# Patient Record
Sex: MALE | Race: WHITE | Employment: OTHER | URBAN - METROPOLITAN AREA
[De-identification: names, ages, dates, MRNs, and addresses within clinical notes are randomized per-mention and may not be internally consistent; named-entity substitution may affect disease eponyms.]

---

## 2021-11-19 ENCOUNTER — HOSPITAL ENCOUNTER (INPATIENT)
Age: 76
LOS: 13 days | Discharge: HOME HEALTH | DRG: 871 | End: 2021-12-02
Attending: EMERGENCY MEDICINE | Admitting: INTERNAL MEDICINE
Payer: MEDICARE

## 2021-11-19 ENCOUNTER — APPOINTMENT (OUTPATIENT)
Dept: CT IMAGING | Age: 76
DRG: 871 | End: 2021-11-19
Attending: EMERGENCY MEDICINE
Payer: MEDICARE

## 2021-11-19 ENCOUNTER — APPOINTMENT (OUTPATIENT)
Dept: CT IMAGING | Age: 76
DRG: 871 | End: 2021-11-19
Attending: INTERNAL MEDICINE
Payer: MEDICARE

## 2021-11-19 DIAGNOSIS — K81.0 ACUTE CHOLECYSTITIS: Primary | ICD-10-CM

## 2021-11-19 DIAGNOSIS — D72.829 LEUKOCYTOSIS, UNSPECIFIED TYPE: ICD-10-CM

## 2021-11-19 PROBLEM — I26.99 ACUTE PULMONARY EMBOLISM WITHOUT ACUTE COR PULMONALE (HCC): Status: ACTIVE | Noted: 2021-11-19

## 2021-11-19 PROBLEM — E87.20 LACTIC ACIDOSIS: Status: ACTIVE | Noted: 2021-11-19

## 2021-11-19 PROBLEM — I10 PRIMARY HYPERTENSION: Status: ACTIVE | Noted: 2021-11-19

## 2021-11-19 PROBLEM — N17.9 AKI (ACUTE KIDNEY INJURY) (HCC): Status: ACTIVE | Noted: 2021-11-19

## 2021-11-19 LAB
ALBUMIN SERPL-MCNC: 3.5 G/DL (ref 3.2–4.6)
ALBUMIN/GLOB SERPL: 1 {RATIO} (ref 1.2–3.5)
ALP SERPL-CCNC: 79 U/L (ref 50–136)
ALT SERPL-CCNC: 32 U/L (ref 12–65)
ANION GAP SERPL CALC-SCNC: 8 MMOL/L (ref 7–16)
APTT PPP: >200 SEC (ref 24.1–35.1)
AST SERPL-CCNC: 15 U/L (ref 15–37)
BASOPHILS # BLD: 0 K/UL (ref 0–0.2)
BASOPHILS NFR BLD: 0 % (ref 0–2)
BILIRUB SERPL-MCNC: 0.6 MG/DL (ref 0.2–1.1)
BUN SERPL-MCNC: 29 MG/DL (ref 8–23)
CALCIUM SERPL-MCNC: 9.5 MG/DL (ref 8.3–10.4)
CHLORIDE SERPL-SCNC: 110 MMOL/L (ref 98–107)
CO2 SERPL-SCNC: 25 MMOL/L (ref 21–32)
CREAT SERPL-MCNC: 1.7 MG/DL (ref 0.8–1.5)
DIFFERENTIAL METHOD BLD: ABNORMAL
EOSINOPHIL # BLD: 0 K/UL (ref 0–0.8)
EOSINOPHIL # BLD: 0.1 K/UL (ref 0–0.8)
EOSINOPHIL # BLD: 0.1 K/UL (ref 0–0.8)
EOSINOPHIL NFR BLD: 0 % (ref 0.5–7.8)
ERYTHROCYTE [DISTWIDTH] IN BLOOD BY AUTOMATED COUNT: 15.1 % (ref 11.9–14.6)
ERYTHROCYTE [DISTWIDTH] IN BLOOD BY AUTOMATED COUNT: 15.1 % (ref 11.9–14.6)
ERYTHROCYTE [DISTWIDTH] IN BLOOD BY AUTOMATED COUNT: 15.2 % (ref 11.9–14.6)
GLOBULIN SER CALC-MCNC: 3.4 G/DL (ref 2.3–3.5)
GLUCOSE SERPL-MCNC: 104 MG/DL (ref 65–100)
HCT VFR BLD AUTO: 42.6 % (ref 41.1–50.3)
HCT VFR BLD AUTO: 42.6 % (ref 41.1–50.3)
HCT VFR BLD AUTO: 44.4 % (ref 41.1–50.3)
HGB BLD-MCNC: 13.6 G/DL (ref 13.6–17.2)
HGB BLD-MCNC: 14 G/DL (ref 13.6–17.2)
HGB BLD-MCNC: 14.7 G/DL (ref 13.6–17.2)
IMM GRANULOCYTES # BLD AUTO: 0.1 K/UL (ref 0–0.5)
IMM GRANULOCYTES NFR BLD AUTO: 1 % (ref 0–5)
LACTATE SERPL-SCNC: 2.1 MMOL/L (ref 0.4–2)
LIPASE SERPL-CCNC: 84 U/L (ref 73–393)
LYMPHOCYTES # BLD: 0.8 K/UL (ref 0.5–4.6)
LYMPHOCYTES # BLD: 1 K/UL (ref 0.5–4.6)
LYMPHOCYTES # BLD: 1.2 K/UL (ref 0.5–4.6)
LYMPHOCYTES NFR BLD: 5 % (ref 13–44)
LYMPHOCYTES NFR BLD: 6 % (ref 13–44)
LYMPHOCYTES NFR BLD: 6 % (ref 13–44)
MCH RBC QN AUTO: 29.1 PG (ref 26.1–32.9)
MCH RBC QN AUTO: 29.9 PG (ref 26.1–32.9)
MCH RBC QN AUTO: 30.3 PG (ref 26.1–32.9)
MCHC RBC AUTO-ENTMCNC: 31.9 G/DL (ref 31.4–35)
MCHC RBC AUTO-ENTMCNC: 32.9 G/DL (ref 31.4–35)
MCHC RBC AUTO-ENTMCNC: 33.1 G/DL (ref 31.4–35)
MCV RBC AUTO: 90.8 FL (ref 79.6–97.8)
MCV RBC AUTO: 91 FL (ref 79.6–97.8)
MCV RBC AUTO: 91.5 FL (ref 79.6–97.8)
MONOCYTES # BLD: 1.1 K/UL (ref 0.1–1.3)
MONOCYTES NFR BLD: 6 % (ref 4–12)
MONOCYTES NFR BLD: 7 % (ref 4–12)
MONOCYTES NFR BLD: 7 % (ref 4–12)
NEUTS SEG # BLD: 13.8 K/UL (ref 1.7–8.2)
NEUTS SEG # BLD: 14.7 K/UL (ref 1.7–8.2)
NEUTS SEG # BLD: 16.5 K/UL (ref 1.7–8.2)
NEUTS SEG NFR BLD: 85 % (ref 43–78)
NEUTS SEG NFR BLD: 87 % (ref 43–78)
NEUTS SEG NFR BLD: 87 % (ref 43–78)
NRBC # BLD: 0 K/UL (ref 0–0.2)
PLATELET # BLD AUTO: 185 K/UL (ref 150–450)
PLATELET # BLD AUTO: 186 K/UL (ref 150–450)
PLATELET # BLD AUTO: 224 K/UL (ref 150–450)
PMV BLD AUTO: 10 FL (ref 9.4–12.3)
PMV BLD AUTO: 10.2 FL (ref 9.4–12.3)
PMV BLD AUTO: 10.4 FL (ref 9.4–12.3)
POTASSIUM SERPL-SCNC: 3.7 MMOL/L (ref 3.5–5.1)
PROT SERPL-MCNC: 6.9 G/DL (ref 6.3–8.2)
RBC # BLD AUTO: 4.68 M/UL (ref 4.23–5.6)
RBC # BLD AUTO: 4.69 M/UL (ref 4.23–5.6)
RBC # BLD AUTO: 4.85 M/UL (ref 4.23–5.6)
SODIUM SERPL-SCNC: 143 MMOL/L (ref 138–145)
UFH PPP CHRO-ACNC: 0.96 IU/ML (ref 0.3–0.7)
WBC # BLD AUTO: 16.2 K/UL (ref 4.3–11.1)
WBC # BLD AUTO: 16.8 K/UL (ref 4.3–11.1)
WBC # BLD AUTO: 19 K/UL (ref 4.3–11.1)

## 2021-11-19 PROCEDURE — 96374 THER/PROPH/DIAG INJ IV PUSH: CPT

## 2021-11-19 PROCEDURE — 74011000258 HC RX REV CODE- 258: Performed by: EMERGENCY MEDICINE

## 2021-11-19 PROCEDURE — 74011250636 HC RX REV CODE- 250/636: Performed by: EMERGENCY MEDICINE

## 2021-11-19 PROCEDURE — 83690 ASSAY OF LIPASE: CPT

## 2021-11-19 PROCEDURE — 86580 TB INTRADERMAL TEST: CPT | Performed by: INTERNAL MEDICINE

## 2021-11-19 PROCEDURE — 74011000258 HC RX REV CODE- 258: Performed by: INTERNAL MEDICINE

## 2021-11-19 PROCEDURE — 87040 BLOOD CULTURE FOR BACTERIA: CPT

## 2021-11-19 PROCEDURE — 36415 COLL VENOUS BLD VENIPUNCTURE: CPT

## 2021-11-19 PROCEDURE — 93005 ELECTROCARDIOGRAM TRACING: CPT | Performed by: EMERGENCY MEDICINE

## 2021-11-19 PROCEDURE — 2709999900 HC NON-CHARGEABLE SUPPLY

## 2021-11-19 PROCEDURE — 65270000029 HC RM PRIVATE

## 2021-11-19 PROCEDURE — 85520 HEPARIN ASSAY: CPT

## 2021-11-19 PROCEDURE — 83605 ASSAY OF LACTIC ACID: CPT

## 2021-11-19 PROCEDURE — 74011250637 HC RX REV CODE- 250/637: Performed by: INTERNAL MEDICINE

## 2021-11-19 PROCEDURE — 85730 THROMBOPLASTIN TIME PARTIAL: CPT

## 2021-11-19 PROCEDURE — 74011000636 HC RX REV CODE- 636: Performed by: EMERGENCY MEDICINE

## 2021-11-19 PROCEDURE — 80053 COMPREHEN METABOLIC PANEL: CPT

## 2021-11-19 PROCEDURE — 99284 EMERGENCY DEPT VISIT MOD MDM: CPT

## 2021-11-19 PROCEDURE — 85025 COMPLETE CBC W/AUTO DIFF WBC: CPT

## 2021-11-19 PROCEDURE — 74177 CT ABD & PELVIS W/CONTRAST: CPT

## 2021-11-19 PROCEDURE — 96375 TX/PRO/DX INJ NEW DRUG ADDON: CPT

## 2021-11-19 PROCEDURE — 74011000250 HC RX REV CODE- 250: Performed by: INTERNAL MEDICINE

## 2021-11-19 PROCEDURE — 81003 URINALYSIS AUTO W/O SCOPE: CPT

## 2021-11-19 PROCEDURE — 74011250636 HC RX REV CODE- 250/636: Performed by: INTERNAL MEDICINE

## 2021-11-19 RX ORDER — SODIUM CHLORIDE 9 MG/ML
100 INJECTION, SOLUTION INTRAVENOUS CONTINUOUS
Status: DISCONTINUED | OUTPATIENT
Start: 2021-11-19 | End: 2021-11-22

## 2021-11-19 RX ORDER — OXYCODONE AND ACETAMINOPHEN 5; 325 MG/1; MG/1
1 TABLET ORAL
Status: DISCONTINUED | OUTPATIENT
Start: 2021-11-19 | End: 2021-11-27

## 2021-11-19 RX ORDER — SODIUM CHLORIDE 0.9 % (FLUSH) 0.9 %
5-10 SYRINGE (ML) INJECTION AS NEEDED
Status: DISCONTINUED | OUTPATIENT
Start: 2021-11-19 | End: 2021-11-27

## 2021-11-19 RX ORDER — FAMOTIDINE 20 MG/1
20 TABLET, FILM COATED ORAL
Status: DISCONTINUED | OUTPATIENT
Start: 2021-11-19 | End: 2021-11-27

## 2021-11-19 RX ORDER — SODIUM CHLORIDE 0.9 % (FLUSH) 0.9 %
5-40 SYRINGE (ML) INJECTION AS NEEDED
Status: DISCONTINUED | OUTPATIENT
Start: 2021-11-19 | End: 2021-12-02 | Stop reason: HOSPADM

## 2021-11-19 RX ORDER — MAG HYDROX/ALUMINUM HYD/SIMETH 200-200-20
15 SUSPENSION, ORAL (FINAL DOSE FORM) ORAL
Status: DISCONTINUED | OUTPATIENT
Start: 2021-11-19 | End: 2021-12-02

## 2021-11-19 RX ORDER — SODIUM CHLORIDE 0.9 % (FLUSH) 0.9 %
10 SYRINGE (ML) INJECTION
Status: COMPLETED | OUTPATIENT
Start: 2021-11-19 | End: 2021-11-19

## 2021-11-19 RX ORDER — SODIUM CHLORIDE 0.9 % (FLUSH) 0.9 %
5-40 SYRINGE (ML) INJECTION EVERY 8 HOURS
Status: DISCONTINUED | OUTPATIENT
Start: 2021-11-19 | End: 2021-12-02 | Stop reason: HOSPADM

## 2021-11-19 RX ORDER — ONDANSETRON 4 MG/1
4 TABLET, ORALLY DISINTEGRATING ORAL
Status: DISCONTINUED | OUTPATIENT
Start: 2021-11-19 | End: 2021-12-02 | Stop reason: HOSPADM

## 2021-11-19 RX ORDER — HEPARIN SODIUM 5000 [USP'U]/100ML
18-36 INJECTION, SOLUTION INTRAVENOUS
Status: DISCONTINUED | OUTPATIENT
Start: 2021-11-19 | End: 2021-11-22

## 2021-11-19 RX ORDER — SODIUM CHLORIDE 0.9 % (FLUSH) 0.9 %
5-10 SYRINGE (ML) INJECTION EVERY 8 HOURS
Status: DISCONTINUED | OUTPATIENT
Start: 2021-11-19 | End: 2021-11-27

## 2021-11-19 RX ORDER — ONDANSETRON 2 MG/ML
4 INJECTION INTRAMUSCULAR; INTRAVENOUS
Status: DISCONTINUED | OUTPATIENT
Start: 2021-11-19 | End: 2021-12-02 | Stop reason: HOSPADM

## 2021-11-19 RX ORDER — POLYETHYLENE GLYCOL 3350 17 G/17G
17 POWDER, FOR SOLUTION ORAL DAILY PRN
Status: DISCONTINUED | OUTPATIENT
Start: 2021-11-19 | End: 2021-11-27

## 2021-11-19 RX ORDER — QUETIAPINE FUMARATE 25 MG/1
25 TABLET, FILM COATED ORAL
Status: DISCONTINUED | OUTPATIENT
Start: 2021-11-19 | End: 2021-12-02 | Stop reason: HOSPADM

## 2021-11-19 RX ORDER — ACETAMINOPHEN 650 MG/1
650 SUPPOSITORY RECTAL
Status: DISCONTINUED | OUTPATIENT
Start: 2021-11-19 | End: 2021-12-02 | Stop reason: HOSPADM

## 2021-11-19 RX ORDER — ONDANSETRON 2 MG/ML
4 INJECTION INTRAMUSCULAR; INTRAVENOUS
Status: COMPLETED | OUTPATIENT
Start: 2021-11-19 | End: 2021-11-19

## 2021-11-19 RX ORDER — FACIAL-BODY WIPES
10 EACH TOPICAL DAILY PRN
Status: DISCONTINUED | OUTPATIENT
Start: 2021-11-19 | End: 2021-12-01

## 2021-11-19 RX ORDER — MORPHINE SULFATE 2 MG/ML
2 INJECTION, SOLUTION INTRAMUSCULAR; INTRAVENOUS
Status: DISCONTINUED | OUTPATIENT
Start: 2021-11-19 | End: 2021-11-21

## 2021-11-19 RX ORDER — HEPARIN SODIUM 1000 [USP'U]/ML
80 INJECTION, SOLUTION INTRAVENOUS; SUBCUTANEOUS ONCE
Status: COMPLETED | OUTPATIENT
Start: 2021-11-19 | End: 2021-11-19

## 2021-11-19 RX ORDER — HYDRALAZINE HYDROCHLORIDE 20 MG/ML
10 INJECTION INTRAMUSCULAR; INTRAVENOUS
Status: DISCONTINUED | OUTPATIENT
Start: 2021-11-19 | End: 2021-11-21

## 2021-11-19 RX ORDER — SODIUM CHLORIDE 0.9 % (FLUSH) 0.9 %
10 SYRINGE (ML) INJECTION
Status: DISCONTINUED | OUTPATIENT
Start: 2021-11-19 | End: 2021-11-19

## 2021-11-19 RX ORDER — ACETAMINOPHEN 325 MG/1
650 TABLET ORAL
Status: DISCONTINUED | OUTPATIENT
Start: 2021-11-19 | End: 2021-12-02 | Stop reason: HOSPADM

## 2021-11-19 RX ORDER — MORPHINE SULFATE 4 MG/ML
4 INJECTION INTRAVENOUS
Status: COMPLETED | OUTPATIENT
Start: 2021-11-19 | End: 2021-11-19

## 2021-11-19 RX ADMIN — MORPHINE SULFATE 2 MG: 2 INJECTION, SOLUTION INTRAMUSCULAR; INTRAVENOUS at 18:13

## 2021-11-19 RX ADMIN — CEFTRIAXONE 2 G: 2 INJECTION, POWDER, FOR SOLUTION INTRAMUSCULAR; INTRAVENOUS at 17:39

## 2021-11-19 RX ADMIN — HEPARIN SODIUM 18 UNITS/KG/HR: 5000 INJECTION, SOLUTION INTRAVENOUS at 14:54

## 2021-11-19 RX ADMIN — PIPERACILLIN SODIUM AND TAZOBACTAM SODIUM 4.5 G: 4; .5 INJECTION, POWDER, LYOPHILIZED, FOR SOLUTION INTRAVENOUS at 14:06

## 2021-11-19 RX ADMIN — Medication 10 ML: at 14:00

## 2021-11-19 RX ADMIN — SODIUM CHLORIDE, SODIUM LACTATE, POTASSIUM CHLORIDE, AND CALCIUM CHLORIDE 1000 ML: 600; 310; 30; 20 INJECTION, SOLUTION INTRAVENOUS at 11:00

## 2021-11-19 RX ADMIN — MORPHINE SULFATE 4 MG: 4 INJECTION INTRAVENOUS at 11:00

## 2021-11-19 RX ADMIN — IOPAMIDOL 100 ML: 755 INJECTION, SOLUTION INTRAVENOUS at 12:23

## 2021-11-19 RX ADMIN — SODIUM CHLORIDE 100 ML: 900 INJECTION, SOLUTION INTRAVENOUS at 12:23

## 2021-11-19 RX ADMIN — HEPARIN SODIUM 18 UNITS/KG/HR: 5000 INJECTION, SOLUTION INTRAVENOUS at 15:15

## 2021-11-19 RX ADMIN — SODIUM CHLORIDE 100 ML/HR: 900 INJECTION, SOLUTION INTRAVENOUS at 17:37

## 2021-11-19 RX ADMIN — Medication 10 ML: at 22:14

## 2021-11-19 RX ADMIN — ONDANSETRON 4 MG: 2 INJECTION INTRAMUSCULAR; INTRAVENOUS at 11:00

## 2021-11-19 RX ADMIN — HEPARIN SODIUM 7640 UNITS: 1000 INJECTION, SOLUTION INTRAVENOUS; SUBCUTANEOUS at 14:50

## 2021-11-19 RX ADMIN — TUBERCULIN PURIFIED PROTEIN DERIVATIVE 5 UNITS: 5 INJECTION, SOLUTION INTRADERMAL at 18:00

## 2021-11-19 RX ADMIN — Medication 10 ML: at 12:23

## 2021-11-19 RX ADMIN — QUETIAPINE FUMARATE 25 MG: 25 TABLET ORAL at 17:39

## 2021-11-19 NOTE — PROGRESS NOTES
STAT MRI abdomen was order on this patient in the ER. Patient is from a assisted living, so he cannot complete his own screening form. Patient is also in restrains. We cannot do an abdomen STAT based on NPO status. The RN from 201 also stated he is talking out of his head. We are going to cancel this MRI for now and see what the doc wants to do in the AM of 11/20.

## 2021-11-19 NOTE — ED NOTES
Per phone conversation with Regi Rosales and Ivelisse Matos the daughters of the patient have stated that they are not the ones whom are making decisions. Contact Jeannine Harper about insurance and medical decisions. The daugthers are not in the patients life, and do not make any decisions. Regi Rosales stated she would contact Kavon Gomes and have her call the  at the ER.

## 2021-11-19 NOTE — PROGRESS NOTES
Pt received contrast at 12:45pm. Will have to wait 24 hours and have another stat creatinine drawn before giving more contrast. Message left for RN to return call to give message.
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
## 2021-11-19 NOTE — CONSULTS
H&P/Consult Note/Progress Note/Office Note:   Clarissa Rolon  MRN: [id number]  :1945  Age:76 y.o. General Surgery Consult ordered by: Dr. Nayan Mosqueda  Reason for General Surgery Consult: Acute Cholecystitis    HPI: Clarissa Rolon is a 78 y.o. male with a past medical history of Asthma, HTN, and PTSD. Pt is a poor historian and information was obtained from chart. Pt presented to the ED 21 from a nursing facility with c/o abdominal pain. WBC 16.8    21 CT of Abd and Pelvis  IMPRESSION     1. Possible filling defect in a left lower lobe pulmonary artery, only partially  imaged but worrisome for acute pulmonary embolism. A CTPA is recommended for  further evaluation.     2. Distended gallbladder with wall thickening and pericholecystic inflammatory  changes worrisome for acute cholecystitis. Secondary inflammatory changes from  biliary obstruction are also a consideration as the intrahepatic and  extrahepatic bile ducts are dilated. Consider MRI/MRCP if there is concern for  biliary obstruction.     3. Advanced atherosclerosis with high-grade stenosis in the proximal SMA and  high-grade stenosis versus occlusion at the origin of the left SFA. Past Medical History:   Diagnosis Date    Asthma     Hypertension      History reviewed. No pertinent surgical history.   Current Facility-Administered Medications   Medication Dose Route Frequency    sodium chloride (NS) flush 5-10 mL  5-10 mL IntraVENous Q8H    sodium chloride (NS) flush 5-10 mL  5-10 mL IntraVENous PRN    sodium chloride (NS) flush 5-40 mL  5-40 mL IntraVENous Q8H    sodium chloride (NS) flush 5-40 mL  5-40 mL IntraVENous PRN    acetaminophen (TYLENOL) tablet 650 mg  650 mg Oral Q6H PRN    Or    acetaminophen (TYLENOL) suppository 650 mg  650 mg Rectal Q6H PRN    polyethylene glycol (MIRALAX) packet 17 g  17 g Oral DAILY PRN    bisacodyL (DULCOLAX) suppository 10 mg  10 mg Rectal DAILY PRN    ondansetron ([employer] ODT) tablet 4 mg  4 mg Oral Q8H PRN    Or    ondansetron (ZOFRAN) injection 4 mg  4 mg IntraVENous Q6H PRN    famotidine (PEPCID) tablet 20 mg  20 mg Oral BID PRN    alum-mag hydroxide-simeth (MYLANTA) oral suspension 15 mL  15 mL Oral Q6H PRN    heparin 25,000 units in dextrose 500 mL infusion  18-36 Units/kg/hr IntraVENous TITRATE    cefTRIAXone (ROCEPHIN) 2 g in 0.9% sodium chloride (MBP/ADV) 50 mL MBP  2 g IntraVENous Q24H    oxyCODONE-acetaminophen (PERCOCET) 5-325 mg per tablet 1 Tablet  1 Tablet Oral Q4H PRN    morphine injection 2 mg  2 mg IntraVENous Q3H PRN    0.9% sodium chloride infusion  100 mL/hr IntraVENous CONTINUOUS    hydrALAZINE (APRESOLINE) 20 mg/mL injection 10 mg  10 mg IntraVENous Q6H PRN    tuberculin injection 5 Units  5 Units IntraDERMal ONCE    QUEtiapine (SEROquel) tablet 25 mg  25 mg Oral QHS     Patient has no known allergies. Social History     Socioeconomic History    Marital status: SINGLE     Social History     Tobacco Use   Smoking Status Not on file   Smokeless Tobacco Not on file     History reviewed. No pertinent family history. ROS: The patient has no difficulty with chest pain or shortness of breath. No fever or chills. Comprehensive review of systems was otherwise unremarkable except as noted above. Physical Exam:   Visit Vitals  BP (!) 173/73   Pulse 60   Temp 97.8 °F (36.6 °C)   Resp 16   Wt 210 lb 9.6 oz (95.5 kg)   SpO2 94%     Vitals:    11/19/21 1141 11/19/21 1344 11/19/21 1412 11/19/21 1545   BP: (!) 166/73  (!) 185/78 (!) 173/73   Pulse: (!) 52  (!) 58 60   Resp:    16   Temp:    97.8 °F (36.6 °C)   SpO2: 94%  94% 94%   Weight:  210 lb 9.6 oz (95.5 kg)       No intake/output data recorded. No intake/output data recorded. Constitutional: Alert, uncooperative, no acute distress; appears stated age    Eyes:Sclera are clear.  EOMs intact  ENMT: no external lesions gross hearing normal; no obvious neck masses, no ear or lip lesions, nares normal  CV: Bradycardic,   Abdomen: Protuberant, mildly ttp Right upper abd     Musculoskeletal: unremarkable with normal function. No embolic signs or cyanosis. Neuro:  Oriented; moves all 4; no focal deficits  Psychiatric: normal affect and mood, no memory impairment    Recent vitals (if inpt):  Patient Vitals for the past 24 hrs:   BP Temp Pulse Resp SpO2 Weight   11/19/21 1545 (!) 173/73 97.8 °F (36.6 °C) 60 16 94 % --   11/19/21 1412 (!) 185/78 -- (!) 58 -- 94 % --   11/19/21 1344 -- -- -- -- -- 210 lb 9.6 oz (95.5 kg)   11/19/21 1141 (!) 166/73 -- (!) 52 -- 94 % --   11/19/21 1018 -- 98 °F (36.7 °C) -- -- -- --   11/19/21 1017 (!) 198/90 -- (!) 50 16 97 % --       Amount and/or Complexity of Data Reviewed and Analyzed:  I reviewed and analyzed all of the unique labs and radiologic studies that are shown below as well as any that are in the HPI, and any that are in the expanded problem list below  *Each unique test, order, or document contributes to the combination of 2 or combination of 3 in Category 1 below. For this visit I also reviewed old records and prior notes. Recent Labs     11/19/21  1856 11/19/21  1630 11/19/21  1629 11/19/21  1019   WBC 16.2*   < >  --  19.0*   HGB 13.6   < >  --  14.7      < >  --  224   NA  --   --   --  143   K  --   --   --  3.7   CL  --   --   --  110*   CO2  --   --   --  25   BUN  --   --   --  29*   CREA  --   --   --  1.70*   GLU  --   --   --  104*   APTT  --   --  >200.0*  --    TBILI  --   --   --  0.6   ALT  --   --   --  32   AP  --   --   --  79   LPSE  --   --   --  84    < > = values in this interval not displayed.      Review of most recent CBC  Lab Results   Component Value Date/Time    WBC 16.2 (H) 11/19/2021 06:56 PM    HGB 13.6 11/19/2021 06:56 PM    HCT 42.6 11/19/2021 06:56 PM    PLATELET 780 65/18/2205 06:56 PM    MCV 91.0 11/19/2021 06:56 PM       Review of most recent BMP  Lab Results   Component Value Date/Time    Sodium 143 11/19/2021 10:19 AM Potassium 3.7 11/19/2021 10:19 AM    Chloride 110 (H) 11/19/2021 10:19 AM    CO2 25 11/19/2021 10:19 AM    Anion gap 8 11/19/2021 10:19 AM    Glucose 104 (H) 11/19/2021 10:19 AM    BUN 29 (H) 11/19/2021 10:19 AM    Creatinine 1.70 (H) 11/19/2021 10:19 AM    GFR est AA 51 (L) 11/19/2021 10:19 AM    GFR est non-AA 42 (L) 11/19/2021 10:19 AM    Calcium 9.5 11/19/2021 10:19 AM       Review of most recent LFTs (and lipase if done)  Lab Results   Component Value Date/Time    ALT (SGPT) 32 11/19/2021 10:19 AM    AST (SGOT) 15 11/19/2021 10:19 AM    Alk. phosphatase 79 11/19/2021 10:19 AM    Bilirubin, total 0.6 11/19/2021 10:19 AM     Lab Results   Component Value Date/Time    Lipase 84 11/19/2021 10:19 AM       Lab Results   Component Value Date/Time    aPTT >200.0 (HH) 11/19/2021 04:29 PM       Review of most recent HgbA1c  No results found for: HBA1C, IDM9ZESV, ARH6MABR    Nutritional assessment screen for wound healing issues:  Lab Results   Component Value Date/Time    Protein, total 6.9 11/19/2021 10:19 AM    Albumin 3.5 11/19/2021 10:19 AM       @lastcovr@  XR Results (most recent):  No results found for this or any previous visit. CT Results (most recent):  Results from Hospital Encounter encounter on 11/19/21    CT ABD PELV W CONT    Narrative  EXAMINATION: CT  ABDOMEN / PELVIS   11/19/2021 12:25 PM    ACCESSION NUMBER:  [id number]    INDICATION:  Right-sided abdominal pain and leukocytosis    COMPARISON: None available    TECHNIQUE: Contiguous axial computed tomographic images were obtained from the  domes of the diaphragm to the symphysis pubis after the intravenous  administration of 100 mL of Isovue 370. Coronal reconstructions were also  performed. Radiation dose reduction techniques were used for this study:  Our CT scanners  use one or all of the following: Automated exposure control, adjustment of the  mA and/or kVp according to patient's size, iterative reconstruction.     FINDINGS:    LOWER THORAX: Lung bases are clear without pleural effusion. Multiple calcified  granulomas. The heart is enlarged without pericardial effusion. Coronary artery  atherosclerotic calcifications. Possible filling defect within a left lower lobe  pulmonary artery. LIVER: Normal    BILIARY: The gallbladder is mildly distended with wall thickening and  pericholecystic fat stranding. There is dilation of the intrahepatic and  extrahepatic bile ducts with the common bile duct measuring up to 1.2 cm. SPLEEN: Numerous calcified granulomas. Normal in size. PANCREAS: No pancreatic duct dilation or mass. ADRENALS: No adrenal nodules or hypertrophy. URINARY: Kidneys are symmetric in size and enhancement. No renal mass or  hydronephrosis. There are punctate nonobstructing right renal calculi. BOWEL: The stomach, small bowel, and large bowel are normal in caliber and wall  thickness. No inflammatory changes. There are postsurgical changes of partial  sigmoid colectomy with patent primary anastomosis. The appendix is not  visualized, however, no secondary signs of acute appendicitis. VESSELS: The abdominal aorta and iliac arterial system are nonaneurysmal with  severe atherosclerotic calcifications. There is high-grade stenosis or occlusion  in the origin of the left superficial femoral artery. There is also short  segment high-grade stenosis within the proximal superior mesenteric artery due  to calcific and fibrofatty plaque. NODES: No lymphadenopathy. FLUID: No free intraperitoneal fluid. REPRODUCTIVE: Mild prostatomegaly. BONES/SOFT TISSUES: Regional soft tissues are within normal limits. No acute or  aggressive osseous abnormality. Impression  1. Possible filling defect in a left lower lobe pulmonary artery, only partially  imaged but worrisome for acute pulmonary embolism. A CTPA is recommended for  further evaluation.     2. Distended gallbladder with wall thickening and pericholecystic inflammatory  changes worrisome for acute cholecystitis. Secondary inflammatory changes from  biliary obstruction are also a consideration as the intrahepatic and  extrahepatic bile ducts are dilated. Consider MRI/MRCP if there is concern for  biliary obstruction. 3. Advanced atherosclerosis with high-grade stenosis in the proximal SMA and  high-grade stenosis versus occlusion at the origin of the left SFA. Notification: The significant results of the study were discussed with Dr. Papo Carmen at 12:53 PM on 11/19/2021. US Results (most recent):  No results found for this or any previous visit. Admission date (for inpatients): 11/19/2021   * No surgery found *  * No surgery found *        ASSESSMENT/PLAN:  Problem List  Date Reviewed: 11/19/2021          Codes Class Noted    Acute cholecystitis ICD-10-CM: K81.0  ICD-9-CM: 575.0  11/19/2021        Acute pulmonary embolism without acute cor pulmonale (HCC) ICD-10-CM: I26.99  ICD-9-CM: 415.19  11/19/2021        TONIA (acute kidney injury) ([facility] ICD-10-CM: N17.9  ICD-9-CM: 584.9  11/19/2021        Lactic acidosis ICD-10-CM: E87.2  ICD-9-CM: 276.2  11/19/2021        Primary hypertension ICD-10-CM: I10  ICD-9-CM: 401.9  11/19/2021            Active Problems:    Acute cholecystitis (11/19/2021)      Acute pulmonary embolism without acute cor pulmonale ([facility] (11/19/2021)      TONIA (acute kidney injury) ([facility] (11/19/2021)      Lactic acidosis (11/19/2021)      Primary hypertension (11/19/2021)           Number and Complexity of Problems addressed and   Risks of complications and/or morbidity of management    Plan:    Care Management per Hospitalist  IV Abx - Ceftriaxone  MRCP  NPO  General Surgery will follow.    Further orders per Dr. Blanca Harris, NP

## 2021-11-19 NOTE — ED PROVIDER NOTES
Patient is a very poor historian but comes in with some abdominal complaints. Pain on exam more than by his awareness is seeing this more on the right side more right upper abdomen. Never had any abdominal surgeries that he is aware of in their note surgical markings. Feels as though he has some worsening constipation beyond his chronic constipation. No melena. Patient lives in a nursing facility most likely due to ability to care for himself and some probable dementia-like changes of minimal paperwork was sent with him that is available to me unaware of fever. .    The history is provided by the patient. Abdominal Pain   This is a new problem. The pain is located in the generalized abdominal region and RUQ. Pertinent negatives include no fever, no diarrhea, no vomiting and no constipation. Nothing worsens the pain. The pain is relieved by nothing. His past medical history does not include PUD, GERD, pancreatitis or diverticulitis. No history of abdominal surgery       Past Medical History:   Diagnosis Date    Asthma     Hypertension        History reviewed. No pertinent surgical history. History reviewed. No pertinent family history.     Social History     Socioeconomic History    Marital status: Not on file     Spouse name: Not on file    Number of children: Not on file    Years of education: Not on file    Highest education level: Not on file   Occupational History    Not on file   Tobacco Use    Smoking status: Not on file    Smokeless tobacco: Not on file   Substance and Sexual Activity    Alcohol use: Not on file    Drug use: Not on file    Sexual activity: Not on file   Other Topics Concern    Not on file   Social History Narrative    Not on file     Social Determinants of Health     Financial Resource Strain:     Difficulty of Paying Living Expenses: Not on file   Food Insecurity:     Worried About [address] in the Last Year: Not on file    [address] in the Last Year: Not on file   Transportation Needs:     Lack of Transportation (Medical): Not on file    Lack of Transportation (Non-Medical): Not on file   Physical Activity:     Days of Exercise per Week: Not on file    Minutes of Exercise per Session: Not on file   Stress:     Feeling of Stress : Not on file   Social Connections:     Frequency of Communication with Friends and Family: Not on file    Frequency of Social Gatherings with Friends and Family: Not on file    Attends Hinduism Services: Not on file    Active Member of [address] [place] [address] [employer] or Organizations: Not on file    Attends Club or Organization Meetings: Not on file    Marital Status: Not on file   Intimate Partner Violence:     Fear of Current or Ex-Partner: Not on file    Emotionally Abused: Not on file    Physically Abused: Not on file    Sexually Abused: Not on file   Housing Stability:     Unable to Pay for Housing in the Last Year: Not on file    Number of [place] in the Last Year: Not on file    Unstable Housing in the Last Year: Not on file         ALLERGIES: Patient has no known allergies. Review of Systems   Unable to perform ROS: Other (History quality from patient is a very poor)   Constitutional: Negative for fever. Respiratory: Negative. Cardiovascular: Negative. Gastrointestinal: Positive for abdominal pain. Negative for constipation, diarrhea and vomiting. All other systems reviewed and are negative. Vitals:    11/19/21 1017 11/19/21 1018   BP: (!) 198/90    Pulse: (!) 50    Resp: 16    Temp:  98 °F (36.7 °C)   SpO2: 97%             Physical Exam  Vitals and nursing note reviewed. Constitutional:       General: He is not in acute distress. Appearance: He is obese. He is ill-appearing. He is not toxic-appearing. HENT:      Head: Atraumatic. Eyes:      General: No scleral icterus. Cardiovascular:      Rate and Rhythm: Regular rhythm. Bradycardia present.    Pulmonary:      Effort: Pulmonary effort is normal. Breath sounds: Normal breath sounds. No wheezing or rhonchi. Abdominal:      General: Abdomen is protuberant. Bowel sounds are decreased. Palpations: Abdomen is soft. Tenderness: There is abdominal tenderness in the right upper quadrant. There is no right CVA tenderness, left CVA tenderness, guarding or rebound. Negative signs include Casper's sign. Hernia: No hernia is present. Comments: Some right-sided soreness it is more to the upper than lower right abdomen. No overt peritoneal findings. No abdominal wall defect hernia or bruising   Skin:     Coloration: Skin is not cyanotic or jaundiced. Neurological:      General: No focal deficit present. Psychiatric:         Behavior: Behavior normal.      Comments: Patient at that time somewhat uncooperative though probably not intentionally and feels mainly related to baseline mental status          MDM  Number of Diagnoses or Management Options  Acute cholecystitis  Leukocytosis, unspecified type  Diagnosis management comments: Patient somewhat uncooperative at times mainly probably due to baseline mild dementia appearing changes. Resents with abdominal pain that he poorly localized but on exam is most consistent with right middle and upper abdomen. No lorenzo peritoneal findings. Patient felt issues were mainly constipation due to meds so findings on exam and diagnostics consistent with probable acute cholecystitis.        Amount and/or Complexity of Data Reviewed  Clinical lab tests: ordered and reviewed  Tests in the radiology section of CPT®: ordered and reviewed  Tests in the medicine section of CPT®: (============================================  ED EKG Interpretation  EKG was interpreted in the absence of a cardiologist.    EKG rhythm: normal sinus rhythm, sinus bradycardia  Rate: 55  ST Segments: Normal ST segments - NO STEMI      Chepe Gilbert MD; 11/19/2021 @7:06 PM================  )  Review and summarize past medical records: yes  Discuss the patient with other providers: yes  Independent visualization of images, tracings, or specimens: yes    Risk of Complications, Morbidity, and/or Mortality  Presenting problems: moderate  Diagnostic procedures: moderate  Management options: moderate           Procedures

## 2021-11-19 NOTE — PROGRESS NOTES
Chani Milling from MRI is canceling MRI due to patient being poor historian. CT Chest Pulmonary Embolism was accidentally also d/c. Nurse spoke with Dorcarlos Milling and MRI will reverse the cancellation of the chest CT.

## 2021-11-19 NOTE — PROGRESS NOTES
TRANSFER - IN REPORT:    Verbal report received from NALLELY Merritt(name) on Tom Saucedo  being received from ED(unit) for routine progression of care      Report consisted of patients Situation, Background, Assessment and   Recommendations(SBAR). Information from the following report(s) SBAR, Kardex, Procedure Summary, Intake/Output and MAR was reviewed with the receiving nurse. Opportunity for questions and clarification was provided. Assessment completed upon patients arrival to unit and care assumed.

## 2021-11-19 NOTE — PROGRESS NOTES
Chart review complete, attempted to meet with pt at bedside, pt would not provide information about insurance or family, only states [facility] CM called spoke with [facility] staff at [email or web address] who states pt just recently moved into their community and they do not have any family contact information on the pt, once again CM attempted to get information from pt and he is still not answering questions for this CM, only states he has a daughter who pays the bills but will not provide contact information, also states he is [facility]% [facility] but will not provide any information on any of that as well. CM will remain available to assist as needed.     Care Management Interventions  PCP Verified by CM:  (unknown)  MyChart Signup: No  Discharge Durable Medical Equipment: No  Physical Therapy Consult: No  Occupational Therapy Consult: No  Speech Therapy Consult: No  Support Systems: Other (Comment) (Shannan Nevarez [phone])  Discharge Location  Discharge Placement: Unable to determine at this time

## 2021-11-19 NOTE — ED TRIAGE NOTES
Patient presents via [facility] from [address] [place] [address] ([address]. [place] [address])  With complaints of bilateral lower quadrant abdominal pain. Patient states that he thinks the staff gave him to much medication last night. Patient complaints of nausea, no vomiting.  BP enroute 162/97    95% RA  HR 50

## 2021-11-20 ENCOUNTER — APPOINTMENT (OUTPATIENT)
Dept: CT IMAGING | Age: 76
DRG: 871 | End: 2021-11-20
Attending: INTERNAL MEDICINE
Payer: MEDICARE

## 2021-11-20 PROBLEM — G93.41 ACUTE METABOLIC ENCEPHALOPATHY: Status: ACTIVE | Noted: 2021-11-20

## 2021-11-20 LAB
ALBUMIN SERPL-MCNC: 2.8 G/DL (ref 3.2–4.6)
ALBUMIN/GLOB SERPL: 0.8 {RATIO} (ref 1.2–3.5)
ALP SERPL-CCNC: 133 U/L (ref 50–136)
ALT SERPL-CCNC: 44 U/L (ref 12–65)
AMMONIA PLAS-SCNC: 13 UMOL/L (ref 11–32)
AMPHET UR QL SCN: NEGATIVE
ANION GAP SERPL CALC-SCNC: 6 MMOL/L (ref 7–16)
AST SERPL-CCNC: 30 U/L (ref 15–37)
ATRIAL RATE: 55 BPM
BARBITURATES UR QL SCN: NEGATIVE
BASOPHILS # BLD: 0 K/UL (ref 0–0.2)
BASOPHILS NFR BLD: 0 % (ref 0–2)
BENZODIAZ UR QL: NEGATIVE
BILIRUB DIRECT SERPL-MCNC: 0.4 MG/DL
BILIRUB SERPL-MCNC: 0.7 MG/DL (ref 0.2–1.1)
BUN SERPL-MCNC: 24 MG/DL (ref 8–23)
CALCIUM SERPL-MCNC: 8.9 MG/DL (ref 8.3–10.4)
CALCULATED P AXIS, ECG09: 21 DEGREES
CALCULATED R AXIS, ECG10: 20 DEGREES
CALCULATED T AXIS, ECG11: -168 DEGREES
CANNABINOIDS UR QL SCN: NEGATIVE
CHLORIDE SERPL-SCNC: 114 MMOL/L (ref 98–107)
CO2 SERPL-SCNC: 24 MMOL/L (ref 21–32)
COCAINE UR QL SCN: NEGATIVE
CREAT SERPL-MCNC: 1.04 MG/DL (ref 0.8–1.5)
DIAGNOSIS, 93000: NORMAL
DIFFERENTIAL METHOD BLD: ABNORMAL
EOSINOPHIL # BLD: 0 K/UL (ref 0–0.8)
EOSINOPHIL NFR BLD: 0 % (ref 0.5–7.8)
ERYTHROCYTE [DISTWIDTH] IN BLOOD BY AUTOMATED COUNT: 15.1 % (ref 11.9–14.6)
GLOBULIN SER CALC-MCNC: 3.6 G/DL (ref 2.3–3.5)
GLUCOSE SERPL-MCNC: 90 MG/DL (ref 65–100)
HCT VFR BLD AUTO: 40.1 % (ref 41.1–50.3)
HGB BLD-MCNC: 13 G/DL (ref 13.6–17.2)
IMM GRANULOCYTES # BLD AUTO: 0.1 K/UL (ref 0–0.5)
IMM GRANULOCYTES NFR BLD AUTO: 1 % (ref 0–5)
LACTATE SERPL-SCNC: 0.7 MMOL/L (ref 0.4–2)
LYMPHOCYTES # BLD: 1.5 K/UL (ref 0.5–4.6)
LYMPHOCYTES NFR BLD: 10 % (ref 13–44)
MCH RBC QN AUTO: 29.5 PG (ref 26.1–32.9)
MCHC RBC AUTO-ENTMCNC: 32.4 G/DL (ref 31.4–35)
MCV RBC AUTO: 90.9 FL (ref 79.6–97.8)
METHADONE UR QL: NEGATIVE
MM INDURATION POC: 0 MM (ref 0–5)
MONOCYTES # BLD: 1.4 K/UL (ref 0.1–1.3)
MONOCYTES NFR BLD: 9 % (ref 4–12)
NEUTS SEG # BLD: 11.9 K/UL (ref 1.7–8.2)
NEUTS SEG NFR BLD: 80 % (ref 43–78)
NRBC # BLD: 0 K/UL (ref 0–0.2)
OPIATES UR QL: POSITIVE
P-R INTERVAL, ECG05: 170 MS
PCP UR QL: NEGATIVE
PLATELET # BLD AUTO: 156 K/UL (ref 150–450)
PMV BLD AUTO: 10.4 FL (ref 9.4–12.3)
POTASSIUM SERPL-SCNC: 3.5 MMOL/L (ref 3.5–5.1)
PPD POC: NORMAL
PROT SERPL-MCNC: 6.4 G/DL (ref 6.3–8.2)
Q-T INTERVAL, ECG07: 450 MS
QRS DURATION, ECG06: 98 MS
QTC CALCULATION (BEZET), ECG08: 430 MS
RBC # BLD AUTO: 4.41 M/UL (ref 4.23–5.6)
SODIUM SERPL-SCNC: 144 MMOL/L (ref 138–145)
TSH SERPL DL<=0.005 MIU/L-ACNC: 1.59 UIU/ML (ref 0.36–3.74)
UFH PPP CHRO-ACNC: 0.37 IU/ML (ref 0.3–0.7)
UFH PPP CHRO-ACNC: 0.56 IU/ML (ref 0.3–0.7)
UFH PPP CHRO-ACNC: 0.82 IU/ML (ref 0.3–0.7)
VENTRICULAR RATE, ECG03: 55 BPM
VIT B12 SERPL-MCNC: 240 PG/ML (ref 193–986)
WBC # BLD AUTO: 14.9 K/UL (ref 4.3–11.1)

## 2021-11-20 PROCEDURE — 74011000250 HC RX REV CODE- 250: Performed by: INTERNAL MEDICINE

## 2021-11-20 PROCEDURE — 70450 CT HEAD/BRAIN W/O DYE: CPT

## 2021-11-20 PROCEDURE — 82607 VITAMIN B-12: CPT

## 2021-11-20 PROCEDURE — 85520 HEPARIN ASSAY: CPT

## 2021-11-20 PROCEDURE — 85025 COMPLETE CBC W/AUTO DIFF WBC: CPT

## 2021-11-20 PROCEDURE — 74011250636 HC RX REV CODE- 250/636: Performed by: INTERNAL MEDICINE

## 2021-11-20 PROCEDURE — 77010033678 HC OXYGEN DAILY

## 2021-11-20 PROCEDURE — 84443 ASSAY THYROID STIM HORMONE: CPT

## 2021-11-20 PROCEDURE — 86592 SYPHILIS TEST NON-TREP QUAL: CPT

## 2021-11-20 PROCEDURE — 80048 BASIC METABOLIC PNL TOTAL CA: CPT

## 2021-11-20 PROCEDURE — 82140 ASSAY OF AMMONIA: CPT

## 2021-11-20 PROCEDURE — 83605 ASSAY OF LACTIC ACID: CPT

## 2021-11-20 PROCEDURE — 94760 N-INVAS EAR/PLS OXIMETRY 1: CPT

## 2021-11-20 PROCEDURE — 80076 HEPATIC FUNCTION PANEL: CPT

## 2021-11-20 PROCEDURE — 74011000258 HC RX REV CODE- 258: Performed by: INTERNAL MEDICINE

## 2021-11-20 PROCEDURE — 80307 DRUG TEST PRSMV CHEM ANLYZR: CPT

## 2021-11-20 PROCEDURE — 74011250637 HC RX REV CODE- 250/637: Performed by: INTERNAL MEDICINE

## 2021-11-20 PROCEDURE — 74011250636 HC RX REV CODE- 250/636: Performed by: FAMILY MEDICINE

## 2021-11-20 PROCEDURE — 2709999900 HC NON-CHARGEABLE SUPPLY

## 2021-11-20 PROCEDURE — 65270000029 HC RM PRIVATE

## 2021-11-20 PROCEDURE — 99254 IP/OBS CNSLTJ NEW/EST MOD 60: CPT | Performed by: SURGERY

## 2021-11-20 PROCEDURE — 84425 ASSAY OF VITAMIN B-1: CPT

## 2021-11-20 PROCEDURE — 36415 COLL VENOUS BLD VENIPUNCTURE: CPT

## 2021-11-20 PROCEDURE — 74011000636 HC RX REV CODE- 636: Performed by: INTERNAL MEDICINE

## 2021-11-20 PROCEDURE — 74011000250 HC RX REV CODE- 250: Performed by: FAMILY MEDICINE

## 2021-11-20 PROCEDURE — 71260 CT THORAX DX C+: CPT

## 2021-11-20 RX ORDER — SODIUM CHLORIDE 0.9 % (FLUSH) 0.9 %
10 SYRINGE (ML) INJECTION
Status: COMPLETED | OUTPATIENT
Start: 2021-11-20 | End: 2021-11-20

## 2021-11-20 RX ORDER — HYDRALAZINE HYDROCHLORIDE 20 MG/ML
20 INJECTION INTRAMUSCULAR; INTRAVENOUS ONCE
Status: COMPLETED | OUTPATIENT
Start: 2021-11-20 | End: 2021-11-20

## 2021-11-20 RX ADMIN — THIAMINE HYDROCHLORIDE 500 MG: 100 INJECTION, SOLUTION INTRAMUSCULAR; INTRAVENOUS at 17:05

## 2021-11-20 RX ADMIN — Medication 10 ML: at 06:00

## 2021-11-20 RX ADMIN — MORPHINE SULFATE 2 MG: 2 INJECTION, SOLUTION INTRAMUSCULAR; INTRAVENOUS at 07:36

## 2021-11-20 RX ADMIN — Medication 10 ML: at 21:52

## 2021-11-20 RX ADMIN — MORPHINE SULFATE 2 MG: 2 INJECTION, SOLUTION INTRAMUSCULAR; INTRAVENOUS at 15:20

## 2021-11-20 RX ADMIN — SODIUM CHLORIDE 100 ML: 900 INJECTION, SOLUTION INTRAVENOUS at 14:18

## 2021-11-20 RX ADMIN — HYDRALAZINE HYDROCHLORIDE 20 MG: 20 INJECTION INTRAMUSCULAR; INTRAVENOUS at 08:58

## 2021-11-20 RX ADMIN — Medication 10 ML: at 14:18

## 2021-11-20 RX ADMIN — THIAMINE HYDROCHLORIDE 500 MG: 100 INJECTION, SOLUTION INTRAMUSCULAR; INTRAVENOUS at 21:56

## 2021-11-20 RX ADMIN — IOPAMIDOL 80 ML: 755 INJECTION, SOLUTION INTRAVENOUS at 14:18

## 2021-11-20 RX ADMIN — Medication 10 ML: at 21:51

## 2021-11-20 RX ADMIN — MORPHINE SULFATE 2 MG: 2 INJECTION, SOLUTION INTRAMUSCULAR; INTRAVENOUS at 10:13

## 2021-11-20 RX ADMIN — HYDRALAZINE HYDROCHLORIDE 10 MG: 20 INJECTION INTRAMUSCULAR; INTRAVENOUS at 05:48

## 2021-11-20 RX ADMIN — PIPERACILLIN SODIUM AND TAZOBACTAM SODIUM 3.38 G: 3; .375 INJECTION, POWDER, LYOPHILIZED, FOR SOLUTION INTRAVENOUS at 15:20

## 2021-11-20 RX ADMIN — PIPERACILLIN SODIUM AND TAZOBACTAM SODIUM 3.38 G: 3; .375 INJECTION, POWDER, LYOPHILIZED, FOR SOLUTION INTRAVENOUS at 10:07

## 2021-11-20 RX ADMIN — ZIPRASIDONE MESYLATE 10 MG: 20 INJECTION, POWDER, LYOPHILIZED, FOR SOLUTION INTRAMUSCULAR at 02:15

## 2021-11-20 RX ADMIN — HEPARIN SODIUM 14 UNITS/KG/HR: 5000 INJECTION, SOLUTION INTRAVENOUS at 06:58

## 2021-11-20 RX ADMIN — HEPARIN SODIUM 14 UNITS/KG/HR: 5000 INJECTION, SOLUTION INTRAVENOUS at 07:47

## 2021-11-20 RX ADMIN — QUETIAPINE FUMARATE 25 MG: 25 TABLET ORAL at 21:57

## 2021-11-20 RX ADMIN — MORPHINE SULFATE 2 MG: 2 INJECTION, SOLUTION INTRAMUSCULAR; INTRAVENOUS at 01:25

## 2021-11-20 RX ADMIN — HYDRALAZINE HYDROCHLORIDE 10 MG: 20 INJECTION INTRAMUSCULAR; INTRAVENOUS at 00:05

## 2021-11-20 RX ADMIN — OLANZAPINE 5 MG: 10 INJECTION, POWDER, FOR SOLUTION INTRAMUSCULAR at 15:35

## 2021-11-20 NOTE — PROGRESS NOTES
END OF SHIFT NOTE:    INTAKE/OUTPUT  11/19 0701 - 11/20 0700  In: 1201 [I.V.:1201]  Out: 200 [Urine:200]  Voiding: YES  Catheter: NO  Drain:   External Urinary Catheter 11/19/21 (Active)   Site Assessment Clean, dry, & intact 11/20/21 0200   Repositioned Yes 11/20/21 0200   Perineal Care Yes 11/20/21 0200   Urine Output (mL) 200 ml 11/19/21 2355               Flatus: Patient does not have flatus present. Stool:  0 occurrences. Characteristics:       Emesis: 0 occurrences. Characteristics:        VITAL SIGNS  Patient Vitals for the past 12 hrs:   Temp Pulse Resp BP SpO2   11/20/21 0440 98.9 °F (37.2 °C) 78 16 (!) 195/89 91 %   11/19/21 2315 98.7 °F (37.1 °C) 71 18 (!) 188/85 95 %   11/19/21 1940 97.9 °F (36.6 °C) 66 17 (!) 184/71 97 %       Pain Assessment  Pain Intensity 1: 0 (11/19/21 1959)  Pain Location 1: Abdomen     Patient Stated Pain Goal: 0    Ambulating  No    Shift report given to oncoming nurse at the bedside.     Yuli Proctor RN

## 2021-11-20 NOTE — PROGRESS NOTES
Nurse to bedside to reassess. Patient appears restless and has removed external carcamo cath. Patient admitted to pain \"allover\". Nurse administered PRN pain med. Nurse requested staff assistance in providing eugenia care. Patient became physically and verbally aggressive with staff with restraints in place. Concern communicated with physician and medication order obtained.

## 2021-11-20 NOTE — PROGRESS NOTES
END OF SHIFT NOTE:    INTAKE/OUTPUT  No intake/output data recorded. Voiding: YES  Catheter: YES; External Cath  Drain:      Flatus: Patient does not have flatus present. Stool:  0 occurrences. Characteristics:       Emesis: 0 occurrences. Characteristics:        VITAL SIGNS  Patient Vitals for the past 12 hrs:   Temp Pulse Resp BP SpO2   11/19/21 1545 97.8 °F (36.6 °C) 60 16 (!) 173/73 94 %   11/19/21 1412 -- (!) 58 -- (!) 185/78 94 %   11/19/21 1141 -- (!) 52 -- (!) 166/73 94 %   11/19/21 1018 98 °F (36.7 °C) -- -- -- --   11/19/21 1017 -- (!) 50 16 (!) 198/90 97 %       Pain Assessment  Pain Intensity 1: 8 (11/19/21 1813)  Pain Location 1: Abdomen          Ambulating  No    Shift report given to oncoming nurse at the bedside.     Anastacio Nicolas RN
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
## 2021-11-20 NOTE — PROGRESS NOTES
Reviewed notes for new spiritual concerns    Staff asked  to visit when he was on the floor    Patient was calm    Sitter at bedside    Provided supportive presence since this is our first encounter    Pt was receptive to visit    Patient asked about a  ( no phillip listed on chart)  This was not a purposeful request     asked patient if we could take advantage of our time together    We talked and then we prayed.     There was a calmness that came over the patient for a moment    Patient was very peaceful when  left the room    Will follow closely during this stay

## 2021-11-20 NOTE — PROGRESS NOTES
Problem: Non-Violent Restraints  Goal: Removal from restraints as soon as assessed to be safe  Outcome: Not Progressing Towards Goal  Goal: No harm/injury to patient while restraints in use  Outcome: Progressing Towards Goal  Goal: Patient's dignity will be maintained  Outcome: Progressing Towards Goal  Goal: Patient Interventions  Outcome: Progressing Towards Goal     Problem: Falls - Risk of  Goal: *Absence of Falls  Description: Document Xiang Casashenry Fall Risk and appropriate interventions in the flowsheet.   Outcome: Progressing Towards Goal  Note: Fall Risk Interventions:  Mobility Interventions: Bed/chair exit alarm, Communicate number of staff needed for ambulation/transfer, Patient to call before getting OOB    Mentation Interventions: Bed/chair exit alarm, Adequate sleep, hydration, pain control, Door open when patient unattended, Evaluate medications/consider consulting pharmacy, Family/sitter at bedside, Increase mobility, More frequent rounding, Reorient patient, Toileting rounds    Medication Interventions: Bed/chair exit alarm, Evaluate medications/consider consulting pharmacy, Patient to call before getting OOB    Elimination Interventions: Bed/chair exit alarm, Call light in reach, Patient to call for help with toileting needs

## 2021-11-20 NOTE — PROGRESS NOTES
11/19/21 1530   Dual Skin Pressure Injury Assessment   Dual Skin Pressure Injury Assessment X   Second Care Provider (Based on [address] [place] [address]) NALLELY Luna   Skin Integumentary   Skin Integumentary (WDL) X    Pressure  Injury Documentation No Pressure Injury Noted-Pressure Ulcer Prevention Initiated   Skin Color Appropriate for ethnicity; Ecchymosis (comment); Hyperpigmentation   Skin Condition/Temp Dry; Flaky; Fragile;  Warm   Skin Integrity Scars (comment)  (abd scar, very dry skin)   Turgor Leathery   Hair Growth Present   Nails X   Varicosities Present   Exceptions to WDL Discolored

## 2021-11-20 NOTE — PROGRESS NOTES
H&P/Consult Note/Progress Note/Office Note:   Shu Maza  MRN: [id number]  :1945  Age:76 y.o. General Surgery Consult ordered by: Dr. Mata Gill  Reason for General Surgery Consult: Acute Cholecystitis    HPI: Shu Maza is a 78 y.o. male with a past medical history of Asthma, HTN, and PTSD. Pt is a poor historian and information was obtained from chart. Pt presented to the ED 21 from a nursing facility with c/o abdominal pain. WBC 16.8    21 CT of Abd and Pelvis  IMPRESSION     1. Possible filling defect in a left lower lobe pulmonary artery, only partially  imaged but worrisome for acute pulmonary embolism. A CTPA is recommended for  further evaluation.     2. Distended gallbladder with wall thickening and pericholecystic inflammatory  changes worrisome for acute cholecystitis. Secondary inflammatory changes from  biliary obstruction are also a consideration as the intrahepatic and  extrahepatic bile ducts are dilated. Consider MRI/MRCP if there is concern for  biliary obstruction.     3. Advanced atherosclerosis with high-grade stenosis in the proximal SMA and  high-grade stenosis versus occlusion at the origin of the left SFA. 21: Pt awake in bed. Abdomen ttp; worse in RUQ. MRCP yesterday was canceled. Past Medical History:   Diagnosis Date    Asthma     Hypertension      History reviewed. No pertinent surgical history.   Current Facility-Administered Medications   Medication Dose Route Frequency    piperacillin-tazobactam (ZOSYN) 3.375 g in 0.9% sodium chloride (MBP/ADV) 100 mL MBP  3.375 g IntraVENous Q8H    sodium chloride 0.9 % bolus infusion 100 mL  100 mL IntraVENous RAD ONCE    saline peripheral flush soln 10 mL  10 mL InterCATHeter RAD ONCE    iopamidoL (ISOVUE-370) 76 % injection 80 mL  80 mL IntraVENous ONCE    sodium chloride (NS) flush 5-10 mL  5-10 mL IntraVENous Q8H    sodium chloride (NS) flush 5-10 mL  5-10 mL IntraVENous PRN    sodium chloride (NS) flush 5-40 mL  5-40 mL IntraVENous Q8H    sodium chloride (NS) flush 5-40 mL  5-40 mL IntraVENous PRN    acetaminophen (TYLENOL) tablet 650 mg  650 mg Oral Q6H PRN    Or    acetaminophen (TYLENOL) suppository 650 mg  650 mg Rectal Q6H PRN    polyethylene glycol (MIRALAX) packet 17 g  17 g Oral DAILY PRN    bisacodyL (DULCOLAX) suppository 10 mg  10 mg Rectal DAILY PRN    ondansetron (ZOFRAN ODT) tablet 4 mg  4 mg Oral Q8H PRN    Or    ondansetron (ZOFRAN) injection 4 mg  4 mg IntraVENous Q6H PRN    famotidine (PEPCID) tablet 20 mg  20 mg Oral BID PRN    alum-mag hydroxide-simeth (MYLANTA) oral suspension 15 mL  15 mL Oral Q6H PRN    heparin 25,000 units in dextrose 500 mL infusion  18-36 Units/kg/hr IntraVENous TITRATE    oxyCODONE-acetaminophen (PERCOCET) 5-325 mg per tablet 1 Tablet  1 Tablet Oral Q4H PRN    morphine injection 2 mg  2 mg IntraVENous Q3H PRN    0.9% sodium chloride infusion  100 mL/hr IntraVENous CONTINUOUS    hydrALAZINE (APRESOLINE) 20 mg/mL injection 10 mg  10 mg IntraVENous Q6H PRN    tuberculin injection 5 Units  5 Units IntraDERMal ONCE    QUEtiapine (SEROquel) tablet 25 mg  25 mg Oral QHS     Patient has no known allergies. Social History     Socioeconomic History    Marital status: SINGLE     Social History     Tobacco Use   Smoking Status Not on file   Smokeless Tobacco Not on file     History reviewed. No pertinent family history. ROS: The patient has no difficulty with chest pain or shortness of breath. No fever or chills. Comprehensive review of systems was otherwise unremarkable except as noted above.     Physical Exam:   Visit Vitals  BP (!) 134/99   Pulse 86   Temp 97.4 °F (36.3 °C)   Resp 22   Wt 210 lb 9.6 oz (95.5 kg)   SpO2 93%     Vitals:    11/20/21 0440 11/20/21 0726 11/20/21 0807 11/20/21 1015   BP: (!) 195/89 (!) 215/89 (!) 194/82 (!) 134/99   Pulse: 78 86     Resp: 16 22     Temp: 98.9 °F (37.2 °C) 97.4 °F (36.3 °C)     SpO2: 91% (!) 89% 93%    Weight:         11/20 0701 - 11/20 1900  In: 285 [I.V.:285]  Out: -   11/18 1901 - 11/20 0700  In: 1201 [I.V.:1201]  Out: 200 [Urine:200]    Constitutional: Alert, cooperative, no acute distress; appears stated age    Eyes:Sclera are clear. EOMs intact  ENMT: no external lesions gross hearing normal; no obvious neck masses, no ear or lip lesions, nares normal  CV: Bradycardic,   Abdomen: Protuberant, +distended, generalized ttp worse RUQ     Musculoskeletal: unremarkable with normal function. No embolic signs or cyanosis. Neuro: moves all 4; no focal deficits  Psychiatric: normal affect and mood, no memory impairment    Recent vitals (if inpt):  Patient Vitals for the past 24 hrs:   BP Temp Pulse Resp SpO2 Weight   11/20/21 1015 (!) 134/99 -- -- -- -- --   11/20/21 0807 (!) 194/82 -- -- -- 93 % --   11/20/21 0726 (!) 215/89 97.4 °F (36.3 °C) 86 22 (!) 89 % --   11/20/21 0440 (!) 195/89 98.9 °F (37.2 °C) 78 16 91 % --   11/19/21 2315 (!) 188/85 98.7 °F (37.1 °C) 71 18 95 % --   11/19/21 1940 (!) 184/71 97.9 °F (36.6 °C) 66 17 97 % --   11/19/21 1545 (!) 173/73 97.8 °F (36.6 °C) 60 16 94 % --   11/19/21 1412 (!) 185/78 -- (!) 58 -- 94 % --   11/19/21 1344 -- -- -- -- -- 210 lb 9.6 oz (95.5 kg)       Amount and/or Complexity of Data Reviewed and Analyzed:  I reviewed and analyzed all of the unique labs and radiologic studies that are shown below as well as any that are in the HPI, and any that are in the expanded problem list below  *Each unique test, order, or document contributes to the combination of 2 or combination of 3 in Category 1 below. For this visit I also reviewed old records and prior notes.       Recent Labs     11/20/21  0541 11/19/21  1630 11/19/21  1629 11/19/21  1019   0000   WBC 14.9*   < >  --  19.0*  --    HGB 13.0*   < >  --  14.7  --       < >  --  224  --      --   --  143   < >   K 3.5  --   --  3.7   < >   *  --   --  110*   < >   CO2 24  --   --  25   < >   BUN 24* --   --  29*   < >   CREA 1.04  --   --  1.70*   < >   GLU 90  --   --  104*   < >   APTT  --   --  >200.0*  --   --    TBILI  --   --   --  0.6  --    ALT  --   --   --  32  --    AP  --   --   --  79  --    LPSE  --   --   --  84  --     < > = values in this interval not displayed. Review of most recent CBC  Lab Results   Component Value Date/Time    WBC 14.9 (H) 11/20/2021 05:41 AM    HGB 13.0 (L) 11/20/2021 05:41 AM    HCT 40.1 (L) 11/20/2021 05:41 AM    PLATELET 752 22/19/8306 05:41 AM    MCV 90.9 11/20/2021 05:41 AM       Review of most recent BMP  Lab Results   Component Value Date/Time    Sodium 144 11/20/2021 05:41 AM    Potassium 3.5 11/20/2021 05:41 AM    Chloride 114 (H) 11/20/2021 05:41 AM    CO2 24 11/20/2021 05:41 AM    Anion gap 6 (L) 11/20/2021 05:41 AM    Glucose 90 11/20/2021 05:41 AM    BUN 24 (H) 11/20/2021 05:41 AM    Creatinine 1.04 11/20/2021 05:41 AM    GFR est AA >60 11/20/2021 05:41 AM    GFR est non-AA >60 11/20/2021 05:41 AM    Calcium 8.9 11/20/2021 05:41 AM       Review of most recent LFTs (and lipase if done)  Lab Results   Component Value Date/Time    ALT (SGPT) 32 11/19/2021 10:19 AM    AST (SGOT) 15 11/19/2021 10:19 AM    Alk. phosphatase 79 11/19/2021 10:19 AM    Bilirubin, total 0.6 11/19/2021 10:19 AM     Lab Results   Component Value Date/Time    Lipase 84 11/19/2021 10:19 AM       Lab Results   Component Value Date/Time    aPTT >200.0 (HH) 11/19/2021 04:29 PM       Review of most recent HgbA1c  No results found for: HBA1C, IFU4XOXJ, BSY7MCVA, HHF4ZSDI    Nutritional assessment screen for wound healing issues:  Lab Results   Component Value Date/Time    Protein, total 6.9 11/19/2021 10:19 AM    Albumin 3.5 11/19/2021 10:19 AM       @lastcovr@  XR Results (most recent):  No results found for this or any previous visit.       CT Results (most recent):  Results from Hospital Encounter encounter on 11/19/21    CT ABD PELV W CONT    Narrative  EXAMINATION: CT  ABDOMEN / PELVIS 11/19/2021 12:25 PM    ACCESSION NUMBER:  [id number]    INDICATION:  Right-sided abdominal pain and leukocytosis    COMPARISON: None available    TECHNIQUE: Contiguous axial computed tomographic images were obtained from the  domes of the diaphragm to the symphysis pubis after the intravenous  administration of 100 mL of Isovue 370. Coronal reconstructions were also  performed. Radiation dose reduction techniques were used for this study:  Our CT scanners  use one or all of the following: Automated exposure control, adjustment of the  mA and/or kVp according to patient's size, iterative reconstruction. FINDINGS:    LOWER THORAX: Lung bases are clear without pleural effusion. Multiple calcified  granulomas. The heart is enlarged without pericardial effusion. Coronary artery  atherosclerotic calcifications. Possible filling defect within a left lower lobe  pulmonary artery. LIVER: Normal    BILIARY: The gallbladder is mildly distended with wall thickening and  pericholecystic fat stranding. There is dilation of the intrahepatic and  extrahepatic bile ducts with the common bile duct measuring up to 1.2 cm. SPLEEN: Numerous calcified granulomas. Normal in size. PANCREAS: No pancreatic duct dilation or mass. ADRENALS: No adrenal nodules or hypertrophy. URINARY: Kidneys are symmetric in size and enhancement. No renal mass or  hydronephrosis. There are punctate nonobstructing right renal calculi. BOWEL: The stomach, small bowel, and large bowel are normal in caliber and wall  thickness. No inflammatory changes. There are postsurgical changes of partial  sigmoid colectomy with patent primary anastomosis. The appendix is not  visualized, however, no secondary signs of acute appendicitis. VESSELS: The abdominal aorta and iliac arterial system are nonaneurysmal with  severe atherosclerotic calcifications.  There is high-grade stenosis or occlusion  in the origin of the left superficial femoral artery. There is also short  segment high-grade stenosis within the proximal superior mesenteric artery due  to calcific and fibrofatty plaque. NODES: No lymphadenopathy. FLUID: No free intraperitoneal fluid. REPRODUCTIVE: Mild prostatomegaly. BONES/SOFT TISSUES: Regional soft tissues are within normal limits. No acute or  aggressive osseous abnormality. Impression  1. Possible filling defect in a left lower lobe pulmonary artery, only partially  imaged but worrisome for acute pulmonary embolism. A CTPA is recommended for  further evaluation. 2. Distended gallbladder with wall thickening and pericholecystic inflammatory  changes worrisome for acute cholecystitis. Secondary inflammatory changes from  biliary obstruction are also a consideration as the intrahepatic and  extrahepatic bile ducts are dilated. Consider MRI/MRCP if there is concern for  biliary obstruction. 3. Advanced atherosclerosis with high-grade stenosis in the proximal SMA and  high-grade stenosis versus occlusion at the origin of the left SFA. Notification: The significant results of the study were discussed with Dr. Edy Borja at 12:53 PM on 11/19/2021. US Results (most recent):  No results found for this or any previous visit.         Admission date (for inpatients): 11/19/2021   * No surgery found *  * No surgery found *        ASSESSMENT/PLAN:  Problem List  Date Reviewed: 11/19/2021          Codes Class Noted    * (Principal) Acute metabolic encephalopathy EHY-[id number]: G93.41  ICD-9-CM: 348.31  11/20/2021        Acute cholecystitis ICD-10-CM: K81.0  ICD-9-CM: 575.0  11/19/2021        Acute pulmonary embolism without acute cor pulmonale ([facility] [address] ICD-10-CM: I26.99  ICD-9-CM: 415.19  11/19/2021        TONIA (acute kidney injury) ([facility] [address] ICD-10-CM: N17.9  ICD-9-CM: 584.9  11/19/2021        Lactic acidosis ICD-10-CM: E87.2  ICD-9-CM: 276.2  11/19/2021        Primary hypertension ICD-10-CM: I10  ICD-9-CM: 401.9  11/19/2021 Principal Problem:    Acute metabolic encephalopathy (13/36/7470)    Active Problems:    Acute cholecystitis (11/19/2021)      Acute pulmonary embolism without acute cor pulmonale ([facility] [address] (11/19/2021)      TONIA (acute kidney injury) ([facility] (11/19/2021)      Lactic acidosis (11/19/2021)      Primary hypertension (11/19/2021)           Number and Complexity of Problems addressed and   Risks of complications and/or morbidity of management    Plan:    Care Management per Hospitalist  IV Abx - Ceftriaxone  MRCP canceled   Clear Liquids  Heparin gtt  CT of Chest today to eval for Pulmonary Embolism    Mirela Le, MARGOTH

## 2021-11-21 PROBLEM — E53.8 B12 DEFICIENCY: Status: ACTIVE | Noted: 2021-11-21

## 2021-11-21 LAB
ANION GAP SERPL CALC-SCNC: 5 MMOL/L (ref 7–16)
ANION GAP SERPL CALC-SCNC: 8 MMOL/L (ref 7–16)
APPEARANCE UR: CLEAR
B PERT DNA SPEC QL NAA+PROBE: NOT DETECTED
BACTERIA URNS QL MICRO: 0 /HPF
BASOPHILS # BLD: 0 K/UL (ref 0–0.2)
BASOPHILS NFR BLD: 0 % (ref 0–2)
BILIRUB UR QL: NEGATIVE
BORDETELLA PARAPERTUSSIS PCR, BORPAR: NOT DETECTED
BUN SERPL-MCNC: 23 MG/DL (ref 8–23)
BUN SERPL-MCNC: 26 MG/DL (ref 8–23)
C PNEUM DNA SPEC QL NAA+PROBE: NOT DETECTED
CALCIUM SERPL-MCNC: 9.3 MG/DL (ref 8.3–10.4)
CALCIUM SERPL-MCNC: 9.3 MG/DL (ref 8.3–10.4)
CASTS URNS QL MICRO: 0 /LPF
CHLORIDE SERPL-SCNC: 111 MMOL/L (ref 98–107)
CHLORIDE SERPL-SCNC: 112 MMOL/L (ref 98–107)
CO2 SERPL-SCNC: 25 MMOL/L (ref 21–32)
CO2 SERPL-SCNC: 26 MMOL/L (ref 21–32)
COLOR UR: YELLOW
CREAT SERPL-MCNC: 1.06 MG/DL (ref 0.8–1.5)
CREAT SERPL-MCNC: 1.13 MG/DL (ref 0.8–1.5)
DIFFERENTIAL METHOD BLD: ABNORMAL
EOSINOPHIL # BLD: 0.1 K/UL (ref 0–0.8)
EOSINOPHIL NFR BLD: 1 % (ref 0.5–7.8)
EPI CELLS #/AREA URNS HPF: 0 /HPF
ERYTHROCYTE [DISTWIDTH] IN BLOOD BY AUTOMATED COUNT: 15.2 % (ref 11.9–14.6)
FLUAV SUBTYP SPEC NAA+PROBE: NOT DETECTED
FLUBV RNA SPEC QL NAA+PROBE: NOT DETECTED
GLUCOSE SERPL-MCNC: 111 MG/DL (ref 65–100)
GLUCOSE SERPL-MCNC: 90 MG/DL (ref 65–100)
GLUCOSE UR STRIP.AUTO-MCNC: NEGATIVE MG/DL
HADV DNA SPEC QL NAA+PROBE: NOT DETECTED
HCOV 229E RNA SPEC QL NAA+PROBE: NOT DETECTED
HCOV HKU1 RNA SPEC QL NAA+PROBE: NOT DETECTED
HCOV NL63 RNA SPEC QL NAA+PROBE: NOT DETECTED
HCOV OC43 RNA SPEC QL NAA+PROBE: NOT DETECTED
HCT VFR BLD AUTO: 37.8 % (ref 41.1–50.3)
HGB BLD-MCNC: 12.1 G/DL (ref 13.6–17.2)
HGB UR QL STRIP: NEGATIVE
HMPV RNA SPEC QL NAA+PROBE: NOT DETECTED
HPIV1 RNA SPEC QL NAA+PROBE: NOT DETECTED
HPIV2 RNA SPEC QL NAA+PROBE: NOT DETECTED
HPIV3 RNA SPEC QL NAA+PROBE: NOT DETECTED
HPIV4 RNA SPEC QL NAA+PROBE: NOT DETECTED
IMM GRANULOCYTES # BLD AUTO: 0 K/UL (ref 0–0.5)
IMM GRANULOCYTES NFR BLD AUTO: 0 % (ref 0–5)
KETONES UR QL STRIP.AUTO: NEGATIVE MG/DL
LEUKOCYTE ESTERASE UR QL STRIP.AUTO: NEGATIVE
LYMPHOCYTES # BLD: 1 K/UL (ref 0.5–4.6)
LYMPHOCYTES NFR BLD: 12 % (ref 13–44)
M PNEUMO DNA SPEC QL NAA+PROBE: NOT DETECTED
MAGNESIUM SERPL-MCNC: 2.1 MG/DL (ref 1.8–2.4)
MCH RBC QN AUTO: 29.3 PG (ref 26.1–32.9)
MCHC RBC AUTO-ENTMCNC: 32 G/DL (ref 31.4–35)
MCV RBC AUTO: 91.5 FL (ref 79.6–97.8)
MM INDURATION POC: 0 MM (ref 0–5)
MONOCYTES # BLD: 0.7 K/UL (ref 0.1–1.3)
MONOCYTES NFR BLD: 8 % (ref 4–12)
NEUTS SEG # BLD: 6.5 K/UL (ref 1.7–8.2)
NEUTS SEG NFR BLD: 77 % (ref 43–78)
NITRITE UR QL STRIP.AUTO: NEGATIVE
NRBC # BLD: 0 K/UL (ref 0–0.2)
PH UR STRIP: 6.5 [PH] (ref 5–9)
PLATELET # BLD AUTO: 177 K/UL (ref 150–450)
PMV BLD AUTO: 11 FL (ref 9.4–12.3)
POTASSIUM SERPL-SCNC: 2.9 MMOL/L (ref 3.5–5.1)
POTASSIUM SERPL-SCNC: 3.1 MMOL/L (ref 3.5–5.1)
PPD POC: NEGATIVE NEGATIVE
PROT UR STRIP-MCNC: ABNORMAL MG/DL
RBC # BLD AUTO: 4.13 M/UL (ref 4.23–5.6)
RBC #/AREA URNS HPF: ABNORMAL /HPF
RSV RNA SPEC QL NAA+PROBE: NOT DETECTED
RV+EV RNA SPEC QL NAA+PROBE: NOT DETECTED
SARS-COV-2 PCR, COVPCR: NOT DETECTED
SODIUM SERPL-SCNC: 142 MMOL/L (ref 138–145)
SODIUM SERPL-SCNC: 145 MMOL/L (ref 136–145)
SP GR UR REFRACTOMETRY: 1.02 (ref 1–1.02)
UFH PPP CHRO-ACNC: 0.44 IU/ML (ref 0.3–0.7)
UROBILINOGEN UR QL STRIP.AUTO: 1 EU/DL (ref 0.2–1)
WBC # BLD AUTO: 8.4 K/UL (ref 4.3–11.1)
WBC URNS QL MICRO: 0 /HPF

## 2021-11-21 PROCEDURE — 77030020847 HC STATLOK BARD -A

## 2021-11-21 PROCEDURE — 0202U NFCT DS 22 TRGT SARS-COV-2: CPT

## 2021-11-21 PROCEDURE — 77030041974 HC CATH SYS PERIPH TELE -B

## 2021-11-21 PROCEDURE — 83735 ASSAY OF MAGNESIUM: CPT

## 2021-11-21 PROCEDURE — 36415 COLL VENOUS BLD VENIPUNCTURE: CPT

## 2021-11-21 PROCEDURE — 97530 THERAPEUTIC ACTIVITIES: CPT

## 2021-11-21 PROCEDURE — 81003 URINALYSIS AUTO W/O SCOPE: CPT

## 2021-11-21 PROCEDURE — 85025 COMPLETE CBC W/AUTO DIFF WBC: CPT

## 2021-11-21 PROCEDURE — 80048 BASIC METABOLIC PNL TOTAL CA: CPT

## 2021-11-21 PROCEDURE — C9113 INJ PANTOPRAZOLE SODIUM, VIA: HCPCS | Performed by: FAMILY MEDICINE

## 2021-11-21 PROCEDURE — 99233 SBSQ HOSP IP/OBS HIGH 50: CPT | Performed by: SURGERY

## 2021-11-21 PROCEDURE — 76937 US GUIDE VASCULAR ACCESS: CPT

## 2021-11-21 PROCEDURE — 65270000029 HC RM PRIVATE

## 2021-11-21 PROCEDURE — 74011000258 HC RX REV CODE- 258: Performed by: INTERNAL MEDICINE

## 2021-11-21 PROCEDURE — 94760 N-INVAS EAR/PLS OXIMETRY 1: CPT

## 2021-11-21 PROCEDURE — 97166 OT EVAL MOD COMPLEX 45 MIN: CPT

## 2021-11-21 PROCEDURE — 97162 PT EVAL MOD COMPLEX 30 MIN: CPT

## 2021-11-21 PROCEDURE — 2709999900 HC NON-CHARGEABLE SUPPLY

## 2021-11-21 PROCEDURE — 74011250636 HC RX REV CODE- 250/636: Performed by: INTERNAL MEDICINE

## 2021-11-21 PROCEDURE — 74011250637 HC RX REV CODE- 250/637: Performed by: FAMILY MEDICINE

## 2021-11-21 PROCEDURE — 85018 HEMOGLOBIN: CPT

## 2021-11-21 PROCEDURE — 74011250637 HC RX REV CODE- 250/637: Performed by: INTERNAL MEDICINE

## 2021-11-21 PROCEDURE — 85520 HEPARIN ASSAY: CPT

## 2021-11-21 PROCEDURE — 74011250636 HC RX REV CODE- 250/636: Performed by: FAMILY MEDICINE

## 2021-11-21 PROCEDURE — 74011000250 HC RX REV CODE- 250: Performed by: FAMILY MEDICINE

## 2021-11-21 PROCEDURE — 74011250636 HC RX REV CODE- 250/636: Performed by: NURSE PRACTITIONER

## 2021-11-21 PROCEDURE — 97112 NEUROMUSCULAR REEDUCATION: CPT

## 2021-11-21 PROCEDURE — 77010033678 HC OXYGEN DAILY

## 2021-11-21 RX ORDER — IBUPROFEN 200 MG
1 TABLET ORAL EVERY 24 HOURS
Status: DISCONTINUED | OUTPATIENT
Start: 2021-11-21 | End: 2021-11-27

## 2021-11-21 RX ORDER — HYDRALAZINE HYDROCHLORIDE 20 MG/ML
20 INJECTION INTRAMUSCULAR; INTRAVENOUS
Status: DISCONTINUED | OUTPATIENT
Start: 2021-11-21 | End: 2021-11-21

## 2021-11-21 RX ORDER — POTASSIUM CHLORIDE 14.9 MG/ML
20 INJECTION INTRAVENOUS
Status: COMPLETED | OUTPATIENT
Start: 2021-11-21 | End: 2021-11-21

## 2021-11-21 RX ORDER — CHLORPROMAZINE HYDROCHLORIDE 25 MG/ML
50 INJECTION INTRAMUSCULAR
Status: DISPENSED | OUTPATIENT
Start: 2021-11-21 | End: 2021-11-22

## 2021-11-21 RX ORDER — HYDROMORPHONE HYDROCHLORIDE 1 MG/ML
0.5 INJECTION, SOLUTION INTRAMUSCULAR; INTRAVENOUS; SUBCUTANEOUS
Status: DISPENSED | OUTPATIENT
Start: 2021-11-21 | End: 2021-11-23

## 2021-11-21 RX ORDER — HYDROMORPHONE HYDROCHLORIDE 1 MG/ML
0.5 INJECTION, SOLUTION INTRAMUSCULAR; INTRAVENOUS; SUBCUTANEOUS
Status: COMPLETED | OUTPATIENT
Start: 2021-11-21 | End: 2021-11-21

## 2021-11-21 RX ORDER — POTASSIUM CHLORIDE 14.9 MG/ML
20 INJECTION INTRAVENOUS
Status: COMPLETED | OUTPATIENT
Start: 2021-11-21 | End: 2021-11-22

## 2021-11-21 RX ORDER — CLONIDINE HYDROCHLORIDE 0.2 MG/1
0.2 TABLET ORAL
Status: ACTIVE | OUTPATIENT
Start: 2021-11-21 | End: 2021-11-22

## 2021-11-21 RX ORDER — DILTIAZEM HYDROCHLORIDE 120 MG/1
120 CAPSULE, COATED, EXTENDED RELEASE ORAL DAILY
Status: DISCONTINUED | OUTPATIENT
Start: 2021-11-21 | End: 2021-12-02 | Stop reason: HOSPADM

## 2021-11-21 RX ORDER — HYDRALAZINE HYDROCHLORIDE 20 MG/ML
10 INJECTION INTRAMUSCULAR; INTRAVENOUS
Status: DISCONTINUED | OUTPATIENT
Start: 2021-11-21 | End: 2021-11-23

## 2021-11-21 RX ORDER — NALOXONE HYDROCHLORIDE 0.4 MG/ML
0.2 INJECTION, SOLUTION INTRAMUSCULAR; INTRAVENOUS; SUBCUTANEOUS
Status: DISCONTINUED | OUTPATIENT
Start: 2021-11-21 | End: 2021-12-02

## 2021-11-21 RX ORDER — CYANOCOBALAMIN 1000 UG/ML
1000 INJECTION, SOLUTION INTRAMUSCULAR; SUBCUTANEOUS EVERY OTHER DAY
Status: COMPLETED | OUTPATIENT
Start: 2021-11-21 | End: 2021-11-27

## 2021-11-21 RX ORDER — LORAZEPAM 2 MG/ML
1 INJECTION INTRAMUSCULAR ONCE
Status: COMPLETED | OUTPATIENT
Start: 2021-11-21 | End: 2021-11-21

## 2021-11-21 RX ORDER — DILTIAZEM HYDROCHLORIDE 5 MG/ML
10 INJECTION INTRAVENOUS ONCE
Status: DISCONTINUED | OUTPATIENT
Start: 2021-11-21 | End: 2021-11-21

## 2021-11-21 RX ORDER — IPRATROPIUM BROMIDE AND ALBUTEROL SULFATE 2.5; .5 MG/3ML; MG/3ML
3 SOLUTION RESPIRATORY (INHALATION)
Status: DISCONTINUED | OUTPATIENT
Start: 2021-11-21 | End: 2021-12-02 | Stop reason: HOSPADM

## 2021-11-21 RX ORDER — LORAZEPAM 2 MG/ML
2 INJECTION INTRAMUSCULAR
Status: COMPLETED | OUTPATIENT
Start: 2021-11-21 | End: 2021-11-22

## 2021-11-21 RX ADMIN — THIAMINE HYDROCHLORIDE 500 MG: 100 INJECTION, SOLUTION INTRAMUSCULAR; INTRAVENOUS at 16:00

## 2021-11-21 RX ADMIN — THIAMINE HYDROCHLORIDE 500 MG: 100 INJECTION, SOLUTION INTRAMUSCULAR; INTRAVENOUS at 21:34

## 2021-11-21 RX ADMIN — MORPHINE SULFATE 2 MG: 2 INJECTION, SOLUTION INTRAMUSCULAR; INTRAVENOUS at 12:58

## 2021-11-21 RX ADMIN — POTASSIUM CHLORIDE 20 MEQ: 14.9 INJECTION, SOLUTION INTRAVENOUS at 20:57

## 2021-11-21 RX ADMIN — HEPARIN SODIUM 14 UNITS/KG/HR: 5000 INJECTION, SOLUTION INTRAVENOUS at 21:25

## 2021-11-21 RX ADMIN — SODIUM CHLORIDE 40 MG: 9 INJECTION INTRAMUSCULAR; INTRAVENOUS; SUBCUTANEOUS at 23:59

## 2021-11-21 RX ADMIN — QUETIAPINE FUMARATE 25 MG: 25 TABLET ORAL at 21:01

## 2021-11-21 RX ADMIN — HYDRALAZINE HYDROCHLORIDE 10 MG: 20 INJECTION INTRAMUSCULAR; INTRAVENOUS at 11:51

## 2021-11-21 RX ADMIN — POTASSIUM CHLORIDE 20 MEQ: 14.9 INJECTION, SOLUTION INTRAVENOUS at 22:47

## 2021-11-21 RX ADMIN — PIPERACILLIN SODIUM AND TAZOBACTAM SODIUM 3.38 G: 3; .375 INJECTION, POWDER, LYOPHILIZED, FOR SOLUTION INTRAVENOUS at 00:04

## 2021-11-21 RX ADMIN — HEPARIN SODIUM 14 UNITS/KG/HR: 5000 INJECTION, SOLUTION INTRAVENOUS at 02:52

## 2021-11-21 RX ADMIN — PIPERACILLIN SODIUM AND TAZOBACTAM SODIUM 3.38 G: 3; .375 INJECTION, POWDER, LYOPHILIZED, FOR SOLUTION INTRAVENOUS at 15:43

## 2021-11-21 RX ADMIN — OXYCODONE HYDROCHLORIDE AND ACETAMINOPHEN 1 TABLET: 5; 325 TABLET ORAL at 08:08

## 2021-11-21 RX ADMIN — POTASSIUM CHLORIDE 20 MEQ: 14.9 INJECTION, SOLUTION INTRAVENOUS at 11:30

## 2021-11-21 RX ADMIN — DILTIAZEM HYDROCHLORIDE 120 MG: 120 CAPSULE, COATED, EXTENDED RELEASE ORAL at 16:57

## 2021-11-21 RX ADMIN — LORAZEPAM 1 MG: 2 INJECTION INTRAMUSCULAR; INTRAVENOUS at 18:18

## 2021-11-21 RX ADMIN — MORPHINE SULFATE 2 MG: 2 INJECTION, SOLUTION INTRAMUSCULAR; INTRAVENOUS at 17:06

## 2021-11-21 RX ADMIN — CYANOCOBALAMIN 1000 MCG: 1000 INJECTION, SOLUTION INTRAMUSCULAR; SUBCUTANEOUS at 20:53

## 2021-11-21 RX ADMIN — HYDRALAZINE HYDROCHLORIDE 20 MG: 20 INJECTION INTRAMUSCULAR; INTRAVENOUS at 15:53

## 2021-11-21 RX ADMIN — POTASSIUM CHLORIDE 20 MEQ: 14.9 INJECTION, SOLUTION INTRAVENOUS at 15:25

## 2021-11-21 RX ADMIN — SODIUM CHLORIDE 100 ML/HR: 900 INJECTION, SOLUTION INTRAVENOUS at 09:52

## 2021-11-21 RX ADMIN — Medication 5 ML: at 14:28

## 2021-11-21 RX ADMIN — PIPERACILLIN SODIUM AND TAZOBACTAM SODIUM 3.38 G: 3; .375 INJECTION, POWDER, LYOPHILIZED, FOR SOLUTION INTRAVENOUS at 23:18

## 2021-11-21 RX ADMIN — Medication 10 ML: at 06:07

## 2021-11-21 RX ADMIN — HYDROMORPHONE HYDROCHLORIDE 0.5 MG: 1 INJECTION, SOLUTION INTRAMUSCULAR; INTRAVENOUS; SUBCUTANEOUS at 23:16

## 2021-11-21 RX ADMIN — PIPERACILLIN SODIUM AND TAZOBACTAM SODIUM 3.38 G: 3; .375 INJECTION, POWDER, LYOPHILIZED, FOR SOLUTION INTRAVENOUS at 08:08

## 2021-11-21 RX ADMIN — OLANZAPINE 10 MG: 10 INJECTION, POWDER, FOR SOLUTION INTRAMUSCULAR at 20:50

## 2021-11-21 NOTE — PROGRESS NOTES
ACUTE PHYSICAL THERAPY GOALS:  (Developed with and agreed upon by patient and/or caregiver.)    (1.) Jose Vega  will move from supine to sit and sit to supine  with SUPERVISION within 7 treatment day(s). (2.) Jose Vega will transfer from bed to chair and chair to bed with SUPERVISION using the least restrictive device within 7 treatment day(s). (3.) Jose Vega will ambulate with SUPERVISION for 30 feet with the least restrictive device within 7 treatment day(s). (4.) Jose Vega will perform standing static and dynamic balance activities x 20 minutes with SUPERVISION to improve safety within 7 treatment day(s). (5.) Jose Vega will perform bilateral lower extremity exercises x 20 min for HEP with INDEPENDENCE to improve strength, endurance, and functional mobility within 7 treatment day(s).      PHYSICAL THERAPY ASSESSMENT: Initial Assessment, Daily Note and AM PT Treatment Day # 1      Jose Vega is a 78 y.o. male   PRIMARY DIAGNOSIS: Acute metabolic encephalopathy  Acute cholecystitis [K81.0]       Reason for Referral:    ICD-10: Treatment Diagnosis: Difficulty in walking, Not elsewhere classified (R26.2)  [place] / [place] ONLY / Product Type: Medicare /     ASSESSMENT:     REHAB RECOMMENDATIONS:   Recommendation to date pending progress:  Setting:   Short-term Rehab  Equipment:    To Be Determined     PRIOR LEVEL OF FUNCTION:  (Prior to Hospitalization) INITIAL/CURRENT LEVEL OF FUNCTION:  (Most Recently Demonstrated)   Bed Mobility:   Unknown  Sit to Stand:   Unknown  Transfers:   Unknown  Gait/Mobility:   Unknown Bed Mobility:   Minimal Assistance x 2  Sit to Stand:   Moderate Assistance x 2  Transfers:   Moderate Assistance x 2  Gait/Mobility:   Moderate Assistance x 2     ASSESSMENT:  Mr. Daniel Medley is a 78year old M who presents with acute metabolic encephalopathy, came from [facility] with abdominal pain, found to have acute cholecystitis. PLOF unclear as pt is confused. This date pt performs mobility including bed mobility with Sharath of 2. SPT to [facility] with modA of 2. Throughout session pt was agitated and impulsive, requiring mittens to be placed mid session for safety as he was attempting to pull out lines and hit therapists. Mobility primarily limited by cognitive status, pt moved well when calm. At end of session pt returned to supine, 4 point restraints in place and RN present, pt calm. Pt presents as functioning below his baseline, with deficits in mobility including transfers, gait, balance, and activity tolerance. Pt will benefit from skilled therapy services to address stated deficits to promote return to highest level of function, independence, and safety. Will continue to follow.      SUBJECTIVE:   Mr. Clau Martinez states, \"take these off of me\"    SOCIAL HISTORY/LIVING ENVIRONMENT: PLOF: came from [facility], unclear mobility status  Support Systems: Other (Comment) (Shannan Isaiah De Nini 634)  OBJECTIVE:     PAIN: VITAL SIGNS: LINES/DRAINS:   Pre Treatment: Pain Screen  Pain Scale 1: Numeric (0 - 10)  Pain Intensity 1: 0  Post Treatment: 0 Vital Signs  O2 Sat (%): 91 % IV and Purewick  O2 Device: Nasal cannula     GROSS EVALUATION:  B LE Within Functional Limits Abnormal/ Functional Abnormal/ Non-Functional (see comments) Not Tested Comments:   AROM [x] [] [] []    PROM [] [] [] [x]    Strength [] [x] [] [] Generalized weakness   Balance [] [x] [] [] Fair sitting, poor standing    Posture [] [x] [] [] Forward flexed   Sensation [] [] [] [x]    Coordination [] [] [] [x]    Tone [] [] [] [x]    Edema [] [] [] [x]    Activity Tolerance [] [x] [] [] desats to high 80s on exertion    [] [] [] []      COGNITION/  PERCEPTION: Intact Impaired   (see comments) Comments:   Orientation [] [x] Self only    Vision [x] []    Hearing [x] []    Command Following [] [x] Requires frequent redirection and repetition of cues Safety Awareness [] [x] Attempting to pull lines, lack of insight into deficits, very impulsive    [] []      MOBILITY: I Mod I S SBA CGA Min Mod Max Total  NT x2 Comments:   Bed Mobility    Rolling [] [] [] [] [] [x] [] [] [] [] [x]    Supine to Sit [] [] [] [] [] [x] [] [] [] [] [x]    Scooting [] [] [] [] [] [x] [] [] [] [] [x]    Sit to Supine [] [] [] [] [] [x] [] [] [] [] [x]    Transfers    Sit to Stand [] [] [] [] [] [] [x] [] [] [] [x]    Bed to Chair [] [] [] [] [] [] [x] [] [] [] [x]    Stand to Sit [] [] [] [] [] [] [x] [] [] [] [x]    I=Independent, Mod I=Modified Independent, S=Supervision, SBA=Standby Assistance, CGA=Contact Guard Assistance,   Min=Minimal Assistance, Mod=Moderate Assistance, Max=Maximal Assistance, Total=Total Assistance, NT=Not Tested  GAIT: I Mod I S SBA CGA Min Mod Max Total  NT x2 Comments:   Level of Assistance [] [] [] [] [] [] [x] [] [] [] [x]    Distance 2 ft    DME Rolling Walker    Gait Quality Unsteady, impulsive    Weightbearing Status N/A     I=Independent, Mod I=Modified Independent, S=Supervision, SBA=Standby Assistance, CGA=Contact Guard Assistance,   Min=Minimal Assistance, Mod=Moderate Assistance, Max=Maximal Assistance, Total=Total Assistance, NT=Not Tested    [address] [facility] [address] [address] AM-[facility] Form       How much difficulty does the patient currently have. .. Unable A Lot A Little None   1. Turning over in bed (including adjusting bedclothes, sheets and blankets)? [] 1   [] 2   [x] 3   [] 4   2. Sitting down on and standing up from a chair with arms ( e.g., wheelchair, bedside commode, etc.)   [] 1   [x] 2   [] 3   [] 4   3. Moving from lying on back to sitting on the side of the bed? [] 1   [] 2   [x] 3   [] 4   How much help from another person does the patient currently need. .. Total A Lot A Little None   4. Moving to and from a bed to a chair (including a wheelchair)? [] 1   [x] 2   [] 3   [] 4   5.   Need to walk in hospital room? [] 1   [x] 2   [] 3   [] 4   6. Climbing 3-5 steps with a railing? [] 1   [x] 2   [] 3   [] 4   © 2007, Trustees of [address] [place] [address] [address] [id number], under license to [employer]. All rights reserved     Score:  Initial: 14 Most Recent: X (Date: -- )    Interpretation of Tool:  Represents activities that are increasingly more difficult (i.e. Bed mobility, Transfers, Gait). PLAN:   FREQUENCY/DURATION: PT Plan of Care: 3 times/week for duration of hospital stay or until stated goals are met, whichever comes first.    PROBLEM LIST:   (Skilled intervention is medically necessary to address:)  1. Decreased ADL/Functional Activities  2. Decreased Activity Tolerance  3. Decreased Balance  4. Decreased Cognition  5. Decreased Gait Ability  6. Decreased Strength  7. Decreased Transfer Abilities   INTERVENTIONS PLANNED:   (Benefits and precautions of physical therapy have been discussed with the patient.)  1. Therapeutic Activity  2. Therapeutic Exercise/HEP  3. Neuromuscular Re-education  4. Gait Training  5. Education     TREATMENT:     EVALUATION: Moderate Complexity : (Untimed Charge)    TREATMENT:   ($$ Therapeutic Activity: 8-22 mins    )  Co-Treatment PT/OT necessary due to patient's decreased overall endurance/tolerance levels, as well as need for high level skilled assistance to complete functional transfers/mobility and functional tasks  Therapeutic Activity (19 Minutes): Therapeutic activity included Rolling, Supine to Sit, Sit to Supine, Scooting, Lateral Scooting, Transfer Training, Ambulation on level ground, Sitting balance  and Standing balance to improve functional Mobility, Strength and Activity tolerance.     TREATMENT GRID:  N/A    AFTER TREATMENT POSITION/PRECAUTIONS:  Bed, Needs within reach, Restraints  and RN notified    INTERDISCIPLINARY COLLABORATION:  RN/PCT and OT/GARCÍA    TOTAL TREATMENT DURATION:  PT Patient Time In/Time Out  Time In: 1022  Time Out: [facility],

## 2021-11-21 NOTE — PROGRESS NOTES
END OF SHIFT NOTE:    INTAKE/OUTPUT  11/20 0701 - 11/21 0700  In: 0859 [I.V.:1842]  Out: 425 [Urine:425]  Voiding: YES  Catheter: YES  Drain:   External Urinary Catheter 11/19/21 (Active)   Site Assessment Clean, dry, & intact 11/21/21 0710   Repositioned No 11/21/21 0710   Perineal Care No 11/21/21 0710   Wick Changed No 11/21/21 0710   Suction Canister/Tubing Changed No 11/21/21 0710   Urine Output (mL) 75 ml 11/20/21 1920               Flatus: Patient does have flatus present. Stool:  1 occurrences. Characteristics:  Stool Assessment  Stool Color: Janna Noel (very dark brown)  Stool Appearance: Formed  Stool Amount: Medium  Stool Source/Status: Rectum    Emesis: 0 occurrences. Characteristics:        VITAL SIGNS  Patient Vitals for the past 12 hrs:   Temp Pulse Resp BP SpO2   11/21/21 1741 -- 93 -- (!) 206/90 --   11/21/21 1629 -- 93 -- (!) 235/95 --   11/21/21 1550 -- 88 -- (!) 205/77 --   11/21/21 1521 97.9 °F (36.6 °C) 88 21 -- 98 %   11/21/21 1151 -- 69 -- (!) 194/84 --   11/21/21 1144 98.1 °F (36.7 °C) 69 20 (!) 194/84 97 %   11/21/21 1100 -- -- -- -- 91 %   11/21/21 0706 98 °F (36.7 °C) 73 21 (!) 178/90 97 %       Pain Assessment  Pain Intensity 1: 8 (11/21/21 1332)  Pain Location 1: Abdomen  Pain Intervention(s) 1: Medication (see MAR)  Patient Stated Pain Goal: 0    Ambulating  Yes (with PT/OT) patient unsteady    Shift report given to oncoming nurse at the bedside.     Shyla Bush, RN

## 2021-11-21 NOTE — PROGRESS NOTES
Patients /95 30 min after hydralazine. Patient is refusing clonidine, stated \"I will spit it out. \" He reported that he took diltiazem at home but does not know dose. Hospitalist notified. Diltiazem ordered.

## 2021-11-21 NOTE — PROGRESS NOTES
H&P/Consult Note/Progress Note/Office Note:   Korey Hill  MRN: [id number]  :1945  Age:76 y.o. General Surgery Consult ordered by: Dr. Cintia Avalos  Reason for General Surgery Consult: Acute Cholecystitis    HPI: Korey Hill is a 78 y.o. male with a past medical history of Asthma, HTN, and PTSD. Pt is a poor historian and information was obtained from chart. Pt presented to the ED 21 from a nursing facility with c/o abdominal pain. WBC 16.8    21 CT of Abd and Pelvis  IMPRESSION     1. Possible filling defect in a left lower lobe pulmonary artery, only partially  imaged but worrisome for acute pulmonary embolism. A CTPA is recommended for  further evaluation.     2. Distended gallbladder with wall thickening and pericholecystic inflammatory  changes worrisome for acute cholecystitis. Secondary inflammatory changes from  biliary obstruction are also a consideration as the intrahepatic and  extrahepatic bile ducts are dilated. Consider MRI/MRCP if there is concern for  biliary obstruction.     3. Advanced atherosclerosis with high-grade stenosis in the proximal SMA and  high-grade stenosis versus occlusion at the origin of the left SFA. 21: Pt awake in bed. Abdomen ttp; worse in RUQ. MRCP yesterday was canceled. 21: Pt awake in bed. Abdomen remains ttp; worse in RUQ. AF, NAD. K+ 2.9. Past Medical History:   Diagnosis Date    Asthma     Hypertension      History reviewed. No pertinent surgical history.   Current Facility-Administered Medications   Medication Dose Route Frequency    piperacillin-tazobactam (ZOSYN) 3.375 g in 0.9% sodium chloride (MBP/ADV) 100 mL MBP  3.375 g IntraVENous Q8H    thiamine (B-1) 500 mg in 0.9% sodium chloride 50 mL IVPB  500 mg IntraVENous TID    sodium chloride (NS) flush 5-10 mL  5-10 mL IntraVENous Q8H    sodium chloride (NS) flush 5-10 mL  5-10 mL IntraVENous PRN    sodium chloride (NS) flush 5-40 mL  5-40 mL IntraVENous Q8H    sodium chloride (NS) flush 5-40 mL  5-40 mL IntraVENous PRN    acetaminophen (TYLENOL) tablet 650 mg  650 mg Oral Q6H PRN    Or    acetaminophen (TYLENOL) suppository 650 mg  650 mg Rectal Q6H PRN    polyethylene glycol (MIRALAX) packet 17 g  17 g Oral DAILY PRN    bisacodyL (DULCOLAX) suppository 10 mg  10 mg Rectal DAILY PRN    ondansetron (ZOFRAN ODT) tablet 4 mg  4 mg Oral Q8H PRN    Or    ondansetron (ZOFRAN) injection 4 mg  4 mg IntraVENous Q6H PRN    famotidine (PEPCID) tablet 20 mg  20 mg Oral BID PRN    alum-mag hydroxide-simeth (MYLANTA) oral suspension 15 mL  15 mL Oral Q6H PRN    heparin 25,000 units in dextrose 500 mL infusion  18-36 Units/kg/hr IntraVENous TITRATE    oxyCODONE-acetaminophen (PERCOCET) 5-325 mg per tablet 1 Tablet  1 Tablet Oral Q4H PRN    morphine injection 2 mg  2 mg IntraVENous Q3H PRN    0.9% sodium chloride infusion  100 mL/hr IntraVENous CONTINUOUS    hydrALAZINE (APRESOLINE) 20 mg/mL injection 10 mg  10 mg IntraVENous Q6H PRN    QUEtiapine (SEROquel) tablet 25 mg  25 mg Oral QHS     Patient has no known allergies. Social History     Socioeconomic History    Marital status: SINGLE     Social History     Tobacco Use   Smoking Status Not on file   Smokeless Tobacco Not on file     History reviewed. No pertinent family history. ROS: The patient has no difficulty with chest pain or shortness of breath. No fever or chills. Comprehensive review of systems was otherwise unremarkable except as noted above.     Physical Exam:   Visit Vitals  BP (!) 178/90 Comment: NALLELY Avila notified   Pulse 73   Temp 98 °F (36.7 °C)   Resp 21   Wt 210 lb 9.6 oz (95.5 kg)   SpO2 97%     Vitals:    11/20/21 1935 11/20/21 2246 11/21/21 0301 11/21/21 0706   BP:  (!) 177/74 (!) 152/80 (!) 178/90   Pulse:  66 71 73   Resp:  20 21 21   Temp:  98.9 °F (37.2 °C) 99 °F (37.2 °C) 98 °F (36.7 °C)   SpO2: 99% 92% 95% 97%   Weight:         11/21 0701 - 11/21 1900  In: 240 [P.O.:240]  Out: - 11/19 1901 - 11/21 0700  In: 3043 [I.V.:3043]  Out: 625 [Urine:625]    Constitutional: Alert, cooperative, no acute distress; appears stated age    Eyes:Sclera are clear. EOMs intact  ENMT: no external lesions gross hearing normal; no obvious neck masses, no ear or lip lesions, nares normal  CV: Bradycardic,   Abdomen: Protuberant, +distended, generalized ttp worse RUQ     Musculoskeletal: unremarkable with normal function. No embolic signs or cyanosis. Neuro: moves all 4; no focal deficits  Psychiatric: normal affect and mood, no memory impairment    Recent vitals (if inpt):  Patient Vitals for the past 24 hrs:   BP Temp Pulse Resp SpO2   11/21/21 0706 (!) 178/90 98 °F (36.7 °C) 73 21 97 %   11/21/21 0301 (!) 152/80 99 °F (37.2 °C) 71 21 95 %   11/20/21 2246 (!) 177/74 98.9 °F (37.2 °C) 66 20 92 %   11/20/21 1935 -- -- -- -- 99 %   11/20/21 1918 (!) 168/74 98.7 °F (37.1 °C) 68 18 99 %   11/20/21 1152 -- 97.7 °F (36.5 °C) 81 16 96 %       Amount and/or Complexity of Data Reviewed and Analyzed:  I reviewed and analyzed all of the unique labs and radiologic studies that are shown below as well as any that are in the HPI, and any that are in the expanded problem list below  *Each unique test, order, or document contributes to the combination of 2 or combination of 3 in Category 1 below. For this visit I also reviewed old records and prior notes.       Recent Labs     11/21/21  0509 11/20/21  1606 11/20/21  1604 11/20/21  0541 11/19/21  1630 11/19/21  1629 11/19/21  1019 11/19/21  1019   WBC 8.4  --   --    < >   < >  --   --  19.0*   HGB 12.1*  --   --    < >   < >  --   --  14.7     --   --    < >   < >  --   --  224     --   --    < >   < >  --   --  143   K 2.9*  --   --    < >   < >  --   --  3.7   *  --   --    < >   < >  --   --  110*   CO2 25  --   --    < >   < >  --   --  25   BUN 26*  --   --    < >   < >  --   --  29*   CREA 1.13  --   --    < >   < >  --   --  1.70*   GLU 90  --   -- < >   < >  --   --  104*   APTT  --   --   --   --   --  >200.0*  --   --    TBILI  --  0.7  --   --   --   --    < > 0.6   CBIL  --  0.4*  --   --   --   --   --   --    ALT  --  44  --   --   --   --    < > 32   AP  --  133  --   --   --   --    < > 79   LPSE  --   --   --   --   --   --   --  84   NH4  --   --  13  --   --   --   --   --     < > = values in this interval not displayed. Review of most recent CBC  Lab Results   Component Value Date/Time    WBC 8.4 11/21/2021 05:09 AM    HGB 12.1 (L) 11/21/2021 05:09 AM    HCT 37.8 (L) 11/21/2021 05:09 AM    PLATELET 955 50/70/8698 05:09 AM    MCV 91.5 11/21/2021 05:09 AM       Review of most recent BMP  Lab Results   Component Value Date/Time    Sodium 145 11/21/2021 05:09 AM    Potassium 2.9 (L) 11/21/2021 05:09 AM    Chloride 112 (H) 11/21/2021 05:09 AM    CO2 25 11/21/2021 05:09 AM    Anion gap 8 11/21/2021 05:09 AM    Glucose 90 11/21/2021 05:09 AM    BUN 26 (H) 11/21/2021 05:09 AM    Creatinine 1.13 11/21/2021 05:09 AM    GFR est AA >60 11/21/2021 05:09 AM    GFR est non-AA >60 11/21/2021 05:09 AM    Calcium 9.3 11/21/2021 05:09 AM       Review of most recent LFTs (and lipase if done)  Lab Results   Component Value Date/Time    ALT (SGPT) 44 11/20/2021 04:06 PM    AST (SGOT) 30 11/20/2021 04:06 PM    Alk.  phosphatase 133 11/20/2021 04:06 PM    Bilirubin, direct 0.4 (H) 11/20/2021 04:06 PM    Bilirubin, total 0.7 11/20/2021 04:06 PM     Lab Results   Component Value Date/Time    Lipase 84 11/19/2021 10:19 AM       Lab Results   Component Value Date/Time    aPTT >200.0 (HH) 11/19/2021 04:29 PM    Bilirubin, direct 0.4 (H) 11/20/2021 04:06 PM    Ammonia 13 11/20/2021 04:04 PM       Review of most recent HgbA1c  No results found for: HBA1C, RVG6RZPB, FWY3VCVG, TLC0CCMH    Nutritional assessment screen for wound healing issues:  Lab Results   Component Value Date/Time    Protein, total 6.4 11/20/2021 04:06 PM    Albumin 2.8 (L) 11/20/2021 04:06 PM @lastcovr@  XR Results (most recent):  No results found for this or any previous visit. CT Results (most recent):  Results from Hospital Encounter encounter on 11/19/21    CT HEAD WO CONT    Narrative  CT head without contrast    History: screen for metal for MRI, patient with history of shrapnel from [facility]    Technique: 5mm axial images were obtained from the skull base to the vertex  without intravenous contrast.  Radiation dose reduction techniques were used for  this study:  Our CT scanners use one or all of the following: Automated exposure  control, adjustment of the mA and/or kVp according to patient's size, iterative  reconstruction. Comparison: None    Findings: The examination is mildly compromised due to motion. The ventricles  and sulci are prominent. There are no extra-axial fluid collections. There is no  evidence to suggest an acute major territorial infarct. Patchy and confluent  areas of decreased attenuation are present within the supratentorial white  matter. These are nonspecific findings but would be most compatible with  moderate chronic small vessel ischemic change. There is no evidence of acute  intraparenchymal hemorrhage or mass effect. The bony calvarium is intact. No  radiopaque foreign body is identified. The visualized mastoid air cells and  paranasal sinuses are well pneumatized and aerated. Impression  1. Mildly, otherwise examination without radiopaque foreign body. 2. Moderate chronic small vessel ischemic change. 3. Mild volume loss. US Results (most recent):  No results found for this or any previous visit.         Admission date (for inpatients): 11/19/2021   * No surgery found *  * No surgery found *        ASSESSMENT/PLAN:  Problem List  Date Reviewed: 11/19/2021          Codes Class Noted    * (Principal) Acute metabolic encephalopathy XUG-[id number]: G93.41  ICD-9-CM: 348.31  11/20/2021        Acute cholecystitis ICD-10-CM: K81.0  ICD-9-CM: 575.0  11/19/2021 Acute pulmonary embolism without acute cor pulmonale (HCC) ICD-10-CM: I26.99  ICD-9-CM: 415.19  11/19/2021        [facility]) ([facility] ICD-10-CM: N17.9  ICD-9-CM: 584.9  11/19/2021        Lactic acidosis ICD-10-CM: L39.4  ICD-9-CM: 276.2  11/19/2021        Primary hypertension ICD-10-CM: I10  ICD-9-CM: 401.9  11/19/2021            Principal Problem:    Acute metabolic encephalopathy (70/72/5221)    Active Problems:    Acute cholecystitis (11/19/2021)      Acute pulmonary embolism without acute cor pulmonale ([facility] (11/19/2021)      TONIA (acute kidney injury) ([facility] (11/19/2021)      Lactic acidosis (11/19/2021)      Primary hypertension (11/19/2021)           Number and Complexity of Problems addressed and   Risks of complications and/or morbidity of management    Patient with multiple medical issues including PE, aspiration pneumonia, sepsis, mental status changes and possible acute cholecystitis. His VSS are stable SaO2 99% on 4L NC. Would like to eventually complete MRCP to determine etiology of biliary ductal dilatation. Will continue serial exams and continue Zosyn. He is now on Heparin gtt. Normal LFTs argue against biliary obstruction. No emergent need for surgery at this time. Will follow. Plan:  Care Management per Hospitalist  IV Abx - Zosyn  MRCP canceled   Clear Liquids  Heparin gtt  Follow labs  Replace rachelle Rowe, NP  this patient was seen and examined yesterday. Notes are being signed today. The patient remains attended and four-point restraints. He denies any abdominal pain. CT was reviewed confirming dilated common bile duct. MRCP was canceled due to patient confusion. Total bilirubin remains normal at 0.7. Will consider MRCP for tomorrow as patient improves. Other medical problems including pulmonary embolism and aspiration pneumonia. Unsure if acute cholecystitis is his main problem.   I have personally performed a face-to-face diagnostic evaluation and management  service on this patient. I have independently seen the patient. I have independently obtained the above history from the patient/family. I have independently examined the patient with above findings. I have independently reviewed data/labs for this patient and developed the above plan of care.     [place] [id number] [facility]

## 2021-11-21 NOTE — PROGRESS NOTES
PICC team called for patient access. Half of abx in and half of potassium in. Thiamine unable to be started. Awaiting new access.

## 2021-11-21 NOTE — PROGRESS NOTES
END OF SHIFT NOTE:    INTAKE/OUTPUT  11/20 0701 - 11/21 0700  In: 8667 [I.V.:1842]  Out: 425 [Urine:425]  Voiding: YES  Catheter: NO  Drain:   External Urinary Catheter 11/19/21 (Active)   Site Assessment Clean, dry, & intact 11/21/21 0021   Repositioned No 11/21/21 0021   Perineal Care No 11/21/21 0021   Wick Changed No 11/21/21 0021   Suction Canister/Tubing Changed No 11/21/21 0021   Urine Output (mL) 75 ml 11/20/21 1920               Flatus: Patient does not have flatus present. Stool:  0 occurrences. Characteristics:       Emesis: 0 occurrences. Characteristics:        VITAL SIGNS  Patient Vitals for the past 12 hrs:   Temp Pulse Resp BP SpO2   11/21/21 0301 99 °F (37.2 °C) 71 21 (!) 152/80 95 %   11/20/21 2246 98.9 °F (37.2 °C) 66 20 (!) 177/74 92 %   11/20/21 1935 -- -- -- -- 99 %   11/20/21 1918 98.7 °F (37.1 °C) 68 18 (!) 168/74 99 %       Pain Assessment  Pain Intensity 1: 7 (11/20/21 0720)  Pain Location 1: Generalized  Pain Intervention(s) 1: Medication (see MAR)  Patient Stated Pain Goal: 0    Ambulating  No    Shift report given to oncoming nurse at the bedside.     Alejandro Alfaro RN

## 2021-11-21 NOTE — CONSULTS
[address]  [address], [address] [facility]       History and Physical/Surgical Consult   One Damion Paniagua date: 2021    MRN: [id number]     : 1945     Age: 78 y.o.          2021 8:23 PM    Subjective/HPI:   This patient is a 78 y.o. seen and evaluated at the request of the hospitalist for acute cholecystitis. The patient was admitted through the ED with confusion and reports of abdominal pain. In the ED he was noted to have an elevated WBC 19 with normal LFTs. CT showed filling defect in pulmonary artery suggestive of PE and GB with thickened wall and dilated ducts. MRCP was cancelled yesterday for unknown reasons possibly confusion. CT chest today confirms PE and infectious process of left upper lobe. Also findings suspicious for aspiration. LFTs today remain normal.  The patient remains confused in restraints. Surgery is consulted for evaluation of acute cholecystitis and possible surgery. Review of Systems  A comprehensive review of systems was negative except for that written in the HPI. Past Medical History:   Diagnosis Date    Asthma     Hypertension       History reviewed. No pertinent surgical history. No Known Allergies   Social History     Tobacco Use    Smoking status: Not on file    Smokeless tobacco: Not on file   Substance Use Topics    Alcohol use: Not on file      Social History     Social History Narrative    Not on file     History reviewed. No pertinent family history.    None     Current Facility-Administered Medications   Medication Dose Route Frequency    piperacillin-tazobactam (ZOSYN) 3.375 g in 0.9% sodium chloride (MBP/ADV) 100 mL MBP  3.375 g IntraVENous Q8H    thiamine (B-1) 500 mg in 0.9% sodium chloride 50 mL IVPB  500 mg IntraVENous TID    sodium chloride (NS) flush 5-10 mL  5-10 mL IntraVENous Q8H    sodium chloride (NS) flush 5-10 mL  5-10 mL IntraVENous PRN    sodium chloride (NS) flush 5-40 mL  5-40 mL IntraVENous Q8H    sodium chloride (NS) flush 5-40 mL  5-40 mL IntraVENous PRN    acetaminophen (TYLENOL) tablet 650 mg  650 mg Oral Q6H PRN    Or    acetaminophen (TYLENOL) suppository 650 mg  650 mg Rectal Q6H PRN    polyethylene glycol (MIRALAX) packet 17 g  17 g Oral DAILY PRN    bisacodyL (DULCOLAX) suppository 10 mg  10 mg Rectal DAILY PRN    ondansetron (ZOFRAN ODT) tablet 4 mg  4 mg Oral Q8H PRN    Or    ondansetron (ZOFRAN) injection 4 mg  4 mg IntraVENous Q6H PRN    famotidine (PEPCID) tablet 20 mg  20 mg Oral BID PRN    alum-mag hydroxide-simeth (MYLANTA) oral suspension 15 mL  15 mL Oral Q6H PRN    heparin 25,000 units in dextrose 500 mL infusion  18-36 Units/kg/hr IntraVENous TITRATE    oxyCODONE-acetaminophen (PERCOCET) 5-325 mg per tablet 1 Tablet  1 Tablet Oral Q4H PRN    morphine injection 2 mg  2 mg IntraVENous Q3H PRN    0.9% sodium chloride infusion  100 mL/hr IntraVENous CONTINUOUS    hydrALAZINE (APRESOLINE) 20 mg/mL injection 10 mg  10 mg IntraVENous Q6H PRN    QUEtiapine (SEROquel) tablet 25 mg  25 mg Oral QHS     Objective:     Vitals:    11/20/21 1015 11/20/21 1152 11/20/21 1918 11/20/21 1935   BP: (!) 134/99  (!) 168/74    Pulse:  81 68    Resp:  16 18    Temp:  97.7 °F (36.5 °C) 98.7 °F (37.1 °C)    SpO2:  96% 99% 99%   Weight:         11/20 1901 - 11/21 0700  In: -   Out: 75 [Urine:75]  11/19 0701 - 11/20 1900  In: 2632 [I.V.:2632]  Out: 550 [Urine:550]  Physical Exam:   Gen- the patient is well developed and in no acute distress  HEENT- PERRL, EOMI, no scleral icterus       nose without alar flaring or epistaxis                  oral muscosa moist without cyanosis  Neck- no JVD or retractions  Lungs- resp even/unlab   Heart- RRR   Abd- soft with diffuse TTP. No rebound  Ext- warm without cyanosis. There is no lower leg edema. Skin- no jaundice or rashes  Neuro- alert and oriented x 3. No gross sensorimotor deficits are present. Data Review   Recent Labs     11/20/21  0541 11/19/21  1856 11/19/21  1630   WBC 14.9* 16.2* 16.8*   HGB 13.0* 13.6 14.0   HCT 40.1* 42.6 42.6    185 186     Recent Labs     11/20/21  0541 11/19/21  1019    143   K 3.5 3.7   * 110*   CO2 24 25   GLU 90 104*   BUN 24* 29*   CREA 1.04 1.70*       Assessment:     Hospital Problems  Date Reviewed: 11/19/2021          Codes Class Noted POA    * (Principal) Acute metabolic encephalopathy CYY-[id number]: G93.41  ICD-9-CM: 348.31  11/20/2021 Unknown        Acute cholecystitis ICD-10-CM: K81.0  ICD-9-CM: 575.0  11/19/2021 Unknown        Acute pulmonary embolism without acute cor pul[facility]ale [facility] ICD-10-CM: I26.99  ICD-9-CM: 415.19  11/19/2021 Unknown        TONIA (acute kidney injury) ([facility] ICD-10-CM: N17.9  ICD-9-CM: 584.9  11/19/2021 Unknown        Lactic acidosis ICD-10-CM: E87.2  ICD-9-CM: 276.2  11/19/2021 Unknown        Primary hypertension ICD-10-CM: I10  ICD-9-CM: 401.9  11/19/2021 Unknown            Plan:   Patient with multiple medical issues including PE, aspiration pneumonia, sepsis, mental status changes and possible acute cholecystitis. His VSS are stable SaO2 99% on 4L NC. Would like to eventually complete MRCP to determine etiology of biliary ductal dilatation. Will continue serial exams and continue Zosyn. He is now on Heparin gtt. Normal LFTs argue against biliary obstruction. No emergent need for surgery at this time. Will follow.   --    Cj Tucker, DO

## 2021-11-21 NOTE — PROGRESS NOTES
Patient /90 about 45 min after diltiazem given. Patient agitated. Hospitalist notified. No new orders at present.

## 2021-11-21 NOTE — PROGRESS NOTES
ACUTE OT GOALS:  (Developed with and agreed upon by patient and/or caregiver.)  1. Patient will bathe and dress total body with minimal assistance and adaptive device as needed. 2. Patient will toilet with stand by assistance and adaptive device as needed. 3. Patient will tolerate 30 minutes of OT treatment with up to 2 rest breaks to increase activity tolerance for ADLs. 4. Patient will complete functional transfers with stand by assistance. 5. Patient will complete functional mobility for ADLs with contact guard assistance. 6. Patient will demonstrate improved safety awareness for ADLs by completing functional task with no cueing from therapist.   Timeframe: 7 visits     OCCUPATIONAL THERAPY ASSESSMENT: Initial Assessment and Daily Note OT Treatment Day # 1    Gerardo Sanchez is a 78 y.o. male   PRIMARY DIAGNOSIS: Acute metabolic encephalopathy  Acute cholecystitis [K81.0]       Reason for Referral:    ICD-10: Treatment Diagnosis: Generalized Muscle Weakness (M62.81)  Difficulty in walking, Not elsewhere classified (R26.2)  [place]PAT[place] / [place] ONLY / Product Type: Medicare /   ASSESSMENT:     REHAB RECOMMENDATIONS:   Recommendation to date pending progress:  Setting:   Short-term Rehab  Equipment:    To Be Determined     PRIOR LEVEL OF FUNCTION:  (Prior to Hospitalization)  INITIAL/CURRENT LEVEL OF FUNCTION:  (Based on today's evaluation)   Bathing:   Unknown  Dressing:   Unknown  Feeding/Grooming:   Unknown  Toileting:   Unknown  Functional Mobility:   Unknown Bathing:   Moderate Assistance  Dressing:   Moderate Assistance  Feeding/Grooming:   Minimal Assistance  Toileting:   Maximal Assistance  Functional Mobility:   Minimal Assistance x 2 to ModAx2     ASSESSMENT:  Mr. Denice Mcdermott presents with acute metabolic encephalopathy, cholecystitis, PE, aspiration pna. Hx PTSD. Pt with AMS, unable to provide PLOF.  Per chart came from independent living facility. Pt with AMS and intermittent agitation, decreased command following and safety. Practiced bed mobilitiy with MinAx2, functional transfers with MinAx2. Practiced steps and BSC transfer with Min-ModAx2/RW, fluctuating assist due to behavior/pt uncooperative and agitated at times. Cues for posture, balance, weight shifting, RW management, safety. Limited this date by AMS. Pt is functioning below baseline and would benefit from continued OT to increase safety and independence. SUBJECTIVE:   Mr. Antonio Briggs states, \"Get out of my way! \"    SOCIAL HISTORY/LIVING ENVIRONMENT:  Hx PTSD. Pt with AMS, unable to provide PLOF. Per chart came from independent living facility. Pt questionable historian, reports independent baseline, ambulates with cane prn.    Support Systems: Other (Comment) (Shannan Isaiah De [address]ini [address])    OBJECTIVE:     PAIN: VITAL SIGNS: LINES/DRAINS:   Pre Treatment: Pain Screen  Pain Scale 1: Numeric (0 - 10)  Pain Intensity 1: 0  Post Treatment: same Vital Signs  O2 Device: Nasal cannula IV and External male catheter  O2 Device: Nasal cannula     GROSS EVALUATION:  BUEs Within Functional Limits Abnormal/ Functional Abnormal/ Non-Functional (see comments) Not Tested Comments:   AROM [] [x] [] []    PROM [] [] [] []    Strength [] [x] [] []    Balance [] [x] [] []    Posture [] [x] [] []    Sensation [] [] [] []    Coordination [] [x] [] []    Tone [] [] [] []    Edema [x] [] [] []    Activity Tolerance [] [x] [] []     [] [] [] []      COGNITION/  PERCEPTION: Intact Impaired   (see comments) Comments:   Orientation [] [x]    Vision [] []    Hearing [] []    Judgment/ Insight [] [x]    Attention [] [x]    Memory [] [x]    Command Following [] [x]    Emotional Regulation [] [x]     [] []      ACTIVITIES OF DAILY LIVING: I Mod I S SBA CGA Min Mod Max Total NT Comments   BASIC ADLs:              Bathing/ Showering [] [] [] [] [] [] [] [] [] [x]    Toileting [] [] [] [] [] [] [] [] [] [x] Dressing [] [] [] [] [] [] [] [] [] [x]    Feeding [] [] [] [] [] [] [] [] [] [x]    Grooming [] [] [] [] [] [] [] [] [] [x]    Personal Device Care [] [] [] [] [] [] [] [] [] [x]    Functional Mobility [] [] [] [] [] [x] [x] [] [] [] x2   I=Independent, Mod I=Modified Independent, S=Supervision, SBA=Standby Assistance, CGA=Contact Guard Assistance,   Min=Minimal Assistance, Mod=Moderate Assistance, Max=Maximal Assistance, Total=Total Assistance, NT=Not Tested    MOBILITY: I Mod I S SBA CGA Min Mod Max Total  NT x2 Comments:   Supine to sit [] [] [] [] [] [x] [] [] [] [] [x]    Sit to supine [] [] [] [] [] [] [x] [] [] [] [x]    Sit to stand [] [] [] [] [] [x] [x] [] [] [] [x]    Bed to chair [] [] [] [] [] [x] [x] [] [] [] [x]    I=Independent, Mod I=Modified Independent, S=Supervision, SBA=Standby Assistance, CGA=Contact Guard Assistance,   Min=Minimal Assistance, Mod=Moderate Assistance, Max=Maximal Assistance, Total=Total Assistance, NT=Not Tested    [address] [facility] [address] [address] AM-PAC 6 Clicks   Daily Activity Inpatient Short Form        How much help from another person does the patient currently need. .. Total A Lot A Little None   1. Putting on and taking off regular lower body clothing? [] 1   [x] 2   [] 3   [] 4   2. Bathing (including washing, rinsing, drying)? [] 1   [x] 2   [] 3   [] 4   3. Toileting, which includes using toilet, bedpan or urinal?   [] 1   [x] 2   [] 3   [] 4   4. Putting on and taking off regular upper body clothing? [] 1   [] 2   [x] 3   [] 4   5. Taking care of personal grooming such as brushing teeth? [] 1   [] 2   [x] 3   [] 4   6. Eating meals? [] 1   [] 2   [x] 3   [] 4   © 2007, Trustees of [address] [place] [address] [address] [id number], under license to [employer]. All rights reserved     Score:  Initial: 15 11/21/21 Most Recent: X (Date: -- )   Interpretation of Tool:  Represents activities that are increasingly more difficult (i.e. Bed mobility, Transfers, Gait).     PLAN:   FREQUENCY/DURATION: OT Plan of Care: 3 times/week for duration of hospital stay or until stated goals are met, whichever comes first.    PROBLEM LIST:   (Skilled intervention is medically necessary to address:)  1. Decreased ADL/Functional Activities  2. Decreased Activity Tolerance  3. Decreased AROM/PROM  4. Decreased Balance  5. Decreased Cognition  6. Decreased Coordination  7. Decreased Gait Ability  8. Decreased Strength  9. Decreased Transfer Abilities   INTERVENTIONS PLANNED:   (Benefits and precautions of occupational therapy have been discussed with the patient.)  1. Self Care Training  2. Therapeutic Activity  3. Therapeutic Exercise/HEP  4. Neuromuscular Re-education  5. Education     TREATMENT:     EVALUATION: Moderate Complexity : (Untimed Charge)    TREATMENT:   ( $$ Neuromuscular Re-Education: 8-22 mins   )  Co-Treatment PT/OT necessary due to patient's decreased overall endurance/tolerance levels, as well as need for high level skilled assistance to complete functional transfers/mobility and functional tasks  Neuromuscular Re-education (10 Minutes): Neuromuscular Re-education included Balance Training, Coordination training, Functional mobility with facilitation, Postural training, Sitting balance training and Standing balance training to improve Balance, Coordination, Functional Mobility and Postural Control.     TREATMENT GRID:  N/A    AFTER TREATMENT POSITION/PRECAUTIONS:  Bed, Needs within reach, Restraints  and RN notified    INTERDISCIPLINARY COLLABORATION:  RN/PCT, PT/PTA and OT/GARCÍA    TOTAL TREATMENT DURATION:  OT Patient Time In/Time Out  Time In: 1022  Time Out: 1400 [facility]

## 2021-11-21 NOTE — PROGRESS NOTES
Patients /77. Hospitalist notified. Hydralazine given and clonidine ordered. Per hospitalist, to give clonidine if BP still >180 after hydralazine.
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
## 2021-11-22 ENCOUNTER — ANESTHESIA EVENT (OUTPATIENT)
Dept: ENDOSCOPY | Age: 76
DRG: 871 | End: 2021-11-22
Payer: MEDICARE

## 2021-11-22 PROBLEM — K92.2 GI BLEED: Status: ACTIVE | Noted: 2021-11-22

## 2021-11-22 LAB
ALBUMIN SERPL-MCNC: 2.5 G/DL (ref 3.2–4.6)
ALBUMIN/GLOB SERPL: 0.7 {RATIO} (ref 1.2–3.5)
ALP SERPL-CCNC: 347 U/L (ref 50–136)
ALT SERPL-CCNC: 83 U/L (ref 12–65)
ANION GAP SERPL CALC-SCNC: 4 MMOL/L (ref 7–16)
ANION GAP SERPL CALC-SCNC: 6 MMOL/L (ref 7–16)
AST SERPL-CCNC: 45 U/L (ref 15–37)
BASOPHILS # BLD: 0 K/UL (ref 0–0.2)
BASOPHILS NFR BLD: 1 % (ref 0–2)
BILIRUB SERPL-MCNC: 0.5 MG/DL (ref 0.2–1.1)
BUN SERPL-MCNC: 15 MG/DL (ref 8–23)
BUN SERPL-MCNC: 20 MG/DL (ref 8–23)
CALCIUM SERPL-MCNC: 9 MG/DL (ref 8.3–10.4)
CALCIUM SERPL-MCNC: 9.2 MG/DL (ref 8.3–10.4)
CHLORIDE SERPL-SCNC: 114 MMOL/L (ref 98–107)
CHLORIDE SERPL-SCNC: 114 MMOL/L (ref 98–107)
CO2 SERPL-SCNC: 24 MMOL/L (ref 21–32)
CO2 SERPL-SCNC: 25 MMOL/L (ref 21–32)
CREAT SERPL-MCNC: 0.79 MG/DL (ref 0.8–1.5)
CREAT SERPL-MCNC: 1 MG/DL (ref 0.8–1.5)
DIFFERENTIAL METHOD BLD: ABNORMAL
EOSINOPHIL # BLD: 0.2 K/UL (ref 0–0.8)
EOSINOPHIL NFR BLD: 3 % (ref 0.5–7.8)
ERYTHROCYTE [DISTWIDTH] IN BLOOD BY AUTOMATED COUNT: 15 % (ref 11.9–14.6)
GLOBULIN SER CALC-MCNC: 3.6 G/DL (ref 2.3–3.5)
GLUCOSE SERPL-MCNC: 80 MG/DL (ref 65–100)
GLUCOSE SERPL-MCNC: 91 MG/DL (ref 65–100)
HCT VFR BLD AUTO: 33.7 % (ref 41.1–50.3)
HCT VFR BLD AUTO: 34.3 % (ref 41.1–50.3)
HEMOCCULT STL QL: POSITIVE
HGB BLD-MCNC: 10.9 G/DL (ref 13.6–17.2)
HGB BLD-MCNC: 11 G/DL (ref 13.6–17.2)
IMM GRANULOCYTES # BLD AUTO: 0 K/UL (ref 0–0.5)
IMM GRANULOCYTES NFR BLD AUTO: 0 % (ref 0–5)
LYMPHOCYTES # BLD: 1.2 K/UL (ref 0.5–4.6)
LYMPHOCYTES NFR BLD: 17 % (ref 13–44)
MCH RBC QN AUTO: 29.9 PG (ref 26.1–32.9)
MCHC RBC AUTO-ENTMCNC: 31.8 G/DL (ref 31.4–35)
MCV RBC AUTO: 94.2 FL (ref 79.6–97.8)
MM INDURATION POC: 0 MM (ref 0–0)
MONOCYTES # BLD: 0.8 K/UL (ref 0.1–1.3)
MONOCYTES NFR BLD: 11 % (ref 4–12)
NEUTS SEG # BLD: 5 K/UL (ref 1.7–8.2)
NEUTS SEG NFR BLD: 68 % (ref 43–78)
NRBC # BLD: 0 K/UL (ref 0–0.2)
PLATELET # BLD AUTO: 178 K/UL (ref 150–450)
PMV BLD AUTO: 10.8 FL (ref 9.4–12.3)
POTASSIUM SERPL-SCNC: 3.3 MMOL/L (ref 3.5–5.1)
POTASSIUM SERPL-SCNC: 3.8 MMOL/L (ref 3.5–5.1)
PPD POC: NEGATIVE NEGATIVE
PROT SERPL-MCNC: 6.1 G/DL (ref 6.3–8.2)
RBC # BLD AUTO: 3.64 M/UL (ref 4.23–5.6)
RPR SER QL: NONREACTIVE
SODIUM SERPL-SCNC: 143 MMOL/L (ref 138–145)
SODIUM SERPL-SCNC: 144 MMOL/L (ref 138–145)
WBC # BLD AUTO: 7.3 K/UL (ref 4.3–11.1)

## 2021-11-22 PROCEDURE — 74011250637 HC RX REV CODE- 250/637: Performed by: FAMILY MEDICINE

## 2021-11-22 PROCEDURE — 99233 SBSQ HOSP IP/OBS HIGH 50: CPT | Performed by: SURGERY

## 2021-11-22 PROCEDURE — 2709999900 HC NON-CHARGEABLE SUPPLY

## 2021-11-22 PROCEDURE — 74011250637 HC RX REV CODE- 250/637: Performed by: INTERNAL MEDICINE

## 2021-11-22 PROCEDURE — C9113 INJ PANTOPRAZOLE SODIUM, VIA: HCPCS | Performed by: FAMILY MEDICINE

## 2021-11-22 PROCEDURE — 74011000250 HC RX REV CODE- 250: Performed by: FAMILY MEDICINE

## 2021-11-22 PROCEDURE — 74011000258 HC RX REV CODE- 258: Performed by: INTERNAL MEDICINE

## 2021-11-22 PROCEDURE — 85025 COMPLETE CBC W/AUTO DIFF WBC: CPT

## 2021-11-22 PROCEDURE — 65270000029 HC RM PRIVATE

## 2021-11-22 PROCEDURE — 74011250636 HC RX REV CODE- 250/636: Performed by: FAMILY MEDICINE

## 2021-11-22 PROCEDURE — 74011250636 HC RX REV CODE- 250/636: Performed by: INTERNAL MEDICINE

## 2021-11-22 PROCEDURE — 36415 COLL VENOUS BLD VENIPUNCTURE: CPT

## 2021-11-22 PROCEDURE — 74011000250 HC RX REV CODE- 250: Performed by: INTERNAL MEDICINE

## 2021-11-22 PROCEDURE — 94760 N-INVAS EAR/PLS OXIMETRY 1: CPT

## 2021-11-22 PROCEDURE — 77010033678 HC OXYGEN DAILY

## 2021-11-22 PROCEDURE — 82272 OCCULT BLD FECES 1-3 TESTS: CPT

## 2021-11-22 PROCEDURE — 80053 COMPREHEN METABOLIC PANEL: CPT

## 2021-11-22 RX ORDER — LORAZEPAM 2 MG/ML
1 INJECTION INTRAMUSCULAR
Status: ACTIVE | OUTPATIENT
Start: 2021-11-22 | End: 2021-11-23

## 2021-11-22 RX ORDER — LORAZEPAM 2 MG/ML
1 INJECTION INTRAMUSCULAR ONCE
Status: DISCONTINUED | OUTPATIENT
Start: 2021-11-22 | End: 2021-11-22

## 2021-11-22 RX ORDER — DEXTROSE MONOHYDRATE AND SODIUM CHLORIDE 5; .45 G/100ML; G/100ML
50 INJECTION, SOLUTION INTRAVENOUS CONTINUOUS
Status: DISCONTINUED | OUTPATIENT
Start: 2021-11-22 | End: 2021-11-24

## 2021-11-22 RX ORDER — LOSARTAN POTASSIUM 50 MG/1
50 TABLET ORAL DAILY
Status: DISCONTINUED | OUTPATIENT
Start: 2021-11-22 | End: 2021-11-24

## 2021-11-22 RX ADMIN — THIAMINE HYDROCHLORIDE 500 MG: 100 INJECTION, SOLUTION INTRAMUSCULAR; INTRAVENOUS at 16:02

## 2021-11-22 RX ADMIN — Medication 10 ML: at 13:09

## 2021-11-22 RX ADMIN — QUETIAPINE FUMARATE 25 MG: 25 TABLET ORAL at 21:00

## 2021-11-22 RX ADMIN — PIPERACILLIN SODIUM AND TAZOBACTAM SODIUM 3.38 G: 3; .375 INJECTION, POWDER, LYOPHILIZED, FOR SOLUTION INTRAVENOUS at 16:01

## 2021-11-22 RX ADMIN — SODIUM CHLORIDE 100 ML/HR: 900 INJECTION, SOLUTION INTRAVENOUS at 06:44

## 2021-11-22 RX ADMIN — SODIUM CHLORIDE 40 MG: 9 INJECTION INTRAMUSCULAR; INTRAVENOUS; SUBCUTANEOUS at 21:00

## 2021-11-22 RX ADMIN — THIAMINE HYDROCHLORIDE 500 MG: 100 INJECTION, SOLUTION INTRAMUSCULAR; INTRAVENOUS at 09:41

## 2021-11-22 RX ADMIN — THIAMINE HYDROCHLORIDE 500 MG: 100 INJECTION, SOLUTION INTRAMUSCULAR; INTRAVENOUS at 21:25

## 2021-11-22 RX ADMIN — DILTIAZEM HYDROCHLORIDE 120 MG: 120 CAPSULE, COATED, EXTENDED RELEASE ORAL at 08:15

## 2021-11-22 RX ADMIN — HYDROMORPHONE HYDROCHLORIDE 0.5 MG: 1 INJECTION, SOLUTION INTRAMUSCULAR; INTRAVENOUS; SUBCUTANEOUS at 07:39

## 2021-11-22 RX ADMIN — PIPERACILLIN SODIUM AND TAZOBACTAM SODIUM 3.38 G: 3; .375 INJECTION, POWDER, LYOPHILIZED, FOR SOLUTION INTRAVENOUS at 23:33

## 2021-11-22 RX ADMIN — DEXTROSE MONOHYDRATE AND SODIUM CHLORIDE 75 ML/HR: 5; .45 INJECTION, SOLUTION INTRAVENOUS at 13:08

## 2021-11-22 RX ADMIN — Medication 10 ML: at 13:10

## 2021-11-22 RX ADMIN — HYDROMORPHONE HYDROCHLORIDE 0.5 MG: 1 INJECTION, SOLUTION INTRAMUSCULAR; INTRAVENOUS; SUBCUTANEOUS at 16:01

## 2021-11-22 RX ADMIN — SODIUM CHLORIDE 40 MG: 9 INJECTION INTRAMUSCULAR; INTRAVENOUS; SUBCUTANEOUS at 08:15

## 2021-11-22 RX ADMIN — PIPERACILLIN SODIUM AND TAZOBACTAM SODIUM 3.38 G: 3; .375 INJECTION, POWDER, LYOPHILIZED, FOR SOLUTION INTRAVENOUS at 08:15

## 2021-11-22 RX ADMIN — LORAZEPAM 2 MG: 2 INJECTION INTRAMUSCULAR; INTRAVENOUS at 12:03

## 2021-11-22 NOTE — PROGRESS NOTES
CM continues to follow for discharge planning and/or CM needs. Pt from an independent living custodial facility (not a skilled nursing home). Pt currently increased agitation/confusion and in restraints. From chart review, pt's daughters are not in pt's life (per their own report) and deflect all decisions to Christell Patience who is listed on pt's chart. Discharge planning pending clinical progress. PT/OT eval recommends STR but pt will need to be out of restraints. No additional CM needs at this time. Will continue to follow and update as needed.

## 2021-11-22 NOTE — PROGRESS NOTES
Attempted to see pt for PT treatment this afternoon, hold per RN due to increased agitation. Will f/u at a later date as pt is able.     Cheyenne Frost, PT, DPT

## 2021-11-22 NOTE — PROGRESS NOTES
Combative when turned. Had zyprexa/seroquel earlier. Messaged hospitalists. Dilaudid one time dose ordered and given. Incontinent dark, tarry stool. Wheezing w/exertion. Dr. Ross Gurrola here and this reported to her. Hgb/Duonebs to be ordered. Calmer after Dilaudid. 0000-Dr. Ross Gurrola here on floor to see pt. Heparin drip discontinued due to possibility of gi bleed. Stool sent for occult blood.

## 2021-11-22 NOTE — PROGRESS NOTES
END OF SHIFT NOTE:    INTAKE/OUTPUT  11/21 0701 - 11/22 0700  In: 240 [P.O.:240]  Out: 1400 [QSPKF:2280]  Voiding: YES  Catheter: NO  Drain:   External Urinary Catheter 11/19/21 (Active)   Site Assessment Clean, dry, & intact 11/21/21 1543   Repositioned Yes 11/21/21 1543   Perineal Care Yes 11/21/21 1543   Wick Changed Yes 11/21/21 1543   Suction Canister/Tubing Changed No 11/21/21 1543   Urine Output (mL) 400 ml 11/21/21 2318               Flatus: Patient does have flatus present. Stool:  3 occurrences. 3 dark black tarry stools this shift. Occult blood positive  Characteristics:  Stool Assessment  Stool Color: Black  Stool Appearance: Loose  Stool Amount: Large  Stool Source/Status: Rectum    Emesis: 0 occurrences. Characteristics:        VITAL SIGNS  Patient Vitals for the past 12 hrs:   Temp Pulse Resp BP SpO2   11/22/21 0740 -- 75 20 -- 98 %   11/22/21 0245 97.8 °F (36.6 °C) 78 21 (!) 151/79 98 %   11/21/21 2356 98.5 °F (36.9 °C) 78 21 (!) 171/84 98 %   11/21/21 2057 -- -- -- -- 96 %       Pain Assessment  Pain Intensity 1: 8 (11/21/21 1332)  Pain Location 1: Abdomen  Pain Intervention(s) 1: Medication (see MAR)  Patient Stated Pain Goal: 0    Ambulating  No  Removed bilateral ankle restraints at 0600. Has not tried to kick anyone. SCDs on. Shift report given to oncoming nurse at the bedside.     Sami Gill RN

## 2021-11-22 NOTE — PROGRESS NOTES
H&P/Consult Note/Progress Note/Office Note:   Demetri Yoder  MRN: [id number]  :1945  Age:76 y.o. General Surgery Consult ordered by: Dr. Shirley Ortega  Reason for General Surgery Consult: Acute Cholecystitis    HPI: Demetri Yoder is a 78 y.o. male with a past medical history of Asthma, HTN, and PTSD. Pt is a poor historian and information was obtained from chart. Pt presented to the ED 21 from a nursing facility with c/o abdominal pain. WBC 16.8    21 CT of Abd and Pelvis  IMPRESSION     1. Possible filling defect in a left lower lobe pulmonary artery, only partially  imaged but worrisome for acute pulmonary embolism. A CTPA is recommended for  further evaluation.     2. Distended gallbladder with wall thickening and pericholecystic inflammatory  changes worrisome for acute cholecystitis. Secondary inflammatory changes from  biliary obstruction are also a consideration as the intrahepatic and  extrahepatic bile ducts are dilated. Consider MRI/MRCP if there is concern for  biliary obstruction.     3. Advanced atherosclerosis with high-grade stenosis in the proximal SMA and  high-grade stenosis versus occlusion at the origin of the left SFA. 21: Pt awake in bed. Abdomen ttp; worse in RUQ. MRCP yesterday was canceled. 21: Pt awake in bed. Abdomen remains ttp; worse in RUQ. AF, NAD. K+ 2.9.  2021 the patient is more alert today. He is aware of his surroundings. He is alert to person place and time stating that he is at [address]. He denies any abdominal pain. Will order MRCP once again for today. If he is unable to complete the MRCP we will pursue gallbladder ultrasound. Vital signs are stable he remains afebrile. Four bowel movements recorded. Past Medical History:   Diagnosis Date    Asthma     B12 deficiency 2021    Hypertension      History reviewed. No pertinent surgical history.   Current Facility-Administered Medications Medication Dose Route Frequency    dilTIAZem ER (CARDIZEM CD) capsule 120 mg  120 mg Oral DAILY    cyanocobalamin (VITAMIN B12) injection 1,000 mcg  1,000 mcg IntraMUSCular EVERY OTHER DAY    nicotine (NICODERM CQ) 21 mg/24 hr patch 1 Patch  1 Patch TransDERmal Q24H    influenza vaccine 2021-22 (6 mos+)(PF) (FLUARIX/FLULAVAL/FLUZONE QUAD) injection 0.5 mL  1 Each IntraMUSCular PRIOR TO DISCHARGE    hydrALAZINE (APRESOLINE) 20 mg/mL injection 10 mg  10 mg IntraVENous Q6H PRN    albuterol-ipratropium (DUO-NEB) 2.5 MG-0.5 MG/3 ML  3 mL Nebulization Q4H PRN    pantoprazole (PROTONIX) 40 mg in 0.9% sodium chloride 10 mL injection  40 mg IntraVENous Q12H    naloxone (NARCAN) injection 0.2 mg  0.2 mg IntraVENous EVERY 2 MINUTES AS NEEDED    chlorproMAZINE (THORAZINE) injection 50 mg  50 mg IntraMUSCular ONCE PRN    And    LORazepam (ATIVAN) injection 2 mg  2 mg IntraMUSCular ONCE PRN    HYDROmorphone (DILAUDID) injection 0.5 mg  0.5 mg IntraVENous Q3H PRN    piperacillin-tazobactam (ZOSYN) 3.375 g in 0.9% sodium chloride (MBP/ADV) 100 mL MBP  3.375 g IntraVENous Q8H    thiamine (B-1) 500 mg in 0.9% sodium chloride 50 mL IVPB  500 mg IntraVENous TID    sodium chloride (NS) flush 5-10 mL  5-10 mL IntraVENous Q8H    sodium chloride (NS) flush 5-10 mL  5-10 mL IntraVENous PRN    sodium chloride (NS) flush 5-40 mL  5-40 mL IntraVENous Q8H    sodium chloride (NS) flush 5-40 mL  5-40 mL IntraVENous PRN    acetaminophen (TYLENOL) tablet 650 mg  650 mg Oral Q6H PRN    Or    acetaminophen (TYLENOL) suppository 650 mg  650 mg Rectal Q6H PRN    polyethylene glycol (MIRALAX) packet 17 g  17 g Oral DAILY PRN    bisacodyL (DULCOLAX) suppository 10 mg  10 mg Rectal DAILY PRN    ondansetron (ZOFRAN ODT) tablet 4 mg  4 mg Oral Q8H PRN    Or    ondansetron (ZOFRAN) injection 4 mg  4 mg IntraVENous Q6H PRN    famotidine (PEPCID) tablet 20 mg  20 mg Oral BID PRN    alum-mag hydroxide-simeth (MYLANTA) oral suspension 15 mL  15 mL Oral Q6H PRN    oxyCODONE-acetaminophen (PERCOCET) 5-325 mg per tablet 1 Tablet  1 Tablet Oral Q4H PRN    0.9% sodium chloride infusion  100 mL/hr IntraVENous CONTINUOUS    QUEtiapine (SEROquel) tablet 25 mg  25 mg Oral QHS     Patient has no known allergies. Social History     Socioeconomic History    Marital status: SINGLE     Social History     Tobacco Use   Smoking Status Not on file   Smokeless Tobacco Not on file     History reviewed. No pertinent family history. ROS: The patient has no difficulty with chest pain or shortness of breath. No fever or chills. Comprehensive review of systems was otherwise unremarkable except as noted above. Physical Exam:   Visit Vitals  BP (!) 184/81 Comment: Primary RN Notified   Pulse 75   Temp 98.5 °F (36.9 °C)   Resp 20   Ht 5' 10\" (1.778 m)   Wt 210 lb 9.6 oz (95.5 kg)   SpO2 98%   BMI 30.22 kg/m²     Vitals:    11/21/21 2057 11/21/21 2356 11/22/21 0245 11/22/21 0740   BP:  (!) 171/84 (!) 151/79 (!) 184/81   Pulse:  78 78 75   Resp:  21 21 20   Temp:  98.5 °F (36.9 °C) 97.8 °F (36.6 °C) 98.5 °F (36.9 °C)   SpO2: 96% 98% 98% 98%   Weight:       Height:         No intake/output data recorded. 11/20 1901 - 11/22 0700  In: 651 [P.O.:240; I.V.:411]  Out: 1475 [Urine:1475]    Constitutional: Alert, cooperative, no acute distress; appears stated age    Eyes:Sclera are clear. EOMs intact  ENMT: no external lesions gross hearing normal; no obvious neck masses, no ear or lip lesions, nares normal  CV: Bradycardic,   Abdomen: soft. No tenderness to palpation. Normal bowel sounds. Musculoskeletal: unremarkable with normal function. No embolic signs or cyanosis.    Neuro: moves all 4; no focal deficits  Psychiatric: normal affect and mood, no memory impairment    Recent vitals (if inpt):  Patient Vitals for the past 24 hrs:   BP Temp Pulse Resp SpO2 Height   11/22/21 0740 (!) 184/81 98.5 °F (36.9 °C) 75 20 98 % --   11/22/21 0245 (!) 151/79 97.8 °F (36.6 °C) 78 21 98 % --   11/21/21 2356 (!) 171/84 98.5 °F (36.9 °C) 78 21 98 % --   11/21/21 2057 -- -- -- -- 96 % --   11/21/21 1938 -- -- -- -- -- 5' 10\" (1.778 m)   11/21/21 1741 (!) 206/90 -- 93 -- -- --   11/21/21 1629 (!) 235/95 -- 93 -- -- --   11/21/21 1550 (!) 205/77 -- 88 -- -- --   11/21/21 1521 -- 97.9 °F (36.6 °C) 88 21 98 % --   11/21/21 1151 (!) 194/84 -- 69 -- -- --   11/21/21 1144 (!) 194/84 98.1 °F (36.7 °C) 69 20 97 % --   11/21/21 1100 -- -- -- -- 91 % --       Amount and/or Complexity of Data Reviewed and Analyzed:  I reviewed and analyzed all of the unique labs and radiologic studies that are shown below as well as any that are in the HPI, and any that are in the expanded problem list below  *Each unique test, order, or document contributes to the combination of 2 or combination of 3 in Category 1 below. For this visit I also reviewed old records and prior notes. Recent Labs     11/22/21  0446 11/21/21  0509 11/20/21  1606 11/20/21  1604 11/19/21  1630 11/19/21  1629 11/19/21  1019 11/19/21  1019   WBC 7.3   < >  --   --    < >  --   --  19.0*   HGB 10.9*   < >  --   --    < >  --   --  14.7      < >  --   --    < >  --   --  224      < >  --   --    < >  --   --  143   K 3.8   < >  --   --    < >  --   --  3.7   *   < >  --   --    < >  --   --  110*   CO2 25   < >  --   --    < >  --   --  25   BUN 20   < >  --   --    < >  --   --  29*   CREA 1.00   < >  --   --    < >  --   --  1.70*   GLU 80   < >  --   --    < >  --   --  104*   APTT  --   --   --   --   --  >200.0*  --   --    TBILI  --   --  0.7  --   --   --    < > 0.6   CBIL  --   --  0.4*  --   --   --   --   --    ALT  --   --  44  --   --   --    < > 32   AP  --   --  133  --   --   --    < > 79   LPSE  --   --   --   --   --   --   --  84   NH4  --   --   --  13  --   --   --   --     < > = values in this interval not displayed.      Review of most recent CBC  Lab Results   Component Value Date/Time    WBC 7.3 11/22/2021 04:46 AM    HGB 10.9 (L) 11/22/2021 04:46 AM    HCT 34.3 (L) 11/22/2021 04:46 AM    PLATELET 449 63/80/3575 04:46 AM    MCV 94.2 11/22/2021 04:46 AM       Review of most recent BMP  Lab Results   Component Value Date/Time    Sodium 143 11/22/2021 04:46 AM    Potassium 3.8 11/22/2021 04:46 AM    Chloride 114 (H) 11/22/2021 04:46 AM    CO2 25 11/22/2021 04:46 AM    Anion gap 4 (L) 11/22/2021 04:46 AM    Glucose 80 11/22/2021 04:46 AM    BUN 20 11/22/2021 04:46 AM    Creatinine 1.00 11/22/2021 04:46 AM    GFR est AA >60 11/22/2021 04:46 AM    GFR est non-AA >60 11/22/2021 04:46 AM    Calcium 9.0 11/22/2021 04:46 AM       Review of most recent LFTs (and lipase if done)  Lab Results   Component Value Date/Time    ALT (SGPT) 44 11/20/2021 04:06 PM    AST (SGOT) 30 11/20/2021 04:06 PM    Alk. phosphatase 133 11/20/2021 04:06 PM    Bilirubin, direct 0.4 (H) 11/20/2021 04:06 PM    Bilirubin, total 0.7 11/20/2021 04:06 PM     Lab Results   Component Value Date/Time    Lipase 84 11/19/2021 10:19 AM       Lab Results   Component Value Date/Time    aPTT >200.0 (HH) 11/19/2021 04:29 PM    Bilirubin, direct 0.4 (H) 11/20/2021 04:06 PM    Ammonia 13 11/20/2021 04:04 PM       Review of most recent HgbA1c  No results found for: HBA1C, FXT3CSMT, NNA4DNHN, RMW1LYTG    Nutritional assessment screen for wound healing issues:  Lab Results   Component Value Date/Time    Protein, total 6.4 11/20/2021 04:06 PM    Albumin 2.8 (L) 11/20/2021 04:06 PM       @lastcovr@  XR Results (most recent):  No results found for this or any previous visit.       CT Results (most recent):  Results from Hospital Encounter encounter on 11/19/21    CT HEAD WO CONT    Narrative  CT head without contrast    History: screen for metal for MRI, patient with history of shrapnel from [facility]    Technique: 5mm axial images were obtained from the skull base to the vertex  without intravenous contrast.  Radiation dose reduction techniques were used for  this study:  Our CT scanners use one or all of the following: Automated exposure  control, adjustment of the mA and/or kVp according to patient's size, iterative  reconstruction. Comparison: None    Findings: The examination is mildly compromised due to motion. The ventricles  and sulci are prominent. There are no extra-axial fluid collections. There is no  evidence to suggest an acute major territorial infarct. Patchy and confluent  areas of decreased attenuation are present within the supratentorial white  matter. These are nonspecific findings but would be most compatible with  moderate chronic small vessel ischemic change. There is no evidence of acute  intraparenchymal hemorrhage or mass effect. The bony calvarium is intact. No  radiopaque foreign body is identified. The visualized mastoid air cells and  paranasal sinuses are well pneumatized and aerated. Impression  1. Mildly, otherwise examination without radiopaque foreign body. 2. Moderate chronic small vessel ischemic change. 3. Mild volume loss. US Results (most recent):  No results found for this or any previous visit.         Admission date (for inpatients): 11/19/2021   * No surgery found *  * No surgery found *        ASSESSMENT/PLAN:  Problem List  Date Reviewed: 11/19/2021          Codes Class Noted    B12 deficiency ICD-10-CM: E53.8  ICD-9-CM: 266.2  11/21/2021        * (Principal) Acute metabolic encephalopathy VGR-82-DS: G93.41  ICD-9-CM: 348.31  11/20/2021        Acute cholecystitis ICD-10-CM: K81.0  ICD-9-CM: 575.0  11/19/2021        Bilateral pulmonary embolism ([facility] [address] ICD-10-CM: I26.99  ICD-9-CM: 415.19  11/19/2021        TONIA (acute kidney injury) ([facility] [address] ICD-10-CM: N17.9  ICD-9-CM: 584.9  11/19/2021        Lactic acidosis ICD-10-CM: E87.2  ICD-9-CM: 276.2  11/19/2021        Primary hypertension ICD-10-CM: I10  ICD-9-CM: 401.9  11/19/2021            Principal Problem:    Acute metabolic encephalopathy (30/19/8637)    Active Problems: Acute cholecystitis (11/19/2021)      Bilateral pulmonary embolism ([facility] [address] (11/19/2021)      TONIA (acute kidney injury) ([address] (11/19/2021)      Lactic acidosis (11/19/2021)      Primary hypertension (11/19/2021)      B12 deficiency (11/21/2021)           Number and Complexity of Problems addressed and   Risks of complications and/or morbidity of management    Patient with multiple medical issues including PE, aspiration pneumonia, sepsis, mental status changes and possible acute cholecystitis. His VSS are stable SaO2 99% on 4L NC. Would like to eventually complete MRCP to determine etiology of biliary ductal dilatation. Will continue serial exams and continue Zosyn. He is now on Heparin gtt. Normal LFTs argue against biliary obstruction. No emergent need for surgery at this time. Will follow. Plan:  Care Management per Hospitalist  IV Abx - Zosyn  MRCP canceled   Clear Liquids  Heparin gtt  Follow labs  Replace rachelle Strong NP  this patient was seen and examined yesterday. Notes are being signed today. The patient remains attended and four-point restraints. He denies any abdominal pain. CT was reviewed confirming dilated common bile duct. MRCP was canceled due to patient confusion. Total bilirubin remains normal at 0.7. Will consider MRCP for tomorrow as patient improves. Other medical problems including pulmonary embolism and aspiration pneumonia. Unsure if acute cholecystitis is his main problem. I have personally performed a face-to-face diagnostic evaluation and management  service on this patient. I have independently seen the patient. I have independently obtained the above history from the patient/family. I have independently examined the patient with above findings. I have independently reviewed data/labs for this patient and developed the above plan of care.     [place] [id number] [facility]

## 2021-11-22 NOTE — CONSULTS
[facility] Consult Note       Primary GI Physician: Dr Shahzad Cueva [facility]    Referring Provider:  Dr Teo Membreno Date:  11/22/2021    Admit Date:  11/19/2021    Chief Complaint:  GIB    Subjective:     History of Present Illness:  Patient is a 78 y.o. male with PMH including but not limited to dementia, colon cancer, asthma, HTN, who is seen in consultation at the request of Dr. Gato Echevarria for GIB. He presented to the ED 11/19 with abd pain and confusion. He ws diagnosed with sepsis with WBC now trending down  On Zosyn empirically for ? absdominal sources. CT with distended gallbladder with wall thickening and pericholecystic inflammatory changes worrisome for acute cholecystitis. Secondary inflammatory changes from biliary obstruction are also a consideration as the intrahepatic and extrahepatic bile ducts are dilated. Surgery is seeing and will re attempt MRCP tomorrow pending his mental status and ability to cooperate. If MRCP is no possible surgery plans US. He naga reports abd pain is resolved. He is confused and oriented to person and place only. He has been combative with staff and gotten Ativan. He is in restraints. CT Chest 11/20 with small bilateral PE and started on heparin. This was stopped yesterday after the patient had 3 dark and tarry stools. Hgb today down to 10.9 from 13.6. BUN elevated on 11/21 at 26, wnl today at 20. LFT's on 11/20 with TB 0.7, , AST 30, ALT 44. Per [facility] reports he has a hx of cancerous colon polyp resected surgically and a family hx of colon cancer in his father. Last colonoscopy as below in 2019 with colon polyps.   He denies abd pain, hx of GIB or BRRB but he        Colonoscopy  3/28/2019 Torin Fernandes MD     Pre-operative Diagnosis:  History of colon cancer [Z85.038]  History of colon polyps [Z86.010]    Post-op Diagnosis:  Post-Op Diagnosis Codes:  * History of colon cancer [Z85.038]  * History of colon polyps [Z86.010]    Procedure:  Procedure(s):  COLON_COLONOSCOPY, FLEXIBLE;DIAGNOSTIC W/REMOVAL TUMOR(S)/POLYP(S)/OTHER LESION(S)BY SNARE TECHNIQUE,. .. Specimen:  ID Type Source Tests Collected by Time Destination   1 : transverse colon polyp Tissue Colon SURGICAL PATHOLOGY EXAM Louis Cuevas MD 3/28/2019 1018   2 : sigmoid colon polyps Tissue Colon SURGICAL PATHOLOGY EXAM Primo Amador MD 3/28/2019 1024   3 : rectosigmoid colon polyp Tissue Colon SURGICAL PATHOLOGY EXAM Primo Amador MD 3/28/2019 1029          PMH:  Past Medical History:   Diagnosis Date    Asthma     B12 deficiency 11/21/2021    Hypertension        PSH:  History reviewed. No pertinent surgical history.     Allergies:  No Known Allergies    Home Medications:  Prior to Admission medications    Not on [facility] Medications:  Current Facility-Administered Medications   Medication Dose Route Frequency    losartan (COZAAR) tablet 50 mg  50 mg Oral DAILY    ziprasidone (GEODON) 10 mg in sterile water (preservative free) 0.5 mL injection  10 mg IntraMUSCular ONCE    LORazepam (ATIVAN) injection 1 mg  1 mg IntraVENous ONCE    dextrose 5 % - 0.45% NaCl infusion  75 mL/hr IntraVENous CONTINUOUS    dilTIAZem ER (CARDIZEM CD) capsule 120 mg  120 mg Oral DAILY    cyanocobalamin (VITAMIN B12) injection 1,000 mcg  1,000 mcg IntraMUSCular EVERY OTHER DAY    nicotine (NICODERM CQ) 21 mg/24 hr patch 1 Patch  1 Patch TransDERmal Q24H    influenza vaccine 2021-22 (6 mos+)(PF) (FLUARIX/FLULAVAL/FLUZONE QUAD) injection 0.5 mL  1 Each IntraMUSCular PRIOR TO DISCHARGE    hydrALAZINE (APRESOLINE) 20 mg/mL injection 10 mg  10 mg IntraVENous Q6H PRN    albuterol-ipratropium (DUO-NEB) 2.5 MG-0.5 MG/3 ML  3 mL Nebulization Q4H PRN    pantoprazole (PROTONIX) 40 mg in 0.9% sodium chloride 10 mL injection  40 mg IntraVENous Q12H    naloxone (NARCAN) injection 0.2 mg  0.2 mg IntraVENous EVERY 2 MINUTES AS NEEDED    chlorproMAZINE (THORAZINE) injection 50 mg  50 mg IntraMUSCular ONCE PRN    HYDROmorphone (DILAUDID) injection 0.5 mg  0.5 mg IntraVENous Q3H PRN    piperacillin-tazobactam (ZOSYN) 3.375 g in 0.9% sodium chloride (MBP/ADV) 100 mL MBP  3.375 g IntraVENous Q8H    thiamine (B-1) 500 mg in 0.9% sodium chloride 50 mL IVPB  500 mg IntraVENous TID    sodium chloride (NS) flush 5-10 mL  5-10 mL IntraVENous Q8H    sodium chloride (NS) flush 5-10 mL  5-10 mL IntraVENous PRN    sodium chloride (NS) flush 5-40 mL  5-40 mL IntraVENous Q8H    sodium chloride (NS) flush 5-40 mL  5-40 mL IntraVENous PRN    acetaminophen (TYLENOL) tablet 650 mg  650 mg Oral Q6H PRN    Or    acetaminophen (TYLENOL) suppository 650 mg  650 mg Rectal Q6H PRN    polyethylene glycol (MIRALAX) packet 17 g  17 g Oral DAILY PRN    bisacodyL (DULCOLAX) suppository 10 mg  10 mg Rectal DAILY PRN    ondansetron (ZOFRAN ODT) tablet 4 mg  4 mg Oral Q8H PRN    Or    ondansetron (ZOFRAN) injection 4 mg  4 mg IntraVENous Q6H PRN    famotidine (PEPCID) tablet 20 mg  20 mg Oral BID PRN    alum-mag hydroxide-simeth (MYLANTA) oral suspension 15 mL  15 mL Oral Q6H PRN    oxyCODONE-acetaminophen (PERCOCET) 5-325 mg per tablet 1 Tablet  1 Tablet Oral Q4H PRN    0.9% sodium chloride infusion  100 mL/hr IntraVENous CONTINUOUS    QUEtiapine (SEROquel) tablet 25 mg  25 mg Oral QHS       Social History:  Social History     Tobacco Use    Smoking status: Not on file    Smokeless tobacco: Not on file   Substance Use Topics    Alcohol use: Not on file       Pt denies any history of drug use, blood transfusions, or tattoos. Family History:  History reviewed. No pertinent family history. Review of Systems:  A detailed 10 system ROS is obtained, with pertinent positives as listed above. All others are negative.     Diet:      Objective:     Physical Exam:  Vitals:  Visit Vitals  /78 (BP 1 Location: Left leg, BP Patient Position: At rest)   Pulse 72 Temp 98.1 °F (36.7 °C)   Resp 21   Ht 5' 10\" (1.778 m)   Wt 95.5 kg (210 lb 9.6 oz)   SpO2 98%   BMI 30.22 kg/m²     Gen:  Pt is alert, cooperative, no acute distress  Skin:  Extremities and face reveal no rashes. HEENT: Sclerae anicteric. Extra-occular muscles are intact. No oral ulcers. No abnormal pigmentation of the lips. The neck is supple. Cardiovascular: Regular rate and rhythm. No murmurs, gallops, or rubs. Respiratory:  Comfortable breathing with no accessory muscle use. Clear breath sounds anteriorly with no wheezes, rales, or rhonchi. GI:  Abdomen nondistended, soft, and nontender. Normal active bowel sounds. No enlargement of the liver or spleen. No masses palpable. Rectal:  Deferred  Musculoskeletal:  No pitting edema of the lower legs.     Neurological:  Patient is alert and oriented to person and place only  Psychiatric:  Confused and agitated       Laboratory:    Recent Labs     11/22/21  0446 11/21/21  2351 11/21/21  1724 11/21/21  0509 11/20/21  1606 11/20/21  0541 11/19/21  1856 11/19/21  1630 11/19/21  1629   WBC 7.3  --   --  8.4  --  14.9* 16.2* 16.8*  --    HGB 10.9* 11.0*  --  12.1*  --  13.0* 13.6 14.0  --    HCT 34.3* 33.7*  --  37.8*  --  40.1* 42.6 42.6  --      --   --  177  --  156 185 186  --    MCV 94.2  --   --  91.5  --  90.9 91.0 90.8  --      --  142 145  --  144  --   --   --    K 3.8  --  3.1* 2.9*  --  3.5  --   --   --    *  --  111* 112*  --  114*  --   --   --    CO2 25  --  26 25  --  24  --   --   --    BUN 20  --  23 26*  --  24*  --   --   --    CREA 1.00  --  1.06 1.13  --  1.04  --   --   --    CA 9.0  --  9.3 9.3  --  8.9  --   --   --    MG  --   --  2.1  --   --   --   --   --   --    GLU 80  --  111* 90  --  90  --   --   --    AP  --   --   --   --  133  --   --   --   --    AST  --   --   --   --  30  --   --   --   --    ALT  --   --   --   --  44  --   --   --   --    TBILI  --   --   --   --  0.7  --   --   --   --    CBIL  -- --   --   --  0.4*  --   --   --   --    ALB  --   --   --   --  2.8*  --   --   --   --    TP  --   --   --   --  6.4  --   --   --   --    APTT  --   --   --   --   --   --   --   --  >200.0*          Assessment:     Principal Problem:    Acute metabolic encephalopathy (31/71/8529)    Active Problems:    Acute cholecystitis (11/19/2021)      Bilateral pulmonary embolism ([facility] [address] (11/19/2021)      TONIA (acute kidney injury) ([facility] [address] (11/19/2021)      Lactic acidosis (11/19/2021)      Primary hypertension (11/19/2021)      B12 deficiency (11/21/2021)      GI bleed (11/22/2021)      79 yo male with PMH including but not limited to dementia, colon cancer, asthma, HTN, who is seen in consultation at the request of Dr. Gato Echevarria for GIB with tarry stools yesterday after being on heparin for bilateral PE's. NO BRRB per nursing reports. Patient is a poor historian and has been combative with staff. Plan:     - MRCP pending   - Will re check LFT's  - Monitor for signs of GIB  - Monitor H&H and transfuse for hgb < 7.0  - Will plan EGD tomorrow to evaluate for UGIB     Miguel Hawkins NP  Patient is seen and examined in collaboration with Dr. Dashawn Hurley. Assessment and plan as per Dr. Joni Tracey.
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
## 2021-11-22 NOTE — PROGRESS NOTES
MRI cannot be performed until 2 doctors sign screening form and a radiologist reviews images. Patient is from a nursing facility and has dementia, unable to screen himself. No family protocol being followed.

## 2021-11-22 NOTE — PROGRESS NOTES
Problem: Non-Violent Restraints  Goal: Removal from restraints as soon as assessed to be safe  Outcome: Progressing Towards Goal  Note: Removed from ankles last night    Goal: No harm/injury to patient while restraints in use  Outcome: Progressing Towards Goal  Goal: Patient's dignity will be maintained  Outcome: Progressing Towards Goal  Goal: Patient Interventions  Outcome: Progressing Towards Goal     Problem: Falls - Risk of  Goal: *Absence of Falls  Description: Document Green Salvia Fall Risk and appropriate interventions in the flowsheet. Outcome: Progressing Towards Goal  Note: Fall Risk Interventions:  Mobility Interventions: Bed/chair exit alarm, Communicate number of staff needed for ambulation/transfer, Patient to call before getting OOB    Mentation Interventions: Adequate sleep, hydration, pain control, Bed/chair exit alarm, Door open when patient unattended, Evaluate medications/consider consulting pharmacy, Increase mobility, More frequent rounding, Reorient patient, Room close to nurse's station    Medication Interventions: Bed/chair exit alarm, Evaluate medications/consider consulting pharmacy, Patient to call before getting OOB, Teach patient to arise slowly    Elimination Interventions: Bed/chair exit alarm, Call light in reach, Patient to call for help with toileting needs, Toileting schedule/hourly rounds              Problem: Patient Education: Go to Patient Education Activity  Goal: Patient/Family Education  Outcome: Progressing Towards Goal     Problem: Pressure Injury - Risk of  Goal: *Prevention of pressure injury  Description: Document Cornelius Scale and appropriate interventions in the flowsheet.   Outcome: Progressing Towards Goal  Note: Pressure Injury Interventions:  Sensory Interventions: Assess changes in LOC, Assess need for specialty bed, Check visual cues for pain, Discuss PT/OT consult with provider, Keep linens dry and wrinkle-free, Maintain/enhance activity level, Minimize linen layers, Pad between skin to skin    Moisture Interventions: Absorbent underpads, Apply protective barrier, creams and emollients, Check for incontinence Q2 hours and as needed, Limit adult briefs, Maintain skin hydration (lotion/cream), Minimize layers, Moisture barrier    Activity Interventions: Assess need for specialty bed, Increase time out of bed, PT/OT evaluation    Mobility Interventions: Assess need for specialty bed, HOB 30 degrees or less, PT/OT evaluation    Nutrition Interventions: Document food/fluid/supplement intake, Offer support with meals,snacks and hydration    Friction and Shear Interventions: Apply protective barrier, creams and emollients, Foam dressings/transparent film/skin sealants, Lift sheet, Minimize layers                Problem: Patient Education: Go to Patient Education Activity  Goal: Patient/Family Education  Outcome: Progressing Towards Goal     Problem: General Medical Care Plan  Goal: *Labs within defined limits  Outcome: Progressing Towards Goal  Goal: *Absence of infection signs and symptoms  Outcome: Progressing Towards Goal  Goal: *Optimal pain control at patient's stated goal  Outcome: Progressing Towards Goal  Goal: *Skin integrity maintained  Outcome: Progressing Towards Goal  Goal: *Fluid volume balance  Outcome: Progressing Towards Goal  Goal: *Optimize nutritional status  Outcome: Progressing Towards Goal  Goal: *Progressive mobility and function (eg: ADL's)  Outcome: Progressing Towards Goal     Problem: Patient Education: Go to Patient Education Activity  Goal: Patient/Family Education  Outcome: Progressing Towards Goal     Problem: Patient Education: Go to Patient Education Activity  Goal: Patient/Family Education  Outcome: Progressing Towards Goal     Problem: General Medical Care Plan  Goal: *Vital signs within specified parameters  Outcome: Not Met  Goal: *Anxiety reduced or absent  Outcome: Not Met

## 2021-11-22 NOTE — PROGRESS NOTES
Notified by RN and Rishi Mcginnis of patient's severe agitation/acute psychosis with combativeness and risk of harm to self and others. Zyprexa 10 IM response was short lived, ~3 hours. Elevated BP and possible cholecysitits . . history of chronic pain syndrome on opioids in the past per record review. Trial dilaudid 0.5 mg x 1 with good response. Patient is sleeping. RR are stable. Appears comfortable in soft restraints. Also nurse noted dark, tarry stool concerning for GIB, on heparin gtt for bilateral PE   Last hemoglobin was early yesterday morning ~12. Check H&H now. Start protonix q12 IV for now pending GI eval in AM   Hold heparin gtt given risk of life threatening bleed. Re-assess AC in AM per primary team.   Dilaudid 0.5 mg prn ordered with prn narcan   One time dose of as needed thorazine 50 mg IM with 2 mg ativan IM for severe refractory agitation.    k low. Ordered IV replacement. Mag checked and wnl.   UA w/o evidence of UTI. Likely slightly dry. Would not aggressively treat his BP aggressively given likely physiologic d/t pain, agitation unless concerns for HTN emergency with end organ damage or vascular/cardiac emergency   Case discussed with primary RN at bedside. There is a high probability of acute organ impairment or life-threatening deterioration in the patient's condition from: ABLA, psychosis     Total critical care time spent: 37 minutes. Time is indicative of direct patient attendance at bedside and on the patient's floor nearby. Includes time spent at bedside performing history and exam, performing chart review, discussing findings and treatment plan with patient and/or family, discussing patient with consultants and colleagues, ordering and reviewing pertinent laboratory and radiographic evaluations, and discussing patient with nursing staff. Time excludes procedures.     Mehreen Lozano DO  11:44 PM  Felisha

## 2021-11-22 NOTE — PROGRESS NOTES
Comprehensive Nutrition Assessment    Type and Reason for Visit: Initial, Positive nutrition screen  Best Practice Alert for Malnutrition Screening Tool: Recently Lost Weight Without Trying: Unsure,  , Eating Poorly Due to Decreased Appetite: No    Nutrition Recommendations/Plan:   Meals and Snacks:   Continue current diet. NPO. Progression per MD.  If pt is to remain NPO/CLD for greater than an additional 4 days, nutrition support should be considered for primary needs. Based on current mentation pt is high risk for self discontinuation of any nutrition access. Malnutrition Assessment:  Malnutrition Status: At risk for malnutrition (specify) (NPO/CLD day 3 at initial assessment)    Nutrition Assessment:   Nutrition History: Unable to obtain. Nutrition Background: PMH remarkable for dementia, colon ca, asthma, HTN. Presented with abdominal pain and confusion. Admitted with Sepsis ? abdominal source, GIB, bilateral PEs, TONIA, acute metabollic encephalopathy. Daily Update:  Pt is restrained, s/p IM ativan at RD visit. Surgery is planning MRCP tomorrow vs gallbladder ultrasound. GI following. Pt without significant wt change based on minimal available EMR weights and current body weight recorded. Currently GI function and mentation inhibit oral intake. IVF: D51/2NS @ 75 ml/hr    Nutrition Related Findings:   4 point restraints; no visual muscle or fat loss      Current Nutrition Therapies:  DIET NPO  DIET NPO    Current Intake:   Average Meal Intake: NPO        Anthropometric Measures:  Height: 5' 10\" (177.8 cm)  Current Body Wt: 95.5 kg (210 lb 8.6 oz), Weight source: Not specified  BMI: 30.2, Obese class 1 (BMI 30.0-34. 9)  Admission Body Weight: 210 lb 8.6 oz (not specified)  Ideal Body Weight (lbs) (Calculated): 166 lbs (75 kg), 126.8 %  Usual Body Wt: 97.1 kg (214 lb) (only EMR value from daniela/ortho office 1/23/20), Percent weight change: -1.6          Edema: LLE: 1+; Non-pitting (11/22/2021  7:20 AM)  RLE: 1+; Non-pitting (11/22/2021  7:20 AM)     Estimated Daily Nutrient Needs:  Energy (kcal/day): 7170-1755 (Kcal/kg (18-20), Weight Used: Admission (95.5 kg))  Protein (g/day):  (1-1.2 g/kg) Weight Used: (Admission)  Fluid (ml/day):   (1 ml/kcal)    Nutrition Diagnosis:   · Inadequate oral intake related to cognitive or neurological impairment, altered GI function as evidenced by NPO or clear liquid status due to medical condition    Nutrition Interventions:   Food and/or Nutrient Delivery: Continue NPO     Coordination of Nutrition Care: Continue to monitor while inpatient    Goals: Active Goal: Tolerate initiation of nutrition regimen within 5 days    Nutrition Monitoring and Evaluation:      Food/Nutrient Intake Outcomes: Diet advancement/tolerance  Physical Signs/Symptoms Outcomes: Biochemical data, GI status, Meal time behavior, Weight    Discharge Planning:     Too soon to determine    Gina Moreau RD, LDN   Contact: [phone]

## 2021-11-23 ENCOUNTER — APPOINTMENT (OUTPATIENT)
Dept: ULTRASOUND IMAGING | Age: 76
DRG: 871 | End: 2021-11-23
Attending: INTERNAL MEDICINE
Payer: MEDICARE

## 2021-11-23 ENCOUNTER — ANESTHESIA (OUTPATIENT)
Dept: ENDOSCOPY | Age: 76
DRG: 871 | End: 2021-11-23
Payer: MEDICARE

## 2021-11-23 LAB
ANION GAP SERPL CALC-SCNC: 9 MMOL/L (ref 7–16)
BASOPHILS # BLD: 0 K/UL (ref 0–0.2)
BASOPHILS NFR BLD: 0 % (ref 0–2)
BUN SERPL-MCNC: 11 MG/DL (ref 8–23)
CALCIUM SERPL-MCNC: 9.2 MG/DL (ref 8.3–10.4)
CHLORIDE SERPL-SCNC: 112 MMOL/L (ref 98–107)
CO2 SERPL-SCNC: 23 MMOL/L (ref 21–32)
CREAT SERPL-MCNC: 0.76 MG/DL (ref 0.8–1.5)
DIFFERENTIAL METHOD BLD: ABNORMAL
EOSINOPHIL # BLD: 0.2 K/UL (ref 0–0.8)
EOSINOPHIL NFR BLD: 3 % (ref 0.5–7.8)
ERYTHROCYTE [DISTWIDTH] IN BLOOD BY AUTOMATED COUNT: 14.7 % (ref 11.9–14.6)
GLUCOSE SERPL-MCNC: 92 MG/DL (ref 65–100)
HCT VFR BLD AUTO: 34.7 % (ref 41.1–50.3)
HCT VFR BLD AUTO: 35.1 % (ref 41.1–50.3)
HCT VFR BLD AUTO: 39 % (ref 41.1–50.3)
HGB BLD-MCNC: 11.2 G/DL (ref 13.6–17.2)
HGB BLD-MCNC: 11.5 G/DL (ref 13.6–17.2)
HGB BLD-MCNC: 12.8 G/DL (ref 13.6–17.2)
IMM GRANULOCYTES # BLD AUTO: 0.1 K/UL (ref 0–0.5)
IMM GRANULOCYTES NFR BLD AUTO: 1 % (ref 0–5)
LYMPHOCYTES # BLD: 0.8 K/UL (ref 0.5–4.6)
LYMPHOCYTES NFR BLD: 10 % (ref 13–44)
MCH RBC QN AUTO: 30.3 PG (ref 26.1–32.9)
MCHC RBC AUTO-ENTMCNC: 32.8 G/DL (ref 31.4–35)
MCV RBC AUTO: 92.4 FL (ref 79.6–97.8)
MONOCYTES # BLD: 0.8 K/UL (ref 0.1–1.3)
MONOCYTES NFR BLD: 11 % (ref 4–12)
NEUTS SEG # BLD: 5.6 K/UL (ref 1.7–8.2)
NEUTS SEG NFR BLD: 75 % (ref 43–78)
NRBC # BLD: 0 K/UL (ref 0–0.2)
PLATELET # BLD AUTO: 179 K/UL (ref 150–450)
PMV BLD AUTO: 10.1 FL (ref 9.4–12.3)
POTASSIUM SERPL-SCNC: 3 MMOL/L (ref 3.5–5.1)
RBC # BLD AUTO: 3.8 M/UL (ref 4.23–5.6)
SODIUM SERPL-SCNC: 144 MMOL/L (ref 138–145)
UFH PPP CHRO-ACNC: 0.19 IU/ML (ref 0.3–0.7)
WBC # BLD AUTO: 7.4 K/UL (ref 4.3–11.1)

## 2021-11-23 PROCEDURE — 85520 HEPARIN ASSAY: CPT

## 2021-11-23 PROCEDURE — 85014 HEMATOCRIT: CPT

## 2021-11-23 PROCEDURE — 65270000029 HC RM PRIVATE

## 2021-11-23 PROCEDURE — 74011000250 HC RX REV CODE- 250: Performed by: NURSE ANESTHETIST, CERTIFIED REGISTERED

## 2021-11-23 PROCEDURE — 80048 BASIC METABOLIC PNL TOTAL CA: CPT

## 2021-11-23 PROCEDURE — 88305 TISSUE EXAM BY PATHOLOGIST: CPT

## 2021-11-23 PROCEDURE — 74011250636 HC RX REV CODE- 250/636: Performed by: INTERNAL MEDICINE

## 2021-11-23 PROCEDURE — 94664 DEMO&/EVAL PT USE INHALER: CPT

## 2021-11-23 PROCEDURE — 74011000258 HC RX REV CODE- 258: Performed by: INTERNAL MEDICINE

## 2021-11-23 PROCEDURE — 77010033678 HC OXYGEN DAILY

## 2021-11-23 PROCEDURE — 74011250636 HC RX REV CODE- 250/636: Performed by: FAMILY MEDICINE

## 2021-11-23 PROCEDURE — 0DB68ZX EXCISION OF STOMACH, VIA NATURAL OR ARTIFICIAL OPENING ENDOSCOPIC, DIAGNOSTIC: ICD-10-PCS | Performed by: INTERNAL MEDICINE

## 2021-11-23 PROCEDURE — 74011250637 HC RX REV CODE- 250/637: Performed by: INTERNAL MEDICINE

## 2021-11-23 PROCEDURE — 2709999900 HC NON-CHARGEABLE SUPPLY: Performed by: INTERNAL MEDICINE

## 2021-11-23 PROCEDURE — 76040000025: Performed by: INTERNAL MEDICINE

## 2021-11-23 PROCEDURE — 2709999900 HC NON-CHARGEABLE SUPPLY

## 2021-11-23 PROCEDURE — 94760 N-INVAS EAR/PLS OXIMETRY 1: CPT

## 2021-11-23 PROCEDURE — 74011000250 HC RX REV CODE- 250: Performed by: FAMILY MEDICINE

## 2021-11-23 PROCEDURE — 94640 AIRWAY INHALATION TREATMENT: CPT

## 2021-11-23 PROCEDURE — 74011250636 HC RX REV CODE- 250/636: Performed by: NURSE ANESTHETIST, CERTIFIED REGISTERED

## 2021-11-23 PROCEDURE — 74011250637 HC RX REV CODE- 250/637: Performed by: FAMILY MEDICINE

## 2021-11-23 PROCEDURE — 88312 SPECIAL STAINS GROUP 1: CPT

## 2021-11-23 PROCEDURE — 74011250636 HC RX REV CODE- 250/636: Performed by: ANESTHESIOLOGY

## 2021-11-23 PROCEDURE — 77030036554

## 2021-11-23 PROCEDURE — C9113 INJ PANTOPRAZOLE SODIUM, VIA: HCPCS | Performed by: FAMILY MEDICINE

## 2021-11-23 PROCEDURE — 76060000031 HC ANESTHESIA FIRST 0.5 HR: Performed by: INTERNAL MEDICINE

## 2021-11-23 PROCEDURE — 77030021593 HC FCPS BIOP ENDOSC BSC -A: Performed by: INTERNAL MEDICINE

## 2021-11-23 PROCEDURE — 85025 COMPLETE CBC W/AUTO DIFF WBC: CPT

## 2021-11-23 PROCEDURE — 36415 COLL VENOUS BLD VENIPUNCTURE: CPT

## 2021-11-23 PROCEDURE — 74011000250 HC RX REV CODE- 250: Performed by: INTERNAL MEDICINE

## 2021-11-23 RX ORDER — SODIUM CHLORIDE, SODIUM LACTATE, POTASSIUM CHLORIDE, CALCIUM CHLORIDE 600; 310; 30; 20 MG/100ML; MG/100ML; MG/100ML; MG/100ML
75 INJECTION, SOLUTION INTRAVENOUS CONTINUOUS
Status: DISCONTINUED | OUTPATIENT
Start: 2021-11-23 | End: 2021-11-23

## 2021-11-23 RX ORDER — HEPARIN SODIUM 5000 [USP'U]/100ML
18-36 INJECTION, SOLUTION INTRAVENOUS
Status: DISCONTINUED | OUTPATIENT
Start: 2021-11-23 | End: 2021-11-25

## 2021-11-23 RX ORDER — POTASSIUM CHLORIDE 14.9 MG/ML
20 INJECTION INTRAVENOUS
Status: COMPLETED | OUTPATIENT
Start: 2021-11-23 | End: 2021-11-23

## 2021-11-23 RX ORDER — POTASSIUM CHLORIDE 29.8 MG/ML
40 INJECTION INTRAVENOUS ONCE
Status: DISCONTINUED | OUTPATIENT
Start: 2021-11-23 | End: 2021-11-23 | Stop reason: SDUPTHER

## 2021-11-23 RX ORDER — PROPOFOL 10 MG/ML
INJECTION, EMULSION INTRAVENOUS AS NEEDED
Status: DISCONTINUED | OUTPATIENT
Start: 2021-11-23 | End: 2021-11-23 | Stop reason: HOSPADM

## 2021-11-23 RX ORDER — HYDRALAZINE HYDROCHLORIDE 20 MG/ML
10 INJECTION INTRAMUSCULAR; INTRAVENOUS
Status: DISCONTINUED | OUTPATIENT
Start: 2021-11-23 | End: 2021-11-27

## 2021-11-23 RX ORDER — SODIUM CHLORIDE 9 MG/ML
25 INJECTION, SOLUTION INTRAVENOUS CONTINUOUS
Status: DISCONTINUED | OUTPATIENT
Start: 2021-11-23 | End: 2021-11-23

## 2021-11-23 RX ORDER — GLYCOPYRROLATE 0.2 MG/ML
INJECTION INTRAMUSCULAR; INTRAVENOUS AS NEEDED
Status: DISCONTINUED | OUTPATIENT
Start: 2021-11-23 | End: 2021-11-23 | Stop reason: HOSPADM

## 2021-11-23 RX ORDER — PANTOPRAZOLE SODIUM 40 MG/1
40 TABLET, DELAYED RELEASE ORAL
Status: DISCONTINUED | OUTPATIENT
Start: 2021-11-23 | End: 2021-12-02 | Stop reason: HOSPADM

## 2021-11-23 RX ORDER — LIDOCAINE HYDROCHLORIDE 20 MG/ML
INJECTION, SOLUTION EPIDURAL; INFILTRATION; INTRACAUDAL; PERINEURAL AS NEEDED
Status: DISCONTINUED | OUTPATIENT
Start: 2021-11-23 | End: 2021-11-23 | Stop reason: HOSPADM

## 2021-11-23 RX ORDER — HEPARIN SODIUM 1000 [USP'U]/ML
20 INJECTION, SOLUTION INTRAVENOUS; SUBCUTANEOUS ONCE
Status: COMPLETED | OUTPATIENT
Start: 2021-11-23 | End: 2021-11-23

## 2021-11-23 RX ORDER — PROPOFOL 10 MG/ML
INJECTION, EMULSION INTRAVENOUS
Status: DISCONTINUED | OUTPATIENT
Start: 2021-11-23 | End: 2021-11-23 | Stop reason: HOSPADM

## 2021-11-23 RX ADMIN — LOSARTAN POTASSIUM 50 MG: 50 TABLET, FILM COATED ORAL at 08:21

## 2021-11-23 RX ADMIN — HEPARIN SODIUM 14 UNITS/KG/HR: 5000 INJECTION, SOLUTION INTRAVENOUS at 13:42

## 2021-11-23 RX ADMIN — THIAMINE HYDROCHLORIDE 500 MG: 100 INJECTION, SOLUTION INTRAMUSCULAR; INTRAVENOUS at 16:58

## 2021-11-23 RX ADMIN — LIDOCAINE HYDROCHLORIDE 100 MG: 20 INJECTION, SOLUTION EPIDURAL; INFILTRATION; INTRACAUDAL; PERINEURAL at 11:57

## 2021-11-23 RX ADMIN — DEXTROSE MONOHYDRATE AND SODIUM CHLORIDE 75 ML/HR: 5; .45 INJECTION, SOLUTION INTRAVENOUS at 08:25

## 2021-11-23 RX ADMIN — CYANOCOBALAMIN 1000 MCG: 1000 INJECTION, SOLUTION INTRAMUSCULAR; SUBCUTANEOUS at 21:23

## 2021-11-23 RX ADMIN — PIPERACILLIN SODIUM AND TAZOBACTAM SODIUM 3.38 G: 3; .375 INJECTION, POWDER, LYOPHILIZED, FOR SOLUTION INTRAVENOUS at 17:00

## 2021-11-23 RX ADMIN — THIAMINE HYDROCHLORIDE 500 MG: 100 INJECTION, SOLUTION INTRAMUSCULAR; INTRAVENOUS at 21:27

## 2021-11-23 RX ADMIN — POTASSIUM CHLORIDE 20 MEQ: 14.9 INJECTION, SOLUTION INTRAVENOUS at 10:13

## 2021-11-23 RX ADMIN — PROPOFOL 140 MCG/KG/MIN: 10 INJECTION, EMULSION INTRAVENOUS at 11:57

## 2021-11-23 RX ADMIN — HYDRALAZINE HYDROCHLORIDE 10 MG: 20 INJECTION INTRAMUSCULAR; INTRAVENOUS at 08:21

## 2021-11-23 RX ADMIN — GLYCOPYRROLATE 0.1 MG: 0.2 INJECTION INTRAMUSCULAR; INTRAVENOUS at 11:57

## 2021-11-23 RX ADMIN — PROPOFOL 80 MG: 10 INJECTION, EMULSION INTRAVENOUS at 11:57

## 2021-11-23 RX ADMIN — PIPERACILLIN SODIUM AND TAZOBACTAM SODIUM 3.38 G: 3; .375 INJECTION, POWDER, LYOPHILIZED, FOR SOLUTION INTRAVENOUS at 23:16

## 2021-11-23 RX ADMIN — PIPERACILLIN SODIUM AND TAZOBACTAM SODIUM 3.38 G: 3; .375 INJECTION, POWDER, LYOPHILIZED, FOR SOLUTION INTRAVENOUS at 08:22

## 2021-11-23 RX ADMIN — IPRATROPIUM BROMIDE AND ALBUTEROL SULFATE 3 ML: .5; 3 SOLUTION RESPIRATORY (INHALATION) at 17:52

## 2021-11-23 RX ADMIN — DILTIAZEM HYDROCHLORIDE 120 MG: 120 CAPSULE, COATED, EXTENDED RELEASE ORAL at 08:21

## 2021-11-23 RX ADMIN — SODIUM CHLORIDE 40 MG: 9 INJECTION INTRAMUSCULAR; INTRAVENOUS; SUBCUTANEOUS at 08:21

## 2021-11-23 RX ADMIN — QUETIAPINE FUMARATE 25 MG: 25 TABLET ORAL at 21:23

## 2021-11-23 RX ADMIN — Medication 10 ML: at 12:56

## 2021-11-23 RX ADMIN — OXYCODONE HYDROCHLORIDE AND ACETAMINOPHEN 1 TABLET: 5; 325 TABLET ORAL at 14:05

## 2021-11-23 RX ADMIN — THIAMINE HYDROCHLORIDE 500 MG: 100 INJECTION, SOLUTION INTRAMUSCULAR; INTRAVENOUS at 10:18

## 2021-11-23 RX ADMIN — POTASSIUM CHLORIDE 20 MEQ: 14.9 INJECTION, SOLUTION INTRAVENOUS at 08:22

## 2021-11-23 RX ADMIN — HYDROMORPHONE HYDROCHLORIDE 0.5 MG: 1 INJECTION, SOLUTION INTRAMUSCULAR; INTRAVENOUS; SUBCUTANEOUS at 03:43

## 2021-11-23 RX ADMIN — HEPARIN SODIUM 1640 UNITS: 1000 INJECTION, SOLUTION INTRAVENOUS; SUBCUTANEOUS at 21:18

## 2021-11-23 RX ADMIN — SODIUM CHLORIDE: 9 INJECTION, SOLUTION INTRAVENOUS at 11:48

## 2021-11-23 RX ADMIN — PANTOPRAZOLE SODIUM 40 MG: 40 TABLET, DELAYED RELEASE ORAL at 16:58
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
## 2021-11-23 NOTE — PROGRESS NOTES
TRANSFER - OUT REPORT:    Verbal report given to Rd Carranza RN(name) on Bonnie Berry  being transferred to GI lab(unit) for ordered procedure       Report consisted of patients Situation, Background, Assessment and   Recommendations(SBAR). Information from the following report(s) SBAR, Kardex, Intake/Output, MAR and Recent Results was reviewed with the receiving nurse. Lines:   Peripheral IV 11/21/21 Right Forearm (Active)   Site Assessment Clean, dry, & intact 11/23/21 0703   Phlebitis Assessment 0 11/23/21 0703   Infiltration Assessment 0 11/23/21 0703   Dressing Status Clean, dry, & intact 11/23/21 0703   Dressing Type Tape; Transparent 11/23/21 0703   Hub Color/Line Status Infusing; Flushed; Patent 11/23/21 0703   Alcohol Cap Used No 11/22/21 1830        Opportunity for questions and clarification was provided.       Patient transported with:   O2 @ 3 liters

## 2021-11-23 NOTE — ANESTHESIA PREPROCEDURE EVALUATION
Relevant Problems   No relevant active problems       Anesthetic History               Review of Systems / Medical History  Patient summary reviewed and pertinent labs reviewed    Pulmonary    COPD      Smoker  Asthma        Neuro/Psych         Dementia    Comments: Metabolic encephalopathy Cardiovascular    Hypertension                   GI/Hepatic/Renal               Comments: GIB Endo/Other        Cancer (colon)     Other Findings            Physical Exam    Airway  Mallampati: III    Neck ROM: decreased range of motion   Mouth opening: Normal     Cardiovascular  Regular rate and rhythm,  S1 and S2 normal,  no murmur, click, rub, or gallop             Dental  No notable dental hx       Pulmonary  Breath sounds clear to auscultation               Abdominal         Other Findings            Anesthetic Plan    ASA: 4  Anesthesia type: total IV anesthesia            Anesthetic plan and risks discussed with: Patient      Patient alert, answers questions appropriately and demonstrates understanding. Denies current nausea or recent vomiting.

## 2021-11-23 NOTE — PROGRESS NOTES
END OF SHIFT NOTE:    INTAKE/OUTPUT  11/21 0701 - 11/22 0700  In: 240 [P.O.:240]  Out: 1400 [PTTYB:3361]  Voiding: YES  Catheter: NO  Drain:   External Urinary Catheter 11/19/21 (Active)   Site Assessment Clean, dry, & intact 11/21/21 1543   Repositioned Yes 11/21/21 1543   Perineal Care Yes 11/21/21 1543   Wick Changed Yes 11/21/21 1543   Suction Canister/Tubing Changed No 11/21/21 1543   Urine Output (mL) 400 ml 11/21/21 2318               Flatus: Patient does have flatus present. Stool:  2   occurrences. Characteristics:  Stool Assessment  Stool Color: Black, Brown (very dark in color)  Stool Appearance: Loose, Soft  Stool Amount: Large  Stool Source/Status: Rectum, Incontinence    Emesis: 0 occurrences. Characteristics:        VITAL SIGNS  Patient Vitals for the past 12 hrs:   Temp Pulse Resp BP SpO2   11/22/21 1522 98.4 °F (36.9 °C) 78 20 (!) 192/83 98 %   11/22/21 1107 98.1 °F (36.7 °C) 72 21 113/78 98 %   11/22/21 0740 98.5 °F (36.9 °C) 75 20 (!) 184/81 98 %       Pain Assessment  Pain Intensity 1: 0 (11/22/21 0815)  Pain Location 1: Generalized  Pain Intervention(s) 1: Medication (see MAR), Repositioned  Patient Stated Pain Goal: 0    Ambulating  No    Shift report given to oncoming nurse at the bedside.     Kevin Scott RN

## 2021-11-23 NOTE — PROGRESS NOTES
END OF SHIFT NOTE:    INTAKE/OUTPUT  11/22 0701 - 11/23 0700  In: 2413 [I.V.:2413]  Out: 700 [Urine:700]  Voiding: YES  Catheter: NO  Drain:   External Urinary Catheter 11/19/21 (Active)   Site Assessment Clean, dry, & intact 11/23/21 0703   Repositioned Yes 11/21/21 1543   Perineal Care Yes 11/21/21 1543   Wick Changed Yes 11/21/21 1543   Suction Canister/Tubing Changed No 11/21/21 1543   Urine Output (mL) 700 ml 11/23/21 1044               Flatus: Patient does have flatus present. Stool:  2 occurrences. Characteristics:  Stool Assessment  Stool Color: Black, Brown  Stool Appearance: Loose  Stool Amount: Large  Stool Source/Status: Rectum    Emesis: 0 occurrences. Characteristics:        VITAL SIGNS  Patient Vitals for the past 12 hrs:   Temp Pulse Resp BP SpO2   11/23/21 1753 -- -- -- -- 95 %   11/23/21 1511 98.1 °F (36.7 °C) 86 22 (!) 199/77 97 %   11/23/21 1251 98 °F (36.7 °C) 78 20 (!) 154/93 96 %   11/23/21 1242 -- 80 16 (!) 194/80 99 %   11/23/21 1233 -- 84 18 (!) 192/86 98 %   11/23/21 1224 -- 85 16 (!) 165/76 98 %   11/23/21 1214 98 °F (36.7 °C) 99 14 (!) 175/75 100 %   11/23/21 1046 98.2 °F (36.8 °C) 92 20 (!) 228/106 99 %   11/23/21 0702 -- -- -- (!) 200/73 --   11/23/21 0657 98.1 °F (36.7 °C) 82 20 (!) 208/97 99 %       Pain Assessment  Pain Intensity 1: 0 (11/23/21 1242)  Pain Location 1: Generalized  Pain Intervention(s) 1: Medication (see MAR)  Patient Stated Pain Goal: 0    Ambulating  No    Shift report given to oncoming nurse at the bedside.     Princess Bellamy RN

## 2021-11-23 NOTE — PROGRESS NOTES
Routine safety standards initiated.  Routine nursing standards initiated.   TRANSFER - IN REPORT:    Verbal report received from [address] on [address]  being received from [phone] for ordered procedure      Report consisted of patients Situation, Background, Assessment and   Recommendations(SBAR). Information from the following report(s) SBAR, Intake/Output, MAR, Recent Results, Med Rec Status and Pre Procedure Checklist was reviewed with the receiving nurse. Opportunity for questions and clarification was provided.

## 2021-11-23 NOTE — PROGRESS NOTES
OT Note:    OT attempted to see patient this afternoon for therapy. Pt did not want to participate at this time. OT will re-attempt to see patient at a later date/time.     Thanks,  Louann Becerra Counter

## 2021-11-23 NOTE — ANESTHESIA POSTPROCEDURE EVALUATION
Procedure(s):  ESOPHAGOGASTRODUODENOSCOPY (EGD)/BMI 30 ROOM 222  ESOPHAGOGASTRODUODENAL (EGD) BIOPSY. total IV anesthesia    Anesthesia Post Evaluation      Multimodal analgesia: multimodal analgesia used between 6 hours prior to anesthesia start to PACU discharge  Patient location during evaluation: PACU  Patient participation: complete - patient participated  Level of consciousness: awake and alert  Pain management: adequate  Airway patency: patent  Anesthetic complications: no  Cardiovascular status: acceptable  Respiratory status: acceptable  Hydration status: acceptable  Post anesthesia nausea and vomiting:  none  Final Post Anesthesia Temperature Assessment:  Normothermia (36.0-37.5 degrees C)      INITIAL Post-op Vital signs:   Vitals Value Taken Time   /93 11/23/21 1251   Temp 36.7 °C (98 °F) 11/23/21 1251   Pulse 64 11/23/21 1251   Resp 20 11/23/21 1251   SpO2 96 % 11/23/21 1251   Vitals shown include unvalidated device data.

## 2021-11-23 NOTE — PROGRESS NOTES
TRANSFER - IN REPORT:    Verbal report received from Rad Fuller RN(name) on Nai Fish  being received from GI lab(unit) for routine post - op      Report consisted of patients Situation, Background, Assessment and   Recommendations(SBAR). Information from the following report(s) SBAR, Kardex, Procedure Summary, Intake/Output, MAR and Recent Results was reviewed with the receiving nurse. Opportunity for questions and clarification was provided. Assessment completed upon patients arrival to unit and care assumed.

## 2021-11-23 NOTE — PROCEDURES
Esophagogastroduodenoscopy    DATE of PROCEDURE: 11/23/2021    MEDICATIONS ADMINISTERED: MAC    INSTRUMENT: GIF    PROCEDURE:  After obtaining informed consent, the patient was placed in the left lateral position and sedated. The endoscope was advanced under direct vision without difficulty. The esophagus, stomach (including retroflexed views) and duodenum were evaluated. The patient was taken to the recovery area in stable condition. FINDINGS:  ESOPHAGUS:  There is no esophagitis, varices, bleeding  STOMACH: No evidence of bleeding but in the antrum there is gastritis as well as multiple small gastric ulcers noted. Biopsies taken for pathology. DUODENUM: normal without AVMs, bleeding, ulcers    Estimated blood loss: 0-minimal     PLAN:  1. PPI 40mg bid  2. Resume heparin and observe hb   3. Pt cannot follow instructions to prep for colo - had one in 2019.  4. Does not need repeat egd for  healing based on appearance.   5. aDvance diet    Funmilayo Adam MD  [facility], [place]

## 2021-11-23 NOTE — PERIOP NOTES
TRANSFER - OUT REPORT:    Verbal report given to Misty Parada RN (name) on Malini Erazo  being transferred to room 222 (unit) for routine progression of care       Report consisted of patients Situation, Background, Assessment and   Recommendations(SBAR). Information from the following report(s) SBAR, Kardex, Procedure Summary and Intake/Output was reviewed with the receiving nurse. Lines:   Peripheral IV 11/21/21 Right Forearm (Active)   Site Assessment Clean, dry, & intact 11/23/21 0703   Phlebitis Assessment 0 11/23/21 0703   Infiltration Assessment 0 11/23/21 0703   Dressing Status Clean, dry, & intact 11/23/21 0703   Dressing Type Tape; Transparent 11/23/21 0703   Hub Color/Line Status Infusing; Flushed; Patent 11/23/21 0703   Alcohol Cap Used No 11/22/21 1830        Opportunity for questions and clarification was provided.       Patient transported with:   O2 @ 4 liters  Registered Nurse  [facility]

## 2021-11-23 NOTE — PROGRESS NOTES
Pt is refusing vital signs to be taken at this time. Told this RN and PCA to \" get out of his room and let him sleep\" and attempted to punch the PCA. Will continue to monitor.

## 2021-11-23 NOTE — PROGRESS NOTES
Attempted to see pt this AM, he is ARNOLD at procedure. Will f/u as pt is able and schedule permits. Update: Attempted to see pt this PM, he declined treatment. Reports he will work with therapy tomorrow.      Kaylie Rivera, PT, DPT

## 2021-11-24 ENCOUNTER — APPOINTMENT (OUTPATIENT)
Dept: ULTRASOUND IMAGING | Age: 76
DRG: 871 | End: 2021-11-24
Attending: INTERNAL MEDICINE
Payer: MEDICARE

## 2021-11-24 ENCOUNTER — APPOINTMENT (OUTPATIENT)
Dept: GENERAL RADIOLOGY | Age: 76
DRG: 871 | End: 2021-11-24
Attending: STUDENT IN AN ORGANIZED HEALTH CARE EDUCATION/TRAINING PROGRAM
Payer: MEDICARE

## 2021-11-24 LAB
ALBUMIN SERPL-MCNC: 2.4 G/DL (ref 3.2–4.6)
ALBUMIN/GLOB SERPL: 0.7 {RATIO} (ref 1.2–3.5)
ALP SERPL-CCNC: 253 U/L (ref 50–136)
ALT SERPL-CCNC: 70 U/L (ref 12–65)
ANION GAP SERPL CALC-SCNC: 7 MMOL/L (ref 7–16)
ANION GAP SERPL CALC-SCNC: 8 MMOL/L (ref 7–16)
AST SERPL-CCNC: 41 U/L (ref 15–37)
BACTERIA SPEC CULT: NORMAL
BACTERIA SPEC CULT: NORMAL
BASOPHILS # BLD: 0 K/UL (ref 0–0.2)
BASOPHILS NFR BLD: 0 % (ref 0–2)
BILIRUB SERPL-MCNC: 0.4 MG/DL (ref 0.2–1.1)
BUN SERPL-MCNC: 11 MG/DL (ref 8–23)
BUN SERPL-MCNC: 13 MG/DL (ref 8–23)
CALCIUM SERPL-MCNC: 9.2 MG/DL (ref 8.3–10.4)
CALCIUM SERPL-MCNC: 9.5 MG/DL (ref 8.3–10.4)
CHLORIDE SERPL-SCNC: 111 MMOL/L (ref 98–107)
CHLORIDE SERPL-SCNC: 112 MMOL/L (ref 98–107)
CO2 SERPL-SCNC: 23 MMOL/L (ref 21–32)
CO2 SERPL-SCNC: 23 MMOL/L (ref 21–32)
CREAT SERPL-MCNC: 1.02 MG/DL (ref 0.8–1.5)
CREAT SERPL-MCNC: 1.06 MG/DL (ref 0.8–1.5)
DIFFERENTIAL METHOD BLD: ABNORMAL
EOSINOPHIL # BLD: 0.2 K/UL (ref 0–0.8)
EOSINOPHIL NFR BLD: 2 % (ref 0.5–7.8)
ERYTHROCYTE [DISTWIDTH] IN BLOOD BY AUTOMATED COUNT: 14.6 % (ref 11.9–14.6)
GLOBULIN SER CALC-MCNC: 3.3 G/DL (ref 2.3–3.5)
GLUCOSE SERPL-MCNC: 155 MG/DL (ref 65–100)
GLUCOSE SERPL-MCNC: 165 MG/DL (ref 65–100)
HCT VFR BLD AUTO: 34.4 % (ref 41.1–50.3)
HCT VFR BLD AUTO: 36.3 % (ref 41.1–50.3)
HCT VFR BLD AUTO: 37.3 % (ref 41.1–50.3)
HGB BLD-MCNC: 11 G/DL (ref 13.6–17.2)
HGB BLD-MCNC: 11.7 G/DL (ref 13.6–17.2)
HGB BLD-MCNC: 11.9 G/DL (ref 13.6–17.2)
IMM GRANULOCYTES # BLD AUTO: 0.1 K/UL (ref 0–0.5)
IMM GRANULOCYTES NFR BLD AUTO: 1 % (ref 0–5)
LYMPHOCYTES # BLD: 1.1 K/UL (ref 0.5–4.6)
LYMPHOCYTES NFR BLD: 12 % (ref 13–44)
MCH RBC QN AUTO: 28.9 PG (ref 26.1–32.9)
MCHC RBC AUTO-ENTMCNC: 32 G/DL (ref 31.4–35)
MCV RBC AUTO: 90.3 FL (ref 79.6–97.8)
MONOCYTES # BLD: 0.9 K/UL (ref 0.1–1.3)
MONOCYTES NFR BLD: 11 % (ref 4–12)
NEUTS SEG # BLD: 6.6 K/UL (ref 1.7–8.2)
NEUTS SEG NFR BLD: 74 % (ref 43–78)
NRBC # BLD: 0 K/UL (ref 0–0.2)
PLATELET # BLD AUTO: 193 K/UL (ref 150–450)
PMV BLD AUTO: 10.2 FL (ref 9.4–12.3)
POTASSIUM SERPL-SCNC: 3.3 MMOL/L (ref 3.5–5.1)
POTASSIUM SERPL-SCNC: 3.6 MMOL/L (ref 3.5–5.1)
PROT SERPL-MCNC: 5.7 G/DL (ref 6.3–8.2)
RBC # BLD AUTO: 3.81 M/UL (ref 4.23–5.6)
SERVICE CMNT-IMP: NORMAL
SERVICE CMNT-IMP: NORMAL
SODIUM SERPL-SCNC: 141 MMOL/L (ref 138–145)
SODIUM SERPL-SCNC: 143 MMOL/L (ref 136–145)
UFH PPP CHRO-ACNC: 0.5 IU/ML (ref 0.3–0.7)
UFH PPP CHRO-ACNC: 0.57 IU/ML (ref 0.3–0.7)
WBC # BLD AUTO: 8.8 K/UL (ref 4.3–11.1)

## 2021-11-24 PROCEDURE — 74011250636 HC RX REV CODE- 250/636: Performed by: INTERNAL MEDICINE

## 2021-11-24 PROCEDURE — 74011000250 HC RX REV CODE- 250: Performed by: INTERNAL MEDICINE

## 2021-11-24 PROCEDURE — 74011250637 HC RX REV CODE- 250/637: Performed by: INTERNAL MEDICINE

## 2021-11-24 PROCEDURE — 85520 HEPARIN ASSAY: CPT

## 2021-11-24 PROCEDURE — 74011250636 HC RX REV CODE- 250/636: Performed by: STUDENT IN AN ORGANIZED HEALTH CARE EDUCATION/TRAINING PROGRAM

## 2021-11-24 PROCEDURE — 74011000250 HC RX REV CODE- 250: Performed by: FAMILY MEDICINE

## 2021-11-24 PROCEDURE — 74011000258 HC RX REV CODE- 258: Performed by: INTERNAL MEDICINE

## 2021-11-24 PROCEDURE — 74011250637 HC RX REV CODE- 250/637: Performed by: FAMILY MEDICINE

## 2021-11-24 PROCEDURE — 77010033678 HC OXYGEN DAILY

## 2021-11-24 PROCEDURE — 80053 COMPREHEN METABOLIC PANEL: CPT

## 2021-11-24 PROCEDURE — 94760 N-INVAS EAR/PLS OXIMETRY 1: CPT

## 2021-11-24 PROCEDURE — 85025 COMPLETE CBC W/AUTO DIFF WBC: CPT

## 2021-11-24 PROCEDURE — 97530 THERAPEUTIC ACTIVITIES: CPT

## 2021-11-24 PROCEDURE — 85014 HEMATOCRIT: CPT

## 2021-11-24 PROCEDURE — 76705 ECHO EXAM OF ABDOMEN: CPT

## 2021-11-24 PROCEDURE — 99233 SBSQ HOSP IP/OBS HIGH 50: CPT | Performed by: SURGERY

## 2021-11-24 PROCEDURE — 65270000029 HC RM PRIVATE

## 2021-11-24 PROCEDURE — 36415 COLL VENOUS BLD VENIPUNCTURE: CPT

## 2021-11-24 PROCEDURE — 73080 X-RAY EXAM OF ELBOW: CPT

## 2021-11-24 PROCEDURE — 94640 AIRWAY INHALATION TREATMENT: CPT

## 2021-11-24 PROCEDURE — 2709999900 HC NON-CHARGEABLE SUPPLY

## 2021-11-24 PROCEDURE — 97535 SELF CARE MNGMENT TRAINING: CPT

## 2021-11-24 RX ORDER — POTASSIUM CHLORIDE 14.9 MG/ML
20 INJECTION INTRAVENOUS ONCE
Status: COMPLETED | OUTPATIENT
Start: 2021-11-24 | End: 2021-11-24

## 2021-11-24 RX ORDER — LOSARTAN POTASSIUM 50 MG/1
100 TABLET ORAL DAILY
Status: DISCONTINUED | OUTPATIENT
Start: 2021-11-25 | End: 2021-12-02 | Stop reason: HOSPADM

## 2021-11-24 RX ADMIN — THIAMINE HYDROCHLORIDE 500 MG: 100 INJECTION, SOLUTION INTRAMUSCULAR; INTRAVENOUS at 10:59

## 2021-11-24 RX ADMIN — HYDRALAZINE HYDROCHLORIDE 10 MG: 20 INJECTION INTRAMUSCULAR; INTRAVENOUS at 15:37

## 2021-11-24 RX ADMIN — HYDRALAZINE HYDROCHLORIDE 10 MG: 20 INJECTION INTRAMUSCULAR; INTRAVENOUS at 08:04

## 2021-11-24 RX ADMIN — HEPARIN SODIUM 16 UNITS/KG/HR: 5000 INJECTION, SOLUTION INTRAVENOUS at 06:13

## 2021-11-24 RX ADMIN — IPRATROPIUM BROMIDE AND ALBUTEROL SULFATE 3 ML: .5; 3 SOLUTION RESPIRATORY (INHALATION) at 04:46

## 2021-11-24 RX ADMIN — PIPERACILLIN SODIUM AND TAZOBACTAM SODIUM 3.38 G: 3; .375 INJECTION, POWDER, LYOPHILIZED, FOR SOLUTION INTRAVENOUS at 08:03

## 2021-11-24 RX ADMIN — PIPERACILLIN SODIUM AND TAZOBACTAM SODIUM 3.38 G: 3; .375 INJECTION, POWDER, LYOPHILIZED, FOR SOLUTION INTRAVENOUS at 16:05

## 2021-11-24 RX ADMIN — HEPARIN SODIUM 16 UNITS/KG/HR: 5000 INJECTION, SOLUTION INTRAVENOUS at 22:04

## 2021-11-24 RX ADMIN — THIAMINE HYDROCHLORIDE 500 MG: 100 INJECTION, SOLUTION INTRAMUSCULAR; INTRAVENOUS at 21:39

## 2021-11-24 RX ADMIN — IPRATROPIUM BROMIDE AND ALBUTEROL SULFATE 3 ML: .5; 3 SOLUTION RESPIRATORY (INHALATION) at 17:12

## 2021-11-24 RX ADMIN — THIAMINE HYDROCHLORIDE 500 MG: 100 INJECTION, SOLUTION INTRAMUSCULAR; INTRAVENOUS at 15:33

## 2021-11-24 RX ADMIN — QUETIAPINE FUMARATE 25 MG: 25 TABLET ORAL at 23:15

## 2021-11-24 RX ADMIN — PIPERACILLIN SODIUM AND TAZOBACTAM SODIUM 3.38 G: 3; .375 INJECTION, POWDER, LYOPHILIZED, FOR SOLUTION INTRAVENOUS at 23:23

## 2021-11-24 RX ADMIN — Medication 10 ML: at 21:40

## 2021-11-24 RX ADMIN — PANTOPRAZOLE SODIUM 40 MG: 40 TABLET, DELAYED RELEASE ORAL at 16:05

## 2021-11-24 RX ADMIN — OXYCODONE HYDROCHLORIDE AND ACETAMINOPHEN 1 TABLET: 5; 325 TABLET ORAL at 10:55

## 2021-11-24 RX ADMIN — POTASSIUM CHLORIDE 20 MEQ: 14.9 INJECTION, SOLUTION INTRAVENOUS at 21:53

## 2021-11-24 RX ADMIN — LOSARTAN POTASSIUM 50 MG: 50 TABLET, FILM COATED ORAL at 08:03

## 2021-11-24 RX ADMIN — DILTIAZEM HYDROCHLORIDE 120 MG: 120 CAPSULE, COATED, EXTENDED RELEASE ORAL at 08:03

## 2021-11-24 RX ADMIN — HYDRALAZINE HYDROCHLORIDE 10 MG: 20 INJECTION INTRAMUSCULAR; INTRAVENOUS at 02:02

## 2021-11-24 RX ADMIN — ACETAMINOPHEN 650 MG: 325 TABLET ORAL at 16:27

## 2021-11-24 RX ADMIN — PANTOPRAZOLE SODIUM 40 MG: 40 TABLET, DELAYED RELEASE ORAL at 06:32

## 2021-11-24 RX ADMIN — DEXTROSE MONOHYDRATE AND SODIUM CHLORIDE 75 ML/HR: 5; .45 INJECTION, SOLUTION INTRAVENOUS at 02:04
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
## 2021-11-24 NOTE — PROGRESS NOTES
ACUTE PHYSICAL THERAPY GOALS:  (Developed with and agreed upon by patient and/or caregiver.)  1.) Gerardo Sanchez  will move from supine to sit and sit to supine  with SUPERVISION within 7 treatment day(s). (2.) Gerardo Sanchez will transfer from bed to chair and chair to bed with SUPERVISION using the least restrictive device within 7 treatment day(s). (3.) Gerardo Sanchez will ambulate with SUPERVISION for 30 feet with the least restrictive device within 7 treatment day(s). (4.) Gerardo Sanchez will perform standing static and dynamic balance activities x 20 minutes with SUPERVISION to improve safety within 7 treatment day(s). (5.) Gerardo Sanchez will perform bilateral lower extremity exercises x 20 min for HEP with INDEPENDENCE to improve strength, endurance, and functional mobility within 7 treatment       PHYSICAL THERAPY: Daily Note and PM Treatment Day # 2    Gerardo Sanchez is a 78 y.o. male   PRIMARY DIAGNOSIS: Acute metabolic encephalopathy  Acute cholecystitis [K81.0]  Procedure(s) (LRB):  ESOPHAGOGASTRODUODENOSCOPY (EGD)/BMI 30 ROOM 222 (N/A)  ESOPHAGOGASTRODUODENAL (EGD) BIOPSY (N/A)  1 Day Post-Op    ASSESSMENT:     REHAB RECOMMENDATIONS: CURRENT LEVEL OF FUNCTION:  (Most Recently Demonstrated)   Recommendation to date pending progress:  Setting:   Short-term Rehab  Equipment:    To Be Determined Bed Mobility:   Minimal Assistance x 2  Sit to Stand:   Minimal Assistance x 2  Transfers:   Minimal Assistance x 2  Gait/Mobility:   Moderate Assistance x 2     ASSESSMENT:  Mr. Denice Mcdermott is supine in the bed and agreeable to therapy. OT present for co treat. Bed mobility requires min assist x 2 with a lot of verbal cues for sequencing. Scooting with assist to the edge of the bed. Sitting balance good. Sit to stand with min assist, balance fair-.   With the use of the [facility] the patient is assisted to the recliner with mod assist x 2 as the patient has safety issues present, he is using the cane inappropriately while holding onto the IV pole with the lines also in his hand. He is not following commands very well either. Managed to make it to the recliner and seated, he is able to scoot himself back in the chair. Patient is positioned for comfort with needs within reach and alarm intact. RN aware of location. Good session, some progress demonstrated however the patient can be very impulsive and easy agitated. Needs additional skilled therapy at discharge. SUBJECTIVE:   Mr. Burton Kovacs states, \"The bus will come and pick me up if you will just call them. \"    SOCIAL HISTORY/ LIVING ENVIRONMENT: see eval  Home Environment: Other (comment)  One/Two Story Residence: Other (Comment)  Living Alone: No  Support Systems: Other (Comment)  OBJECTIVE:     PAIN: VITAL SIGNS: LINES/DRAINS:   Pre Treatment:  0/10  Post Treatment: 0/10   IV and Purewick  O2 Device: Nasal cannula (4L/min.)     MOBILITY: I Mod I S SBA CGA Min Mod Max Total  NT x2 Comments:   Bed Mobility    Rolling [] [] [] [] [] [x] [] [] [] [] [x]    Supine to Sit [] [] [] [] [] [x] [] [] [] [] [x]    Scooting [] [] [] [] [] [x] [] [] [] [] [x]    Sit to Supine [] [] [] [] [] [] [] [] [] [x] []    Transfers    Sit to Stand [] [] [] [] [] [x] [] [] [] [] [x]    Bed to Chair [] [] [] [] [] [x] [] [] [] [] [x]    Stand to Sit [] [] [] [] [] [x] [] [] [] [] [x]    I=Independent, Mod I=Modified Independent, S=Supervision, SBA=Standby Assistance, CGA=Contact Guard Assistance,   Min=Minimal Assistance, Mod=Moderate Assistance, Max=Maximal Assistance, Total=Total Assistance, NT=Not Tested    BALANCE: Good Fair+ Fair Fair- Poor NT Comments   Sitting Static [] [] [x] [] [] []    Sitting Dynamic [] [] [x] [] [] []              Standing Static [] [] [] [x] [] []    Standing Dynamic [] [] [] [x] [] []      GAIT: I Mod I S SBA CGA Min Mod Max Total  NT x2 Comments:   Level of Assistance [] [] [] [] [] [] [x] [] [] [] [x] Distance 3 feet     DME SPC    Gait Quality Safety issues present    Weightbearing  Status N/A     I=Independent, Mod I=Modified Independent, S=Supervision, SBA=Standby Assistance, CGA=Contact Guard Assistance,   Min=Minimal Assistance, Mod=Moderate Assistance, Max=Maximal Assistance, Total=Total Assistance, NT=Not Tested    PLAN:   FREQUENCY/DURATION: PT Plan of Care: 3 times/week for duration of hospital stay or until stated goals are met, whichever comes first.  TREATMENT:     TREATMENT:   ($$ Therapeutic Activity: 23-37 mins    )  Co-Treatment PT/OT necessary due to patient's decreased overall endurance/tolerance levels, as well as need for high level skilled assistance to complete functional transfers/mobility and functional tasks  Therapeutic Activity (23 Minutes): Therapeutic activity included Rolling, Supine to Sit, Scooting, Transfer Training, Ambulation on level ground, Sitting balance  and Standing balance to improve functional Mobility, Strength and Activity tolerance.     TREATMENT GRID:  N/A    AFTER TREATMENT POSITION/PRECAUTIONS:  Alarm Activated, Chair, Needs within reach and RN notified    INTERDISCIPLINARY COLLABORATION:  RN/PCT, PT/PTA and OT/GARCÍA    TOTAL TREATMENT DURATION:  PT Patient Time In/Time Out  Time In: 1312  Time Out: Giorgi Hand PTA

## 2021-11-24 NOTE — PROGRESS NOTES
At this time pt is no longer interfering with medical therapy. He is no longer a threat to himself, staff or others. Pt remains calm and cooperative. Sitter in place. Pt resting comfortably with no complaints at this time. Will continue to monitor.

## 2021-11-24 NOTE — PROGRESS NOTES
END OF SHIFT NOTE:    INTAKE/OUTPUT  11/23 0701 - 11/24 0700  In: 1953 [P.O.:720; I.V.:1235]  Out: 1700 [Urine:1700]  Voiding: YES- via condom cath    Catheter: NO  Drain:   External Urinary Catheter 11/19/21 (Active)   Site Assessment Clean, dry, & intact 11/24/21 0700   Repositioned Yes 11/24/21 0700   Perineal Care Yes 11/24/21 0700   Wick Changed No 11/24/21 0700   Suction Canister/Tubing Changed No 11/24/21 0700   Urine Output (mL) 500 ml 11/24/21 1845               Flatus: Patient + BS but unable to tell me if he is passing flatus    Stool:  0 occurrences. Characteristics:  Stool Assessment  Stool Color: Other (Comment) (No BM to assess at this time)  Stool Appearance: Loose  Stool Amount: Large  Stool Source/Status: Rectum    Emesis: 0 occurrences. Characteristics:        VITAL SIGNS  Patient Vitals for the past 12 hrs:   Temp Pulse Resp BP SpO2   11/24/21 1713 -- -- -- -- 95 %   11/24/21 1531 97.9 °F (36.6 °C) 91 18 (!) 196/93 97 %   11/24/21 1131 98.2 °F (36.8 °C) 89 20 (!) 165/90 98 %   11/24/21 0734 97.5 °F (36.4 °C) 78 20 (!) 207/90 94 %       Pain Assessment  Pain Intensity 1: 0 (11/24/21 1516)  Pain Location 1: Elbow  Pain Intervention(s) 1: Medication (see MAR)  Patient Stated Pain Goal: 0    Ambulating  No- transferred to chair    Shift report given to oncoming nurse at the bedside.     Julio Tristan, NALLELY

## 2021-11-24 NOTE — PROGRESS NOTES
PT note:  Treatment deferred as this time per the RN as the patient is in extreme pain (elbow). Will return as time permits. Hernan Bishop, PTA

## 2021-11-24 NOTE — PROGRESS NOTES
ACUTE OT GOALS:  (Developed with and agreed upon by patient and/or caregiver.)  1. Patient will bathe and dress total body with minimal assistance and adaptive device as needed. 2. Patient will toilet with stand by assistance and adaptive device as needed. 3. Patient will tolerate 30 minutes of OT treatment with up to 2 rest breaks to increase activity tolerance for ADLs. 4. Patient will complete functional transfers with stand by assistance. 5. Patient will complete functional mobility for ADLs with contact guard assistance. 6. Patient will demonstrate improved safety awareness for ADLs by completing functional task with no cueing from therapist.   Timeframe: 7 visits     OCCUPATIONAL THERAPY: Daily Note OT Treatment Day # 2    Jose A Andrews is a 78 y.o. male   PRIMARY DIAGNOSIS: Acute metabolic encephalopathy  Acute cholecystitis [K81.0]  Procedure(s) (LRB):  ESOPHAGOGASTRODUODENOSCOPY (EGD)/BMI 30 ROOM 222 (N/A)  ESOPHAGOGASTRODUODENAL (EGD) BIOPSY (N/A)  1 Day Post-Op  [place] / Plan: [place] ONLY / Product Type: Medicare /   ASSESSMENT:     REHAB RECOMMENDATIONS: CURRENT LEVEL OF FUNCTION:  (Most Recently Demonstrated)   Recommendation to date pending progress:  Setting:   Short-term Rehab  Equipment:    To Be Determined Bathing:   Moderate Assistance  Dressing:   Maximal Assistance  Feeding/Grooming:   Supervision  Toileting:   Moderate Assistance  Functional Mobility:   Moderate Assistance x 2     ASSESSMENT:  Mr. Tammy Camara presents with acute metabolic encephalopathy, cholecystitis, PE, aspiration pna. Hx PTSD. Pt with AMS, unable to provide PLOF. Per chart came from independent living facility. Today, pt supine in bed eating lunch and agreeable to therapy co-treatment session after several days of agitation and refusing therapy. Pt finished lunch and performed grooming with setup.  Pt required min A x2 for sup>sit and maintained sitting balance on EOB with high guard. Pt performed sit<>stand and bed<>chair with mod A x2. Pt unsteady and impulsive during few steps to chair, grabbing on to IV and holding on to cane. He was left with legs elevated, alarm in place, and tray positioned in front. Pt asking to go home. SUBJECTIVE:   Mr. Burton Kovacs states, \"You just need to call me an ambulance so I can go home. \"    SOCIAL HISTORY/LIVING ENVIRONMENT:  Hx PTSD. Pt with AMS, unable to provide PLOF. Per chart came from independent living facility. Pt questionable historian, reports independent baseline, ambulates with cane prn.   Home Environment: Other (comment)  One/Two Story Residence: Other (Comment)  Living Alone: No  Support Systems: Other (Comment)    OBJECTIVE:     PAIN: VITAL SIGNS: LINES/DRAINS:   Pre Treatment: Pain Screen  Pain Scale 1: Numeric (0 - 10)  Pain Intensity 1: 0  Post Treatment: 0   IV and Purewick  O2 Device: Nasal cannula (4L/min.)     ACTIVITIES OF DAILY LIVING: I Mod I S SBA CGA Min Mod Max Total NT Comments   BASIC ADLs:              Bathing/ Showering [] [] [] [] [] [] [] [] [] [x]    Toileting [] [] [] [] [] [] [] [] [] [x] purewick in place   Dressing [] [] [] [] [] [] [] [x] [] [] Jeannine Loth new socks   Feeding [] [] [x] [] [] [] [] [] [] [] Eating lunch laying in bed   Grooming [] [] [x] [] [] [] [] [] [] [] Wash face and chest after lunch   Personal Device Care [] [] [] [] [] [] [] [] [] [x]    Functional Mobility [] [] [] [] [] [] [x] [] [] [] x2 bed>chair   I=Independent, Mod I=Modified Independent, S=Supervision, SBA=Standby Assistance, CGA=Contact Guard Assistance,   Min=Minimal Assistance, Mod=Moderate Assistance, Max=Maximal Assistance, Total=Total Assistance, NT=Not Tested    MOBILITY: I Mod I S SBA CGA Min Mod Max Total  NT x2 Comments:   Supine to sit [] [] [] [] [] [x] [x] [] [] [] [x]    Sit to supine [] [] [] [] [] [] [] [] [] [] []    Sit to stand [] [] [] [] [] [] [x] [] [] [] [x]    Bed to chair [] [] [] [] [] [] [x] [] [] [] [x] I=Independent, Mod I=Modified Independent, S=Supervision, SBA=Standby Assistance, CGA=Contact Guard Assistance,   Min=Minimal Assistance, Mod=Moderate Assistance, Max=Maximal Assistance, Total=Total Assistance, NT=Not Tested    BALANCE: Good Fair+ Fair Fair- Poor NT Comments   Sitting Static [] [x] [] [] [] []    Sitting Dynamic [] [x] [] [] [] []              Standing Static [] [] [] [x] [] []    Standing Dynamic [] [] [] [x] [] []      PLAN:   FREQUENCY/DURATION: OT Plan of Care: 3 times/week for duration of hospital stay or until stated goals are met, whichever comes first.    TREATMENT:   TREATMENT:   ($$ Self Care/Home Management: 23-37 mins    )  Co-Treatment PT/OT necessary due to patient's decreased overall endurance/tolerance levels, as well as need for high level skilled assistance to complete functional transfers/mobility and functional tasks  Self Care (35 Minutes): Self care including Lower Body Dressing, Self Feeding, Grooming and functional mobility to increase independence and decrease level of assistance required.     TREATMENT GRID:  N/A    AFTER TREATMENT POSITION/PRECAUTIONS:  Alarm Activated, Chair, Needs within reach and RN notified    INTERDISCIPLINARY COLLABORATION:  RN/PCT and PT/PTA    TOTAL TREATMENT DURATION:  OT Patient Time In/Time Out  Time In: 1300  Time Out: Stefan Rankin 28., OT

## 2021-11-24 NOTE — PROGRESS NOTES
Comprehensive Nutrition Assessment    Type and Reason for Visit: Reassess  Best Practice Alert for Malnutrition Screening Tool: Recently Lost Weight Without Trying: Unsure,  , Eating Poorly Due to Decreased Appetite: No    Nutrition Recommendations/Plan:   Meals and Snacks:   Continue current diet. If pt maintains current trend of oral intake he will meet estimated needs. Nutrition Supplement Therapy:   Medical food supplement therapy:   Deferred at this time as pt consuming adequate intake times one tray and does not engage in conversation regarding nutrition history. Malnutrition Assessment:  Malnutrition Status: At risk for malnutrition (specify) (NPO/CLD day 3 at initial assessment)    Nutrition Assessment:   Nutrition History: Unable to obtain. Nutrition Background: PMH remarkable for dementia, colon ca, asthma, HTN. Presented with abdominal pain and confusion. Admitted with Sepsis ? abdominal source, GIB, bilateral PEs, TONIA, acute metabollic encephalopathy. Daily Update:  Pt up to chair at RD visit. He is vocal about his rights being violated stating he wants to return to his home. He is able to accurately recall noon meal service and what he consumed. He denies any difficulties with oral intake. Unable to obtain any additional pertinent information from pt. Discussed with Shira Prather RN.    IVF: D51/2NS @ 50 ml/hr    Nutrition Related Findings:   Visually without fat or muscle wasting, full NFPE deferred due to pt agitation. Current Nutrition Therapies:  ADULT DIET Regular; Low Fat/Low Chol/High Fiber/ARELI    Current Intake:   Average Meal Intake: % (times one meal)        Anthropometric Measures:  Height: 5' 10\" (177.8 cm)  Current Body Wt: 95.3 kg (210 lb 1.6 oz), Weight source: Not specified  BMI: 30.1, Obese class 1 (BMI 30.0-34. 9)  Admission Body Weight: 210 lb 8.6 oz (not specified)  Ideal Body Weight (lbs) (Calculated): 166 lbs (75 kg), 126.8 %  Usual Body Wt: 97.1 kg (214 lb) (only EMR value from daniela/ortho office 1/23/20), Percent weight change: -1.6          Edema: LLE: 1+; Non-pitting (11/24/2021  7:00 AM)  RLE: 1+; Non-pitting (11/24/2021  7:00 AM)     Estimated Daily Nutrient Needs:  Energy (kcal/day): 3958-5291 (Kcal/kg (18-20), Weight Used: Admission (95.5 kg))  Protein (g/day):  (1-1.2 g/kg) Weight Used: (Admission)  Fluid (ml/day):   (1 ml/kcal)    Nutrition Diagnosis:   · Inadequate oral intake related to cognitive or neurological impairment, altered GI function as evidenced by  (diet just advanced)    Nutrition Interventions:   Food and/or Nutrient Delivery: Continue current diet     Coordination of Nutrition Care: Continue to monitor while inpatient    Goals:   Previous Goal Met: Goal(s) achieved  Active Goal: Maintain po trend to meet >75% estimated needs    Nutrition Monitoring and Evaluation:      Food/Nutrient Intake Outcomes: Food and nutrient intake  Physical Signs/Symptoms Outcomes: Biochemical data, GI status, Meal time behavior, Weight    Discharge Planning:     Too soon to determine    Gina Nash RD, LDN   Contact: [phone]

## 2021-11-24 NOTE — PROGRESS NOTES
CM reviewed pt's chart for discharge needs. Therapy unable to work with pt for the past couple days. Tentative recommendation for STR but will need update therapy note for clarification. CM following pt's status for discharge needs.

## 2021-11-25 LAB
ALBUMIN SERPL-MCNC: 2.2 G/DL (ref 3.2–4.6)
ALBUMIN/GLOB SERPL: 0.7 {RATIO} (ref 1.2–3.5)
ALP SERPL-CCNC: 201 U/L (ref 50–136)
ALT SERPL-CCNC: 57 U/L (ref 12–65)
ANION GAP SERPL CALC-SCNC: 6 MMOL/L (ref 7–16)
AST SERPL-CCNC: 29 U/L (ref 15–37)
BASOPHILS # BLD: 0 K/UL (ref 0–0.2)
BASOPHILS NFR BLD: 0 % (ref 0–2)
BILIRUB SERPL-MCNC: 0.3 MG/DL (ref 0.2–1.1)
BUN SERPL-MCNC: 10 MG/DL (ref 8–23)
CALCIUM SERPL-MCNC: 9.1 MG/DL (ref 8.3–10.4)
CHLORIDE SERPL-SCNC: 113 MMOL/L (ref 98–107)
CO2 SERPL-SCNC: 23 MMOL/L (ref 21–32)
CREAT SERPL-MCNC: 0.98 MG/DL (ref 0.8–1.5)
DIFFERENTIAL METHOD BLD: ABNORMAL
EOSINOPHIL # BLD: 0.2 K/UL (ref 0–0.8)
EOSINOPHIL NFR BLD: 3 % (ref 0.5–7.8)
ERYTHROCYTE [DISTWIDTH] IN BLOOD BY AUTOMATED COUNT: 14.8 % (ref 11.9–14.6)
GLOBULIN SER CALC-MCNC: 3.1 G/DL (ref 2.3–3.5)
GLUCOSE SERPL-MCNC: 103 MG/DL (ref 65–100)
HCT VFR BLD AUTO: 32.9 % (ref 41.1–50.3)
HGB BLD-MCNC: 10.7 G/DL (ref 13.6–17.2)
IMM GRANULOCYTES # BLD AUTO: 0.1 K/UL (ref 0–0.5)
IMM GRANULOCYTES NFR BLD AUTO: 1 % (ref 0–5)
LYMPHOCYTES # BLD: 1 K/UL (ref 0.5–4.6)
LYMPHOCYTES NFR BLD: 13 % (ref 13–44)
MAGNESIUM SERPL-MCNC: 2.1 MG/DL (ref 1.8–2.4)
MCH RBC QN AUTO: 30.2 PG (ref 26.1–32.9)
MCHC RBC AUTO-ENTMCNC: 32.5 G/DL (ref 31.4–35)
MCV RBC AUTO: 92.9 FL (ref 79.6–97.8)
MONOCYTES # BLD: 0.9 K/UL (ref 0.1–1.3)
MONOCYTES NFR BLD: 11 % (ref 4–12)
NEUTS SEG # BLD: 6 K/UL (ref 1.7–8.2)
NEUTS SEG NFR BLD: 72 % (ref 43–78)
NRBC # BLD: 0 K/UL (ref 0–0.2)
PLATELET # BLD AUTO: 189 K/UL (ref 150–450)
PMV BLD AUTO: 9.9 FL (ref 9.4–12.3)
POTASSIUM SERPL-SCNC: 3.5 MMOL/L (ref 3.5–5.1)
PROT SERPL-MCNC: 5.3 G/DL (ref 6.3–8.2)
RBC # BLD AUTO: 3.54 M/UL (ref 4.23–5.6)
SODIUM SERPL-SCNC: 142 MMOL/L (ref 138–145)
UFH PPP CHRO-ACNC: 0.47 IU/ML (ref 0.3–0.7)
WBC # BLD AUTO: 8.3 K/UL (ref 4.3–11.1)

## 2021-11-25 PROCEDURE — 74011250637 HC RX REV CODE- 250/637: Performed by: STUDENT IN AN ORGANIZED HEALTH CARE EDUCATION/TRAINING PROGRAM

## 2021-11-25 PROCEDURE — 74011250637 HC RX REV CODE- 250/637: Performed by: FAMILY MEDICINE

## 2021-11-25 PROCEDURE — 74011250637 HC RX REV CODE- 250/637: Performed by: INTERNAL MEDICINE

## 2021-11-25 PROCEDURE — 2709999900 HC NON-CHARGEABLE SUPPLY

## 2021-11-25 PROCEDURE — 94640 AIRWAY INHALATION TREATMENT: CPT

## 2021-11-25 PROCEDURE — 36415 COLL VENOUS BLD VENIPUNCTURE: CPT

## 2021-11-25 PROCEDURE — 85025 COMPLETE CBC W/AUTO DIFF WBC: CPT

## 2021-11-25 PROCEDURE — 65270000029 HC RM PRIVATE

## 2021-11-25 PROCEDURE — 74011000258 HC RX REV CODE- 258: Performed by: INTERNAL MEDICINE

## 2021-11-25 PROCEDURE — 94760 N-INVAS EAR/PLS OXIMETRY 1: CPT

## 2021-11-25 PROCEDURE — 83735 ASSAY OF MAGNESIUM: CPT

## 2021-11-25 PROCEDURE — 85520 HEPARIN ASSAY: CPT

## 2021-11-25 PROCEDURE — 74011000250 HC RX REV CODE- 250: Performed by: FAMILY MEDICINE

## 2021-11-25 PROCEDURE — 80053 COMPREHEN METABOLIC PANEL: CPT

## 2021-11-25 PROCEDURE — 77010033678 HC OXYGEN DAILY

## 2021-11-25 PROCEDURE — 74011250636 HC RX REV CODE- 250/636: Performed by: FAMILY MEDICINE

## 2021-11-25 PROCEDURE — 74011250637 HC RX REV CODE- 250/637: Performed by: NURSE PRACTITIONER

## 2021-11-25 PROCEDURE — 74011250636 HC RX REV CODE- 250/636: Performed by: INTERNAL MEDICINE

## 2021-11-25 RX ORDER — HEPARIN SODIUM 5000 [USP'U]/100ML
18-36 INJECTION, SOLUTION INTRAVENOUS
Status: ACTIVE | OUTPATIENT
Start: 2021-11-25 | End: 2021-11-25

## 2021-11-25 RX ORDER — HEPARIN SODIUM 5000 [USP'U]/100ML
18-36 INJECTION, SOLUTION INTRAVENOUS
Status: DISCONTINUED | OUTPATIENT
Start: 2021-11-25 | End: 2021-11-25

## 2021-11-25 RX ADMIN — Medication 10 ML: at 15:40

## 2021-11-25 RX ADMIN — THIAMINE HYDROCHLORIDE 500 MG: 100 INJECTION, SOLUTION INTRAMUSCULAR; INTRAVENOUS at 21:38

## 2021-11-25 RX ADMIN — Medication 10 ML: at 21:31

## 2021-11-25 RX ADMIN — HYDRALAZINE HYDROCHLORIDE 10 MG: 20 INJECTION INTRAMUSCULAR; INTRAVENOUS at 05:22

## 2021-11-25 RX ADMIN — DILTIAZEM HYDROCHLORIDE 120 MG: 120 CAPSULE, COATED, EXTENDED RELEASE ORAL at 08:08

## 2021-11-25 RX ADMIN — Medication 10 ML: at 05:07

## 2021-11-25 RX ADMIN — PIPERACILLIN SODIUM AND TAZOBACTAM SODIUM 3.38 G: 3; .375 INJECTION, POWDER, LYOPHILIZED, FOR SOLUTION INTRAVENOUS at 23:40

## 2021-11-25 RX ADMIN — THIAMINE HYDROCHLORIDE 500 MG: 100 INJECTION, SOLUTION INTRAMUSCULAR; INTRAVENOUS at 09:15

## 2021-11-25 RX ADMIN — PANTOPRAZOLE SODIUM 40 MG: 40 TABLET, DELAYED RELEASE ORAL at 15:41

## 2021-11-25 RX ADMIN — CYANOCOBALAMIN 1000 MCG: 1000 INJECTION, SOLUTION INTRAMUSCULAR; SUBCUTANEOUS at 20:08

## 2021-11-25 RX ADMIN — QUETIAPINE FUMARATE 25 MG: 25 TABLET ORAL at 21:38

## 2021-11-25 RX ADMIN — ACETAMINOPHEN 650 MG: 325 TABLET ORAL at 08:07

## 2021-11-25 RX ADMIN — PANTOPRAZOLE SODIUM 40 MG: 40 TABLET, DELAYED RELEASE ORAL at 06:37

## 2021-11-25 RX ADMIN — PIPERACILLIN SODIUM AND TAZOBACTAM SODIUM 3.38 G: 3; .375 INJECTION, POWDER, LYOPHILIZED, FOR SOLUTION INTRAVENOUS at 16:10

## 2021-11-25 RX ADMIN — THIAMINE HYDROCHLORIDE 500 MG: 100 INJECTION, SOLUTION INTRAMUSCULAR; INTRAVENOUS at 16:38

## 2021-11-25 RX ADMIN — IPRATROPIUM BROMIDE AND ALBUTEROL SULFATE 3 ML: .5; 3 SOLUTION RESPIRATORY (INHALATION) at 09:13

## 2021-11-25 RX ADMIN — Medication 10 ML: at 21:32

## 2021-11-25 RX ADMIN — HYDRALAZINE HYDROCHLORIDE 10 MG: 20 INJECTION INTRAMUSCULAR; INTRAVENOUS at 10:36

## 2021-11-25 RX ADMIN — APIXABAN 10 MG: 5 TABLET, FILM COATED ORAL at 15:40

## 2021-11-25 RX ADMIN — LOSARTAN POTASSIUM 100 MG: 50 TABLET, FILM COATED ORAL at 08:08

## 2021-11-25 RX ADMIN — ACETAMINOPHEN 650 MG: 325 TABLET ORAL at 15:41

## 2021-11-25 RX ADMIN — PIPERACILLIN SODIUM AND TAZOBACTAM SODIUM 3.38 G: 3; .375 INJECTION, POWDER, LYOPHILIZED, FOR SOLUTION INTRAVENOUS at 08:08

## 2021-11-25 NOTE — PROGRESS NOTES
END OF SHIFT NOTE:    INTAKE/OUTPUT  11/24 0701 - 11/25 0700  In: 719 [I.V.:812]  Out: 1100 [Urine:1100]  Voiding: YES  Catheter: NO- condom cath  Drain:   External Urinary Catheter 11/19/21 (Active)   Site Assessment Clean, dry, & intact 11/25/21 0750   Repositioned Yes 11/25/21 0750   Perineal Care Yes 11/25/21 0750   Wick Changed No 11/25/21 0750   Suction Canister/Tubing Changed No 11/25/21 0750   Urine Output (mL) 350 ml 11/25/21 1758               Flatus: Patient does not have flatus present.- Pt denies    Stool:  0 occurrences. Characteristics:  Stool Assessment  Stool Color:  (have not observed)  Stool Appearance: Loose  Stool Amount: Large  Stool Source/Status: Rectum    Emesis: 0 occurrences. Characteristics:        VITAL SIGNS  Patient Vitals for the past 12 hrs:   Temp Pulse Resp BP SpO2   11/25/21 1446 98.9 °F (37.2 °C) 83 18 (!) 149/73 97 %   11/25/21 1127 98.8 °F (37.1 °C) 92 18 (!) 145/69 96 %   11/25/21 0914 -- -- -- -- 96 %   11/25/21 0653 99.4 °F (37.4 °C) 93 18 (!) 172/75 94 %       Pain Assessment  Pain Intensity 1: 0 (11/25/21 1311)  Pain Location 1: Chest  Pain Intervention(s) 1: Medication (see MAR)  Patient Stated Pain Goal: 0    Ambulating  No    Pt has had a good day without episodes of agitation. Remains confused. Poor safety awareness. Medicated with tylenol x 2. Appetite fair. Pt waiting for his friend to bring his dentures so he can chew his food. Shift report given to oncoming nurse at the bedside.     Arielle Arnold RN

## 2021-11-25 NOTE — PROGRESS NOTES
Hospitalist Progress Note   Admit Date:  2021 10:13 AM   Name:  Juliana Varela   Age:  78 y.o. Sex:  male  :  1945   MRN:  [id number]   Room:      Presenting Complaint: Abdominal Pain    Reason(s) for Admission: Acute cholecystitis [K81.0]     Hospital Course & Interval History:   April Malagon a 78 y. o. male with a unknown medical history presented  to the ER with a complaint of severe abdominal pain x 1 day. Unable to obtain history of complaint due to patient being combative and confused. Daughter was contacted and stated she has no relationship with patient and could not provide any history. In the ER: Labs  showing leukocytosis and elevated creatinine 1.70, Lactic 2.1. CT AP worrisome for PE LLL, distended gallbladder concerning for acute cholecystitis with dilatedintrahepatic and extrahepatic bile ducts with the common bile duct measuring up to 1.2 cm.    .   CTPE showing small volume bilateral PE, mid left upper lobe groundglass opacities, trace pleural effusion and some possible aspiration. CT head unremarkable.  Patient behavior required for the addition of restraints for safety concerns     Subjective (21):  \"I need my teeth\" Denies pain, distress    Assessment & Plan:     Principal Problem:  Sepsis due to ?abdominal source (?intrabiliary/cholecystitis)  - WBC normalized 8.8  - BCx  no growth x 5 days  - Continue Zosyn  - RPP (including COVID) are negative  - MRCP cannot be completed  - Abd U/S  Cholelithiasis with evidence suggestive of acute cholecystitis, Intra and extrahepatic biliary ductal dilatation, Hepatic steatosis  - Surgery followed - signed off , no surgical intervention, can follow up in office  - GI following - no need for ERCP, plan for outpatient followup    -continue Zosyn for completion of 7 days  -Final BCx negative   -Therapy with recs for rehab     Bilateral Pes  Acute hypoxic respiratory failure  - ?provoked  - COVID negative  - Heparin gtt with pharmacy to dose, GI is ok with restarting   - needs DOAC at discharge  - Supplemental O2 at 4L NC, wean as tolerated  - Consider transition to [address] 11/25 since no surgical plans  11/25   -Patient transitioned to [facility]; Monitor for bleeding  -O2 reduced to 3L;  Monitor and continue to wean as able         GI bleeding  - EGD with GI 11/23, ok to restart heparin  - cannot follow instructions for colonscopy  - avoid NSAIDs, continue PPI  - transfuse for Hgb<7 and/or brisk bleeding  - Hgb 11.9, stable  11/25  -No evidence of bleeding; Monitor Hgb      TONIA, likely prerenal azotemia, resolved  - avoid nephrotoxic meds, NSAIDs    HTN  - on home diltiazem and losartan  - Extremely agitated today, BP elevated 196/93, will increase Losartan, prn Hydralazine available  11/25  -BP more controlled today; Monitor with increase in meds yesterday      Acute metabolic encephalopathy, not improving  - Metabolic workup (RPR, ammonia, LFTs, LA, UDS), unrevealing   - Scheduled thiamine   - CT head shows only chronic changes  - Avoid sedative/hypnotics and narcotics  - Seroquel at nighttime  11/25  -Continue with sitter and meds     Hypokalemia (Resolved)           Dispo/Discharge Planning:      Therapy with recs for rehab     Diet:  ADULT DIET Regular; Low Fat/Low Chol/High Fiber/ARELI  DVT PPx: Eliquis   Code status: Full Code    Hospital Problems as of 11/25/2021 Date Reviewed: 11/19/2021          Codes Class Noted - Resolved POA    GI bleed ICD-10-CM: K92.2  ICD-9-CM: 578.9  11/22/2021 - Present Unknown        B12 deficiency ICD-10-CM: E53.8  ICD-9-CM: 266.2  11/21/2021 - Present Yes        * (Principal) Acute metabolic encephalopathy [facility]-CJ: G93.41  ICD-9-CM: 348.31  11/20/2021 - Present Unknown        Acute cholecystitis ICD-10-CM: K81.0  ICD-9-CM: 575.0  11/19/2021 - Present Unknown        Bilateral pulmonary embolism ([facility] [address] ICD-10-CM: I26.99  ICD-9-CM: 415.19  11/19/2021 - Present Unknown        TONIA (acute kidney injury) ([facility] [address] ICD-10-CM: N17.9  ICD-9-CM: 584.9  11/19/2021 - Present Unknown        Lactic acidosis ICD-10-CM: E87.2  ICD-9-CM: 276.2  11/19/2021 - Present Unknown        Primary hypertension ICD-10-CM: I10  ICD-9-CM: 401.9  11/19/2021 - Present Unknown              Objective:     Patient Vitals for the past 24 hrs:   Temp Pulse Resp BP SpO2   11/25/21 1127 98.8 °F (37.1 °C) 92 18 (!) 145/69 96 %   11/25/21 0914 -- -- -- -- 96 %   11/25/21 0653 99.4 °F (37.4 °C) 93 18 (!) 172/75 94 %   11/25/21 0629 -- 92 -- (!) 170/72 --   11/25/21 0518 98 °F (36.7 °C) 83 18 (!) 184/80 99 %   11/24/21 2344 98 °F (36.7 °C) 96 18 (!) 167/91 93 %   11/24/21 2029 98.3 °F (36.8 °C) 99 18 (!) 142/79 100 %   11/24/21 1713 -- -- -- -- 95 %   11/24/21 1531 97.9 °F (36.6 °C) 91 18 (!) 196/93 97 %     Oxygen Therapy  O2 Sat (%): 96 % (11/25/21 1127)  Pulse via Oximetry: 100 beats per minute (11/25/21 0914)  O2 Device: Nasal cannula (11/25/21 0914)  O2 Flow Rate (L/min): 4 l/min (11/25/21 0914)  FIO2 (%): 36 % (11/23/21 1753)  ETCO2 (mmHg): 97 mmHg (11/21/21 1741)    Estimated body mass index is 30.13 kg/m² as calculated from the following:    Height as of this encounter: 5' 10\" (1.778 m). Weight as of this encounter: 95.3 kg (210 lb). Intake/Output Summary (Last 24 hours) at 11/25/2021 1326  Last data filed at 11/25/2021 0518  Gross per 24 hour   Intake 812 ml   Output 1100 ml   Net -288 ml         Physical Exam:     Blood pressure (!) 145/69, pulse 92, temperature 98.8 °F (37.1 °C), resp. rate 18, height 5' 10\" (1.778 m), weight 95.3 kg (210 lb), SpO2 96 %. General:    No overt distress  Head:  Normocephalic, atraumatic  Eyes:  Sclerae appear normal.  Pupils equally round. ENT:  Nares appear normal, no drainage. Moist oral mucosa  Neck:  No restricted ROM. Trachea midline   CV:   RRR. No m/r/g. No jugular venous distension. Lungs:   CTAB. No wheezing, rhonchi, or rales.   Respirations even, unlabored  Abdomen: Bowel sounds present. Soft, nontender, nondistended. Extremities: No cyanosis or clubbing. No edema  Skin:     No rashes and normal coloration. Warm and dry. Neuro:  CN II-XII grossly intact. A&Ox3  Psych:  Agitated      I have reviewed ordered lab tests and independently visualized imaging below:    Recent Labs:  Recent Results (from the past 48 hour(s))   HGB & HCT    Collection Time: 11/23/21  2:22 PM   Result Value Ref Range    HGB 12.8 (L) 13.6 - 17.2 g/dL    HCT 39.0 (L) 41.1 - 50.3 %   HEPARIN XA UFH    Collection Time: 11/23/21  7:54 PM   Result Value Ref Range    Heparin Xa UFH 0.19 (L) 0.3 - 0.7 IU/mL   HGB & HCT    Collection Time: 11/23/21 10:49 PM   Result Value Ref Range    HGB 11.2 (L) 13.6 - 17.2 g/dL    HCT 34.7 (L) 41.1 - 50.3 %   HEPARIN XA UFH    Collection Time: 11/24/21  3:20 AM   Result Value Ref Range    Heparin Xa UFH 0.50 0.3 - 0.7 IU/mL   CBC WITH AUTOMATED DIFF    Collection Time: 11/24/21  3:20 AM   Result Value Ref Range    WBC 8.8 4.3 - 11.1 K/uL    RBC 3.81 (L) 4.23 - 5.6 M/uL    HGB 11.0 (L) 13.6 - 17.2 g/dL    HCT 34.4 (L) 41.1 - 50.3 %    MCV 90.3 79.6 - 97.8 FL    MCH 28.9 26.1 - 32.9 PG    MCHC 32.0 31.4 - 35.0 g/dL    RDW 14.6 11.9 - 14.6 %    PLATELET 827 579 - 759 K/uL    MPV 10.2 9.4 - 12.3 FL    ABSOLUTE NRBC 0.00 0.0 - 0.2 K/uL    DF AUTOMATED      NEUTROPHILS 74 43 - 78 %    LYMPHOCYTES 12 (L) 13 - 44 %    MONOCYTES 11 4.0 - 12.0 %    EOSINOPHILS 2 0.5 - 7.8 %    BASOPHILS 0 0.0 - 2.0 %    IMMATURE GRANULOCYTES 1 0.0 - 5.0 %    ABS. NEUTROPHILS 6.6 1.7 - 8.2 K/UL    ABS. LYMPHOCYTES 1.1 0.5 - 4.6 K/UL    ABS. MONOCYTES 0.9 0.1 - 1.3 K/UL    ABS. EOSINOPHILS 0.2 0.0 - 0.8 K/UL    ABS. BASOPHILS 0.0 0.0 - 0.2 K/UL    ABS. IMM.  GRANS. 0.1 0.0 - 0.5 K/UL   METABOLIC PANEL, BASIC    Collection Time: 11/24/21  3:20 AM   Result Value Ref Range    Sodium 143 136 - 145 mmol/L    Potassium 3.6 3.5 - 5.1 mmol/L    Chloride 112 (H) 98 - 107 mmol/L    CO2 23 21 - 32 mmol/L    Anion gap 8 7 - 16 mmol/L    Glucose 155 (H) 65 - 100 mg/dL    BUN 11 8 - 23 MG/DL    Creatinine 1.06 0.8 - 1.5 MG/DL    GFR est AA >60 >60 ml/min/1.73m2    GFR est non-AA >60 >60 ml/min/1.73m2    Calcium 9.5 8.3 - 10.4 MG/DL   HEPARIN XA UFH    Collection Time: 11/24/21 10:49 AM   Result Value Ref Range    Heparin Xa UFH 0.57 0.3 - 0.7 IU/mL   HGB & HCT    Collection Time: 11/24/21 11:04 AM   Result Value Ref Range    HGB 11.7 (L) 13.6 - 17.2 g/dL    HCT 36.3 (L) 41.1 - 49.1 %   METABOLIC PANEL, COMPREHENSIVE    Collection Time: 11/24/21 11:04 AM   Result Value Ref Range    Sodium 141 138 - 145 mmol/L    Potassium 3.3 (L) 3.5 - 5.1 mmol/L    Chloride 111 (H) 98 - 107 mmol/L    CO2 23 21 - 32 mmol/L    Anion gap 7 7 - 16 mmol/L    Glucose 165 (H) 65 - 100 mg/dL    BUN 13 8 - 23 MG/DL    Creatinine 1.02 0.8 - 1.5 MG/DL    GFR est AA >60 >60 ml/min/1.73m2    GFR est non-AA >60 >60 ml/min/1.73m2    Calcium 9.2 8.3 - 10.4 MG/DL    Bilirubin, total 0.4 0.2 - 1.1 MG/DL    ALT (SGPT) 70 (H) 12 - 65 U/L    AST (SGOT) 41 (H) 15 - 37 U/L    Alk.  phosphatase 253 (H) 50 - 136 U/L    Protein, total 5.7 (L) 6.3 - 8.2 g/dL    Albumin 2.4 (L) 3.2 - 4.6 g/dL    Globulin 3.3 2.3 - 3.5 g/dL    A-G Ratio 0.7 (L) 1.2 - 3.5     HGB & HCT    Collection Time: 11/24/21  2:32 PM   Result Value Ref Range    HGB 11.9 (L) 13.6 - 17.2 g/dL    HCT 37.3 (L) 41.1 - 50.3 %   CBC WITH AUTOMATED DIFF    Collection Time: 11/25/21  5:23 AM   Result Value Ref Range    WBC 8.3 4.3 - 11.1 K/uL    RBC 3.54 (L) 4.23 - 5.6 M/uL    HGB 10.7 (L) 13.6 - 17.2 g/dL    HCT 32.9 (L) 41.1 - 50.3 %    MCV 92.9 79.6 - 97.8 FL    MCH 30.2 26.1 - 32.9 PG    MCHC 32.5 31.4 - 35.0 g/dL    RDW 14.8 (H) 11.9 - 14.6 %    PLATELET 311 113 - 431 K/uL    MPV 9.9 9.4 - 12.3 FL    ABSOLUTE NRBC 0.00 0.0 - 0.2 K/uL    DF AUTOMATED      NEUTROPHILS 72 43 - 78 %    LYMPHOCYTES 13 13 - 44 %    MONOCYTES 11 4.0 - 12.0 %    EOSINOPHILS 3 0.5 - 7.8 %    BASOPHILS 0 0.0 - 2.0 %    IMMATURE GRANULOCYTES 1 0.0 - 5.0 %    ABS. NEUTROPHILS 6.0 1.7 - 8.2 K/UL    ABS. LYMPHOCYTES 1.0 0.5 - 4.6 K/UL    ABS. MONOCYTES 0.9 0.1 - 1.3 K/UL    ABS. EOSINOPHILS 0.2 0.0 - 0.8 K/UL    ABS. BASOPHILS 0.0 0.0 - 0.2 K/UL    ABS. IMM. GRANS. 0.1 0.0 - 0.5 K/UL   METABOLIC PANEL, COMPREHENSIVE    Collection Time: 11/25/21  5:23 AM   Result Value Ref Range    Sodium 142 138 - 145 mmol/L    Potassium 3.5 3.5 - 5.1 mmol/L    Chloride 113 (H) 98 - 107 mmol/L    CO2 23 21 - 32 mmol/L    Anion gap 6 (L) 7 - 16 mmol/L    Glucose 103 (H) 65 - 100 mg/dL    BUN 10 8 - 23 MG/DL    Creatinine 0.98 0.8 - 1.5 MG/DL    GFR est AA >60 >60 ml/min/1.73m2    GFR est non-AA >60 >60 ml/min/1.73m2    Calcium 9.1 8.3 - 10.4 MG/DL    Bilirubin, total 0.3 0.2 - 1.1 MG/DL    ALT (SGPT) 57 12 - 65 U/L    AST (SGOT) 29 15 - 37 U/L    Alk.  phosphatase 201 (H) 50 - 136 U/L    Protein, total 5.3 (L) 6.3 - 8.2 g/dL    Albumin 2.2 (L) 3.2 - 4.6 g/dL    Globulin 3.1 2.3 - 3.5 g/dL    A-G Ratio 0.7 (L) 1.2 - 3.5     MAGNESIUM    Collection Time: 11/25/21  5:23 AM   Result Value Ref Range    Magnesium 2.1 1.8 - 2.4 mg/dL   HEPARIN XA UFH    Collection Time: 11/25/21  5:23 AM   Result Value Ref Range    Heparin Xa UFH 0.47 0.3 - 0.7 IU/mL       All Micro Results     Procedure Component Value Units Date/Time    CULTURE, BLOOD [[id number]] Collected: 11/19/21 3190    Order Status: Completed Specimen: Blood Updated: 11/24/21 1238     Special Requests: --        RIGHT  HAND       Culture result: NO GROWTH 5 DAYS       CULTURE, BLOOD [[id number]] Collected: 11/19/21 1630    Order Status: Completed Specimen: Blood Updated: 11/24/21 0741     Special Requests: --        LEFT  Antecubital       Culture result: NO GROWTH 5 DAYS       RESPIRATORY VIRUS PANEL W/COVID-19, PCR [[id number]] Collected: 11/21/21 0940    Order Status: Completed Specimen: Nasopharyngeal Updated: 11/21/21 1105     Adenovirus NOT DETECTED        Coronavirus 229E NOT DETECTED        Coronavirus HKU1 NOT DETECTED        Coronavirus CVNL63 NOT DETECTED        Coronavirus OC43 NOT DETECTED        SARS-CoV-2, PCR NOT DETECTED        Metapneumovirus NOT DETECTED        Rhinovirus and Enterovirus NOT DETECTED        Influenza A NOT DETECTED        Influenza B NOT DETECTED        Parainfluenza 1 NOT DETECTED        Parainfluenza 2 NOT DETECTED        Parainfluenza 3 NOT DETECTED        Parainfluenza virus 4 NOT DETECTED        RSV by PCR NOT DETECTED        B. parapertussis, PCR NOT DETECTED        Bordetella pertussis - PCR NOT DETECTED        Chlamydophila pneumoniae DNA, QL, PCR NOT DETECTED        Mycoplasma pneumoniae DNA, QL, PCR NOT DETECTED             Other Studies:  No results found.     Current Meds:  Current Facility-Administered Medications   Medication Dose Route Frequency    ziprasidone (GEODON) 10 mg in sterile water (preservative free) 0.5 mL injection  10 mg IntraMUSCular ONCE PRN    losartan (COZAAR) tablet 100 mg  100 mg Oral DAILY    hydrALAZINE (APRESOLINE) 20 mg/mL injection 10 mg  10 mg IntraVENous Q6H PRN    pantoprazole (PROTONIX) tablet 40 mg  40 mg Oral ACB&D    heparin 25,000 units in dextrose 500 mL infusion  18-36 Units/kg/hr IntraVENous TITRATE    dilTIAZem ER (CARDIZEM CD) capsule 120 mg  120 mg Oral DAILY    cyanocobalamin (VITAMIN B12) injection 1,000 mcg  1,000 mcg IntraMUSCular EVERY OTHER DAY    nicotine (NICODERM CQ) 21 mg/24 hr patch 1 Patch  1 Patch TransDERmal Q24H    influenza vaccine 2021-22 (6 mos+)(PF) (FLUARIX/FLULAVAL/FLUZONE QUAD) injection 0.5 mL  1 Each IntraMUSCular PRIOR TO DISCHARGE    albuterol-ipratropium (DUO-NEB) 2.5 MG-0.5 MG/3 ML  3 mL Nebulization Q4H PRN    naloxone (NARCAN) injection 0.2 mg  0.2 mg IntraVENous EVERY 2 MINUTES AS NEEDED    piperacillin-tazobactam (ZOSYN) 3.375 g in 0.9% sodium chloride (MBP/ADV) 100 mL MBP  3.375 g IntraVENous Q8H    thiamine (B-1) 500 mg in 0.9% sodium chloride 50 mL IVPB  500 mg IntraVENous TID    sodium chloride (NS) flush 5-10 mL  5-10 mL IntraVENous Q8H    sodium chloride (NS) flush 5-10 mL  5-10 mL IntraVENous PRN    sodium chloride (NS) flush 5-40 mL  5-40 mL IntraVENous Q8H    sodium chloride (NS) flush 5-40 mL  5-40 mL IntraVENous PRN    acetaminophen (TYLENOL) tablet 650 mg  650 mg Oral Q6H PRN    Or    acetaminophen (TYLENOL) suppository 650 mg  650 mg Rectal Q6H PRN    polyethylene glycol (MIRALAX) packet 17 g  17 g Oral DAILY PRN    bisacodyL (DULCOLAX) suppository 10 mg  10 mg Rectal DAILY PRN    ondansetron (ZOFRAN ODT) tablet 4 mg  4 mg Oral Q8H PRN    Or    ondansetron (ZOFRAN) injection 4 mg  4 mg IntraVENous Q6H PRN    famotidine (PEPCID) tablet 20 mg  20 mg Oral BID PRN    alum-mag hydroxide-simeth (MYLANTA) oral suspension 15 mL  15 mL Oral Q6H PRN    oxyCODONE-acetaminophen (PERCOCET) 5-325 mg per tablet 1 Tablet  1 Tablet Oral Q4H PRN    QUEtiapine (SEROquel) tablet 25 mg  25 mg Oral QHS       Signed:  Svetlana Ruiz NP    Part of this note may have been written by using a voice dictation software. The note has been proof read but may still contain some grammatical/other typographical errors.

## 2021-11-26 LAB
ALBUMIN SERPL-MCNC: 2.2 G/DL (ref 3.2–4.6)
ALBUMIN/GLOB SERPL: 0.6 {RATIO} (ref 1.2–3.5)
ALP SERPL-CCNC: 168 U/L (ref 50–136)
ALT SERPL-CCNC: 50 U/L (ref 12–65)
ANION GAP SERPL CALC-SCNC: 6 MMOL/L (ref 7–16)
AST SERPL-CCNC: 25 U/L (ref 15–37)
BILIRUB SERPL-MCNC: 0.4 MG/DL (ref 0.2–1.1)
BUN SERPL-MCNC: 11 MG/DL (ref 8–23)
CALCIUM SERPL-MCNC: 9.3 MG/DL (ref 8.3–10.4)
CHLORIDE SERPL-SCNC: 112 MMOL/L (ref 98–107)
CO2 SERPL-SCNC: 24 MMOL/L (ref 21–32)
CREAT SERPL-MCNC: 1.03 MG/DL (ref 0.8–1.5)
GLOBULIN SER CALC-MCNC: 4 G/DL (ref 2.3–3.5)
GLUCOSE SERPL-MCNC: 87 MG/DL (ref 65–100)
HCT VFR BLD AUTO: 33.2 % (ref 41.1–50.3)
HGB BLD-MCNC: 10.7 G/DL (ref 13.6–17.2)
POTASSIUM SERPL-SCNC: 3.6 MMOL/L (ref 3.5–5.1)
PROT SERPL-MCNC: 6.2 G/DL (ref 6.3–8.2)
SODIUM SERPL-SCNC: 142 MMOL/L (ref 138–145)

## 2021-11-26 PROCEDURE — 65270000029 HC RM PRIVATE

## 2021-11-26 PROCEDURE — 74011250637 HC RX REV CODE- 250/637: Performed by: FAMILY MEDICINE

## 2021-11-26 PROCEDURE — 74011250637 HC RX REV CODE- 250/637: Performed by: INTERNAL MEDICINE

## 2021-11-26 PROCEDURE — 85018 HEMOGLOBIN: CPT

## 2021-11-26 PROCEDURE — 94760 N-INVAS EAR/PLS OXIMETRY 1: CPT

## 2021-11-26 PROCEDURE — 74011250637 HC RX REV CODE- 250/637: Performed by: NURSE PRACTITIONER

## 2021-11-26 PROCEDURE — 74011250636 HC RX REV CODE- 250/636: Performed by: NURSE PRACTITIONER

## 2021-11-26 PROCEDURE — 74011000250 HC RX REV CODE- 250: Performed by: FAMILY MEDICINE

## 2021-11-26 PROCEDURE — 36415 COLL VENOUS BLD VENIPUNCTURE: CPT

## 2021-11-26 PROCEDURE — 74011250637 HC RX REV CODE- 250/637: Performed by: STUDENT IN AN ORGANIZED HEALTH CARE EDUCATION/TRAINING PROGRAM

## 2021-11-26 PROCEDURE — 74011000258 HC RX REV CODE- 258: Performed by: NURSE PRACTITIONER

## 2021-11-26 PROCEDURE — 80053 COMPREHEN METABOLIC PANEL: CPT

## 2021-11-26 PROCEDURE — 74011250636 HC RX REV CODE- 250/636: Performed by: INTERNAL MEDICINE

## 2021-11-26 PROCEDURE — 2709999900 HC NON-CHARGEABLE SUPPLY

## 2021-11-26 PROCEDURE — 74011000258 HC RX REV CODE- 258: Performed by: INTERNAL MEDICINE

## 2021-11-26 PROCEDURE — 94640 AIRWAY INHALATION TREATMENT: CPT

## 2021-11-26 RX ADMIN — PANTOPRAZOLE SODIUM 40 MG: 40 TABLET, DELAYED RELEASE ORAL at 06:33

## 2021-11-26 RX ADMIN — HYDRALAZINE HYDROCHLORIDE 10 MG: 20 INJECTION INTRAMUSCULAR; INTRAVENOUS at 20:19

## 2021-11-26 RX ADMIN — QUETIAPINE FUMARATE 25 MG: 25 TABLET ORAL at 21:00

## 2021-11-26 RX ADMIN — PIPERACILLIN SODIUM AND TAZOBACTAM SODIUM 3.38 G: 3; .375 INJECTION, POWDER, LYOPHILIZED, FOR SOLUTION INTRAVENOUS at 23:22

## 2021-11-26 RX ADMIN — PANTOPRAZOLE SODIUM 40 MG: 40 TABLET, DELAYED RELEASE ORAL at 15:49

## 2021-11-26 RX ADMIN — THIAMINE HYDROCHLORIDE 500 MG: 100 INJECTION, SOLUTION INTRAMUSCULAR; INTRAVENOUS at 15:49

## 2021-11-26 RX ADMIN — THIAMINE HYDROCHLORIDE 500 MG: 100 INJECTION, SOLUTION INTRAMUSCULAR; INTRAVENOUS at 10:32

## 2021-11-26 RX ADMIN — Medication 10 ML: at 05:54

## 2021-11-26 RX ADMIN — IPRATROPIUM BROMIDE AND ALBUTEROL SULFATE 3 ML: .5; 3 SOLUTION RESPIRATORY (INHALATION) at 22:03

## 2021-11-26 RX ADMIN — Medication 10 ML: at 15:54

## 2021-11-26 RX ADMIN — PIPERACILLIN SODIUM AND TAZOBACTAM SODIUM 3.38 G: 3; .375 INJECTION, POWDER, LYOPHILIZED, FOR SOLUTION INTRAVENOUS at 15:49

## 2021-11-26 RX ADMIN — Medication 10 ML: at 21:00

## 2021-11-26 RX ADMIN — HYDRALAZINE HYDROCHLORIDE 10 MG: 20 INJECTION INTRAMUSCULAR; INTRAVENOUS at 12:29

## 2021-11-26 RX ADMIN — HYDRALAZINE HYDROCHLORIDE 10 MG: 20 INJECTION INTRAMUSCULAR; INTRAVENOUS at 01:04

## 2021-11-26 RX ADMIN — LOSARTAN POTASSIUM 100 MG: 50 TABLET, FILM COATED ORAL at 08:51

## 2021-11-26 RX ADMIN — APIXABAN 10 MG: 5 TABLET, FILM COATED ORAL at 17:06

## 2021-11-26 RX ADMIN — PIPERACILLIN SODIUM AND TAZOBACTAM SODIUM 3.38 G: 3; .375 INJECTION, POWDER, LYOPHILIZED, FOR SOLUTION INTRAVENOUS at 08:50

## 2021-11-26 RX ADMIN — DILTIAZEM HYDROCHLORIDE 120 MG: 120 CAPSULE, COATED, EXTENDED RELEASE ORAL at 08:51

## 2021-11-26 RX ADMIN — Medication 10 ML: at 15:55

## 2021-11-26 RX ADMIN — APIXABAN 10 MG: 5 TABLET, FILM COATED ORAL at 08:51

## 2021-11-26 RX ADMIN — IPRATROPIUM BROMIDE AND ALBUTEROL SULFATE 3 ML: .5; 3 SOLUTION RESPIRATORY (INHALATION) at 12:15

## 2021-11-26 RX ADMIN — THIAMINE HYDROCHLORIDE 500 MG: 100 INJECTION, SOLUTION INTRAMUSCULAR; INTRAVENOUS at 21:21

## 2021-11-26 NOTE — PROGRESS NOTES
LOS 7d  RNCM reviewed chart for discharge planning. Initial PT evaluation recommendation is for [facility] but since initial evaluation patient has declined to participate with PT. Patient will need to be actively participating with therapy for insurance to approve [facility] needs. PPD initiated on 11/20 per notes.   Will continue to follow for discharge planning needs  Please consult  if any new issues arise      Discharge plan is [facility] if patient will resume participation with PT.

## 2021-11-26 NOTE — PROGRESS NOTES
Hospitalist Progress Note   Admit Date:  2021 10:13 AM   Name:  Jose A Andrews   Age:  78 y.o. Sex:  male  :  1945   MRN:  [id number]   Room:  /    Presenting Complaint: Abdominal Pain    Reason(s) for Admission: Acute cholecystitis [K81.0]     Hospital Course & Interval History:   Aga Douglas a 78 y. o. male with a unknown medical history presented  to the ER with a complaint of severe abdominal pain x 1 day. Unable to obtain history of complaint due to patient being combative and confused. Daughter was contacted and stated she has no relationship with patient and could not provide any history. In the ER: Labs  showing leukocytosis and elevated creatinine 1.70, Lactic 2.1. CT AP worrisome for PE LLL, distended gallbladder concerning for acute cholecystitis with dilatedintrahepatic and extrahepatic bile ducts with the common bile duct measuring up to 1.2 cm.    .   CTPE showing small volume bilateral PE, mid left upper lobe groundglass opacities, trace pleural effusion and some possible aspiration. CT head unremarkable. Patient behavior required for the addition of restraints for safety concerns     Subjective (21):   Patient denies SOB, CP, CHEN  Assessment & Plan:     Principal Problem:  Sepsis due to ?abdominal source (?intrabiliary/cholecystitis)  - WBC normalized 8.8  - BCx  no growth x 5 days  - Continue Zosyn  - RPP (including COVID) are negative  - MRCP cannot be completed  - Abd U/S  Cholelithiasis with evidence suggestive of acute cholecystitis, Intra and extrahepatic biliary ductal dilatation, Hepatic steatosis  - Surgery followed - signed off , no surgical intervention, can follow up in office  - GI following - no need for ERCP, plan for outpatient followup    -continue Zosyn for completion of 7 days  -Final BCx negative   -Therapy with recs for rehab     Bilateral Pes  Acute hypoxic respiratory failure  - ?provoked  - COVID negative  - Heparin gtt with pharmacy to dose, GI is ok with restarting   - needs DOAC at discharge  - Supplemental O2 at 4L NC, wean as tolerated  - Consider transition to [address] 11/25 since no surgical plans  11/25   -Patient transitioned to Eliis; Monitor for bleeding  -O2 reduced to 3L;  Monitor and continue to wean as able  -Patient now on 2L; wean as able          GI bleeding/Anemia  - EGD with GI 11/23, ok to restart heparin  - cannot follow instructions for colonscopy  - avoid NSAIDs, continue PPI  - transfuse for Hgb<7 and/or brisk bleeding  - Hgb 11.9, stable  11/25  -No evidence of bleeding; Monitor Hgb      TONIA, likely prerenal azotemia, resolved  - avoid nephrotoxic meds, NSAIDs    HTN  - on home diltiazem and losartan  - Extremely agitated today, BP elevated 196/93, will increase Losartan, prn Hydralazine available  11/25  -BP more controlled today; Monitor with increase in meds yesterday      Acute metabolic encephalopathy (improved)  - Metabolic workup (RPR, ammonia, LFTs, LA, UDS), unrevealing   - Scheduled thiamine   - CT head shows only chronic changes  - Avoid sedative/hypnotics and narcotics  - Seroquel at nighttime  11/25  -Continue with sitter and meds  11/26 Patient is alert and oriented x 3 and more compliant with treatment     Hypokalemia (Resolved)           Dispo/Discharge Planning:      Therapy with recs for rehab     Diet:  ADULT DIET Regular; Low Fat/Low Chol/High Fiber/ARELI  DVT PPx: Eliquis   Code status: Full Code    Hospital Problems as of 11/26/2021 Date Reviewed: 11/19/2021          Codes Class Noted - Resolved POA    GI bleed ICD-10-CM: K92.2  ICD-9-CM: 578.9  11/22/2021 - Present Unknown        B12 deficiency ICD-10-CM: E53.8  ICD-9-CM: 266.2  11/21/2021 - Present Yes        * (Principal) Acute metabolic encephalopathy SRL-[id number]: G93.41  ICD-9-CM: 348.31  11/20/2021 - Present Unknown        Acute cholecystitis ICD-10-CM: K81.0  ICD-9-CM: 575.0  11/19/2021 - Present Unknown Bilateral pulmonary embolism (HCC) ICD-10-CM: I26.99  ICD-9-CM: 415.19  11/19/2021 - Present Unknown        TONIA (acute kidney injury) ([facility] ICD-10-CM: N17.9  ICD-9-CM: 584.9  11/19/2021 - Present Unknown        Lactic acidosis ICD-10-CM: E87.2  ICD-9-CM: 276.2  11/19/2021 - Present Unknown        Primary hypertension ICD-10-CM: I10  ICD-9-CM: 401.9  11/19/2021 - Present Unknown              Objective:     Patient Vitals for the past 24 hrs:   Temp Pulse Resp BP SpO2   11/26/21 0758 -- -- -- (!) 160/90 --   11/26/21 0742 99.1 °F (37.3 °C) 90 20 (!) 193/101 95 %   11/26/21 0414 97.9 °F (36.6 °C) 91 20 (!) 160/95 95 %   11/26/21 0345 -- 95 20 (!) 169/92 --   11/26/21 0012 99 °F (37.2 °C) 88 18 (!) 167/76 97 %   11/25/21 1901 99.3 °F (37.4 °C) 88 18 (!) 154/75 96 %   11/25/21 1446 98.9 °F (37.2 °C) 83 18 (!) 149/73 97 %     Oxygen Therapy  O2 Sat (%): 95 % (11/26/21 0742)  Pulse via Oximetry: 100 beats per minute (11/25/21 0914)  O2 Device: Nasal cannula (11/26/21 0830)  Skin Assessment: Clean, dry, & intact (11/26/21 9300)  Skin Protection for O2 Device: N/A (11/26/21 0742)  O2 Flow Rate (L/min): 2 l/min (11/26/21 0830)  FIO2 (%): 36 % (11/23/21 1753)  ETCO2 (mmHg): 97 mmHg (11/21/21 1741)    Estimated body mass index is 30.13 kg/m² as calculated from the following:    Height as of this encounter: 5' 10\" (1.778 m). Weight as of this encounter: 95.3 kg (210 lb). Intake/Output Summary (Last 24 hours) at 11/26/2021 1141  Last data filed at 11/26/2021 0929  Gross per 24 hour   Intake 1577 ml   Output 1650 ml   Net -73 ml         Physical Exam:     Blood pressure (!) 160/90, pulse 90, temperature 99.1 °F (37.3 °C), resp. rate 20, height 5' 10\" (1.778 m), weight 95.3 kg (210 lb), SpO2 95 %. General:    No overt distress  Head:  Normocephalic, atraumatic  Eyes:  Sclerae appear normal.  Pupils equally round. ENT:  Nares appear normal, no drainage. Moist oral mucosa  Neck:  No restricted ROM.   Trachea midline   CV: RRR.  No m/r/g. No jugular venous distension. Lungs:   CTAB. No wheezing, rhonchi, or rales. Respirations even, unlabored  Abdomen: Bowel sounds present. Soft, nontender, nondistended. Extremities: No cyanosis or clubbing. No edema  Skin:     No rashes and normal coloration. Warm and dry. Neuro:  CN II-XII grossly intact. A&Ox3  Psych:  Agitated      I have reviewed ordered lab tests and independently visualized imaging below:    Recent Labs:  Recent Results (from the past 48 hour(s))   HGB & HCT    Collection Time: 11/24/21  2:32 PM   Result Value Ref Range    HGB 11.9 (L) 13.6 - 17.2 g/dL    HCT 37.3 (L) 41.1 - 50.3 %   CBC WITH AUTOMATED DIFF    Collection Time: 11/25/21  5:23 AM   Result Value Ref Range    WBC 8.3 4.3 - 11.1 K/uL    RBC 3.54 (L) 4.23 - 5.6 M/uL    HGB 10.7 (L) 13.6 - 17.2 g/dL    HCT 32.9 (L) 41.1 - 50.3 %    MCV 92.9 79.6 - 97.8 FL    MCH 30.2 26.1 - 32.9 PG    MCHC 32.5 31.4 - 35.0 g/dL    RDW 14.8 (H) 11.9 - 14.6 %    PLATELET 126 268 - 401 K/uL    MPV 9.9 9.4 - 12.3 FL    ABSOLUTE NRBC 0.00 0.0 - 0.2 K/uL    DF AUTOMATED      NEUTROPHILS 72 43 - 78 %    LYMPHOCYTES 13 13 - 44 %    MONOCYTES 11 4.0 - 12.0 %    EOSINOPHILS 3 0.5 - 7.8 %    BASOPHILS 0 0.0 - 2.0 %    IMMATURE GRANULOCYTES 1 0.0 - 5.0 %    ABS. NEUTROPHILS 6.0 1.7 - 8.2 K/UL    ABS. LYMPHOCYTES 1.0 0.5 - 4.6 K/UL    ABS. MONOCYTES 0.9 0.1 - 1.3 K/UL    ABS. EOSINOPHILS 0.2 0.0 - 0.8 K/UL    ABS. BASOPHILS 0.0 0.0 - 0.2 K/UL    ABS. IMM.  GRANS. 0.1 0.0 - 0.5 K/UL   METABOLIC PANEL, COMPREHENSIVE    Collection Time: 11/25/21  5:23 AM   Result Value Ref Range    Sodium 142 138 - 145 mmol/L    Potassium 3.5 3.5 - 5.1 mmol/L    Chloride 113 (H) 98 - 107 mmol/L    CO2 23 21 - 32 mmol/L    Anion gap 6 (L) 7 - 16 mmol/L    Glucose 103 (H) 65 - 100 mg/dL    BUN 10 8 - 23 MG/DL    Creatinine 0.98 0.8 - 1.5 MG/DL    GFR est AA >60 >60 ml/min/1.73m2    GFR est non-AA >60 >60 ml/min/1.73m2    Calcium 9.1 8.3 - 10.4 MG/DL Bilirubin, total 0.3 0.2 - 1.1 MG/DL    ALT (SGPT) 57 12 - 65 U/L    AST (SGOT) 29 15 - 37 U/L    Alk. phosphatase 201 (H) 50 - 136 U/L    Protein, total 5.3 (L) 6.3 - 8.2 g/dL    Albumin 2.2 (L) 3.2 - 4.6 g/dL    Globulin 3.1 2.3 - 3.5 g/dL    A-G Ratio 0.7 (L) 1.2 - 3.5     MAGNESIUM    Collection Time: 11/25/21  5:23 AM   Result Value Ref Range    Magnesium 2.1 1.8 - 2.4 mg/dL   HEPARIN XA UFH    Collection Time: 11/25/21  5:23 AM   Result Value Ref Range    Heparin Xa UFH 0.47 0.3 - 0.7 IU/mL   METABOLIC PANEL, COMPREHENSIVE    Collection Time: 11/26/21  5:03 AM   Result Value Ref Range    Sodium 142 138 - 145 mmol/L    Potassium 3.6 3.5 - 5.1 mmol/L    Chloride 112 (H) 98 - 107 mmol/L    CO2 24 21 - 32 mmol/L    Anion gap 6 (L) 7 - 16 mmol/L    Glucose 87 65 - 100 mg/dL    BUN 11 8 - 23 MG/DL    Creatinine 1.03 0.8 - 1.5 MG/DL    GFR est AA >60 >60 ml/min/1.73m2    GFR est non-AA >60 >60 ml/min/1.73m2    Calcium 9.3 8.3 - 10.4 MG/DL    Bilirubin, total 0.4 0.2 - 1.1 MG/DL    ALT (SGPT) 50 12 - 65 U/L    AST (SGOT) 25 15 - 37 U/L    Alk.  phosphatase 168 (H) 50 - 136 U/L    Protein, total 6.2 (L) 6.3 - 8.2 g/dL    Albumin 2.2 (L) 3.2 - 4.6 g/dL    Globulin 4.0 (H) 2.3 - 3.5 g/dL    A-G Ratio 0.6 (L) 1.2 - 3.5     HGB & HCT    Collection Time: 11/26/21  5:03 AM   Result Value Ref Range    HGB 10.7 (L) 13.6 - 17.2 g/dL    HCT 33.2 (L) 41.1 - 50.3 %       All Micro Results     Procedure Component Value Units Date/Time    CULTURE, BLOOD [[id number]] Collected: 11/19/21 1350    Order Status: Completed Specimen: Blood Updated: 11/24/21 0741     Special Requests: --        RIGHT  HAND       Culture result: NO GROWTH 5 DAYS       CULTURE, BLOOD [[id number]] Collected: 11/19/21 1630    Order Status: Completed Specimen: Blood Updated: 11/24/21 0741     Special Requests: --        LEFT  Antecubital       Culture result: NO GROWTH 5 DAYS       RESPIRATORY VIRUS PANEL W/COVID-19, PCR [[id number]] Collected: 11/21/21 0909 Order Status: Completed Specimen: Nasopharyngeal Updated: 11/21/21 1105     Adenovirus NOT DETECTED        Coronavirus 229E NOT DETECTED        Coronavirus HKU1 NOT DETECTED        Coronavirus CVNL63 NOT DETECTED        Coronavirus OC43 NOT DETECTED        SARS-CoV-2, PCR NOT DETECTED        Metapneumovirus NOT DETECTED        Rhinovirus and Enterovirus NOT DETECTED        Influenza A NOT DETECTED        Influenza B NOT DETECTED        Parainfluenza 1 NOT DETECTED        Parainfluenza 2 NOT DETECTED        Parainfluenza 3 NOT DETECTED        Parainfluenza virus 4 NOT DETECTED        RSV by PCR NOT DETECTED        B. parapertussis, PCR NOT DETECTED        Bordetella pertussis - PCR NOT DETECTED        Chlamydophila pneumoniae DNA, QL, PCR NOT DETECTED        Mycoplasma pneumoniae DNA, QL, PCR NOT DETECTED             Other Studies:  No results found.     Current Meds:  Current Facility-Administered Medications   Medication Dose Route Frequency    piperacillin-tazobactam (ZOSYN) 3.375 g in 0.9% sodium chloride (MBP/ADV) 100 mL MBP  3.375 g IntraVENous Q8H    apixaban (ELIQUIS) tablet 10 mg  10 mg Oral BID    Followed by   Rosanne Roca ON 12/2/2021] apixaban (ELIQUIS) tablet 5 mg  5 mg Oral BID    losartan (COZAAR) tablet 100 mg  100 mg Oral DAILY    hydrALAZINE (APRESOLINE) 20 mg/mL injection 10 mg  10 mg IntraVENous Q6H PRN    pantoprazole (PROTONIX) tablet 40 mg  40 mg Oral ACB&D    dilTIAZem ER (CARDIZEM CD) capsule 120 mg  120 mg Oral DAILY    cyanocobalamin (VITAMIN B12) injection 1,000 mcg  1,000 mcg IntraMUSCular EVERY OTHER DAY    nicotine (NICODERM CQ) 21 mg/24 hr patch 1 Patch  1 Patch TransDERmal Q24H    influenza vaccine 2021-22 (6 mos+)(PF) (FLUARIX/FLULAVAL/FLUZONE QUAD) injection 0.5 mL  1 Each IntraMUSCular PRIOR TO DISCHARGE    albuterol-ipratropium (DUO-NEB) 2.5 MG-0.5 MG/3 ML  3 mL Nebulization Q4H PRN    naloxone (NARCAN) injection 0.2 mg  0.2 mg IntraVENous EVERY 2 MINUTES AS NEEDED    thiamine (B-1) 500 mg in 0.9% sodium chloride 50 mL IVPB  500 mg IntraVENous TID    sodium chloride (NS) flush 5-10 mL  5-10 mL IntraVENous Q8H    sodium chloride (NS) flush 5-10 mL  5-10 mL IntraVENous PRN    sodium chloride (NS) flush 5-40 mL  5-40 mL IntraVENous Q8H    sodium chloride (NS) flush 5-40 mL  5-40 mL IntraVENous PRN    acetaminophen (TYLENOL) tablet 650 mg  650 mg Oral Q6H PRN    Or    acetaminophen (TYLENOL) suppository 650 mg  650 mg Rectal Q6H PRN    polyethylene glycol (MIRALAX) packet 17 g  17 g Oral DAILY PRN    bisacodyL (DULCOLAX) suppository 10 mg  10 mg Rectal DAILY PRN    ondansetron (ZOFRAN ODT) tablet 4 mg  4 mg Oral Q8H PRN    Or    ondansetron (ZOFRAN) injection 4 mg  4 mg IntraVENous Q6H PRN    famotidine (PEPCID) tablet 20 mg  20 mg Oral BID PRN    alum-mag hydroxide-simeth (MYLANTA) oral suspension 15 mL  15 mL Oral Q6H PRN    oxyCODONE-acetaminophen (PERCOCET) 5-325 mg per tablet 1 Tablet  1 Tablet Oral Q4H PRN    QUEtiapine (SEROquel) tablet 25 mg  25 mg Oral QHS       Signed:  Pete Peck NP    Part of this note may have been written by using a voice dictation software. The note has been proof read but may still contain some grammatical/other typographical errors.

## 2021-11-26 NOTE — PROGRESS NOTES
Problem: Falls - Risk of  Goal: *Absence of Falls  Description: Document Bard Domínguez Fall Risk and appropriate interventions in the flowsheet. Outcome: Progressing Towards Goal  Note: Fall Risk Interventions:  Mobility Interventions: Communicate number of staff needed for ambulation/transfer, Patient to call before getting OOB    Mentation Interventions: Door open when patient unattended, Reorient patient, Update white board    Medication Interventions: Teach patient to arise slowly, Patient to call before getting OOB    Elimination Interventions: Call light in reach, Patient to call for help with toileting needs              Problem: Patient Education: Go to Patient Education Activity  Goal: Patient/Family Education  Outcome: Progressing Towards Goal     Problem: Pressure Injury - Risk of  Goal: *Prevention of pressure injury  Description: Document Cornelius Scale and appropriate interventions in the flowsheet.   Outcome: Progressing Towards Goal  Note: Pressure Injury Interventions:  Sensory Interventions: Assess changes in LOC, Maintain/enhance activity level    Moisture Interventions: Absorbent underpads, Internal/External urinary devices    Activity Interventions: Increase time out of bed, Pressure redistribution bed/mattress(bed type), PT/OT evaluation    Mobility Interventions: Pressure redistribution bed/mattress (bed type), PT/OT evaluation    Nutrition Interventions: Document food/fluid/supplement intake, Offer support with meals,snacks and hydration    Friction and Shear Interventions: Apply protective barrier, creams and emollients                Problem: Patient Education: Go to Patient Education Activity  Goal: Patient/Family Education  Outcome: Progressing Towards Goal     Problem: General Medical Care Plan  Goal: *Vital signs within specified parameters  Outcome: Progressing Towards Goal  Goal: *Labs within defined limits  Outcome: Progressing Towards Goal  Goal: *Absence of infection signs and symptoms  Outcome: Progressing Towards Goal  Goal: *Optimal pain control at patient's stated goal  Outcome: Progressing Towards Goal  Goal: *Skin integrity maintained  Outcome: Progressing Towards Goal  Goal: *Fluid volume balance  Outcome: Progressing Towards Goal  Goal: *Optimize nutritional status  Outcome: Progressing Towards Goal  Goal: *Anxiety reduced or absent  Outcome: Progressing Towards Goal  Goal: *Progressive mobility and function (eg: ADL's)  Outcome: Progressing Towards Goal     Problem: Patient Education: Go to Patient Education Activity  Goal: Patient/Family Education  Outcome: Progressing Towards Goal     Problem: Patient Education: Go to Patient Education Activity  Goal: Patient/Family Education  Outcome: Progressing Towards Goal

## 2021-11-26 NOTE — PROGRESS NOTES
PT note:  Patient refused therapy at this time stating that he had been up already. Review importance of mobility in the recovery process and his plan of care. However unsuccessful in convincing the patient to participate at this time. Will return as time/schedule permits.   Linda Lindsay, PTA

## 2021-11-26 NOTE — PROGRESS NOTES
END OF SHIFT NOTE:    INTAKE/OUTPUT  11/25 0701 - 11/26 0700  In: 3684 [I.V.:1337]  Out: 850 [Urine:850]  Voiding: YES  Catheter: NO  Drain:   External Urinary Catheter 11/19/21 (Active)   Site Assessment Clean, dry, & intact 11/26/21 1550   Repositioned No 11/26/21 1550   Perineal Care No 11/26/21 1550   Wick Changed No 11/26/21 1550   Suction Canister/Tubing Changed No 11/26/21 1550   Urine Output (mL) 100 ml 11/26/21 1426               Flatus: Patient does have flatus present. Stool:  1 occurrences. Characteristics:  Stool Assessment  Stool Color: Black, Green  Stool Appearance: Loose  Stool Amount: Large  Stool Source/Status: Rectum    Emesis: 0 occurrences. Characteristics:        VITAL SIGNS  Patient Vitals for the past 12 hrs:   Temp Pulse Resp BP SpO2   11/26/21 1553 -- -- -- (!) 146/71 --   11/26/21 1455 98.4 °F (36.9 °C) 86 18 (!) 166/70 95 %   11/26/21 1207 -- -- -- (!) 162/82 --   11/26/21 1111 98.6 °F (37 °C) 91 20 (!) 174/95 95 %   11/26/21 0758 -- -- -- (!) 160/90 --   11/26/21 0742 99.1 °F (37.3 °C) 90 20 (!) 193/101 95 %       Pain Assessment  Pain Intensity 1: 0 (11/26/21 0750)  Pain Location 1: Chest  Pain Intervention(s) 1: Medication (see MAR)  Patient Stated Pain Goal: 0    Ambulating  Yes    Shift report given to oncoming nurse at the bedside.     Wendi Sun RN

## 2021-11-26 NOTE — PROGRESS NOTES
Problem: Non-Violent Restraints  Goal: Removal from restraints as soon as assessed to be safe  Outcome: Resolved/Met  Goal: No harm/injury to patient while restraints in use  Outcome: Resolved/Met  Goal: Patient's dignity will be maintained  Outcome: Resolved/Met  Goal: Patient Interventions  Outcome: Resolved/Met     Problem: Falls - Risk of  Goal: *Absence of Falls  Description: Document Taylor Shook Fall Risk and appropriate interventions in the flowsheet. 11/25/2021 1929 by Rachel Walsh RN  Outcome: Progressing Towards Goal  Note: Fall Risk Interventions:  Mobility Interventions: Communicate number of staff needed for ambulation/transfer    Mentation Interventions: Adequate sleep, hydration, pain control    Medication Interventions: Bed/chair exit alarm    Elimination Interventions: Bed/chair exit alarm           11/25/2021 1844 by Rachel Walsh RN  Outcome: Progressing Towards Goal  Note: Fall Risk Interventions:  Mobility Interventions: Communicate number of staff needed for ambulation/transfer    Mentation Interventions: Adequate sleep, hydration, pain control    Medication Interventions: Bed/chair exit alarm    Elimination Interventions: Bed/chair exit alarm              Problem: Patient Education: Go to Patient Education Activity  Goal: Patient/Family Education  Outcome: Progressing Towards Goal     Problem: Pressure Injury - Risk of  Goal: *Prevention of pressure injury  Description: Document Cornelius Scale and appropriate interventions in the flowsheet.   Outcome: Progressing Towards Goal  Note: Pressure Injury Interventions:  Sensory Interventions: Assess changes in LOC, Discuss PT/OT consult with provider, Pressure redistribution bed/mattress (bed type)    Moisture Interventions: Absorbent underpads, Internal/External urinary devices    Activity Interventions: Increase time out of bed, Pressure redistribution bed/mattress(bed type), PT/OT evaluation    Mobility Interventions: Pressure redistribution bed/mattress (bed type), PT/OT evaluation    Nutrition Interventions: Document food/fluid/supplement intake, Offer support with meals,snacks and hydration    Friction and Shear Interventions: Apply protective barrier, creams and emollients, Foam dressings/transparent film/skin sealants, Lift sheet, Minimize layers                Problem: Patient Education: Go to Patient Education Activity  Goal: Patient/Family Education  Outcome: Progressing Towards Goal     Problem: General Medical Care Plan  Goal: *Vital signs within specified parameters  Outcome: Progressing Towards Goal  Goal: *Labs within defined limits  Outcome: Progressing Towards Goal  Goal: *Absence of infection signs and symptoms  Outcome: Progressing Towards Goal  Goal: *Optimal pain control at patient's stated goal  Outcome: Progressing Towards Goal  Goal: *Skin integrity maintained  Outcome: Progressing Towards Goal  Goal: *Fluid volume balance  Outcome: Progressing Towards Goal  Goal: *Optimize nutritional status  Outcome: Progressing Towards Goal  Goal: *Anxiety reduced or absent  Outcome: Progressing Towards Goal  Goal: *Progressive mobility and function (eg: ADL's)  Outcome: Progressing Towards Goal     Problem: Patient Education: Go to Patient Education Activity  Goal: Patient/Family Education  Outcome: Progressing Towards Goal

## 2021-11-26 NOTE — PROGRESS NOTES
END OF SHIFT NOTE:    INTAKE/OUTPUT  11/25 0701 - 11/26 0700  In: 9806 [I.V.:1337]  Out: 850 [Urine:850]  Voiding: YES (Incontinent)  Catheter: NO  Drain:   External Urinary Catheter 11/19/21 (Active)   Site Assessment Clean, dry, & intact 11/26/21 0319   Repositioned Yes 11/26/21 0319   Perineal Care Yes 11/26/21 0319   Wick Changed Yes 11/26/21 0319   Suction Canister/Tubing Changed Yes 11/26/21 0319   Urine Output (mL) 350 ml 11/25/21 1758               Flatus: Patient does have flatus present. Stool:  0 occurrences. Characteristics:  Stool Assessment  Stool Color:  (have not observed)  Stool Appearance: Other (comment) (not observed)  Stool Amount: Large  Stool Source/Status: Rectum    Emesis: 0 occurrences. Characteristics:        VITAL SIGNS  Patient Vitals for the past 12 hrs:   Temp Pulse Resp BP SpO2   11/26/21 0414 97.9 °F (36.6 °C) 91 20 (!) 160/95 95 %   11/26/21 0345 -- 95 20 (!) 169/92 --   11/26/21 0012 99 °F (37.2 °C) 88 18 (!) 167/76 97 %   11/25/21 1901 99.3 °F (37.4 °C) 88 18 (!) 154/75 96 %       Pain Assessment  Pain Intensity 1: 3 (11/26/21 0345)  Pain Location 1: Chest  Pain Intervention(s) 1: Medication (see MAR)  Patient Stated Pain Goal: 0    Ambulating  No (BLE weakness)/ Chairfast    Shift report given to oncoming nurse at the bedside.     Naima Mack RN

## 2021-11-27 PROCEDURE — 74011000258 HC RX REV CODE- 258: Performed by: INTERNAL MEDICINE

## 2021-11-27 PROCEDURE — 74011250637 HC RX REV CODE- 250/637: Performed by: INTERNAL MEDICINE

## 2021-11-27 PROCEDURE — 74011250637 HC RX REV CODE- 250/637: Performed by: HOSPITALIST

## 2021-11-27 PROCEDURE — 77010033678 HC OXYGEN DAILY

## 2021-11-27 PROCEDURE — 74011250637 HC RX REV CODE- 250/637: Performed by: NURSE PRACTITIONER

## 2021-11-27 PROCEDURE — 74011250636 HC RX REV CODE- 250/636: Performed by: INTERNAL MEDICINE

## 2021-11-27 PROCEDURE — 74011250637 HC RX REV CODE- 250/637: Performed by: STUDENT IN AN ORGANIZED HEALTH CARE EDUCATION/TRAINING PROGRAM

## 2021-11-27 PROCEDURE — 65270000029 HC RM PRIVATE

## 2021-11-27 PROCEDURE — 74011000250 HC RX REV CODE- 250: Performed by: NURSE PRACTITIONER

## 2021-11-27 PROCEDURE — 74011250636 HC RX REV CODE- 250/636: Performed by: NURSE PRACTITIONER

## 2021-11-27 PROCEDURE — 74011250637 HC RX REV CODE- 250/637: Performed by: FAMILY MEDICINE

## 2021-11-27 PROCEDURE — 94760 N-INVAS EAR/PLS OXIMETRY 1: CPT

## 2021-11-27 RX ORDER — GUAIFENESIN 600 MG/1
600 TABLET, EXTENDED RELEASE ORAL
Status: DISCONTINUED | OUTPATIENT
Start: 2021-11-27 | End: 2021-11-28

## 2021-11-27 RX ORDER — IBUPROFEN 200 MG
1 TABLET ORAL EVERY 24 HOURS
Status: DISCONTINUED | OUTPATIENT
Start: 2021-11-28 | End: 2021-12-02 | Stop reason: HOSPADM

## 2021-11-27 RX ORDER — LANOLIN ALCOHOL/MO/W.PET/CERES
500 CREAM (GRAM) TOPICAL 3 TIMES DAILY
Status: DISCONTINUED | OUTPATIENT
Start: 2021-11-27 | End: 2021-12-01

## 2021-11-27 RX ORDER — HYDRALAZINE HYDROCHLORIDE 20 MG/ML
10 INJECTION INTRAMUSCULAR; INTRAVENOUS
Status: DISCONTINUED | OUTPATIENT
Start: 2021-11-27 | End: 2021-12-02 | Stop reason: HOSPADM

## 2021-11-27 RX ORDER — OXYCODONE AND ACETAMINOPHEN 5; 325 MG/1; MG/1
1 TABLET ORAL
Status: DISCONTINUED | OUTPATIENT
Start: 2021-11-27 | End: 2021-12-01

## 2021-11-27 RX ORDER — LABETALOL HYDROCHLORIDE 5 MG/ML
20 INJECTION, SOLUTION INTRAVENOUS
Status: DISCONTINUED | OUTPATIENT
Start: 2021-11-27 | End: 2021-12-02

## 2021-11-27 RX ORDER — CARVEDILOL 6.25 MG/1
6.25 TABLET ORAL 2 TIMES DAILY WITH MEALS
Status: DISCONTINUED | OUTPATIENT
Start: 2021-11-27 | End: 2021-11-28

## 2021-11-27 RX ADMIN — LABETALOL HYDROCHLORIDE 20 MG: 5 INJECTION INTRAVENOUS at 21:01

## 2021-11-27 RX ADMIN — OXYCODONE HYDROCHLORIDE AND ACETAMINOPHEN 1 TABLET: 5; 325 TABLET ORAL at 05:34

## 2021-11-27 RX ADMIN — GUAIFENESIN 600 MG: 600 TABLET ORAL at 22:39

## 2021-11-27 RX ADMIN — APIXABAN 10 MG: 5 TABLET, FILM COATED ORAL at 08:08

## 2021-11-27 RX ADMIN — CARVEDILOL 6.25 MG: 6.25 TABLET, FILM COATED ORAL at 17:26

## 2021-11-27 RX ADMIN — Medication 10 ML: at 05:12

## 2021-11-27 RX ADMIN — PANTOPRAZOLE SODIUM 40 MG: 40 TABLET, DELAYED RELEASE ORAL at 17:27

## 2021-11-27 RX ADMIN — Medication 500 MG: at 17:27

## 2021-11-27 RX ADMIN — PANTOPRAZOLE SODIUM 40 MG: 40 TABLET, DELAYED RELEASE ORAL at 06:30

## 2021-11-27 RX ADMIN — HYDRALAZINE HYDROCHLORIDE 10 MG: 20 INJECTION INTRAMUSCULAR; INTRAVENOUS at 04:19

## 2021-11-27 RX ADMIN — APIXABAN 10 MG: 5 TABLET, FILM COATED ORAL at 17:25

## 2021-11-27 RX ADMIN — QUETIAPINE FUMARATE 25 MG: 25 TABLET ORAL at 21:02

## 2021-11-27 RX ADMIN — Medication 500 MG: at 21:01

## 2021-11-27 RX ADMIN — Medication 10 ML: at 14:07

## 2021-11-27 RX ADMIN — DILTIAZEM HYDROCHLORIDE 120 MG: 120 CAPSULE, COATED, EXTENDED RELEASE ORAL at 08:07

## 2021-11-27 RX ADMIN — LOSARTAN POTASSIUM 100 MG: 50 TABLET, FILM COATED ORAL at 08:07

## 2021-11-27 RX ADMIN — THIAMINE HYDROCHLORIDE 500 MG: 100 INJECTION, SOLUTION INTRAMUSCULAR; INTRAVENOUS at 09:12

## 2021-11-27 RX ADMIN — Medication 10 ML: at 21:02

## 2021-11-27 RX ADMIN — CYANOCOBALAMIN 1000 MCG: 1000 INJECTION, SOLUTION INTRAMUSCULAR; SUBCUTANEOUS at 20:33

## 2021-11-27 NOTE — PROGRESS NOTES
Hospitalist Progress Note   Admit Date:  2021 10:13 AM   Name:  Jeremi Carrillo   Age:  78 y.o. Sex:  male  :  1945   MRN:  [id number]   Room:  /    Presenting Complaint: Abdominal Pain    Reason(s) for Admission: Acute cholecystitis [K81.0]     Hospital Course & Interval History:   Mr. Arline Mckeon a 78 y. o. male with an unknown medical history who presented to the ER with a complaint of severe abdominal pain x 1 day. Unable to obtain history of complaint due to patient being combative and confused. Daughter was contacted and she stated she has no relationship with the patient and could not provide any history. In the ER, labs showing leukocytosis, elevated creatinine of 1.70, and lactic acid 2.1. CT AP worrisome for PE LLL, distended gallbladder concerning for acute cholecystitis with dilated intrahepatic and extrahepatic bile ducts with the common bile duct measuring up to 1.2 cm. CT PE showing small volume bilateral PE, mid L upper lobe groundglass opacities, trace pleural effusion, and some possible aspiration. CT head unremarkable. Patient behavior required the addition of restraints for safety concerns. He was admitted to the Hospitalist service for further treatment. Subjective (21):  No AEO. Patient not encephalopathic during encounter this morning. Patient only complains of SOB with activity. Still requiring O2 NC; will order walk test.  STR being rec by professional therapists.       Assessment & Plan:     Principal Problem:  Sepsis due to ?abdominal source (?intrabiliary/cholecystitis)  - WBC now wnl  - BCx  no growth x 5 days  - Completed Zosyn  - RPP (including COVID) negative  - MRCP cannot be completed  - Abd U/S  Cholelithiasis with evidence suggestive of acute cholecystitis, Intra and extrahepatic biliary ductal dilatation, Hepatic steatosis  - Surgery consulted - signed off , no surgical intervention, plan for outpatient follow-up  - GI consulted - no need for ERCP, plan for outpatient follow-up    Bilateral pulmonary emboli  Acute hypoxic respiratory failure  - ?provoked  - COVID negative  - Heparin gtt with pharmacy to dose, GI is ok with restarting   - Transitioned to [address] on   - Supplemental O2 at 3L NC, wean as tolerated  - Order walk test    GI bleeding/Anemia  - EGD with GI , ok to restart heparin  - cannot follow instructions for colonscopy  - avoid NSAIDs, continue PPI  - transfuse for Hgb<7 and/or brisk bleeding  - No evidence of bleeding; monitor Hgb      TONIA, likely prerenal azotemia, resolved  - avoid nephrotoxic meds, NSAIDs     HTN  - on home diltiazem and losartan  - needs better control; start carvedilol 6.25 mg BID  - PRN hydralazine and labetalol     Acute metabolic encephalopathy, improved  - Metabolic workup (RPR, ammonia, LFTs, LA, UDS), unrevealing   - Scheduled thiamine   - CT head shows only chronic changes  - Avoid sedatives/hypnotics  - Seroquel 25 mg qHS   Patient is alert and oriented x 3 and more compliant with treatment  - PT/OT rec STR    Obesity, Class 1  - Lifestyle modification     Hypokalemia, resolved      Dispo/Discharge Plannin-3 midnights    Diet:  ADULT DIET Regular; Low Fat/Low Chol/High Fiber/ARELI  DVT PPx: apixaban  Code status: Full Code    Hospital Problems as of 2021 Date Reviewed: 2021          Codes Class Noted - Resolved POA    GI bleed ICD-10-CM: K92.2  ICD-9-CM: 578.9  2021 - Present Unknown        B12 deficiency ICD-10-CM: E53.8  ICD-9-CM: 266.2  2021 - Present Yes        * (Principal) Acute metabolic encephalopathy IIU-35-NC: G93.41  ICD-9-CM: 348.31  2021 - Present Unknown        Acute cholecystitis ICD-10-CM: K81.0  ICD-9-CM: 575.0  2021 - Present Unknown        Bilateral pulmonary embolism ([address] ICD-10-CM: I26.99  ICD-9-CM: 415.19  2021 - Present Unknown        TONIA (acute kidney injury) ([address] ICD-10-CM: N17.9  ICD-9-CM: 584.9  11/19/2021 - Present Unknown        Lactic acidosis ICD-10-CM: E87.2  ICD-9-CM: 276.2  11/19/2021 - Present Unknown        Primary hypertension ICD-10-CM: I10  ICD-9-CM: 401.9  11/19/2021 - Present Unknown              Objective:     Patient Vitals for the past 24 hrs:   Temp Pulse Resp BP SpO2   11/27/21 1156 -- 81 -- (!) 154/95 --   11/27/21 1140 99 °F (37.2 °C) 83 24 (!) 175/88 94 %   11/27/21 0816 98.2 °F (36.8 °C) 87 22 (!) 214/100 93 %   11/27/21 0645 98.2 °F (36.8 °C) -- -- (!) 181/84 --   11/27/21 0255 99.2 °F (37.3 °C) 95 19 (!) 198/73 95 %   11/26/21 2355 97.4 °F (36.3 °C) 92 18 (!) 183/84 94 %   11/26/21 2203 -- -- -- -- 99 %   11/26/21 1905 98.6 °F (37 °C) 82 19 (!) 180/80 95 %   11/26/21 1553 -- -- -- (!) 146/71 --   11/26/21 1455 98.4 °F (36.9 °C) 86 18 (!) 166/70 95 %     Oxygen Therapy  O2 Sat (%): 94 % (11/27/21 1140)  Pulse via Oximetry: 97 beats per minute (11/26/21 2203)  O2 Device: Nasal cannula (11/27/21 0734)  Skin Assessment: Clean, dry, & intact (11/27/21 0734)  Skin Protection for O2 Device: No (11/27/21 0734)  O2 Flow Rate (L/min): 3 l/min (11/27/21 0734)  FIO2 (%): 28 % (11/26/21 2203)  ETCO2 (mmHg): 97 mmHg (11/21/21 1741)    Estimated body mass index is 30.13 kg/m² as calculated from the following:    Height as of this encounter: 5' 10\" (1.778 m). Weight as of this encounter: 95.3 kg (210 lb). Intake/Output Summary (Last 24 hours) at 11/27/2021 1412  Last data filed at 11/27/2021 0255  Gross per 24 hour   Intake 905 ml   Output 2300 ml   Net -1395 ml         Physical Exam:   Blood pressure (!) 154/95, pulse 81, temperature 99 °F (37.2 °C), resp. rate 24, height 5' 10\" (1.778 m), weight 95.3 kg (210 lb), SpO2 94 %. General:    No overt distress  Head:  Normocephalic, atraumatic  Eyes:  Sclerae appear normal.  Pupils equally round. ENT:  Nares appear normal, no drainage. Moist oral mucosa  Neck:  No restricted ROM. Trachea midline   CV:   RRR. No m/r/g. Lungs:   Respirations even, unlabored at rest on 3 L NC. Abdomen:   Soft, nontender, nondistended. Extremities: No cyanosis or clubbing. Skin:     No rashes and normal coloration. Neuro:  CN II-XII grossly intact. A&Ox3  Psych:  Normal mood and affect. I have reviewed ordered lab tests and independently visualized imaging below:    Recent Labs:  Recent Results (from the past 48 hour(s))   METABOLIC PANEL, COMPREHENSIVE    Collection Time: 11/26/21  5:03 AM   Result Value Ref Range    Sodium 142 138 - 145 mmol/L    Potassium 3.6 3.5 - 5.1 mmol/L    Chloride 112 (H) 98 - 107 mmol/L    CO2 24 21 - 32 mmol/L    Anion gap 6 (L) 7 - 16 mmol/L    Glucose 87 65 - 100 mg/dL    BUN 11 8 - 23 MG/DL    Creatinine 1.03 0.8 - 1.5 MG/DL    GFR est AA >60 >60 ml/min/1.73m2    GFR est non-AA >60 >60 ml/min/1.73m2    Calcium 9.3 8.3 - 10.4 MG/DL    Bilirubin, total 0.4 0.2 - 1.1 MG/DL    ALT (SGPT) 50 12 - 65 U/L    AST (SGOT) 25 15 - 37 U/L    Alk.  phosphatase 168 (H) 50 - 136 U/L    Protein, total 6.2 (L) 6.3 - 8.2 g/dL    Albumin 2.2 (L) 3.2 - 4.6 g/dL    Globulin 4.0 (H) 2.3 - 3.5 g/dL    A-G Ratio 0.6 (L) 1.2 - 3.5     HGB & HCT    Collection Time: 11/26/21  5:03 AM   Result Value Ref Range    HGB 10.7 (L) 13.6 - 17.2 g/dL    HCT 33.2 (L) 41.1 - 50.3 %       All Micro Results     Procedure Component Value Units Date/Time    CULTURE, BLOOD [[id number]] Collected: 11/19/21 1350    Order Status: Completed Specimen: Blood Updated: 11/24/21 0709     Special Requests: --        RIGHT  HAND       Culture result: NO GROWTH 5 DAYS       CULTURE, BLOOD [[id number]] Collected: 11/19/21 1630    Order Status: Completed Specimen: Blood Updated: 11/24/21 0741     Special Requests: --        LEFT  Antecubital       Culture result: NO GROWTH 5 DAYS       RESPIRATORY VIRUS PANEL W/COVID-19, PCR [[id number]] Collected: 11/21/21 0940    Order Status: Completed Specimen: Nasopharyngeal Updated: 11/21/21 1105     Adenovirus NOT DETECTED Coronavirus 229E NOT DETECTED        Coronavirus HKU1 NOT DETECTED        Coronavirus CVNL63 NOT DETECTED        Coronavirus OC43 NOT DETECTED        SARS-CoV-2, PCR NOT DETECTED        Metapneumovirus NOT DETECTED        Rhinovirus and Enterovirus NOT DETECTED        Influenza A NOT DETECTED        Influenza B NOT DETECTED        Parainfluenza 1 NOT DETECTED        Parainfluenza 2 NOT DETECTED        Parainfluenza 3 NOT DETECTED        Parainfluenza virus 4 NOT DETECTED        RSV by PCR NOT DETECTED        B. parapertussis, PCR NOT DETECTED        Bordetella pertussis - PCR NOT DETECTED        Chlamydophila pneumoniae DNA, QL, PCR NOT DETECTED        Mycoplasma pneumoniae DNA, QL, PCR NOT DETECTED             Other Studies:  No results found.     Current Meds:  Current Facility-Administered Medications   Medication Dose Route Frequency    hydrALAZINE (APRESOLINE) 20 mg/mL injection 10 mg  10 mg IntraVENous Q6H PRN    labetaloL (NORMODYNE;TRANDATE) injection 20 mg  20 mg IntraVENous Q6H PRN    thiamine HCL (B-1) tablet 500 mg  500 mg Oral TID    apixaban (ELIQUIS) tablet 10 mg  10 mg Oral BID    Followed by   Rhonda Branham ON 12/2/2021] apixaban (ELIQUIS) tablet 5 mg  5 mg Oral BID    losartan (COZAAR) tablet 100 mg  100 mg Oral DAILY    pantoprazole (PROTONIX) tablet 40 mg  40 mg Oral ACB&D    dilTIAZem ER (CARDIZEM CD) capsule 120 mg  120 mg Oral DAILY    cyanocobalamin (VITAMIN B12) injection 1,000 mcg  1,000 mcg IntraMUSCular EVERY OTHER DAY    nicotine (NICODERM CQ) 21 mg/24 hr patch 1 Patch  1 Patch TransDERmal Q24H    influenza vaccine 2021-22 (6 mos+)(PF) (FLUARIX/FLULAVAL/FLUZONE QUAD) injection 0.5 mL  1 Each IntraMUSCular PRIOR TO DISCHARGE    albuterol-ipratropium (DUO-NEB) 2.5 MG-0.5 MG/3 ML  3 mL Nebulization Q4H PRN    naloxone (NARCAN) injection 0.2 mg  0.2 mg IntraVENous EVERY 2 MINUTES AS NEEDED    sodium chloride (NS) flush 5-40 mL  5-40 mL IntraVENous Q8H    sodium chloride (NS) flush 5-40 mL  5-40 mL IntraVENous PRN    acetaminophen (TYLENOL) tablet 650 mg  650 mg Oral Q6H PRN    Or    acetaminophen (TYLENOL) suppository 650 mg  650 mg Rectal Q6H PRN    polyethylene glycol (MIRALAX) packet 17 g  17 g Oral DAILY PRN    bisacodyL (DULCOLAX) suppository 10 mg  10 mg Rectal DAILY PRN    ondansetron (ZOFRAN ODT) tablet 4 mg  4 mg Oral Q8H PRN    Or    ondansetron (ZOFRAN) injection 4 mg  4 mg IntraVENous Q6H PRN    famotidine (PEPCID) tablet 20 mg  20 mg Oral BID PRN    alum-mag hydroxide-simeth (MYLANTA) oral suspension 15 mL  15 mL Oral Q6H PRN    oxyCODONE-acetaminophen (PERCOCET) 5-325 mg per tablet 1 Tablet  1 Tablet Oral Q4H PRN    QUEtiapine (SEROquel) tablet 25 mg  25 mg Oral QHS       Signed:  Ashley Tavera NP    Part of this note may have been written by using a voice dictation software. The note has been proof read but may still contain some grammatical/other typographical errors.

## 2021-11-27 NOTE — PROGRESS NOTES
Pt refused to complete walk test due to unsteady gate. Will try again with support staff.  Pt stable at this time

## 2021-11-27 NOTE — PROGRESS NOTES
END OF SHIFT NOTE:    INTAKE/OUTPUT  11/26 0701 - 11/27 0700  In: 8678 [P.O.:720; I.V.:665]  Out: 3100 [Urine:3100]  Voiding: YES  Catheter: NO  Drain:   External Urinary Catheter 11/19/21 (Active)   Site Assessment Clean, dry, & intact 11/27/21 1442   Repositioned No 11/27/21 1442   Perineal Care No 11/27/21 1442   Wick Changed No 11/27/21 1442   Suction Canister/Tubing Changed No 11/27/21 1442   Urine Output (mL) 950 ml 11/27/21 1505               Flatus: Patient does have flatus present. Stool:  0 occurrences. Characteristics:      Emesis: 0 occurrences. Characteristics:        VITAL SIGNS  Patient Vitals for the past 12 hrs:   Temp Pulse Resp BP SpO2   11/27/21 1700 99 °F (37.2 °C) 82 20 (!) 168/96 95 %   11/27/21 1156 -- 81 -- (!) 154/95 --   11/27/21 1140 99 °F (37.2 °C) 83 24 (!) 175/88 94 %   11/27/21 0816 98.2 °F (36.8 °C) 87 22 (!) 214/100 93 %   11/27/21 0645 98.2 °F (36.8 °C) -- -- (!) 181/84 --       Pain Assessment  Pain Intensity 1: 0 (11/27/21 1442)  Pain Location 1: Chest  Pain Intervention(s) 1: Medication (see MAR)  Patient Stated Pain Goal: 0    Ambulating  Refused    Shift report given to oncoming nurse at the bedside.     Alice Wilson RN

## 2021-11-28 ENCOUNTER — APPOINTMENT (OUTPATIENT)
Dept: GENERAL RADIOLOGY | Age: 76
DRG: 871 | End: 2021-11-28
Attending: NURSE PRACTITIONER
Payer: MEDICARE

## 2021-11-28 PROCEDURE — 74011250637 HC RX REV CODE- 250/637: Performed by: NURSE PRACTITIONER

## 2021-11-28 PROCEDURE — 77010033678 HC OXYGEN DAILY

## 2021-11-28 PROCEDURE — 74011250636 HC RX REV CODE- 250/636: Performed by: NURSE PRACTITIONER

## 2021-11-28 PROCEDURE — 94760 N-INVAS EAR/PLS OXIMETRY 1: CPT

## 2021-11-28 PROCEDURE — 97530 THERAPEUTIC ACTIVITIES: CPT

## 2021-11-28 PROCEDURE — 74011250637 HC RX REV CODE- 250/637: Performed by: INTERNAL MEDICINE

## 2021-11-28 PROCEDURE — 74011250637 HC RX REV CODE- 250/637: Performed by: FAMILY MEDICINE

## 2021-11-28 PROCEDURE — 74011250637 HC RX REV CODE- 250/637: Performed by: STUDENT IN AN ORGANIZED HEALTH CARE EDUCATION/TRAINING PROGRAM

## 2021-11-28 PROCEDURE — 65270000029 HC RM PRIVATE

## 2021-11-28 PROCEDURE — 71045 X-RAY EXAM CHEST 1 VIEW: CPT

## 2021-11-28 PROCEDURE — 2709999900 HC NON-CHARGEABLE SUPPLY

## 2021-11-28 RX ORDER — CARVEDILOL 12.5 MG/1
12.5 TABLET ORAL 2 TIMES DAILY WITH MEALS
Status: DISCONTINUED | OUTPATIENT
Start: 2021-11-28 | End: 2021-11-30

## 2021-11-28 RX ORDER — GUAIFENESIN/DEXTROMETHORPHAN 100-10MG/5
10 SYRUP ORAL
Status: DISCONTINUED | OUTPATIENT
Start: 2021-11-28 | End: 2021-12-02 | Stop reason: HOSPADM

## 2021-11-28 RX ADMIN — Medication 500 MG: at 16:10

## 2021-11-28 RX ADMIN — QUETIAPINE FUMARATE 25 MG: 25 TABLET ORAL at 21:20

## 2021-11-28 RX ADMIN — HYDRALAZINE HYDROCHLORIDE 10 MG: 20 INJECTION INTRAMUSCULAR; INTRAVENOUS at 00:45

## 2021-11-28 RX ADMIN — HYDRALAZINE HYDROCHLORIDE 10 MG: 20 INJECTION INTRAMUSCULAR; INTRAVENOUS at 12:09

## 2021-11-28 RX ADMIN — APIXABAN 10 MG: 5 TABLET, FILM COATED ORAL at 08:36

## 2021-11-28 RX ADMIN — DILTIAZEM HYDROCHLORIDE 120 MG: 120 CAPSULE, COATED, EXTENDED RELEASE ORAL at 08:37

## 2021-11-28 RX ADMIN — Medication 10 ML: at 21:21

## 2021-11-28 RX ADMIN — APIXABAN 10 MG: 5 TABLET, FILM COATED ORAL at 18:37

## 2021-11-28 RX ADMIN — PANTOPRAZOLE SODIUM 40 MG: 40 TABLET, DELAYED RELEASE ORAL at 16:10

## 2021-11-28 RX ADMIN — CARVEDILOL 6.25 MG: 6.25 TABLET, FILM COATED ORAL at 08:37

## 2021-11-28 RX ADMIN — LABETALOL HYDROCHLORIDE 20 MG: 5 INJECTION INTRAVENOUS at 22:08

## 2021-11-28 RX ADMIN — OXYCODONE HYDROCHLORIDE AND ACETAMINOPHEN 1 TABLET: 5; 325 TABLET ORAL at 03:48

## 2021-11-28 RX ADMIN — CARVEDILOL 12.5 MG: 12.5 TABLET, FILM COATED ORAL at 16:09

## 2021-11-28 RX ADMIN — LOSARTAN POTASSIUM 100 MG: 50 TABLET, FILM COATED ORAL at 08:36

## 2021-11-28 RX ADMIN — Medication 10 ML: at 05:04

## 2021-11-28 RX ADMIN — Medication 500 MG: at 21:30

## 2021-11-28 RX ADMIN — Medication 10 ML: at 15:16

## 2021-11-28 RX ADMIN — OXYCODONE HYDROCHLORIDE AND ACETAMINOPHEN 1 TABLET: 5; 325 TABLET ORAL at 08:39

## 2021-11-28 RX ADMIN — HYDRALAZINE HYDROCHLORIDE 10 MG: 20 INJECTION INTRAMUSCULAR; INTRAVENOUS at 21:20

## 2021-11-28 RX ADMIN — LABETALOL HYDROCHLORIDE 20 MG: 5 INJECTION INTRAVENOUS at 05:03

## 2021-11-28 RX ADMIN — LABETALOL HYDROCHLORIDE 20 MG: 5 INJECTION INTRAVENOUS at 16:12

## 2021-11-28 RX ADMIN — PANTOPRAZOLE SODIUM 40 MG: 40 TABLET, DELAYED RELEASE ORAL at 07:30

## 2021-11-28 RX ADMIN — Medication 500 MG: at 08:38
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
## 2021-11-28 NOTE — PROGRESS NOTES
Hospitalist Progress Note   Admit Date:  2021 10:13 AM   Name:  Malini Erazo   Age:  78 y.o. Sex:  male  :  1945   MRN:  [id number]   Room:      Presenting Complaint: Abdominal Pain    Reason(s) for Admission: Acute cholecystitis [K81.0]     Hospital Course & Interval History:   Mr. Tenzin Drew a 78 y. o. male with an unknown medical history who presented to the ER with a complaint of severe abdominal pain x 1 day. Unable to obtain history of complaint due to patient being combative and confused. Daughter was contacted and she stated she has no relationship with the patient and could not provide any history. In the ER, labs showing leukocytosis, elevated creatinine of 1.70, and lactic acid 2.1. CT AP worrisome for PE LLL, distended gallbladder concerning for acute cholecystitis with dilated intrahepatic and extrahepatic bile ducts with the common bile duct measuring up to 1.2 cm. CT PE showing small volume bilateral PE, mid L upper lobe groundglass opacities, trace pleural effusion, and some possible aspiration. CT head unremarkable. Patient behavior required the addition of restraints for safety concerns. He was admitted to the Hospitalist service for further treatment. Subjective (21): Patient refused walk test.  Chest imaging appears improved. His only complaint is that he wants his cane. Follow up labs in the morning. Due to coughing while eating and findings on CT chest, will ask SLP to evaluate him.     Assessment & Plan:     Principal Problem:  Sepsis due to ?abdominal source (?intrabiliary/cholecystitis)  - WBC now wnl  - BCx  no growth x 5 days  - Completed Zosyn  - RPP (including COVID) negative  - MRCP could not be completed  - Abd U/S  Cholelithiasis with evidence suggestive of acute cholecystitis, Intra and extrahepatic biliary ductal dilatation, hepatic steatosis  - Surgery consulted - signed off , no surgical intervention, plan for outpatient follow-up  - GI consulted - no need for ERCP, plan for outpatient follow-up     Bilateral pulmonary emboli  Acute hypoxic respiratory failure  - ?provoked  - COVID negative  - Transitioned to DOAC from heparin gtt on Nov/25  - Supplemental O2 at 3L NC, wean as tolerated  - CXR on Nov/28 indicates resolution of L infiltrates  - Ask SLP to evaluate  - Check walk test     GI bleeding/Anemia  Gastritis/gastric ulcers  - EGD with GI on 11/23, no active bleed during scope  - avoid NSAIDs, continue PPI BID per GI  - transfuse for Hgb<7 and/or brisk bleeding  - cannot follow instructions for colonscopy     Acute metabolic encephalopathy, improved  - Metabolic workup (RPR, ammonia, LFTs, LA, UDS), unrevealing   - Scheduled thiamine   - CT head shows only chronic changes  - Avoid sedatives/hypnotics  - Seroquel 25 mg qHS  - PT/OT rec [facility]    TONIA, likely prerenal azotemia, resolved  - avoid nephrotoxic meds, NSAIDs     HTN  - on home diltiazem and losartan  - needs better control; increase carvedilol to 12.5 mg BID  - PRN hydralazine and labetalol     Obesity, Class 1  - Lifestyle modification     Hypokalemia, resolved      Dispo/Discharge Planning:  To [facility]    Diet:  ADULT DIET Regular; Low Fat/Low Chol/High Fiber/ARELI  DVT PPx: apixaban  Code status: Full Code    Hospital Problems as of 11/28/2021 Date Reviewed: 11/19/2021          Codes Class Noted - Resolved POA    GI bleed ICD-10-CM: K92.2  ICD-9-CM: 578.9  11/22/2021 - Present Unknown        B12 deficiency ICD-10-CM: E53.8  ICD-9-CM: 266.2  11/21/2021 - Present Yes        * (Principal) Acute metabolic encephalopathy [id number]: G93.41  ICD-9-CM: 348.31  11/20/2021 - Present Unknown        Acute cholecystitis ICD-10-CM: K81.0  ICD-9-CM: 575.0  11/19/2021 - Present Unknown        Bilateral pulmonary embolism ([facility] ICD-10-CM: I26.99  ICD-9-CM: 415.19  11/19/2021 - Present Unknown        TONIA (acute kidney injury) ([facility] ICD-10-CM: N17.9  ICD-9-CM: 584.9  11/19/2021 - Present Unknown        Lactic acidosis ICD-10-CM: E87.2  ICD-9-CM: 276.2  11/19/2021 - Present Unknown        Primary hypertension ICD-10-CM: I10  ICD-9-CM: 401.9  11/19/2021 - Present Unknown              Objective:     Patient Vitals for the past 24 hrs:   Temp Pulse Resp BP SpO2   11/28/21 1145 97.5 °F (36.4 °C) 73 22 (!) 183/83 94 %   11/28/21 0709 98 °F (36.7 °C) 78 18 (!) 181/75 93 %   11/28/21 0616 -- -- -- (!) 165/72 --   11/28/21 0330 98.1 °F (36.7 °C) 94 24 (!) 186/88 97 %   11/28/21 0045 97.7 °F (36.5 °C) 78 22 (!) 193/77 98 %   11/27/21 2126 -- -- -- (!) 177/97 95 %   11/27/21 1928 98 °F (36.7 °C) 80 24 (!) 200/82 96 %   11/27/21 1700 99 °F (37.2 °C) 82 20 (!) 168/96 95 %     Oxygen Therapy  O2 Sat (%): 94 % (11/28/21 1145)  Pulse via Oximetry: 97 beats per minute (11/26/21 2203)  O2 Device: None (Room air) (11/28/21 0720)  Skin Assessment: Clean, dry, & intact (11/27/21 1859)  Skin Protection for O2 Device: No (11/27/21 1859)  O2 Flow Rate (L/min): 2 l/min (11/27/21 1859)  FIO2 (%): 28 % (11/26/21 2203)  ETCO2 (mmHg): 97 mmHg (11/21/21 1741)    Estimated body mass index is 30.13 kg/m² as calculated from the following:    Height as of this encounter: 5' 10\" (1.778 m). Weight as of this encounter: 95.3 kg (210 lb). Intake/Output Summary (Last 24 hours) at 11/28/2021 1240  Last data filed at 11/28/2021 1145  Gross per 24 hour   Intake --   Output 2550 ml   Net -2550 ml         Physical Exam:  Blood pressure (!) 183/83, pulse 73, temperature 97.5 °F (36.4 °C), resp. rate 22, height 5' 10\" (1.778 m), weight 95.3 kg (210 lb), SpO2 94 %. General:    No acute distress  Head:  Normocephalic, atraumatic  Eyes:  Sclerae appear normal.  Pupils equally round. ENT:  Nares appear normal, no drainage. Moist oral mucosa  Neck:  No restricted ROM. Trachea midline   CV:   RRR. No m/r/g. Lungs:   + wheezing. Respirations even, unlabored on 2 L NC. Abdomen:   Soft, nontender, nondistended.   Extremities: No cyanosis or clubbing. Skin:     No rashes and normal coloration. Neuro:  CN II-XII grossly intact. A&O   Psych:  Normal mood and affect. I have reviewed ordered lab tests and independently visualized imaging below:    Recent Labs:  No results found for this or any previous visit (from the past 48 hour(s)).     All Micro Results     Procedure Component Value Units Date/Time    CULTURE, BLOOD [[id number]] Collected: 11/19/21 1350    Order Status: Completed Specimen: Blood Updated: 11/24/21 0741     Special Requests: --        RIGHT  HAND       Culture result: NO GROWTH 5 DAYS       CULTURE, BLOOD [[id number]] Collected: 11/19/21 1630    Order Status: Completed Specimen: Blood Updated: 11/24/21 0741     Special Requests: --        LEFT  Antecubital       Culture result: NO GROWTH 5 DAYS       RESPIRATORY VIRUS PANEL W/COVID-19, PCR [[id number]] Collected: 11/21/21 0940    Order Status: Completed Specimen: Nasopharyngeal Updated: 11/21/21 1105     Adenovirus NOT DETECTED        Coronavirus 229E NOT DETECTED        Coronavirus HKU1 NOT DETECTED        Coronavirus CVNL63 NOT DETECTED        Coronavirus OC43 NOT DETECTED        SARS-CoV-2, PCR NOT DETECTED        Metapneumovirus NOT DETECTED        Rhinovirus and Enterovirus NOT DETECTED        Influenza A NOT DETECTED        Influenza B NOT DETECTED        Parainfluenza 1 NOT DETECTED        Parainfluenza 2 NOT DETECTED        Parainfluenza 3 NOT DETECTED        Parainfluenza virus 4 NOT DETECTED        RSV by PCR NOT DETECTED        B. parapertussis, PCR NOT DETECTED        Bordetella pertussis - PCR NOT DETECTED        Chlamydophila pneumoniae DNA, QL, PCR NOT DETECTED        Mycoplasma pneumoniae DNA, QL, PCR NOT DETECTED             Other Studies:  XR CHEST SNGL V    Result Date: 11/28/2021  Chest portable CLINICAL INDICATION: Follow-up of abnormal left upper and lower lobe opacities on previous chest CT, pulmonary emboli, persisting subacute mild shortness of breath, wheezing COMPARISON: CT chest 11/20/2021 TECHNIQUE: single AP portable view chest at 11:32 AM upright FINDINGS:  There is no evidence of dense consolidation, pneumothorax, pleural effusion or overt CHF. There are mild bilateral perihilar areas of vascular indistinctness and congestion. The previously seen left lung infiltrates appear nearly completely resolved. . The mediastinal and hilar contours are stable given technique, heart is enlarged. 1. No consolidative pneumonia. 2. Near complete resolution of prior left infiltrates. 3. Mild pulmonary vascular congestion, cardiac enlargement.        Current Meds:  Current Facility-Administered Medications   Medication Dose Route Frequency    guaiFENesin-dextromethorphan (ROBITUSSIN DM) 100-10 mg/5 mL syrup 10 mL  10 mL Oral Q6H PRN    carvediloL (COREG) tablet 12.5 mg  12.5 mg Oral BID WITH MEALS    hydrALAZINE (APRESOLINE) 20 mg/mL injection 10 mg  10 mg IntraVENous Q6H PRN    labetaloL (NORMODYNE;TRANDATE) injection 20 mg  20 mg IntraVENous Q6H PRN    thiamine HCL (B-1) tablet 500 mg  500 mg Oral TID    nicotine (NICODERM CQ) 14 mg/24 hr patch 1 Patch  1 Patch TransDERmal Q24H    oxyCODONE-acetaminophen (PERCOCET) 5-325 mg per tablet 1 Tablet  1 Tablet Oral Q6H PRN    apixaban (ELIQUIS) tablet 10 mg  10 mg Oral BID    Followed by   Cydne Rosin ON 12/2/2021] apixaban (ELIQUIS) tablet 5 mg  5 mg Oral BID    losartan (COZAAR) tablet 100 mg  100 mg Oral DAILY    pantoprazole (PROTONIX) tablet 40 mg  40 mg Oral ACB&D    dilTIAZem ER (CARDIZEM CD) capsule 120 mg  120 mg Oral DAILY    influenza vaccine 2021-22 (6 mos+)(PF) (FLUARIX/FLULAVAL/FLUZONE QUAD) injection 0.5 mL  1 Each IntraMUSCular PRIOR TO DISCHARGE    albuterol-ipratropium (DUO-NEB) 2.5 MG-0.5 MG/3 ML  3 mL Nebulization Q4H PRN    naloxone (NARCAN) injection 0.2 mg  0.2 mg IntraVENous EVERY 2 MINUTES AS NEEDED    sodium chloride (NS) flush 5-40 mL  5-40 mL IntraVENous Q8H    sodium chloride (NS) flush 5-40 mL  5-40 mL IntraVENous PRN    acetaminophen (TYLENOL) tablet 650 mg  650 mg Oral Q6H PRN    Or    acetaminophen (TYLENOL) suppository 650 mg  650 mg Rectal Q6H PRN    bisacodyL (DULCOLAX) suppository 10 mg  10 mg Rectal DAILY PRN    ondansetron (ZOFRAN ODT) tablet 4 mg  4 mg Oral Q8H PRN    Or    ondansetron (ZOFRAN) injection 4 mg  4 mg IntraVENous Q6H PRN    alum-mag hydroxide-simeth (MYLANTA) oral suspension 15 mL  15 mL Oral Q6H PRN    QUEtiapine (SEROquel) tablet 25 mg  25 mg Oral QHS       Signed:  Alirio Bautista NP    Part of this note may have been written by using a voice dictation software. The note has been proof read but may still contain some grammatical/other typographical errors.

## 2021-11-29 LAB
ANION GAP SERPL CALC-SCNC: 8 MMOL/L (ref 7–16)
BUN SERPL-MCNC: 12 MG/DL (ref 8–23)
CALCIUM SERPL-MCNC: 9.8 MG/DL (ref 8.3–10.4)
CHLORIDE SERPL-SCNC: 109 MMOL/L (ref 98–107)
CO2 SERPL-SCNC: 25 MMOL/L (ref 21–32)
CREAT SERPL-MCNC: 0.83 MG/DL (ref 0.8–1.5)
ERYTHROCYTE [DISTWIDTH] IN BLOOD BY AUTOMATED COUNT: 14.1 % (ref 11.9–14.6)
GLUCOSE SERPL-MCNC: 89 MG/DL (ref 65–100)
HCT VFR BLD AUTO: 32.1 % (ref 41.1–50.3)
HGB BLD-MCNC: 10.5 G/DL (ref 13.6–17.2)
MCH RBC QN AUTO: 30.3 PG (ref 26.1–32.9)
MCHC RBC AUTO-ENTMCNC: 32.7 G/DL (ref 31.4–35)
MCV RBC AUTO: 92.8 FL (ref 79.6–97.8)
NRBC # BLD: 0 K/UL (ref 0–0.2)
PLATELET # BLD AUTO: 292 K/UL (ref 150–450)
PMV BLD AUTO: 9.3 FL (ref 9.4–12.3)
POTASSIUM SERPL-SCNC: 3.4 MMOL/L (ref 3.5–5.1)
RBC # BLD AUTO: 3.46 M/UL (ref 4.23–5.6)
SODIUM SERPL-SCNC: 142 MMOL/L (ref 138–145)
WBC # BLD AUTO: 8.5 K/UL (ref 4.3–11.1)

## 2021-11-29 PROCEDURE — 97530 THERAPEUTIC ACTIVITIES: CPT

## 2021-11-29 PROCEDURE — 85027 COMPLETE CBC AUTOMATED: CPT

## 2021-11-29 PROCEDURE — 74011250637 HC RX REV CODE- 250/637: Performed by: HOSPITALIST

## 2021-11-29 PROCEDURE — 74011250637 HC RX REV CODE- 250/637: Performed by: INTERNAL MEDICINE

## 2021-11-29 PROCEDURE — 74011250637 HC RX REV CODE- 250/637: Performed by: STUDENT IN AN ORGANIZED HEALTH CARE EDUCATION/TRAINING PROGRAM

## 2021-11-29 PROCEDURE — 77030040829 HC CATH EXT URINE MDII -B

## 2021-11-29 PROCEDURE — 36415 COLL VENOUS BLD VENIPUNCTURE: CPT

## 2021-11-29 PROCEDURE — 77010033678 HC OXYGEN DAILY

## 2021-11-29 PROCEDURE — 2709999900 HC NON-CHARGEABLE SUPPLY

## 2021-11-29 PROCEDURE — 74011250637 HC RX REV CODE- 250/637: Performed by: NURSE PRACTITIONER

## 2021-11-29 PROCEDURE — 80048 BASIC METABOLIC PNL TOTAL CA: CPT

## 2021-11-29 PROCEDURE — 94760 N-INVAS EAR/PLS OXIMETRY 1: CPT

## 2021-11-29 PROCEDURE — 92610 EVALUATE SWALLOWING FUNCTION: CPT

## 2021-11-29 PROCEDURE — 74011250637 HC RX REV CODE- 250/637: Performed by: FAMILY MEDICINE

## 2021-11-29 PROCEDURE — 74011250636 HC RX REV CODE- 250/636: Performed by: NURSE PRACTITIONER

## 2021-11-29 PROCEDURE — 74011000250 HC RX REV CODE- 250: Performed by: NURSE PRACTITIONER

## 2021-11-29 PROCEDURE — 65270000029 HC RM PRIVATE

## 2021-11-29 RX ORDER — CLONIDINE HYDROCHLORIDE 0.2 MG/1
0.2 TABLET ORAL ONCE
Status: COMPLETED | OUTPATIENT
Start: 2021-11-29 | End: 2021-11-29

## 2021-11-29 RX ORDER — GUAIFENESIN 600 MG/1
600 TABLET, EXTENDED RELEASE ORAL EVERY 12 HOURS
Status: DISCONTINUED | OUTPATIENT
Start: 2021-11-29 | End: 2021-12-02 | Stop reason: HOSPADM

## 2021-11-29 RX ORDER — POTASSIUM CHLORIDE 20 MEQ/1
40 TABLET, EXTENDED RELEASE ORAL
Status: COMPLETED | OUTPATIENT
Start: 2021-11-29 | End: 2021-11-29

## 2021-11-29 RX ADMIN — APIXABAN 10 MG: 5 TABLET, FILM COATED ORAL at 09:10

## 2021-11-29 RX ADMIN — Medication 10 ML: at 14:30

## 2021-11-29 RX ADMIN — LABETALOL HYDROCHLORIDE 20 MG: 5 INJECTION INTRAVENOUS at 04:57

## 2021-11-29 RX ADMIN — Medication 500 MG: at 09:17

## 2021-11-29 RX ADMIN — Medication 10 ML: at 05:01

## 2021-11-29 RX ADMIN — HYDRALAZINE HYDROCHLORIDE 10 MG: 20 INJECTION INTRAMUSCULAR; INTRAVENOUS at 09:25

## 2021-11-29 RX ADMIN — PANTOPRAZOLE SODIUM 40 MG: 40 TABLET, DELAYED RELEASE ORAL at 17:43

## 2021-11-29 RX ADMIN — HYDRALAZINE HYDROCHLORIDE 10 MG: 20 INJECTION INTRAMUSCULAR; INTRAVENOUS at 21:03

## 2021-11-29 RX ADMIN — APIXABAN 10 MG: 5 TABLET, FILM COATED ORAL at 17:40

## 2021-11-29 RX ADMIN — Medication 10 ML: at 21:04

## 2021-11-29 RX ADMIN — GUAIFENESIN 600 MG: 600 TABLET ORAL at 20:49

## 2021-11-29 RX ADMIN — DILTIAZEM HYDROCHLORIDE 120 MG: 120 CAPSULE, COATED, EXTENDED RELEASE ORAL at 09:12

## 2021-11-29 RX ADMIN — QUETIAPINE FUMARATE 25 MG: 25 TABLET ORAL at 21:03

## 2021-11-29 RX ADMIN — LOSARTAN POTASSIUM 100 MG: 50 TABLET, FILM COATED ORAL at 09:13

## 2021-11-29 RX ADMIN — ACETAMINOPHEN 650 MG: 325 TABLET ORAL at 21:03

## 2021-11-29 RX ADMIN — CARVEDILOL 12.5 MG: 12.5 TABLET, FILM COATED ORAL at 17:42

## 2021-11-29 RX ADMIN — Medication 500 MG: at 17:41

## 2021-11-29 RX ADMIN — CARVEDILOL 12.5 MG: 12.5 TABLET, FILM COATED ORAL at 09:11

## 2021-11-29 RX ADMIN — POTASSIUM CHLORIDE 40 MEQ: 20 TABLET, EXTENDED RELEASE ORAL at 17:43

## 2021-11-29 RX ADMIN — Medication 500 MG: at 21:31

## 2021-11-29 RX ADMIN — CLONIDINE HYDROCHLORIDE 0.2 MG: 0.2 TABLET ORAL at 00:58

## 2021-11-29 RX ADMIN — PANTOPRAZOLE SODIUM 40 MG: 40 TABLET, DELAYED RELEASE ORAL at 06:43

## 2021-11-29 RX ADMIN — HYDRALAZINE HYDROCHLORIDE 10 MG: 20 INJECTION INTRAMUSCULAR; INTRAVENOUS at 03:49

## 2021-11-29 NOTE — PROGRESS NOTES
SPEECH PATHOLOGY NOTE:    Speech therapy consult received and appreciated. Attempted to see patient this AM for bedside swallow evaluation. Patient is currently getting cleaned up by nursing staff. Will re-attempt at later time/date as patient becomes available.        Gudelia Morales MS, CCC-SLP

## 2021-11-29 NOTE — PROGRESS NOTES
END OF SHIFT NOTE:    INTAKE/OUTPUT  11/27 0701 - 11/28 0700  In: -   Out: 0263 [SIGRU:[id number]]  Voiding: YES  Catheter: NO  Drain:   External Urinary Catheter 11/19/21 (Active)   Site Assessment Clean, dry, & intact 11/28/21 1508   Repositioned Yes 11/28/21 1508   Perineal Care Yes 11/28/21 1508   Wick Changed Yes 11/28/21 1508   Suction Canister/Tubing Changed No 11/28/21 1508   Urine Output (mL) 800 ml 11/28/21 1145               Flatus: Patient does have flatus present. Stool:  1 occurrences. Characteristics:  Stool Assessment  Stool Color: Green  Stool Appearance: Loose  Stool Amount: Medium  Stool Source/Status: Rectum, Incontinence    Emesis: 0 occurrences. Characteristics:        VITAL SIGNS  Patient Vitals for the past 12 hrs:   Temp Pulse Resp BP SpO2   11/28/21 1530 98.7 °F (37.1 °C) 89 20 (!) 190/97 91 %   11/28/21 1331 -- -- -- (!) 157/78 --   11/28/21 1145 97.5 °F (36.4 °C) 73 22 (!) 183/83 94 %   11/28/21 0709 98 °F (36.7 °C) 78 18 (!) 181/75 93 %       Pain Assessment  Pain Intensity 1: 0 (11/28/21 1358)  Pain Location 1: Generalized  Pain Intervention(s) 1: Medication (see MAR)  Patient Stated Pain Goal: 0    Ambulating  Refused    Shift report given to oncoming nurse at the bedside.     William Ordoñez RN

## 2021-11-29 NOTE — PROGRESS NOTES
Physician Progress Note      PATIENT:               Vicki Henderson  CSN #:                  [id number]  :                       1945  ADMIT DATE:       2021 10:13 AM  DISCH DATE:  RESPONDING  PROVIDER #:        Rafael Garcia NP          QUERY TEXT:    Patient admitted with sepsis. Noted documentation of acute respiratory failure in progress note on 21. In order to support the diagnosis of acute respiratory failure, please include additional clinical indicators in your documentation. Or please document if the diagnosis of acute respiratory failure has been ruled out after further study. The medical record reflects the following:  Risk Factors: sepsis  Clinical Indicators:  \"Lungs: CTAB. No wheezing, rhonchi, or rales. Respirations even, unlabored\" respirations 20-22, no ABG's  obtained  Treatment: 02 @ 4L N/C    Acute Respiratory Failure Clinical Indicators per [employer] Training Guide and Quick Reference Guide:  pO2 < 60 mmHg or SpO2 (pulse oximetry) < 91% breathing room air  pCO2 > 50 and pH < 7.35  P/F ratio (pO2 / FIO2) < 300  pO2 decrease or pCO2 increase by 10 mmHg from baseline (if known)  Supplemental oxygen of 40% or more  Presence of respiratory distress, tachypnea, dyspnea, shortness of breath, wheezing  Unable to speak in complete sentences  Use of accessory muscles to breathe  Extreme anxiety and feeling of impending doom  Tripod position  Confusion/altered mental status/obtunded  Options provided:  -- Acute Respiratory Failure as evidenced by, Please document evidence.   -- Acute Respiratory Failure ruled out after study  -- Other - I will add my own diagnosis  -- Disagree - Not applicable / Not valid  -- Disagree - Clinically unable to determine / Unknown  -- Refer to Clinical Documentation Reviewer    PROVIDER RESPONSE TEXT:    This patient is in acute respiratory failure as evidenced by Bilateral PE on CT imaging    Query created by: Jarod Ivan on 2021 8:43 AM      Electronically signed by:  Matthew Zuniga NP 11/29/2021 12:10 PM

## 2021-11-29 NOTE — PROGRESS NOTES
Pt had a rapid response called early. Pt was put on per RN. Pt's blood pressure was not stable. Will attempt at another time/date as schedule permits.      Thank you  RICKY Shelby/MARK

## 2021-11-29 NOTE — PROGRESS NOTES
END OF SHIFT NOTE:    INTAKE/OUTPUT  11/28 0701 - 11/29 0700  In: 480 [P.O.:480]  Out: 1400 [Urine:1400]  Voiding: YES  Catheter: NO  Drain:   External Urinary Catheter 11/19/21 (Active)   Site Assessment Clean, dry, & intact 11/29/21 0442   Repositioned Yes 11/29/21 0442   Perineal Care Yes 11/29/21 0442   Wick Changed Yes 11/29/21 0442   Suction Canister/Tubing Changed No 11/29/21 0442   Urine Output (mL) 600 ml 11/28/21 2343               Flatus: Patient does have flatus present. Stool:  0 occurrences. Characteristics:  Stool Assessment  Stool Color: Green  Stool Appearance: Other (comment) (not observed)  Stool Amount: Medium  Stool Source/Status: Rectum, Incontinence    Emesis: 0 occurrences. Characteristics:        VITAL SIGNS  Patient Vitals for the past 12 hrs:   Temp Pulse Resp BP SpO2   11/29/21 0556 -- 65 22 (!) 183/61 92 %   11/29/21 0454 -- 69 22 (!) 186/81 98 %   11/29/21 0345 98.2 °F (36.8 °C) 74 22 (!) 205/89 98 %   11/28/21 2341 98.1 °F (36.7 °C) 82 18 (!) 191/80 92 %   11/28/21 2202 -- 82 -- (!) 181/77 95 %   11/28/21 2116 -- -- -- (!) 203/73 --   11/28/21 1915 97.2 °F (36.2 °C) 74 21 (!) 202/90 98 %       Pain Assessment  Pain Intensity 1: 0 (11/29/21 0556)  Pain Location 1: Generalized  Pain Intervention(s) 1: Medication (see MAR)  Patient Stated Pain Goal: 0    Ambulating  No. Possibly with Assist x 2     Shift report given to oncoming nurse at the bedside.     Jerry Rubi RN

## 2021-11-29 NOTE — PROGRESS NOTES
LTG: Patient will tolerate least restrictive diet without overt signs or symptoms of airway compromise. STG: Patient will tolerate easy to chew diet and thin liquids without overt signs or symptoms of airway compromise. STG: Patient will participate in modified barium swallow study as clinically indicated. SPEECH LANGUAGE PATHOLOGY: DYSPHAGIA- Initial Assessment    NAME/AGE/GENDER: Jarred Munoz is a 78 y.o. male  DATE: 11/29/2021  PRIMARY DIAGNOSIS: Acute cholecystitis [K81.0]  Procedure(s) (LRB):  ESOPHAGOGASTRODUODENOSCOPY (EGD)/BMI 30 ROOM 222 (N/A)  ESOPHAGOGASTRODUODENAL (EGD) BIOPSY (N/A) 6 Days Post-Op  ICD-10: Treatment Diagnosis: R13.12 Dysphagia, Oropharyngeal Phase    RECOMMENDATIONS   DIET:    Easy to Chew   Thin Liquids    MEDICATIONS: as tolerated     PRECAUTIONS, MODIFICATIONS, AND STRATEGIES  · Slow rate of intake  · Small bites/sips  · Upright at 90 degrees during meal  · Supervision with meals due to impulsivity     RECOMMENDATIONS FOR CONTINUED SPEECH THERAPY:   YES: Anticipate need for ongoing speech therapy during this hospitalization. ASSESSMENT   Patient presents with mild oral dysphagia characterized by slowed mastication of chewables due to edentulous state. Baseline dry cough prior to po, and dry cough post swallow x1 with puree and x1 with mixed fruit. Otherwise no overt s/sx airway compromise. Patient endorses chronic cough due to years of smoking. Recommend downgrade to easy to chew diet/thin liquids. Meds as tolerated. Will continue to follow for diet tolerance. EDUCATION:  · Recommendations discussed with Patient and RN    REHABILITATION POTENTIAL FOR STATED GOALS: Good    PLAN    FREQUENCY/DURATION:   Continue to follow patient 2 times a week for duration of hospital stay to address above goals.  Recommendations for next treatment session: Next treatment session will address diet tolerance    CONTINUATION OF SKILLED SERVICES/MEDICAL NECESSITY:   Patient is expected to demonstrate progress in  swallow function, diet tolerance and swallow safety in order to  improve swallow safety.  Patient continues to require skilled intervention due to dysphagia. SUBJECTIVE   Patient alert upright in bed for assessment. Initially argumentative and refusing ST eval; however, agreed to participate with encouragement from nursing staff. Oxygen Device: NC 2L  Pain: Pain Scale 1: Numeric (0 - 10)  Pain Intensity 1: 0    History of Present Injury/Illness: Mr. Bruce Burt  has a past medical history of Asthma, B12 deficiency (11/21/2021), and Hypertension. Morena Shah He also  has no past surgical history on file. PRECAUTIONS/ALLERGIES: Patient has no known allergies. Problem List:  (Impairments causing functional limitations):  1. Oropharyngeal dysphagia    Previous Dysphagia: NONE REPORTED  Diet Prior to Evaluation: Regular/thin    Orientation:  Person  Place    Cognitive-Linguistic Screening:   Alertness  o Alert   Speech Production:   o Some dysarthria, but able to understand   Expressive Language:  o Fluent speech and Able to effectively communicate wants and needs   Receptive Language:  o Answers yes/no questions appropriately and Follows commands   Cognition:   o Reduced attention to task. Tangential. Requires frequent redirection  Prior Level of Function: Lives at independently living alf facility per chart review  Recommendations: Given results of screening, patient appears to be functioning at baseline. No acute assessment of speech, language, or cognition warranted. OBJECTIVE   Oral Motor:   · Labial: No impairment  · Dentition: Edentulous  · Oral Hygiene: Adequate  · Lingual: No impairment    Dysphagia evaluation:   Baseline cough prior to po trials. Patient consumed trials of thin by cup/straw (~4 oz), puree across 3 trials, and peaches across 4 trials. Slowed mastication of peaches, but adequate oral clearing.  Timely single swallows upon palpation with all consistencies. Cough post swallow x1 with puree and x1 with peach. No further overt s/sx airway compromise. Tool Used: Dysphagia Outcome and Severity Scale (CASEY)    Score Comments   Normal Diet  [] 7 With no strategies or extra time needed   Functional Swallow  [] 6 May have mild oral or pharyngeal delay   Mild Dysphagia  [x] 5 Which may require one diet consistency restricted    Mild-Moderate Dysphagia  [] 4 With 1-2 diet consistencies restricted   Moderate Dysphagia  [] 3 With 2 or more diet consistencies restricted   Moderate-Severe Dysphagia  [] 2 With partial PO strategies (trials with ST only)   Severe Dysphagia  [] 1 With inability to tolerate any PO safely      Score:  Initial: 5 Most Recent: x (Date 11/29/21 )   Interpretation of Tool: The Dysphagia Outcome and Severity Scale (CASEY) is a simple, easy-to-use, 7-point scale developed to systematically rate the functional severity of dysphagia based on objective assessment and make recommendations for diet level, independence level, and type of nutrition. Current Medications:   No current facility-administered medications on file prior to encounter. No current outpatient medications on file prior to encounter.         INTERDISCIPLINARY COLLABORATION: RN    After treatment position/precautions:  · Upright in bed  · Call light within reach  · RN notified    Total Treatment Duration:   Time In: 3108  Time Out: [address] [facility] [address], [place]

## 2021-11-29 NOTE — PROGRESS NOTES
Hospitalist Progress Note   Admit Date:  2021 10:13 AM   Name:  Juan Baires   Age:  78 y.o. Sex:  male  :  1945   MRN:  [id number]   Room:  /    Presenting Complaint: Abdominal Pain    Reason(s) for Admission: Acute cholecystitis [K81.0]     Hospital Course & Interval History:   Bart Arce a 78 y. o. male with a unknown medical history presented  to the ER with a complaint of severe abdominal pain x 1 day. Unable to obtain history of complaint due to patient being combative and confused. Daughter was contacted and stated she has no relationship with patient and could not provide any history.     In the ER: Labs  showing leukocytosis and elevated creatinine 1.70, Lactic 2. 1.      CT AP worrisome for PE LLL, distended gallbladder concerning for acute cholecystitis with dilatedintrahepatic and extrahepatic bile ducts with the common bile duct measuring up to 1.2 cm.    .   CTPE showing small volume bilateral PE, mid left upper lobe groundglass opacities, trace pleural effusion and some possible aspiration. CT head unremarkable. Patient behavior required for the addition of restraints for safety concerns     Subjective (21):  Denies SOB, chest pain, poor appetite, hematochezia, hematemesis.  Reports \"I'm irritated with staff\"     Assessment & Plan:     Principal Problem:  Sepsis due to ?abdominal source (?intrabiliary/cholecystitis)  - WBC now wnl  - BCx  no growth x 5 days  - Completed Zosyn  - RPP (including COVID) negative  - MRCP could not be completed  - Abd U/S  Cholelithiasis with evidence suggestive of acute cholecystitis, Intra and extrahepatic biliary ductal dilatation, hepatic steatosis  - Surgery consulted - signed off , no surgical intervention, plan for outpatient follow-up  - GI consulted - no need for ERCP, plan for outpatient follow-up  21  -STR pending      Bilateral pulmonary emboli  Acute hypoxic respiratory failure  - ?provoked  - COVID negative  - Transitioned to [address] from heparin gtt on Nov/25  - Supplemental O2 at 3L NC, wean as tolerated  - CXR on Nov/28 indicates resolution of L infiltrates  - Ask SLP to evaluate  - Check walk test  11/29/21  -Walk test prior to discharge to establish O2 need     GI bleeding/Anemia  Gastritis/gastric ulcers  - EGD with GI on 11/23, no active bleed during scope  - avoid NSAIDs, continue PPI BID per GI  - transfuse for Hgb<7 and/or brisk bleeding  - cannot follow instructions for colonoscopy  11/29/21   -No visible bleeding; Hgb stable at 10.5      Acute metabolic encephalopathy (Resolved)   - Metabolic workup (RPR, ammonia, LFTs, LA, UDS), unrevealing   - Scheduled thiamine   - CT head shows only chronic changes  - Avoid sedatives/hypnotics  - Seroquel 25 mg qHS  - PT/OT rec STR  11/29/21  -Resolved; patient alert and oriented x 3      TONIA, likely prerenal azotemia, resolved  - avoid nephrotoxic meds, NSAIDs     HTN  - on home diltiazem and losartan  - needs better control; increase carvedilol to 12.5 mg BID  - PRN hydralazine and labetalol     Obesity, Class 1  - Lifestyle modification     Hypokalemia, resolved         Dispo/Discharge Planning:      STR pending     Diet:  ADULT DIET Easy to Chew; Low Fat/Low Chol/High Fiber/ARELI  DVT PPx: Eliquis   Code status: Full Code    Hospital Problems as of 11/29/2021 Date Reviewed: 11/19/2021          Codes Class Noted - Resolved POA    GI bleed ICD-10-CM: K92.2  ICD-9-CM: 578.9  11/22/2021 - Present Unknown        B12 deficiency ICD-10-CM: E53.8  ICD-9-CM: 266.2  11/21/2021 - Present Yes        * (Principal) Acute metabolic encephalopathy [id number]: G93.41  ICD-9-CM: 348.31  11/20/2021 - Present Unknown        Acute cholecystitis ICD-10-CM: K81.0  ICD-9-CM: 575.0  11/19/2021 - Present Unknown        Bilateral pulmonary embolism ([facility] ICD-10-CM: I26.99  ICD-9-CM: 415.19  11/19/2021 - Present Unknown        TONIA (acute kidney injury) ([facility] ICD-10-CM: N17.9  ICD-9-CM: 584.9  11/19/2021 - Present Unknown        Lactic acidosis ICD-10-CM: E87.2  ICD-9-CM: 276.2  11/19/2021 - Present Unknown        Primary hypertension ICD-10-CM: I10  ICD-9-CM: 401.9  11/19/2021 - Present Unknown              Objective:     Patient Vitals for the past 24 hrs:   Temp Pulse Resp BP SpO2   11/29/21 1036 98.2 °F (36.8 °C) 67 24 (!) 174/76 98 %   11/29/21 0734 98 °F (36.7 °C) 67 18 (!) 184/70 92 %   11/29/21 0556 -- 65 22 (!) 183/61 92 %   11/29/21 0454 -- 69 22 (!) 186/81 98 %   11/29/21 0345 98.2 °F (36.8 °C) 74 22 (!) 205/89 98 %   11/28/21 2341 98.1 °F (36.7 °C) 82 18 (!) 191/80 92 %   11/28/21 2202 -- 82 -- (!) 181/77 95 %   11/28/21 2116 -- -- -- (!) 203/73 --   11/28/21 1915 97.2 °F (36.2 °C) 74 21 (!) 202/90 98 %   11/28/21 1530 98.7 °F (37.1 °C) 89 20 (!) 190/97 91 %     Oxygen Therapy  O2 Sat (%): 98 % (11/29/21 1036)  Pulse via Oximetry: 97 beats per minute (11/26/21 2203)  O2 Device: Nasal cannula (11/29/21 0734)  Skin Assessment: Clean, dry, & intact (11/28/21 2202)  Skin Protection for O2 Device: No (11/27/21 1859)  O2 Flow Rate (L/min): 2 l/min (11/29/21 0734)  FIO2 (%): 28 % (11/26/21 2203)  ETCO2 (mmHg): 97 mmHg (11/21/21 1741)    Estimated body mass index is 30.13 kg/m² as calculated from the following:    Height as of this encounter: 5' 10\" (1.778 m). Weight as of this encounter: 95.3 kg (210 lb). Intake/Output Summary (Last 24 hours) at 11/29/2021 1454  Last data filed at 11/29/2021 1327  Gross per 24 hour   Intake 0 ml   Output 1300 ml   Net -1300 ml         Physical Exam:     Blood pressure (!) 174/76, pulse 67, temperature 98.2 °F (36.8 °C), resp. rate 24, height 5' 10\" (1.778 m), weight 95.3 kg (210 lb), SpO2 98 %. General:    No overt distress  Head:  Normocephalic, atraumatic  Eyes:  Sclerae appear normal.  Pupils equally round. ENT:  Nares appear normal, no drainage. Moist oral mucosa  Neck:  No restricted ROM. Trachea midline   CV:   RRR. No m/r/g. No jugular venous distension. Lungs:   CTAB. No wheezing, rhonchi, or rales. Respirations even, unlabored  Abdomen: Bowel sounds present. Soft, nontender, nondistended. Extremities: No cyanosis or clubbing. No edema  Skin:     No rashes and normal coloration. Warm and dry. Neuro:  CN II-XII grossly intact. A&Ox3  Psych:  Normal mood and affect.       I have reviewed ordered lab tests and independently visualized imaging below:    Recent Labs:  Recent Results (from the past 48 hour(s))   CBC W/O DIFF    Collection Time: 11/29/21  4:46 AM   Result Value Ref Range    WBC 8.5 4.3 - 11.1 K/uL    RBC 3.46 (L) 4.23 - 5.6 M/uL    HGB 10.5 (L) 13.6 - 17.2 g/dL    HCT 32.1 (L) 41.1 - 50.3 %    MCV 92.8 79.6 - 97.8 FL    MCH 30.3 26.1 - 32.9 PG    MCHC 32.7 31.4 - 35.0 g/dL    RDW 14.1 11.9 - 14.6 %    PLATELET 657 645 - 600 K/uL    MPV 9.3 (L) 9.4 - 12.3 FL    ABSOLUTE NRBC 0.00 0.0 - 0.2 K/uL   METABOLIC PANEL, BASIC    Collection Time: 11/29/21  4:46 AM   Result Value Ref Range    Sodium 142 138 - 145 mmol/L    Potassium 3.4 (L) 3.5 - 5.1 mmol/L    Chloride 109 (H) 98 - 107 mmol/L    CO2 25 21 - 32 mmol/L    Anion gap 8 7 - 16 mmol/L    Glucose 89 65 - 100 mg/dL    BUN 12 8 - 23 MG/DL    Creatinine 0.83 0.8 - 1.5 MG/DL    GFR est AA >60 >60 ml/min/1.73m2    GFR est non-AA >60 >60 ml/min/1.73m2    Calcium 9.8 8.3 - 10.4 MG/DL       All Micro Results     Procedure Component Value Units Date/Time    CULTURE, BLOOD [[id number]] Collected: 11/19/21 1350    Order Status: Completed Specimen: Blood Updated: 11/24/21 1674     Special Requests: --        RIGHT  HAND       Culture result: NO GROWTH 5 DAYS       CULTURE, BLOOD [[id number]] Collected: 11/19/21 1630    Order Status: Completed Specimen: Blood Updated: 11/24/21 0741     Special Requests: --        LEFT  Antecubital       Culture result: NO GROWTH 5 DAYS       RESPIRATORY VIRUS PANEL W/COVID-19, PCR [[id number]] Collected: 11/21/21 0940    Order Status: Completed Specimen: Nasopharyngeal Updated: 11/21/21 1105     Adenovirus NOT DETECTED        Coronavirus 229E NOT DETECTED        Coronavirus HKU1 NOT DETECTED        Coronavirus CVNL63 NOT DETECTED        Coronavirus OC43 NOT DETECTED        SARS-CoV-2, PCR NOT DETECTED        Metapneumovirus NOT DETECTED        Rhinovirus and Enterovirus NOT DETECTED        Influenza A NOT DETECTED        Influenza B NOT DETECTED        Parainfluenza 1 NOT DETECTED        Parainfluenza 2 NOT DETECTED        Parainfluenza 3 NOT DETECTED        Parainfluenza virus 4 NOT DETECTED        RSV by PCR NOT DETECTED        B. parapertussis, PCR NOT DETECTED        Bordetella pertussis - PCR NOT DETECTED        Chlamydophila pneumoniae DNA, QL, PCR NOT DETECTED        Mycoplasma pneumoniae DNA, QL, PCR NOT DETECTED             Other Studies:  No results found.     Current Meds:  Current Facility-Administered Medications   Medication Dose Route Frequency    guaiFENesin-dextromethorphan (ROBITUSSIN DM) 100-10 mg/5 mL syrup 10 mL  10 mL Oral Q6H PRN    carvediloL (COREG) tablet 12.5 mg  12.5 mg Oral BID WITH MEALS    hydrALAZINE (APRESOLINE) 20 mg/mL injection 10 mg  10 mg IntraVENous Q6H PRN    labetaloL (NORMODYNE;TRANDATE) injection 20 mg  20 mg IntraVENous Q6H PRN    thiamine HCL (B-1) tablet 500 mg  500 mg Oral TID    nicotine (NICODERM CQ) 14 mg/24 hr patch 1 Patch  1 Patch TransDERmal Q24H    oxyCODONE-acetaminophen (PERCOCET) 5-325 mg per tablet 1 Tablet  1 Tablet Oral Q6H PRN    apixaban (ELIQUIS) tablet 10 mg  10 mg Oral BID    Followed by   Cydne Rosin ON 12/2/2021] apixaban (ELIQUIS) tablet 5 mg  5 mg Oral BID    losartan (COZAAR) tablet 100 mg  100 mg Oral DAILY    pantoprazole (PROTONIX) tablet 40 mg  40 mg Oral ACB&D    dilTIAZem ER (CARDIZEM CD) capsule 120 mg  120 mg Oral DAILY    influenza vaccine 2021-22 (6 mos+)(PF) (FLUARIX/FLULAVAL/FLUZONE QUAD) injection 0.5 mL  1 Each IntraMUSCular PRIOR TO DISCHARGE    albuterol-ipratropium (DUO-NEB) 2.5 MG-0.5 MG/3 ML  3 mL Nebulization Q4H PRN    naloxone (NARCAN) injection 0.2 mg  0.2 mg IntraVENous EVERY 2 MINUTES AS NEEDED    sodium chloride (NS) flush 5-40 mL  5-40 mL IntraVENous Q8H    sodium chloride (NS) flush 5-40 mL  5-40 mL IntraVENous PRN    acetaminophen (TYLENOL) tablet 650 mg  650 mg Oral Q6H PRN    Or    acetaminophen (TYLENOL) suppository 650 mg  650 mg Rectal Q6H PRN    bisacodyL (DULCOLAX) suppository 10 mg  10 mg Rectal DAILY PRN    ondansetron (ZOFRAN ODT) tablet 4 mg  4 mg Oral Q8H PRN    Or    ondansetron (ZOFRAN) injection 4 mg  4 mg IntraVENous Q6H PRN    alum-mag hydroxide-simeth (MYLANTA) oral suspension 15 mL  15 mL Oral Q6H PRN    QUEtiapine (SEROquel) tablet 25 mg  25 mg Oral QHS       Signed:  Rico Matta NP    Part of this note may have been written by using a voice dictation software. The note has been proof read but may still contain some grammatical/other typographical errors.

## 2021-11-29 NOTE — PROGRESS NOTES
ACUTE OT GOALS:  (Developed with and agreed upon by patient and/or caregiver.)  1. Patient will bathe and dress total body with minimal assistance and adaptive device as needed. 2. Patient will toilet with stand by assistance and adaptive device as needed. 3. Patient will tolerate 30 minutes of OT treatment with up to 2 rest breaks to increase activity tolerance for ADLs. 4. Patient will complete functional transfers with stand by assistance. 5. Patient will complete functional mobility for ADLs with contact guard assistance. 6. Patient will demonstrate improved safety awareness for ADLs by completing functional task with no cueing from therapist.   Timeframe: 7 visits     OCCUPATIONAL THERAPY: Daily Note OT Treatment Day # 3    Sarah Diaz is a 78 y.o. male   PRIMARY DIAGNOSIS: Acute metabolic encephalopathy  Acute cholecystitis [K81.0]  Procedure(s) (LRB):  ESOPHAGOGASTRODUODENOSCOPY (EGD)/BMI 30 ROOM 222 (N/A)  ESOPHAGOGASTRODUODENAL (EGD) BIOPSY (N/A)  6 Days Post-Op  [place] / Plan[place] ONLY / Product Type: Medicare /   ASSESSMENT:     REHAB RECOMMENDATIONS: CURRENT LEVEL OF FUNCTION:  (Most Recently Demonstrated)   Recommendation to date pending progress:  Setting:   Short-term Rehab  Equipment:    To Be Determined Bathing:   Not tested  Dressing:   Not tested  Feeding/Grooming:   Not tested  Toileting:   Not tested  Functional Mobility:   Minimal Assistance x 2     ASSESSMENT:  Mr. Bruce Burt presents with acute metabolic encephalopathy, cholecystitis, PE, aspiration pna. Hx PTSD. Pt with AMS, unable to provide PLOF. Pt was supine in bed upon arrival. Pt was rude and did not want to participate in therapy. Pt finally agreed with max encouragement. Pt completed bed mobility with min A. Pt sat EOB for 15 minutes. Pt returned to supine. Continue POC. SUBJECTIVE:   Mr. Bruce Burt states, \"You do not care about me, this is just a job. \"    SOCIAL HISTORY/LIVING ENVIRONMENT:  Hx PTSD. Pt with AMS, unable to provide PLOF. Per chart came from independent living facility. Pt questionable historian, reports independent baseline, ambulates with cane prn. Home Environment: Other (comment)  One/Two Story Residence: Other (Comment)  Living Alone: No  Support Systems: Other (Comment)    OBJECTIVE:     PAIN: VITAL SIGNS: LINES/DRAINS:   Pre Treatment:    Post Treatment: 0   IV and Purewick  O2 Device: Nasal cannula     ACTIVITIES OF DAILY LIVING: I Mod I S SBA CGA Min Mod Max Total NT Comments   BASIC ADLs:              Bathing/ Showering [] [] [] [] [] [] [] [] [] [x]    Toileting [] [] [] [] [] [] [] [] [] [x]    Dressing [] [] [] [] [] [] [] [] [] [x]    Feeding [] [] [] [] [] [] [] [] [] [x]    Grooming [] [] [] [] [] [] [] [] [] [x]    Personal Device Care [] [] [] [] [] [] [] [] [] [x]    Functional Mobility [] [] [] [] [] [x] [] [] [] [] Bed mob.    I=Independent, Mod I=Modified Independent, S=Supervision, SBA=Standby Assistance, CGA=Contact Guard Assistance,   Min=Minimal Assistance, Mod=Moderate Assistance, Max=Maximal Assistance, Total=Total Assistance, NT=Not Tested    MOBILITY: I Mod I S SBA CGA Min Mod Max Total  NT x2 Comments:   Supine to sit [] [] [] [] [] [x] [] [] [] [] []    Sit to supine [] [] [] [] [] [x] [] [] [] [] []    Sit to stand [] [] [] [] [] [] [] [] [] [x] []    Bed to chair [] [] [] [] [] [] [] [] [] [x] []    I=Independent, Mod I=Modified Independent, S=Supervision, SBA=Standby Assistance, CGA=Contact Guard Assistance,   Min=Minimal Assistance, Mod=Moderate Assistance, Max=Maximal Assistance, Total=Total Assistance, NT=Not Tested    BALANCE: Good Fair+ Fair Fair- Poor NT Comments   Sitting Static [] [x] [] [] [] []    Sitting Dynamic [] [x] [] [] [] []              Standing Static [] [] [] [x] [] []    Standing Dynamic [] [] [] [x] [] []      PLAN:   FREQUENCY/DURATION: OT Plan of Care: 3 times/week for duration of hospital stay or until stated goals are met, whichever comes first.    TREATMENT:   TREATMENT:   ( $$ Therapeutic Activity: 23-37 mins   )  Therapeutic Activity (24 Minutes): Therapeutic activity included Supine to Sit, Sit to Supine, Ambulation on level ground, Sitting balance  and Standing balance to improve functional Mobility and Strength.     TREATMENT GRID:  N/A    AFTER TREATMENT POSITION/PRECAUTIONS:  Bed, Needs within reach and RN notified    INTERDISCIPLINARY COLLABORATION:  RN/PCT and OT/GARCÍA    TOTAL TREATMENT DURATION:   Time in:1121  Time out: [address] [facility]

## 2021-11-30 LAB
ANION GAP SERPL CALC-SCNC: 6 MMOL/L (ref 7–16)
BUN SERPL-MCNC: 13 MG/DL (ref 8–23)
CALCIUM SERPL-MCNC: 9.8 MG/DL (ref 8.3–10.4)
CHLORIDE SERPL-SCNC: 111 MMOL/L (ref 98–107)
CO2 SERPL-SCNC: 27 MMOL/L (ref 21–32)
CREAT SERPL-MCNC: 0.93 MG/DL (ref 0.8–1.5)
GLUCOSE SERPL-MCNC: 90 MG/DL (ref 65–100)
POTASSIUM SERPL-SCNC: 3.8 MMOL/L (ref 3.5–5.1)
SODIUM SERPL-SCNC: 144 MMOL/L (ref 136–145)
VIT B1 BLD-SCNC: 136.5 NMOL/L (ref 66.5–200)

## 2021-11-30 PROCEDURE — 74011250637 HC RX REV CODE- 250/637: Performed by: INTERNAL MEDICINE

## 2021-11-30 PROCEDURE — 94760 N-INVAS EAR/PLS OXIMETRY 1: CPT

## 2021-11-30 PROCEDURE — 36415 COLL VENOUS BLD VENIPUNCTURE: CPT

## 2021-11-30 PROCEDURE — 80048 BASIC METABOLIC PNL TOTAL CA: CPT

## 2021-11-30 PROCEDURE — 74011250637 HC RX REV CODE- 250/637: Performed by: NURSE PRACTITIONER

## 2021-11-30 PROCEDURE — 77010033678 HC OXYGEN DAILY

## 2021-11-30 PROCEDURE — 74011250637 HC RX REV CODE- 250/637: Performed by: STUDENT IN AN ORGANIZED HEALTH CARE EDUCATION/TRAINING PROGRAM

## 2021-11-30 PROCEDURE — 74011000250 HC RX REV CODE- 250: Performed by: NURSE PRACTITIONER

## 2021-11-30 PROCEDURE — 74011250637 HC RX REV CODE- 250/637: Performed by: FAMILY MEDICINE

## 2021-11-30 PROCEDURE — 65270000029 HC RM PRIVATE

## 2021-11-30 RX ORDER — HYDRALAZINE HYDROCHLORIDE 10 MG/1
10 TABLET, FILM COATED ORAL 3 TIMES DAILY
Status: DISCONTINUED | OUTPATIENT
Start: 2021-11-30 | End: 2021-12-02 | Stop reason: HOSPADM

## 2021-11-30 RX ORDER — CARVEDILOL 6.25 MG/1
6.25 TABLET ORAL 2 TIMES DAILY WITH MEALS
Status: DISCONTINUED | OUTPATIENT
Start: 2021-11-30 | End: 2021-12-01

## 2021-11-30 RX ADMIN — Medication 500 MG: at 21:15

## 2021-11-30 RX ADMIN — HYDRALAZINE HYDROCHLORIDE 10 MG: 10 TABLET, FILM COATED ORAL at 09:21

## 2021-11-30 RX ADMIN — Medication 5 ML: at 14:32

## 2021-11-30 RX ADMIN — QUETIAPINE FUMARATE 25 MG: 25 TABLET ORAL at 21:14

## 2021-11-30 RX ADMIN — GUAIFENESIN 600 MG: 600 TABLET ORAL at 09:20

## 2021-11-30 RX ADMIN — PANTOPRAZOLE SODIUM 40 MG: 40 TABLET, DELAYED RELEASE ORAL at 17:22

## 2021-11-30 RX ADMIN — Medication 10 ML: at 05:14

## 2021-11-30 RX ADMIN — HYDRALAZINE HYDROCHLORIDE 10 MG: 10 TABLET, FILM COATED ORAL at 17:21

## 2021-11-30 RX ADMIN — Medication 500 MG: at 09:17

## 2021-11-30 RX ADMIN — Medication 500 MG: at 17:18

## 2021-11-30 RX ADMIN — DILTIAZEM HYDROCHLORIDE 120 MG: 120 CAPSULE, COATED, EXTENDED RELEASE ORAL at 09:20

## 2021-11-30 RX ADMIN — CARVEDILOL 6.25 MG: 6.25 TABLET, FILM COATED ORAL at 17:20

## 2021-11-30 RX ADMIN — GUAIFENESIN 600 MG: 600 TABLET ORAL at 21:14

## 2021-11-30 RX ADMIN — APIXABAN 10 MG: 5 TABLET, FILM COATED ORAL at 09:17

## 2021-11-30 RX ADMIN — Medication 10 ML: at 21:15

## 2021-11-30 RX ADMIN — CARVEDILOL 6.25 MG: 6.25 TABLET, FILM COATED ORAL at 09:19

## 2021-11-30 RX ADMIN — PANTOPRAZOLE SODIUM 40 MG: 40 TABLET, DELAYED RELEASE ORAL at 06:34

## 2021-11-30 RX ADMIN — LABETALOL HYDROCHLORIDE 20 MG: 5 INJECTION INTRAVENOUS at 19:17

## 2021-11-30 RX ADMIN — APIXABAN 10 MG: 5 TABLET, FILM COATED ORAL at 17:21

## 2021-11-30 RX ADMIN — LOSARTAN POTASSIUM 100 MG: 50 TABLET, FILM COATED ORAL at 09:21

## 2021-11-30 RX ADMIN — HYDRALAZINE HYDROCHLORIDE 10 MG: 10 TABLET, FILM COATED ORAL at 21:14

## 2021-11-30 NOTE — PROGRESS NOTES
END OF SHIFT NOTE:    INTAKE/OUTPUT  11/29 0701 - 11/30 0700  In: -   Out: 1000 [Urine:1000]  Voiding: YES  Catheter: NO  Drain:   External Urinary Catheter 11/19/21 (Active)   Site Assessment Clean, dry, & intact 11/29/21 2034   Repositioned Yes 11/29/21 2034   Perineal Care Yes 11/29/21 2034   Wick Changed No 11/29/21 2034   Suction Canister/Tubing Changed No 11/29/21 2034   Urine Output (mL) 300 ml 11/30/21 0553               Flatus: Patient does have flatus present. Stool:  0 occurrences. Characteristics:  Stool Assessment  Stool Color: Green  Stool Appearance: Other (comment) (not observed)  Stool Amount: Medium  Stool Source/Status: Rectum, Incontinence    Emesis: 0 occurrences. Characteristics:        VITAL SIGNS  Patient Vitals for the past 12 hrs:   Temp Pulse Resp BP SpO2   11/30/21 0331 97.5 °F (36.4 °C) 64 20 (!) 173/73 98 %   11/29/21 2301 98.2 °F (36.8 °C) 64 21 (!) 153/79 99 %   11/29/21 2156 -- 70 20 (!) 147/76 --   11/29/21 1954 97.7 °F (36.5 °C) 66 22 (!) 200/65 98 %       Pain Assessment  Pain Intensity 1: 0 (11/30/21 0331)  Pain Location 1: Generalized  Pain Intervention(s) 1: Medication (see MAR)  Patient Stated Pain Goal: 0    Ambulating  No    Shift report given to oncoming nurse at the bedside.     Bertram Mayo RN

## 2021-11-30 NOTE — PROGRESS NOTES
Comprehensive Nutrition Assessment    Type and Reason for Visit: Reassess    Nutrition Recommendations/Plan:    Continue current diet ; pt declined all offers of oral nutrition supplements      Malnutrition Assessment:  Malnutrition Status: At risk for malnutrition (specify) (NPO/CLD day 3 at initial assessment)    Nutrition Assessment:   Nutrition History: Pt states he was eating \"very well\" [facility] at [facility]. Pt states he provided 3 meals per day + multiple snacks. Pt states he has tried oral nutrition supplements in past, however does not care for taste. Pt reports UBW of ~200lb, cannot specify when he last weighed stated amount. Nutrition Background: PMH remarkable for dementia, colon ca, asthma, HTN. Presented with abdominal pain and confusion. Admitted with Sepsis ? abdominal source, GIB, bilateral PEs, TONIA, acute metabollic encephalopathy. Daily Update:  Pt seen at beside, no visitors present. Pt reports wishes to \"get up and leave and be done with this mess, it is all a scam.\" Able to redirect pt to focus on nutrition status during admission. Pt reports \"fair\" intake of breakfast and lunch today. Visual observation of lunch tray shows 50% consumed. Pt reports biggest barrier to greater intake as the taste of foods served, pt declines offer for oral nutrition supplements. Pt began to fixate on leaving hospital, no other needs for RD at this time. Lab Results   Component Value Date/Time    Sodium 144 11/30/2021 05:40 AM    Potassium 3.8 11/30/2021 05:40 AM    Chloride 111 (H) 11/30/2021 05:40 AM    CO2 27 11/30/2021 05:40 AM    Anion gap 6 (L) 11/30/2021 05:40 AM    Glucose 90 11/30/2021 05:40 AM    BUN 13 11/30/2021 05:40 AM    Creatinine 0.93 11/30/2021 05:40 AM    Calcium 9.8 11/30/2021 05:40 AM    Albumin 2.2 (L) 11/26/2021 05:03 AM    Magnesium 2.1 11/25/2021 05:23 AM       Nutrition Related Findings:   NFPE completed without lorenzo signs of fat or muscle wasting noted.       Current Nutrition Therapies:  ADULT DIET Easy to Chew; Low Fat/Low Chol/High Fiber/ARELI    Current Intake:   Average Meal Intake: 51-75% Average Supplement Intake: None ordered      Anthropometric Measures:  Height: 5' 10\" (177.8 cm)  Current Body Wt: 95.3 kg (210 lb 1.6 oz), Weight source: Not specified (unable to get bed scale weight 11/30)  BMI: 30.1, Obese class 1 (BMI 30.0-34. 9)  Admission Body Weight: 210 lb 8.6 oz (not specified)  Ideal Body Weight (lbs) (Calculated): 166 lbs (75 kg), 126.8 %  Usual Body Wt: 97.1 kg (214 lb) (only EMR value from daniela/ortho office 1/23/20), Percent weight change: -1.6          Edema: Generalized: Non-pitting (11/30/2021  7:11 AM)  LLE: Non-pitting (11/30/2021  7:11 AM)  LUE: Non-pitting (11/30/2021  7:11 AM)  RLE: Non-pitting (11/30/2021  7:11 AM)  RUE: Non-pitting (11/30/2021  7:11 AM)     Estimated Daily Nutrient Needs:  Energy (kcal/day): 0830-5207 (Kcal/kg (18-20), Weight Used: Admission (95.5 kg))  Protein (g/day):  (1-1.2 g/kg) Weight Used: (Admission)  Fluid (ml/day):   (1 ml/kcal)    Nutrition Diagnosis:   · Inadequate oral intake related to cognitive or neurological impairment as evidenced by  (pt reported barriers to intake as above)       Nutrition Interventions:   Food and/or Nutrient Delivery: Continue current diet     Coordination of Nutrition Care: Continue to monitor while inpatient  Plan of Care discussed with Teresa Mckeon RN    Goals:   Previous Goal Met: Progress towards goal(s) declining  Active Goal: Meet >75% of estimated needs via PO intake by next RD assessment    Nutrition Monitoring and Evaluation:      Food/Nutrient Intake Outcomes: Food and nutrient intake  Physical Signs/Symptoms Outcomes: Biochemical data, GI status, Meal time behavior    Discharge Planning:    Continue current diet    Philly Ramos [address], LD  Contact: [phone]

## 2021-11-30 NOTE — PROGRESS NOTES
7/12/2017    Dejon Downs MD  [facility]  [address] [place] [address]    RE: Tyrel Rene       Dear Colleague,    I had the pleasure of seeing Tyrel Rene in the [facility].    I had the opportunity to see Mr. Tyrel Rene in Cardiology Clinic today at the [facility] in [place] for reevaluation of chronic ischemic cardiomyopathy and congestive heart failure.      As you may recall, Mr. Rene has a stable ejection fraction of 30%-35% with chronic atrial fibrillation and a biventricular ICD.  He also has chronic kidney disease which has stabilized with a creatinine in the range of 1.4 to 1.5.  He has had some problems with worsening kidney failure when he has developed volume overload issues and required additional diuretic therapy.    Patient was last seen by Dr. Newman in January 2017. He has suffered with diarrhea for quite some time. In January he cut back his age atorvastatin to half a tablet every other day. He continue with diarrhea for days that he took atorvastatin. On the nights that he did not take a atorvastatin he did not have diarrhea. He stopped a atorvastatin last week and he has not had diarrhea since.    Prior to his bypass surgery he was on simvastatin with no difficulties. His last dose of torsemide was in April. Shortness of breath is about the same. It has not increased. He is fairly active. Plays pickle ball quite often without any problems. A vacation to [place] a few times and did well symptom-wise.    His weight has been stable at around 174 pounds at home.     He denies any defibrillator shocks.  His last device check 6/21/2017 showed 83 episodes saved since last remote, 27 with EGM was for review. 23 EGM showed atrial fibrillation with RVR, no change infarct field morphology. 3 EGM's of an SVT. One EGM of a VT  treated successfully with ATP on 5/6/2017 at 11 AM.      On examination today, his blood  Hospitalist Progress Note   Admit Date:  2021 10:13 AM   Name:  Judy Hollingsworth   Age:  78 y.o. Sex:  male  :  1945   MRN:  [id number]   Room:      Presenting Complaint: Abdominal Pain    Reason(s) for Admission: Acute cholecystitis [K81.0]     Hospital Course & Interval History:   Mine Perdomo a 78 y. o. male with a unknown medical history presented  to the ER with a complaint of severe abdominal pain x 1 day. Unable to obtain history of complaint due to patient being combative and confused. Daughter was contacted and stated she has no relationship with patient and could not provide any history.     In the ER: Labs  showing leukocytosis and elevated creatinine 1.70, Lactic 2. 1.      CT AP worrisome for PE LLL, distended gallbladder concerning for acute cholecystitis with dilatedintrahepatic and extrahepatic bile ducts with the common bile duct measuring up to 1.2 cm.    .   CTPE showing small volume bilateral PE, mid left upper lobe groundglass opacities, trace pleural effusion and some possible aspiration. CT head unremarkable. Patient behavior required for the addition of restraints for safety concerns     Subjective (21):  Denies SOB, chest pain, poor appetite, hematochezia, hematemesis.  Reports \"I'm irritated with staff\"     Assessment & Plan:     Principal Problem:  Sepsis due to ?abdominal source (?intrabiliary/cholecystitis)  - WBC now wnl  - BCx  no growth x 5 days  - Completed Zosyn  - RPP (including COVID) negative  - MRCP could not be completed  - Abd U/S  Cholelithiasis with evidence suggestive of acute cholecystitis, Intra and extrahepatic biliary ductal dilatation, hepatic steatosis  - Surgery consulted - signed off , no surgical intervention, plan for outpatient follow-up  - GI consulted - no need for ERCP, plan for outpatient follow-up  21  -STR pending      Bilateral pulmonary emboli  Acute hypoxic respiratory failure  - ?provoked  - COVID negative  - Transitioned to [address] from heparin gtt on Nov/25  - Supplemental O2 at 3L NC, wean as tolerated  - CXR on Nov/28 indicates resolution of L infiltrates  - Ask SLP to evaluate  - Check walk test  11/29/21  -Walk test prior to discharge to establish O2 need     GI bleeding/Anemia  Gastritis/gastric ulcers  - EGD with GI on 11/23, no active bleed during scope  - avoid NSAIDs, continue PPI BID per GI  - transfuse for Hgb<7 and/or brisk bleeding  - cannot follow instructions for colonoscopy  11/29/21   -No visible bleeding; Hgb stable at 10.5      Acute metabolic encephalopathy (Resolved)   - Metabolic workup (RPR, ammonia, LFTs, LA, UDS), unrevealing   - Scheduled thiamine   - CT head shows only chronic changes  - Avoid sedatives/hypnotics  - Seroquel 25 mg qHS  - PT/OT rec STR  11/29/21  -Resolved; patient alert and oriented x 3      TONIA, likely prerenal azotemia, resolved  - avoid nephrotoxic meds, NSAIDs     HTN  - on home diltiazem and losartan  - needs better control; increase carvedilol to 12.5 mg BID  - PRN hydralazine and labetalol  11/30/21  -Scheduled hydralazine added for better BP control      Obesity, Class 1  - Lifestyle modification     Hypokalemia, resolved         Dispo/Discharge Planning:      STR pending     Diet:  ADULT DIET Easy to Chew; Low Fat/Low Chol/High Fiber/ARELI  DVT PPx: Eliquis   Code status: Full Code    Hospital Problems as of 11/30/2021 Date Reviewed: 11/19/2021          Codes Class Noted - Resolved POA    GI bleed ICD-10-CM: K92.2  ICD-9-CM: 578.9  11/22/2021 - Present Unknown        B12 deficiency ICD-10-CM: E53.8  ICD-9-CM: 266.2  11/21/2021 - Present Yes        * (Principal) Acute metabolic encephalopathy RHJ-45-SD: G93.41  ICD-9-CM: 348.31  11/20/2021 - Present Unknown        Acute cholecystitis ICD-10-CM: K81.0  ICD-9-CM: 575.0  11/19/2021 - Present Unknown        Bilateral pulmonary embolism ([facility] [address] ICD-10-CM: I26.99  ICD-9-CM: 415.19  11/19/2021 - pressure is 113/70, heart rate 66 and weight 178 pounds, which is unchanged since 1/2017.  His lungs are clear.  His heart rhythm is regular.  He has no cardiac murmurs or carotid bruits and no edema.     Laboratory values: Sodium 138, potassium 4.7, BUN 20, creatinine 1.43, GFR 48      IMPRESSION:  Mr. Tyrel Rene is a 75-year-old gentleman with chronic ischemic cardiomyopathy with an echocardiogram this year showing a stable ejection fraction of 30%-35%.  He has stage III chronic kidney disease with a stable creatinine of 1.43 today as well.  He has chronic atrial fibrillation and is on warfarin for stroke prevention.  His cholesterol numbers were excellent on atorvastatin 80 mg a day, however the dose needed to be decreased to 40 mg a day due to loose stools. His loose stools progressed to diarrhea and he cut back on his atorvastatin to a half a tablet every other day. He stopped a atorvastatin completely one week ago. He asked for a fasting lipid profile today.     He has not had diarrhea for a week since stopping a atorvastatin. He does state that he was on simvastatin prior to his heart attack and he had no difficulties with that. I added on a fasting lipid profile to his blood work today therefore I had told him I would contact him tomorrow with the lab values and discussed the next plan of care.One thought would be to restart simvastatin.    The patient will follow-up with Dr. Newman in January 2018 with an echocardiogram, base metabolic panel and fasting lipid profile.    B-V -ICD checks as directed. Device check shows episodes of atrial fibrillation with RVR, SVT and one episode of VT successively treated with ATP. He is currently on warfarin therapy and his INRs have been excellent.    He will follow up with us again in 6 months in the [facility] and I will plan to see him again in 1 year.     Thank you for allowing me to participate in the care of your patient.    Sincerely,     Hayde Galvez,  APRN CNP     [facility]     Present Unknown        TONIA (acute kidney injury) ([facility] [address] ICD-10-CM: N17.9  ICD-9-CM: 584.9  11/19/2021 - Present Unknown        Lactic acidosis ICD-10-CM: E87.2  ICD-9-CM: 276.2  11/19/2021 - Present Unknown        Primary hypertension ICD-10-CM: I10  ICD-9-CM: 401.9  11/19/2021 - Present Unknown              Objective:     Patient Vitals for the past 24 hrs:   Temp Pulse Resp BP SpO2   11/30/21 0723 97.8 °F (36.6 °C) 66 20 (!) 185/94 98 %   11/30/21 0331 97.5 °F (36.4 °C) 64 20 (!) 173/73 98 %   11/29/21 2301 98.2 °F (36.8 °C) 64 21 (!) 153/79 99 %   11/29/21 2156 -- 70 20 (!) 147/76 --   11/29/21 1954 97.7 °F (36.5 °C) 66 22 (!) 200/65 98 %   11/29/21 1526 98.6 °F (37 °C) 62 22 (!) 144/68 97 %   11/29/21 1036 98.2 °F (36.8 °C) 67 24 (!) 174/76 98 %     Oxygen Therapy  O2 Sat (%): 98 % (11/30/21 0723)  Pulse via Oximetry: 97 beats per minute (11/26/21 2203)  O2 Device: Nasal cannula (11/30/21 0711)  Skin Assessment: Other (see comment/note) (humidified) (11/29/21 2034)  Skin Protection for O2 Device: Yes (humidified) (11/29/21 2034)  Orientation: Bilateral (11/29/21 2034)  O2 Flow Rate (L/min): 3 l/min (11/30/21 0711)  FIO2 (%): 28 % (11/26/21 2203)  ETCO2 (mmHg): 97 mmHg (11/21/21 1741)    Estimated body mass index is 30.13 kg/m² as calculated from the following:    Height as of this encounter: 5' 10\" (1.778 m). Weight as of this encounter: 95.3 kg (210 lb). Intake/Output Summary (Last 24 hours) at 11/30/2021 1026  Last data filed at 11/30/2021 0553  Gross per 24 hour   Intake --   Output 650 ml   Net -650 ml         Physical Exam:     Blood pressure (!) 185/94, pulse 66, temperature 97.8 °F (36.6 °C), resp. rate 20, height 5' 10\" (1.778 m), weight 95.3 kg (210 lb), SpO2 98 %. General:    No overt distress  Head:  Normocephalic, atraumatic  Eyes:  Sclerae appear normal.  Pupils equally round. ENT:  Nares appear normal, no drainage. Moist oral mucosa  Neck:  No restricted ROM. Trachea midline   CV:   RRR. No m/r/g. No jugular venous distension. Lungs:   CTAB. No wheezing, rhonchi, or rales. Respirations even, unlabored  Abdomen: Bowel sounds present. Soft, nontender, nondistended. Extremities: No cyanosis or clubbing. No edema  Skin:     No rashes and normal coloration. Warm and dry. Neuro:  CN II-XII grossly intact. A&Ox3  Psych:  Normal mood and affect.       I have reviewed ordered lab tests and independently visualized imaging below:    Recent Labs:  Recent Results (from the past 48 hour(s))   CBC W/O DIFF    Collection Time: 11/29/21  4:46 AM   Result Value Ref Range    WBC 8.5 4.3 - 11.1 K/uL    RBC 3.46 (L) 4.23 - 5.6 M/uL    HGB 10.5 (L) 13.6 - 17.2 g/dL    HCT 32.1 (L) 41.1 - 50.3 %    MCV 92.8 79.6 - 97.8 FL    MCH 30.3 26.1 - 32.9 PG    MCHC 32.7 31.4 - 35.0 g/dL    RDW 14.1 11.9 - 14.6 %    PLATELET 155 616 - 896 K/uL    MPV 9.3 (L) 9.4 - 12.3 FL    ABSOLUTE NRBC 0.00 0.0 - 0.2 K/uL   METABOLIC PANEL, BASIC    Collection Time: 11/29/21  4:46 AM   Result Value Ref Range    Sodium 142 138 - 145 mmol/L    Potassium 3.4 (L) 3.5 - 5.1 mmol/L    Chloride 109 (H) 98 - 107 mmol/L    CO2 25 21 - 32 mmol/L    Anion gap 8 7 - 16 mmol/L    Glucose 89 65 - 100 mg/dL    BUN 12 8 - 23 MG/DL    Creatinine 0.83 0.8 - 1.5 MG/DL    GFR est AA >60 >60 ml/min/1.73m2    GFR est non-AA >60 >60 ml/min/1.73m2    Calcium 9.8 8.3 - 44.3 MG/DL   METABOLIC PANEL, BASIC    Collection Time: 11/30/21  5:40 AM   Result Value Ref Range    Sodium 144 136 - 145 mmol/L    Potassium 3.8 3.5 - 5.1 mmol/L    Chloride 111 (H) 98 - 107 mmol/L    CO2 27 21 - 32 mmol/L    Anion gap 6 (L) 7 - 16 mmol/L    Glucose 90 65 - 100 mg/dL    BUN 13 8 - 23 MG/DL    Creatinine 0.93 0.8 - 1.5 MG/DL    GFR est AA >60 >60 ml/min/1.73m2    GFR est non-AA >60 >60 ml/min/1.73m2    Calcium 9.8 8.3 - 10.4 MG/DL       All Micro Results     Procedure Component Value Units Date/Time    CULTURE, BLOOD [[id number]] Collected: 11/19/21 1350    Order Status: Completed Specimen: Blood Updated: 11/24/21 0741     Special Requests: --        RIGHT  HAND       Culture result: NO GROWTH 5 DAYS       CULTURE, BLOOD [[id number]] Collected: 11/19/21 1630    Order Status: Completed Specimen: Blood Updated: 11/24/21 0741     Special Requests: --        LEFT  Antecubital       Culture result: NO GROWTH 5 DAYS       RESPIRATORY VIRUS PANEL W/COVID-19, PCR [[id number]] Collected: 11/21/21 0940    Order Status: Completed Specimen: Nasopharyngeal Updated: 11/21/21 1105     Adenovirus NOT DETECTED        Coronavirus 229E NOT DETECTED        Coronavirus HKU1 NOT DETECTED        Coronavirus CVNL63 NOT DETECTED        Coronavirus OC43 NOT DETECTED        SARS-CoV-2, PCR NOT DETECTED        Metapneumovirus NOT DETECTED        Rhinovirus and Enterovirus NOT DETECTED        Influenza A NOT DETECTED        Influenza B NOT DETECTED        Parainfluenza 1 NOT DETECTED        Parainfluenza 2 NOT DETECTED        Parainfluenza 3 NOT DETECTED        Parainfluenza virus 4 NOT DETECTED        RSV by PCR NOT DETECTED        B. parapertussis, PCR NOT DETECTED        Bordetella pertussis - PCR NOT DETECTED        Chlamydophila pneumoniae DNA, QL, PCR NOT DETECTED        Mycoplasma pneumoniae DNA, QL, PCR NOT DETECTED             Other Studies:  No results found.     Current Meds:  Current Facility-Administered Medications   Medication Dose Route Frequency    hydrALAZINE (APRESOLINE) tablet 10 mg  10 mg Oral TID    carvediloL (COREG) tablet 6.25 mg  6.25 mg Oral BID WITH MEALS    guaiFENesin ER (MUCINEX) tablet 600 mg  600 mg Oral Q12H    guaiFENesin-dextromethorphan (ROBITUSSIN DM) 100-10 mg/5 mL syrup 10 mL  10 mL Oral Q6H PRN    hydrALAZINE (APRESOLINE) 20 mg/mL injection 10 mg  10 mg IntraVENous Q6H PRN    labetaloL (NORMODYNE;TRANDATE) injection 20 mg  20 mg IntraVENous Q6H PRN    thiamine HCL (B-1) tablet 500 mg  500 mg Oral TID    nicotine (NICODERM CQ) 14 mg/24 hr patch 1 Patch  1 Patch TransDERmal Q24H    oxyCODONE-acetaminophen (PERCOCET) 5-325 mg per tablet 1 Tablet  1 Tablet Oral Q6H PRN    apixaban (ELIQUIS) tablet 10 mg  10 mg Oral BID    Followed by   Colette Blazing ON 12/2/2021] apixaban (ELIQUIS) tablet 5 mg  5 mg Oral BID    losartan (COZAAR) tablet 100 mg  100 mg Oral DAILY    pantoprazole (PROTONIX) tablet 40 mg  40 mg Oral ACB&D    dilTIAZem ER (CARDIZEM CD) capsule 120 mg  120 mg Oral DAILY    influenza vaccine 2021-22 (6 mos+)(PF) (FLUARIX/FLULAVAL/FLUZONE QUAD) injection 0.5 mL  1 Each IntraMUSCular PRIOR TO DISCHARGE    albuterol-ipratropium (DUO-NEB) 2.5 MG-0.5 MG/3 ML  3 mL Nebulization Q4H PRN    naloxone (NARCAN) injection 0.2 mg  0.2 mg IntraVENous EVERY 2 MINUTES AS NEEDED    sodium chloride (NS) flush 5-40 mL  5-40 mL IntraVENous Q8H    sodium chloride (NS) flush 5-40 mL  5-40 mL IntraVENous PRN    acetaminophen (TYLENOL) tablet 650 mg  650 mg Oral Q6H PRN    Or    acetaminophen (TYLENOL) suppository 650 mg  650 mg Rectal Q6H PRN    bisacodyL (DULCOLAX) suppository 10 mg  10 mg Rectal DAILY PRN    ondansetron (ZOFRAN ODT) tablet 4 mg  4 mg Oral Q8H PRN    Or    ondansetron (ZOFRAN) injection 4 mg  4 mg IntraVENous Q6H PRN    alum-mag hydroxide-simeth (MYLANTA) oral suspension 15 mL  15 mL Oral Q6H PRN    QUEtiapine (SEROquel) tablet 25 mg  25 mg Oral QHS       Signed:  Ml Hutson NP    Part of this note may have been written by using a voice dictation software. The note has been proof read but may still contain some grammatical/other typographical errors.

## 2021-11-30 NOTE — PROGRESS NOTES
CM follow up regarding rehab for pt. Spoke with caregiver, Kevyn Brown about plan. SHe is supportive of pt having rehab,  Chose referral to [facility] as it is near pt's home so she can follow up with pt at rehab. Liaison Zamzam Pretty from [facility] onsite this afternoon to meet pt and she will discuss with her care team and update [facility]MSW if they can accept pt.

## 2021-12-01 PROCEDURE — 94760 N-INVAS EAR/PLS OXIMETRY 1: CPT

## 2021-12-01 PROCEDURE — 74011250637 HC RX REV CODE- 250/637: Performed by: NURSE PRACTITIONER

## 2021-12-01 PROCEDURE — 77010033678 HC OXYGEN DAILY

## 2021-12-01 PROCEDURE — 65270000029 HC RM PRIVATE

## 2021-12-01 PROCEDURE — 2709999900 HC NON-CHARGEABLE SUPPLY

## 2021-12-01 PROCEDURE — 74011250637 HC RX REV CODE- 250/637: Performed by: INTERNAL MEDICINE

## 2021-12-01 PROCEDURE — 74011250637 HC RX REV CODE- 250/637: Performed by: FAMILY MEDICINE

## 2021-12-01 PROCEDURE — 74011250637 HC RX REV CODE- 250/637: Performed by: STUDENT IN AN ORGANIZED HEALTH CARE EDUCATION/TRAINING PROGRAM

## 2021-12-01 PROCEDURE — 92526 ORAL FUNCTION THERAPY: CPT

## 2021-12-01 PROCEDURE — 74011250636 HC RX REV CODE- 250/636: Performed by: NURSE PRACTITIONER

## 2021-12-01 RX ORDER — LANOLIN ALCOHOL/MO/W.PET/CERES
100 CREAM (GRAM) TOPICAL DAILY
Status: DISCONTINUED | OUTPATIENT
Start: 2021-12-02 | End: 2021-12-02 | Stop reason: HOSPADM

## 2021-12-01 RX ORDER — OXYCODONE AND ACETAMINOPHEN 5; 325 MG/1; MG/1
1 TABLET ORAL
Status: DISCONTINUED | OUTPATIENT
Start: 2021-12-01 | End: 2021-12-02

## 2021-12-01 RX ADMIN — APIXABAN 10 MG: 5 TABLET, FILM COATED ORAL at 09:38

## 2021-12-01 RX ADMIN — HYDRALAZINE HYDROCHLORIDE 10 MG: 10 TABLET, FILM COATED ORAL at 09:40

## 2021-12-01 RX ADMIN — LOSARTAN POTASSIUM 100 MG: 50 TABLET, FILM COATED ORAL at 09:37

## 2021-12-01 RX ADMIN — HYDRALAZINE HYDROCHLORIDE 10 MG: 20 INJECTION INTRAMUSCULAR; INTRAVENOUS at 03:43

## 2021-12-01 RX ADMIN — HYDRALAZINE HYDROCHLORIDE 10 MG: 10 TABLET, FILM COATED ORAL at 22:31

## 2021-12-01 RX ADMIN — GUAIFENESIN 600 MG: 600 TABLET ORAL at 22:30

## 2021-12-01 RX ADMIN — Medication 10 ML: at 14:05

## 2021-12-01 RX ADMIN — PANTOPRAZOLE SODIUM 40 MG: 40 TABLET, DELAYED RELEASE ORAL at 17:50

## 2021-12-01 RX ADMIN — PANTOPRAZOLE SODIUM 40 MG: 40 TABLET, DELAYED RELEASE ORAL at 06:35

## 2021-12-01 RX ADMIN — CARVEDILOL 6.25 MG: 6.25 TABLET, FILM COATED ORAL at 09:39

## 2021-12-01 RX ADMIN — GUAIFENESIN SYRUP AND DEXTROMETHORPHAN 10 ML: 100; 10 SYRUP ORAL at 04:11

## 2021-12-01 RX ADMIN — DILTIAZEM HYDROCHLORIDE 120 MG: 120 CAPSULE, COATED, EXTENDED RELEASE ORAL at 09:39

## 2021-12-01 RX ADMIN — HYDRALAZINE HYDROCHLORIDE 10 MG: 10 TABLET, FILM COATED ORAL at 17:49

## 2021-12-01 RX ADMIN — APIXABAN 10 MG: 5 TABLET, FILM COATED ORAL at 17:49

## 2021-12-01 RX ADMIN — OXYCODONE HYDROCHLORIDE AND ACETAMINOPHEN 1 TABLET: 5; 325 TABLET ORAL at 04:14

## 2021-12-01 RX ADMIN — Medication 10 ML: at 05:02

## 2021-12-01 RX ADMIN — GUAIFENESIN 600 MG: 600 TABLET ORAL at 09:40

## 2021-12-01 RX ADMIN — Medication 10 ML: at 22:31

## 2021-12-01 RX ADMIN — QUETIAPINE FUMARATE 25 MG: 25 TABLET ORAL at 22:31

## 2021-12-01 RX ADMIN — ACETAMINOPHEN 650 MG: 325 TABLET ORAL at 22:31

## 2021-12-01 NOTE — PROGRESS NOTES
LTG: Patient will tolerate least restrictive diet without overt signs or symptoms of airway compromise. MET 12/1  STG: Patient will tolerate easy to chew diet and thin liquids without overt signs or symptoms of airway compromise. MET 12/1  STG: Patient will participate in modified barium swallow study as clinically indicated. Discontinued 12/1  SPEECH LANGUAGE PATHOLOGY: DYSPHAGIA- Daily Note and Discharge    NAME/AGE/GENDER: Lj Wilson is a 78 y.o. male  DATE: 12/1/2021  PRIMARY DIAGNOSIS: Acute cholecystitis [K81.0]  Procedure(s) (LRB):  ESOPHAGOGASTRODUODENOSCOPY (EGD)/BMI 30 ROOM 222 (N/A)  ESOPHAGOGASTRODUODENAL (EGD) BIOPSY (N/A) 8 Days Post-Op  ICD-10: Treatment Diagnosis: R13.12 Dysphagia, Oropharyngeal Phase    RECOMMENDATIONS   DIET:    Easy to [employer] Thin Liquids    MEDICATIONS: as tolerated     PRECAUTIONS, MODIFICATIONS, AND STRATEGIES  · Slow rate of intake  · Small bites/sips  · Upright at 90 degrees during meal and 30 min after po intake   RECOMMENDATIONS FOR CONTINUED SPEECH THERAPY:   No further speech therapy indicated at this time. ASSESSMENT   Ongoing mild oral dysphagia due to edentulous state but functional with soft, easy to chew solids. No overt s/sx aspiration. Baseline cough does not appear related to po intake. Recommend continue easy to chew diet/thin liquids. Meds as tolerated. No further speech therapy intervention at this time. EDUCATION:  · Recommendations discussed with Patient and RN    REHABILITATION POTENTIAL FOR STATED GOALS: Good    PLAN    FREQUENCY/DURATION:   No further speech therapy indicated at this time as oropharyngeal swallow function is within normal limits. CONTINUATION OF SKILLED SERVICES/MEDICAL NECESSITY:   No additional speech services warranted. SUBJECTIVE   Asleep but easily roused. Agreeable to participate. Wanting to go home but pleasant today.      Oxygen Device: NC 2L  Pain: Pain Scale 1: Numeric (0 - 10)  Pain Intensity 1: 0  Pain Location 1: Generalized  Pain Intervention(s) 1: Medication (see MAR)    History of Present Injury/Illness: Mr. Ellen Najera  has a past medical history of Asthma, B12 deficiency (11/21/2021), and Hypertension. Giselle Meyer He also  has no past surgical history on file. PRECAUTIONS/ALLERGIES: Patient has no known allergies. Problem List:  (Impairments causing functional limitations):  1. Oral dysphagia       Orientation:  Person  Place      OBJECTIVE   Oral Motor:   · Labial: No impairment  · Dentition: Edentulous  · Oral Hygiene: Adequate  · Lingual: No impairment    Dysphagia treatment:   Patient seen with easy to chew breakfast tray. Tolerated thin liquid and easy to chew solids without overt s/sx aspiration. Baseline cough that occurred before and significantly delayed at conclusion of breakfast. No coughing during po intake. Vocal quality remained clear. Single swallow per bite/sip. Increased oral prep due to edentulous state (reports dentures at home) but functional with soft, easy to chew solids. Tool Used: Dysphagia Outcome and Severity Scale (CASEY)    Score Comments   Normal Diet  [] 7 With no strategies or extra time needed   Functional Swallow  [] 6 May have mild oral or pharyngeal delay   Mild Dysphagia  [x] 5 Which may require one diet consistency restricted    Mild-Moderate Dysphagia  [] 4 With 1-2 diet consistencies restricted   Moderate Dysphagia  [] 3 With 2 or more diet consistencies restricted   Moderate-Severe Dysphagia  [] 2 With partial PO strategies (trials with ST only)   Severe Dysphagia  [] 1 With inability to tolerate any PO safely      Score:  Initial: 5 Most Recent: 6(Date 12/01/21 )   Interpretation of Tool: The Dysphagia Outcome and Severity Scale (CASEY) is a simple, easy-to-use, 7-point scale developed to systematically rate the functional severity of dysphagia based on objective assessment and make recommendations for diet level, independence level, and type of nutrition. INTERDISCIPLINARY COLLABORATION: n/a    After treatment position/precautions:  · Upright in bed  · Call light within reach    Total Treatment Duration:   Time In: 2870  Time Out: Raymundo Calixto, [facility] [facility], [facility] MED[facility] PAULETTE NICOLAS, [id number] [facility]

## 2021-12-01 NOTE — PROGRESS NOTES
Hospitalist Progress Note   Admit Date:  2021 10:13 AM   Name:  Martha Browne   Age:  78 y.o. Sex:  male  :  1945   MRN:  [id number]   Room:      Presenting Complaint: Abdominal Pain    Reason(s) for Admission: Acute cholecystitis [K81.0]     Hospital Course & Interval History:   Mr. Ann is a 78 y. o. male with an unknown medical history who presented to the ER with a complaint of severe abdominal pain x 1 day.  Unable to obtain history of complaint due to patient being combative and confused.  Daughter was contacted and she stated she has no relationship with the patient and could not provide any history.  In the ER, labs showing leukocytosis, elevated creatinine of 1.70, and lactic acid 2. 1.  CT AP worrisome for PE LLL, distended gallbladder concerning for acute cholecystitis with dilated intrahepatic and extrahepatic bile ducts with the common bile duct measuring up to 1.2 cm.  CT PE showing small volume bilateral PE, mid L upper lobe groundglass opacities, trace pleural effusion, and some possible aspiration.  CT head unremarkable.  Patient behavior required the addition of restraints for safety concerns. Rachael Waggoner was admitted to the Hospitalist service for further treatment. Subjective (21):  No AEO. Patient stable for discharge to a rehab facility. Patient refused PT/OT today.     Assessment & Plan:     Principal Problem:  Sepsis due to ?abdominal source (?intrabiliary/cholecystitis)  - WBC now wnl  - BCx  no growth x 5 days  - Completed Zosyn  - RPP (including COVID) negative  - MRCP could not be completed  - Abd U/S  Cholelithiasis with evidence suggestive of acute cholecystitis, Intra and extrahepatic biliary ductal dilatation, hepatic steatosis  - Surgery consulted - signed off , no surgical intervention, plan for outpatient follow-up  - GI consulted - no need for ERCP, plan for outpatient follow-up  - STR pending      Bilateral pulmonary emboli  Acute hypoxic respiratory failure  - ?provoked  - COVID negative  - Transitioned to DOAC from heparin gtt on Nov/25  - Supplemental O2 at 2-3 L NC, wean as tolerated  - CXR on Nov/28 indicates resolution of L infiltrates  - SLP rec easy-to-chew diet  - Check walk test     GI bleeding/Anemia  Gastritis/gastric ulcers  - EGD with GI on 11/23, no active bleed during scope  - avoid NSAIDs, continue PPI BID per GI  - transfuse for Hgb<7 and/or brisk bleeding  - cannot follow instructions for colonoscopy  - No visible bleeding; Hgb stable at 10.5      Acute metabolic encephalopathy (Resolved)   - Metabolic workup (RPR, ammonia, LFTs, LA, UDS), unrevealing   - Scheduled thiamine   - CT head shows only chronic changes  - Avoid sedatives/hypnotics  - Seroquel 25 mg qHS  - PT/OT rec STR  11/29/21  - Resolved; patient alert and oriented x 3      TONIA, likely prerenal azotemia, resolved  - avoid nephrotoxic meds, NSAIDs     HTN  - on home diltiazem and losartan  - PRN hydralazine and labetalol  - Scheduled hydralazine added for better BP control      Obesity, Class 1  - Lifestyle modification     Hypokalemia, resolved      Dispo/Discharge Planning: Pending    Diet:  ADULT DIET Easy to Chew; Low Fat/Low Chol/High Fiber/ARELI  DVT PPx: apixaban  Code status: Full Code    Hospital Problems as of 12/1/2021 Date Reviewed: 11/19/2021          Codes Class Noted - Resolved POA    GI bleed ICD-10-CM: K92.2  ICD-9-CM: 578.9  11/22/2021 - Present Unknown        B12 deficiency ICD-10-CM: E53.8  ICD-9-CM: 266.2  11/21/2021 - Present Yes        * (Principal) Acute metabolic encephalopathy [id number]-91-UE: G93.41  ICD-9-CM: 348.31  11/20/2021 - Present Unknown        Acute cholecystitis ICD-10-CM: K81.0  ICD-9-CM: 575.0  11/19/2021 - Present Unknown        Bilateral pulmonary embolism ([facility] ICD-10-CM: I26.99  ICD-9-CM: 415.19  11/19/2021 - Present Unknown        TONIA (acute kidney injury) ([facility] ICD-10-CM: N17.9  ICD-9-CM: 584.9  11/19/2021 - Present Unknown        Lactic acidosis ICD-10-CM: E87.2  ICD-9-CM: 276.2  11/19/2021 - Present Unknown        Primary hypertension ICD-10-CM: I10  ICD-9-CM: 401.9  11/19/2021 - Present Unknown              Objective:     Patient Vitals for the past 24 hrs:   Temp Pulse Resp BP SpO2   12/01/21 1239 -- -- -- 134/67 --   12/01/21 1131 98 °F (36.7 °C) 81 18 (!) 161/109 93 %   12/01/21 0713 97.4 °F (36.3 °C) 73 16 (!) 154/75 94 %   12/01/21 0516 -- 65 -- 133/69 97 %   12/01/21 0334 97.5 °F (36.4 °C) 80 20 (!) 181/71 98 %   11/30/21 2347 98.5 °F (36.9 °C) (!) 50 21 (!) 185/75 96 %   11/30/21 2109 -- -- -- (!) 186/74 --   11/30/21 1912 97.3 °F (36.3 °C) 79 20 (!) 182/81 98 %   11/30/21 1514 98.3 °F (36.8 °C) 77 20 (!) 161/76 97 %     Oxygen Therapy  O2 Sat (%): 93 % (12/01/21 1131)  Pulse via Oximetry: 97 beats per minute (11/26/21 2203)  O2 Device: Nasal cannula (12/01/21 0712)  Skin Assessment: Clean, dry, & intact (12/01/21 3622)  Skin Protection for O2 Device: No (12/01/21 6311)  Orientation: Bilateral (11/29/21 2034)  O2 Flow Rate (L/min): 3 l/min (12/01/21 0712)  FIO2 (%): 28 % (11/26/21 2203)  ETCO2 (mmHg): 97 mmHg (11/21/21 1741)    Estimated body mass index is 30.13 kg/m² as calculated from the following:    Height as of this encounter: 5' 10\" (1.778 m). Weight as of this encounter: 95.3 kg (210 lb). Intake/Output Summary (Last 24 hours) at 12/1/2021 1408  Last data filed at 12/1/2021 1219  Gross per 24 hour   Intake 960 ml   Output 2175 ml   Net -1215 ml         Physical Exam:   Blood pressure 134/67, pulse 81, temperature 98 °F (36.7 °C), resp. rate 18, height 5' 10\" (1.778 m), weight 95.3 kg (210 lb), SpO2 93 %. General:    No overt distress  Head:  Normocephalic, atraumatic  Eyes:  Sclerae appear normal.  Pupils equally round. ENT:  Nares appear normal, no drainage. Moist oral mucosa  Neck:  No restricted ROM. Trachea midline   CV:   RRR. No m/r/g. Lungs: No wheezing.   Respirations even, unlabored at rest.  Abdomen:   Soft, nontender, nondistended. Extremities: No cyanosis or clubbing. Skin:     No rashes and normal coloration. Neuro:  CN II-XII grossly intact. A&O  Psych:  Erratic.       I have reviewed ordered lab tests and independently visualized imaging below:    Recent Labs:  Recent Results (from the past 48 hour(s))   METABOLIC PANEL, BASIC    Collection Time: 11/30/21  5:40 AM   Result Value Ref Range    Sodium 144 136 - 145 mmol/L    Potassium 3.8 3.5 - 5.1 mmol/L    Chloride 111 (H) 98 - 107 mmol/L    CO2 27 21 - 32 mmol/L    Anion gap 6 (L) 7 - 16 mmol/L    Glucose 90 65 - 100 mg/dL    BUN 13 8 - 23 MG/DL    Creatinine 0.93 0.8 - 1.5 MG/DL    GFR est AA >60 >60 ml/min/1.73m2    GFR est non-AA >60 >60 ml/min/1.73m2    Calcium 9.8 8.3 - 10.4 MG/DL       All Micro Results     Procedure Component Value Units Date/Time    CULTURE, BLOOD [[id number]] Collected: 11/19/21 1350    Order Status: Completed Specimen: Blood Updated: 11/24/21 0741     Special Requests: --        RIGHT  HAND       Culture result: NO GROWTH 5 DAYS       CULTURE, BLOOD [[id number]] Collected: 11/19/21 1630    Order Status: Completed Specimen: Blood Updated: 11/24/21 0741     Special Requests: --        LEFT  Antecubital       Culture result: NO GROWTH 5 DAYS       RESPIRATORY VIRUS PANEL W/COVID-19, PCR [[id number]] Collected: 11/21/21 0940    Order Status: Completed Specimen: Nasopharyngeal Updated: 11/21/21 1105     Adenovirus NOT DETECTED        Coronavirus 229E NOT DETECTED        Coronavirus HKU1 NOT DETECTED        Coronavirus CVNL63 NOT DETECTED        Coronavirus OC43 NOT DETECTED        SARS-CoV-2, PCR NOT DETECTED        Metapneumovirus NOT DETECTED        Rhinovirus and Enterovirus NOT DETECTED        Influenza A NOT DETECTED        Influenza B NOT DETECTED        Parainfluenza 1 NOT DETECTED        Parainfluenza 2 NOT DETECTED        Parainfluenza 3 NOT DETECTED        Parainfluenza virus 4 NOT DETECTED RSV by PCR NOT DETECTED        B. parapertussis, PCR NOT DETECTED        Bordetella pertussis - PCR NOT DETECTED        Chlamydophila pneumoniae DNA, QL, PCR NOT DETECTED        Mycoplasma pneumoniae DNA, QL, PCR NOT DETECTED             Other Studies:  No results found.     Current Meds:  Current Facility-Administered Medications   Medication Dose Route Frequency    oxyCODONE-acetaminophen (PERCOCET) 5-325 mg per tablet 1 Tablet  1 Tablet Oral Q8H PRN    hydrALAZINE (APRESOLINE) tablet 10 mg  10 mg Oral TID    carvediloL (COREG) tablet 6.25 mg  6.25 mg Oral BID WITH MEALS    guaiFENesin ER (MUCINEX) tablet 600 mg  600 mg Oral Q12H    guaiFENesin-dextromethorphan (ROBITUSSIN DM) 100-10 mg/5 mL syrup 10 mL  10 mL Oral Q6H PRN    hydrALAZINE (APRESOLINE) 20 mg/mL injection 10 mg  10 mg IntraVENous Q6H PRN    labetaloL (NORMODYNE;TRANDATE) injection 20 mg  20 mg IntraVENous Q6H PRN    thiamine HCL (B-1) tablet 500 mg  500 mg Oral TID    nicotine (NICODERM CQ) 14 mg/24 hr patch 1 Patch  1 Patch TransDERmal Q24H    apixaban (ELIQUIS) tablet 10 mg  10 mg Oral BID    Followed by   Mylene Driver ON 12/2/2021] apixaban (ELIQUIS) tablet 5 mg  5 mg Oral BID    losartan (COZAAR) tablet 100 mg  100 mg Oral DAILY    pantoprazole (PROTONIX) tablet 40 mg  40 mg Oral ACB&D    dilTIAZem ER (CARDIZEM CD) capsule 120 mg  120 mg Oral DAILY    influenza vaccine 2021-22 (6 mos+)(PF) (FLUARIX/FLULAVAL/FLUZONE QUAD) injection 0.5 mL  1 Each IntraMUSCular PRIOR TO DISCHARGE    albuterol-ipratropium (DUO-NEB) 2.5 MG-0.5 MG/3 ML  3 mL Nebulization Q4H PRN    naloxone (NARCAN) injection 0.2 mg  0.2 mg IntraVENous EVERY 2 MINUTES AS NEEDED    sodium chloride (NS) flush 5-40 mL  5-40 mL IntraVENous Q8H    sodium chloride (NS) flush 5-40 mL  5-40 mL IntraVENous PRN    acetaminophen (TYLENOL) tablet 650 mg  650 mg Oral Q6H PRN    Or    acetaminophen (TYLENOL) suppository 650 mg  650 mg Rectal Q6H PRN    bisacodyL (DULCOLAX) suppository 10 mg  10 mg Rectal DAILY PRN    ondansetron (ZOFRAN ODT) tablet 4 mg  4 mg Oral Q8H PRN    Or    ondansetron (ZOFRAN) injection 4 mg  4 mg IntraVENous Q6H PRN    alum-mag hydroxide-simeth (MYLANTA) oral suspension 15 mL  15 mL Oral Q6H PRN    QUEtiapine (SEROquel) tablet 25 mg  25 mg Oral QHS       Signed:  Tony Silva NP    Part of this note may have been written by using a voice dictation software. The note has been proof read but may still contain some grammatical/other typographical errors.

## 2021-12-01 NOTE — PROGRESS NOTES
Occupational Therapy Note:     Attempted to see patient this AM. He refused any OOB activity despite maximum encouragment. Will check back as time allows and patient is available.      Thank you,  Eriberto Cervantes, OTR/L

## 2021-12-01 NOTE — PROGRESS NOTES
PT Note:  Attempted PT. Patient refused to get OOB or to move despite strong coaxing and encouragement. Will attempt another time/day as schedule permits.   A Mi Bui, PT, DPT

## 2021-12-01 NOTE — PROGRESS NOTES
CM received notification from the liaison at [facility] that the DON denied pt's referral regarding concern for possible \"behaviors. \"  CM sent a referral to [address] for review. Will await update from the liaison.

## 2021-12-02 ENCOUNTER — HOME HEALTH ADMISSION (OUTPATIENT)
Dept: HOME HEALTH SERVICES | Facility: HOME HEALTH | Age: 76
End: 2021-12-02

## 2021-12-02 VITALS
BODY MASS INDEX: 30.06 KG/M2 | RESPIRATION RATE: 16 BRPM | OXYGEN SATURATION: 95 % | HEART RATE: 74 BPM | TEMPERATURE: 97.7 F | HEIGHT: 70 IN | SYSTOLIC BLOOD PRESSURE: 111 MMHG | WEIGHT: 210 LBS | DIASTOLIC BLOOD PRESSURE: 78 MMHG

## 2021-12-02 PROCEDURE — 97535 SELF CARE MNGMENT TRAINING: CPT

## 2021-12-02 PROCEDURE — 74011250637 HC RX REV CODE- 250/637: Performed by: NURSE PRACTITIONER

## 2021-12-02 PROCEDURE — 97530 THERAPEUTIC ACTIVITIES: CPT

## 2021-12-02 PROCEDURE — 74011250637 HC RX REV CODE- 250/637: Performed by: FAMILY MEDICINE

## 2021-12-02 PROCEDURE — 74011250637 HC RX REV CODE- 250/637: Performed by: INTERNAL MEDICINE

## 2021-12-02 PROCEDURE — 97112 NEUROMUSCULAR REEDUCATION: CPT

## 2021-12-02 PROCEDURE — 74011250637 HC RX REV CODE- 250/637: Performed by: STUDENT IN AN ORGANIZED HEALTH CARE EDUCATION/TRAINING PROGRAM

## 2021-12-02 RX ORDER — DILTIAZEM HYDROCHLORIDE 120 MG/1
120 CAPSULE, COATED, EXTENDED RELEASE ORAL DAILY
Qty: 30 CAPSULE | Refills: 0 | Status: SHIPPED | OUTPATIENT
Start: 2021-12-03 | End: 2022-01-02

## 2021-12-02 RX ORDER — TAMSULOSIN HYDROCHLORIDE 0.4 MG/1
0.4 CAPSULE ORAL
Qty: 30 CAPSULE | Refills: 0 | Status: SHIPPED | OUTPATIENT
Start: 2021-12-02 | End: 2022-01-01

## 2021-12-02 RX ORDER — QUETIAPINE FUMARATE 25 MG/1
25 TABLET, FILM COATED ORAL
Qty: 30 TABLET | Refills: 0 | Status: SHIPPED | OUTPATIENT
Start: 2021-12-02 | End: 2022-01-01

## 2021-12-02 RX ORDER — PANTOPRAZOLE SODIUM 40 MG/1
40 TABLET, DELAYED RELEASE ORAL
Qty: 60 TABLET | Refills: 0 | Status: SHIPPED | OUTPATIENT
Start: 2021-12-02 | End: 2022-01-01

## 2021-12-02 RX ORDER — HYDRALAZINE HYDROCHLORIDE 10 MG/1
10 TABLET, FILM COATED ORAL 3 TIMES DAILY
Qty: 90 TABLET | Refills: 0 | Status: SHIPPED | OUTPATIENT
Start: 2021-12-02 | End: 2022-01-01

## 2021-12-02 RX ORDER — LOSARTAN POTASSIUM 100 MG/1
100 TABLET ORAL DAILY
Qty: 30 TABLET | Refills: 0 | Status: SHIPPED | OUTPATIENT
Start: 2021-12-03 | End: 2022-01-02

## 2021-12-02 RX ADMIN — LOSARTAN POTASSIUM 100 MG: 50 TABLET, FILM COATED ORAL at 09:04

## 2021-12-02 RX ADMIN — Medication 20 ML: at 14:00

## 2021-12-02 RX ADMIN — HYDRALAZINE HYDROCHLORIDE 10 MG: 10 TABLET, FILM COATED ORAL at 16:05

## 2021-12-02 RX ADMIN — HYDRALAZINE HYDROCHLORIDE 10 MG: 10 TABLET, FILM COATED ORAL at 09:04

## 2021-12-02 RX ADMIN — APIXABAN 5 MG: 5 TABLET, FILM COATED ORAL at 16:03

## 2021-12-02 RX ADMIN — GUAIFENESIN 600 MG: 600 TABLET ORAL at 09:05

## 2021-12-02 RX ADMIN — ACETAMINOPHEN 650 MG: 325 TABLET ORAL at 16:03

## 2021-12-02 RX ADMIN — DILTIAZEM HYDROCHLORIDE 120 MG: 120 CAPSULE, COATED, EXTENDED RELEASE ORAL at 09:05

## 2021-12-02 RX ADMIN — Medication 10 ML: at 05:27

## 2021-12-02 RX ADMIN — APIXABAN 10 MG: 5 TABLET, FILM COATED ORAL at 09:04

## 2021-12-02 RX ADMIN — PANTOPRAZOLE SODIUM 40 MG: 40 TABLET, DELAYED RELEASE ORAL at 16:05

## 2021-12-02 RX ADMIN — Medication 100 MG: at 09:04

## 2021-12-02 NOTE — PROGRESS NOTES
ACUTE PHYSICAL THERAPY GOALS:  (Developed with and agreed upon by patient and/or caregiver.)  1.) Kenny Salgado  will move from supine to sit and sit to supine  with SUPERVISION within 7 treatment day(s). (2.) Kenny Salgado will transfer from bed to chair and chair to bed with SUPERVISION using the least restrictive device within 7 treatment day(s). (3.) Kenny Salgado will ambulate with SUPERVISION for 30 feet with the least restrictive device within 7 treatment day(s). (4.) Kenny Salgado will perform standing static and dynamic balance activities x 20 minutes with SUPERVISION to improve safety within 7 treatment day(s). (5.) Kenny Salgado will perform bilateral lower extremity exercises x 20 min for HEP with INDEPENDENCE to improve strength, endurance, and functional mobility within 7 treatment       PHYSICAL THERAPY: Daily Note Treatment Day # 4    Kenny Salgado is a 78 y.o. male   PRIMARY DIAGNOSIS: Acute metabolic encephalopathy  Acute cholecystitis [K81.0]  Procedure(s) (LRB):  ESOPHAGOGASTRODUODENOSCOPY (EGD)/BMI 30 ROOM 222 (N/A)  ESOPHAGOGASTRODUODENAL (EGD) BIOPSY (N/A)  9 Days Post-Op    ASSESSMENT:     REHAB RECOMMENDATIONS: CURRENT LEVEL OF FUNCTION:  (Most Recently Demonstrated)   Recommendation to date pending progress:  Setting:   Short-term Rehab  Equipment:    To Be Determined Bed Mobility:   Not tested  Sit to Stand:   Standby Assistance  Transfers:   Standby Assistance  Gait/Mobility:   Contact Guard Assistance     ASSESSMENT:  Mr. Anna Brown is in bathroom with OT and agreeable to therapy. Patient ambulated ~8' to recliner with RW and CGA. Then performed ex's. Demonstrated progress with gait today. SpO2 >90% on room air. Required assist of 2 due to impulsivity/safety. Needs additional skilled therapy at discharge.      SUBJECTIVE:   Mr. Anna Brown states, \"I know I need to get stronger\"    SOCIAL HISTORY/ LIVING ENVIRONMENT: see eval  Home Environment: Other (comment)  One/Two Story Residence: Other (Comment)  Living Alone: No  Support Systems: Other (Comment)  OBJECTIVE:     PAIN: VITAL SIGNS: LINES/DRAINS:   Pre Treatment: Pain Screen  Pain Scale 1: Numeric (0 - 10)  Pain Intensity 1: 00/10  Post Treatment: 0/10   Purewick  O2 Device: None (Room air)     MOBILITY: I Mod I S SBA CGA Min Mod Max Total  NT x2 Comments:   Bed Mobility    Rolling [] [] [] [] [] [] [] [] [] [x] []    Supine to Sit [] [] [] [] [] [] [] [] [] [x] []    Scooting [] [] [] [] [] [] [] [] [] [x] []    Sit to Supine [] [] [] [] [] [] [] [] [] [x] []    Transfers    Sit to Stand [] [] [] [x] [] [] [] [] [] [] []    Bed to Chair [] [] [] [] [] [] [] [] [] [] []    Stand to Sit [] [] [] [x] [] [] [] [] [] [] []    I=Independent, Mod I=Modified Independent, S=Supervision, SBA=Standby Assistance, CGA=Contact Guard Assistance,   Min=Minimal Assistance, Mod=Moderate Assistance, Max=Maximal Assistance, Total=Total Assistance, NT=Not Tested    BALANCE: Good Fair+ Fair Fair- Poor NT Comments   Sitting Static [] [x] [] [] [] []    Sitting Dynamic [] [x] [] [] [] []              Standing Static [] [] [x] [] [] []    Standing Dynamic [] [] [x] [] [] []      GAIT: I Mod I S SBA CGA Min Mod Max Total  NT x2 Comments:   Level of Assistance [] [] [] [] [x] [] [] [] [] [x] []    Distance 8 feet     DME Rolling Walker and Gait Belt    Gait Quality Flexed posture, short steps, slow    Weightbearing  Status N/A     I=Independent, Mod I=Modified Independent, S=Supervision, SBA=Standby Assistance, CGA=Contact Guard Assistance,   Min=Minimal Assistance, Mod=Moderate Assistance, Max=Maximal Assistance, Total=Total Assistance, NT=Not Tested    PLAN:   FREQUENCY/DURATION: PT Plan of Care: 3 times/week for duration of hospital stay or until stated goals are met, whichever comes first.  TREATMENT:     TREATMENT:   ($$ Therapeutic Activity: 23-37 mins    )  Co-Treatment PT/OT necessary due to patient's decreased overall endurance/tolerance levels, as well as need for high level skilled assistance to complete functional transfers/mobility and functional tasks  Therapeutic Activity (23 Minutes): Therapeutic activity included Transfer Training, Ambulation on level ground and LE ex's to improve functional Mobility, Strength and Activity tolerance.     TREATMENT GRID:  N/A   DATE: 12/02/21       Ambulation        Hip Flexion        Long Arc Quads x15 AB       Hip ab/ad 2x10 AB       Heel Raises x20 AB       Toe Raises x20 AB       Straight leg raise 2x10 AB                        Key:  A=active, AA=active assisted, P=passive, B=bilaterally, R=right, L=left   DF=dorsiflexion, PF=plantarflexion      AFTER TREATMENT POSITION/PRECAUTIONS:  Alarm Activated, Chair, Needs within reach and RN notified    INTERDISCIPLINARY COLLABORATION:  RN/PCT, PT/PTA and OT/GARCÍA    TOTAL TREATMENT DURATION:  PT Patient Time In/Time Out  Time In: 0950  Time Out: [address], PT, DPT

## 2021-12-02 NOTE — PROGRESS NOTES
Pt has only voided 250ml for the shift. Bladder scan shows 475ml. New orders for a straight cath received. Pt refused straight cath. Educated by Tianna Garnett and additional RN. Pt stated \"I understand\" and still refused.

## 2021-12-02 NOTE — PROGRESS NOTES
END OF SHIFT NOTE:    INTAKE/OUTPUT  12/01 0701 - 12/02 0700  In: 840 [P.O.:840]  Out: 1225 [Urine:1225]  Voiding: YES  Catheter: NO  Drain:   Condom Catheter 11/29/21 (Active)   Status Draining 12/02/21 0243   Site Condition No abnormalities 12/02/21 0243   Drainage Tube Clipped to Bed Yes 12/02/21 0243   Catheter Secured to Thigh Yes 12/02/21 0243   Tamper Seal Intact No 12/02/21 0243   Bag Below Bladder/Not on Floor Yes 12/02/21 0243   Lack of Dependent Loop in Tubing Yes 12/02/21 0243   Drainage Bag Less Than Half Full Yes 12/02/21 0243   Sterile Solution Used for  Irrigation N/A 12/02/21 0243   Urine Output (mL) 250 ml 12/02/21 0455               Flatus: Patient does have flatus present. Stool:  0 occurrences. Characteristics:  Stool Assessment  Stool Color: Brown  Stool Appearance: Soft  Stool Amount: Small  Stool Source/Status: Rectum    Emesis: 0 occurrences. Characteristics:        VITAL SIGNS  Patient Vitals for the past 12 hrs:   Temp Pulse Resp BP SpO2   12/02/21 0344 98 °F (36.7 °C) 67 19 (!) 150/69 96 %   12/01/21 2219 98.1 °F (36.7 °C) 69 19 (!) 181/74 95 %   12/01/21 1959 98 °F (36.7 °C) 66 15 (!) 161/82 96 %       Pain Assessment  Pain Intensity 1: 0 (12/02/21 0222)  Pain Location 1: Generalized  Pain Intervention(s) 1: Medication (see MAR)  Patient Stated Pain Goal: 4    Ambulating  No    Shift report given to oncoming nurse at the bedside.     Td Woodard, NALLELY
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
## 2021-12-02 NOTE — PROGRESS NOTES
ACUTE OT GOALS:  (Developed with and agreed upon by patient and/or caregiver.)  1. Patient will bathe and dress total body with minimal assistance and adaptive device as needed. 2. Patient will toilet with stand by assistance and adaptive device as needed. 3. Patient will tolerate 30 minutes of OT treatment with up to 2 rest breaks to increase activity tolerance for ADLs. 4. Patient will complete functional transfers with stand by assistance. 5. Patient will complete functional mobility for ADLs with contact guard assistance. 6. Patient will demonstrate improved safety awareness for ADLs by completing functional task with no cueing from therapist.   Timeframe: 7 visits     OCCUPATIONAL THERAPY: Daily Note and am OT Treatment Day # 4    Tom Saucedo is a 78 y.o. male   PRIMARY DIAGNOSIS: Acute metabolic encephalopathy  Acute cholecystitis [K81.0]  Procedure(s) (LRB):  ESOPHAGOGASTRODUODENOSCOPY (EGD)/BMI 30 ROOM 222 (N/A)  ESOPHAGOGASTRODUODENAL (EGD) BIOPSY (N/A)  9 Days Post-Op  [place] / Plan: [place] ONLY / Product Type: Medicare /   ASSESSMENT:     REHAB RECOMMENDATIONS: CURRENT LEVEL OF FUNCTION:  (Most Recently Demonstrated)   Recommendation to date pending progress:  Setting:   Short-term Rehab  Equipment:    To Be Determined Bathing:   Not tested  Dressing:   Not tested  Feeding/Grooming:   CGA to Min A for standing balance at sinkside. Toileting:   Moderate Assistance brief management/wiping. Functional Mobility:   CGA to Min A x 1 x RW     ASSESSMENT:  Mr. Burton Kovacs presents alert and motivated to work with therapy this am with good progress towards acute care OT goals. Received on RA with O2 sats 93-94% at rest and 92-93% following functional mobility. Requires SBA to complete transfer to EOB. Requires CGA x RW to complete sit <> stand and CGA x RW to complete in room and hallway mobility to increase activity tolerance for household distances.  Requires one seated rest break in hallway due to increased fatigue. Of note, pt requires cueing to hold head upright and bring pelvis forward to improve standing posture and balance. In addition, pt requires cueing to maintain close self to walker proximity. Requires CGA x RW to urinate in standing with use in urinal. Requires CGA x RW to complete stand <> sit in preparation for bowel movement. Requires CGA x BHR to complete sit <> stand and Mod A to complete perineal wiping and brief management in standing. Requires CGA x RW to walk from commode to sink and close CGA to Min A  to wash hands due to decreased dynamic standing posture and balance from increased fatigue. Requires CGA x RW to urinate additional time in standing. PT in room to assist with pt balance for brief change and to assist pt with return to chair (CGA to Min A x 1) to return to recliner chair and complete stand <> sit. Pt would benefit from continued skilled OT to increase independence and safety for performance of ADLs and functional mobility. SUBJECTIVE:   Mr. Manuel Caldwell states, \"I''ll show you my moves. \"    SOCIAL HISTORY/LIVING ENVIRONMENT:  Hx PTSD. Pt with AMS, unable to provide PLOF. Per chart came from independent living facility. Pt questionable historian, reports independent baseline, ambulates with cane prn. Home Environment: Other (comment)  One/Two Story Residence: Other (Comment)  Living Alone: No  Support Systems: Other (Comment)    OBJECTIVE:     PAIN: VITAL SIGNS: LINES/DRAINS:   Pre Treatment: Pain Screen  Pain Scale 1: Numeric (0 - 10)  Pain Intensity 1: 0  Post Treatment: 0 Vital Signs  O2 Sat (%): 94 %  O2 Device: None (Room air) IV  O2 Device: None (Room air)     ACTIVITIES OF DAILY LIVING: I Mod I S SBA CGA Min Mod Max Total NT Comments   BASIC ADLs:              Bathing/ Showering [] [] [] [] [] [] [] [] [] [x]    Toileting [] [] [] [] [] [] [x] [] [] [] Brief management and wiping.    Dressing [] [] [] [] [] [] [x] [] [] [] Brief management. Feeding [] [] [] [] [] [] [] [] [] [x]    Grooming [] [] [] [] [x] [x] [] [] [] [] Washing hands at sinkside. Personal Device Care [] [] [] [] [] [] [] [] [] [x]    Functional Mobility [] [] [] [] [x] [x] [] [] [] [] X RW   I=Independent, Mod I=Modified Independent, S=Supervision, SBA=Standby Assistance, CGA=Contact Guard Assistance,   Min=Minimal Assistance, Mod=Moderate Assistance, Max=Maximal Assistance, Total=Total Assistance, NT=Not Tested    MOBILITY: I Mod I S SBA CGA Min Mod Max Total  NT x2 Comments:   Supine to sit [] [] [] [x] [] [] [] [] [] [] [] With use of bed rail. Sit to supine [] [] [] [] [] [] [] [] [] [x] []    Sit to stand [] [] [] [] [x] [] [] [] [] [] [] CGA x RW   Bed to chair [] [] [] [] [x] [x] [] [] [] [] [] CGA to Min A x 1 x RW   I=Independent, Mod I=Modified Independent, S=Supervision, SBA=Standby Assistance, CGA=Contact Guard Assistance,   Min=Minimal Assistance, Mod=Moderate Assistance, Max=Maximal Assistance, Total=Total Assistance, NT=Not Tested    BALANCE: Good Fair+ Fair Fair- Poor NT Comments   Sitting Static [x] [] [] [] [] []    Sitting Dynamic [] [x] [] [] [] []              Standing Static [] [x] [x] [] [] []    Standing Dynamic [] [] [x] [x] [] [] Decreased posture and balance with increased duration in standing. PLAN:   FREQUENCY/DURATION: OT Plan of Care: 3 times/week for duration of hospital stay or until stated goals are met, whichever comes first.    TREATMENT:   TREATMENT:   Co-Treatment PT/OT necessary due to patient's decreased overall endurance/tolerance levels, as well as need for high level skilled assistance to complete functional transfers/mobility and functional tasks  Self Care (23 Minutes): Self care including Toileting, Lower Body Dressing, Grooming and Energy Conservation Training to increase independence, decrease level of assistance required and increase activity tolerance.   Neuromuscular Re-education (15 Minutes): Neuromuscular Re-education included Balance Training, Coordination training, Postural training, Sitting balance training and Standing balance training to improve Balance, Coordination and Postural Control. TREATMENT GRID:  N/A    AFTER TREATMENT POSITION/PRECAUTIONS:  Needs within reach, RN notified and walking to chair with PT.     INTERDISCIPLINARY COLLABORATION:  RN/PCT, PT/PTA and OT/GARCÍA    TOTAL TREATMENT DURATION:  OT Patient Time In/Time Out  Time In: 0922  Time Out: DELORES Meng/MARK

## 2021-12-02 NOTE — PROGRESS NOTES
CM following patient's chart. PT/OT recommending STR. Previous CM sent referral to [facility] at the request of patient's caregiver, Nicholas Christianas however, [facility] declined. CM then sent referral to [facility] accepted [facility]. CM met with patient with NP in the room to discuss STR. Patient very adamant about going home instead and very rudely and strongly declined STR, even after much encouragement patient asked CM and NP to \"get out of my room\". CM called and updated caregiver on information. She asked if CM will speak with patient about having HH. CM met with patient again who eventually agreed to having [facility] and agreeable to referral being sent to [facility] due to patient have secondary [place] insurance. Order placed, referral sent. Nicholas Hammond (patient's caregiver advised). Caregiver confirmed that patient sees a PCP at the [facility]. CM will continue to follow. 4:00 - Patient is up for discharge. Patient will be getting discharged home with [facility]. Patient needs transportation - CM arranged transporation via [facility] for 4:30. CM contacted patient's caregiver and advised her what time patient would be leaving. Patient and RN also notifeid. [facility] pw placed in patient's chart. No other needs have been identified at this time.      Care Management Interventions  PCP Verified by CM:  (unknown)  MyChart Signup: No  Discharge Durable Medical Equipment: No  Physical Therapy Consult: No  Occupational Therapy Consult: No  Speech Therapy Consult: No  Support Systems: Other (Comment)  Discharge Location  Discharge Placement: Home with home health ([facility] )

## 2021-12-02 NOTE — PROGRESS NOTES
END OF SHIFT NOTE:    INTAKE/OUTPUT  11/30 0701 - 12/01 0700  In: 360 [P.O.:360]  Out: 1600 [Urine:1600]  Voiding: YES  Catheter: NO  Drain:   Condom Catheter 11/29/21 (Active)   Status Draining; Patent 12/01/21 0712   Site Condition No abnormalities 12/01/21 0712   Drainage Tube Clipped to Bed Yes 12/01/21 0712   Catheter Secured to Thigh Yes 12/01/21 0712   Tamper Seal Intact No 12/01/21 0712   Bag Below Bladder/Not on Floor Yes 12/01/21 0712   Lack of Dependent Loop in Tubing Yes 12/01/21 0712   Drainage Bag Less Than Half Full Yes 12/01/21 5674   Sterile Solution Used for  Irrigation N/A 12/01/21 3423   Urine Output (mL) 975 ml 12/01/21 0935               Flatus: Patient does have flatus present. Stool:  2 occurrences. Characteristics:  Stool Assessment  Stool Color: Brown  Stool Appearance: Soft  Stool Amount: Small  Stool Source/Status: Rectum    Emesis: 0 occurrences. Characteristics:        VITAL SIGNS  Patient Vitals for the past 12 hrs:   Temp Pulse Resp BP SpO2   12/01/21 1511 98.2 °F (36.8 °C) 62 14 (!) 172/84 96 %   12/01/21 1239 -- -- -- 134/67 --   12/01/21 1131 98 °F (36.7 °C) 81 18 (!) 161/109 93 %       Pain Assessment  Pain Intensity 1: 0 (12/01/21 0900)  Pain Location 1: Generalized  Pain Intervention(s) 1: Medication (see MAR)  Patient Stated Pain Goal: 4    Ambulating  Refused      Shift report given to oncoming nurse at the bedside.     Alice Wilson RN

## 2021-12-02 NOTE — PROGRESS NOTES
Discharge instructions reviewed with patient. Prescriptions in chart with ambulance papers. Patient alert with some periods of confusion. Knows that he is going home and caregiver Katy Montague made awared by SW of his discharge and  time. Stated \"I am ready to go home\". Waiting in room, belongings packed.

## 2022-03-18 PROBLEM — G93.41 ACUTE METABOLIC ENCEPHALOPATHY: Status: ACTIVE | Noted: 2021-11-20

## 2022-03-18 PROBLEM — I10 PRIMARY HYPERTENSION: Status: ACTIVE | Noted: 2021-11-19

## 2022-03-19 PROBLEM — N17.9 AKI (ACUTE KIDNEY INJURY) (HCC): Status: ACTIVE | Noted: 2021-11-19

## 2022-03-19 PROBLEM — E53.8 B12 DEFICIENCY: Status: ACTIVE | Noted: 2021-11-21

## 2022-03-19 PROBLEM — I26.99 BILATERAL PULMONARY EMBOLISM (HCC): Status: ACTIVE | Noted: 2021-11-19

## 2022-03-19 PROBLEM — K92.2 GI BLEED: Status: ACTIVE | Noted: 2021-11-22

## 2022-03-20 PROBLEM — K81.0 ACUTE CHOLECYSTITIS: Status: ACTIVE | Noted: 2021-11-19

## 2022-03-20 PROBLEM — E87.20 LACTIC ACIDOSIS: Status: ACTIVE | Noted: 2021-11-19

## 2022-04-21 ENCOUNTER — APPOINTMENT (OUTPATIENT)
Dept: GENERAL RADIOLOGY | Age: 77
DRG: 077 | End: 2022-04-21
Attending: EMERGENCY MEDICINE
Payer: MEDICARE

## 2022-04-21 ENCOUNTER — HOSPITAL ENCOUNTER (INPATIENT)
Age: 77
LOS: 3 days | Discharge: HOME HEALTH | DRG: 077 | End: 2022-04-24
Attending: EMERGENCY MEDICINE | Admitting: FAMILY MEDICINE
Payer: MEDICARE

## 2022-04-21 ENCOUNTER — APPOINTMENT (OUTPATIENT)
Dept: CT IMAGING | Age: 77
DRG: 077 | End: 2022-04-21
Attending: EMERGENCY MEDICINE
Payer: MEDICARE

## 2022-04-21 DIAGNOSIS — I10 HYPERTENSION, UNSPECIFIED TYPE: ICD-10-CM

## 2022-04-21 DIAGNOSIS — R79.89 ELEVATED LACTIC ACID LEVEL: ICD-10-CM

## 2022-04-21 DIAGNOSIS — R41.82 ALTERED MENTAL STATUS, UNSPECIFIED ALTERED MENTAL STATUS TYPE: Primary | ICD-10-CM

## 2022-04-21 PROBLEM — I67.4 HYPERTENSIVE ENCEPHALOPATHY: Status: ACTIVE | Noted: 2022-04-21

## 2022-04-21 LAB
ALBUMIN SERPL-MCNC: 3.7 G/DL (ref 3.2–4.6)
ALBUMIN/GLOB SERPL: 1.2 {RATIO} (ref 1.2–3.5)
ALP SERPL-CCNC: 84 U/L (ref 50–136)
ALT SERPL-CCNC: 23 U/L (ref 12–65)
AMPHET UR QL SCN: NEGATIVE
ANION GAP SERPL CALC-SCNC: 6 MMOL/L (ref 7–16)
APPEARANCE UR: CLEAR
AST SERPL-CCNC: 17 U/L (ref 15–37)
BARBITURATES UR QL SCN: NEGATIVE
BASOPHILS # BLD: 0.1 K/UL (ref 0–0.2)
BASOPHILS NFR BLD: 1 % (ref 0–2)
BENZODIAZ UR QL: NEGATIVE
BILIRUB SERPL-MCNC: 0.3 MG/DL (ref 0.2–1.1)
BILIRUB UR QL: NEGATIVE
BILIRUB UR QL: NEGATIVE
BUN SERPL-MCNC: 14 MG/DL (ref 8–23)
CALCIUM SERPL-MCNC: 9.5 MG/DL (ref 8.3–10.4)
CANNABINOIDS UR QL SCN: NEGATIVE
CHLORIDE SERPL-SCNC: 111 MMOL/L (ref 98–107)
CO2 SERPL-SCNC: 27 MMOL/L (ref 21–32)
COCAINE UR QL SCN: NEGATIVE
COLOR UR: YELLOW
CREAT SERPL-MCNC: 1.2 MG/DL (ref 0.8–1.5)
DIFFERENTIAL METHOD BLD: NORMAL
EOSINOPHIL # BLD: 0.2 K/UL (ref 0–0.8)
EOSINOPHIL NFR BLD: 2 % (ref 0.5–7.8)
ERYTHROCYTE [DISTWIDTH] IN BLOOD BY AUTOMATED COUNT: 14.6 % (ref 11.9–14.6)
ETHANOL SERPL-MCNC: 5 MG/DL
GLOBULIN SER CALC-MCNC: 3.2 G/DL (ref 2.3–3.5)
GLUCOSE SERPL-MCNC: 87 MG/DL (ref 65–100)
GLUCOSE UR QL STRIP.AUTO: NEGATIVE MG/DL
GLUCOSE UR STRIP.AUTO-MCNC: NEGATIVE MG/DL
HCT VFR BLD AUTO: 44.3 % (ref 41.1–50.3)
HGB BLD-MCNC: 14.7 G/DL (ref 13.6–17.2)
HGB UR QL STRIP: NEGATIVE
IMM GRANULOCYTES # BLD AUTO: 0 K/UL (ref 0–0.5)
IMM GRANULOCYTES NFR BLD AUTO: 1 % (ref 0–5)
KETONES UR QL STRIP.AUTO: NEGATIVE MG/DL
KETONES UR-MCNC: NEGATIVE MG/DL
LACTATE SERPL-SCNC: 1.1 MMOL/L (ref 0.4–2)
LACTATE SERPL-SCNC: 3.3 MMOL/L (ref 0.4–2)
LEUKOCYTE ESTERASE UR QL STRIP.AUTO: NEGATIVE
LEUKOCYTE ESTERASE UR QL STRIP: NEGATIVE
LYMPHOCYTES # BLD: 1.6 K/UL (ref 0.5–4.6)
LYMPHOCYTES NFR BLD: 19 % (ref 13–44)
MCH RBC QN AUTO: 29.3 PG (ref 26.1–32.9)
MCHC RBC AUTO-ENTMCNC: 33.2 G/DL (ref 31.4–35)
MCV RBC AUTO: 88.4 FL (ref 79.6–97.8)
METHADONE UR QL: NEGATIVE
MONOCYTES # BLD: 0.8 K/UL (ref 0.1–1.3)
MONOCYTES NFR BLD: 9 % (ref 4–12)
NEUTS SEG # BLD: 5.6 K/UL (ref 1.7–8.2)
NEUTS SEG NFR BLD: 68 % (ref 43–78)
NITRITE UR QL STRIP.AUTO: NEGATIVE
NITRITE UR QL: NEGATIVE
NRBC # BLD: 0 K/UL (ref 0–0.2)
OPIATES UR QL: NEGATIVE
PCP UR QL: NEGATIVE
PH UR STRIP: 6 [PH] (ref 5–9)
PH UR: 6.5 [PH] (ref 5–9)
PLATELET # BLD AUTO: 224 K/UL (ref 150–450)
PMV BLD AUTO: 9.5 FL (ref 9.4–12.3)
POTASSIUM SERPL-SCNC: 3.2 MMOL/L (ref 3.5–5.1)
PROT SERPL-MCNC: 6.9 G/DL (ref 6.3–8.2)
PROT UR QL: 100 MG/DL
PROT UR STRIP-MCNC: ABNORMAL MG/DL
RBC # BLD AUTO: 5.01 M/UL (ref 4.23–5.6)
RBC # UR STRIP: NEGATIVE /UL
SERVICE CMNT-IMP: ABNORMAL
SODIUM SERPL-SCNC: 144 MMOL/L (ref 136–145)
SP GR UR REFRACTOMETRY: 1.02 (ref 1–1.02)
SP GR UR: 1.02 (ref 1–1.02)
TROPONIN-HIGH SENSITIVITY: 13.6 PG/ML (ref 0–14)
TROPONIN-HIGH SENSITIVITY: 16.4 PG/ML (ref 0–14)
TSH SERPL DL<=0.005 MIU/L-ACNC: 1.28 UIU/ML (ref 0.36–3.74)
UROBILINOGEN UR QL STRIP.AUTO: 0.2 EU/DL (ref 0.2–1)
UROBILINOGEN UR QL: 0.2 EU/DL (ref 0.2–1)
WBC # BLD AUTO: 8.2 K/UL (ref 4.3–11.1)

## 2022-04-21 PROCEDURE — 93005 ELECTROCARDIOGRAM TRACING: CPT | Performed by: FAMILY MEDICINE

## 2022-04-21 PROCEDURE — 93005 ELECTROCARDIOGRAM TRACING: CPT | Performed by: EMERGENCY MEDICINE

## 2022-04-21 PROCEDURE — 74011000258 HC RX REV CODE- 258: Performed by: EMERGENCY MEDICINE

## 2022-04-21 PROCEDURE — 96375 TX/PRO/DX INJ NEW DRUG ADDON: CPT

## 2022-04-21 PROCEDURE — 81003 URINALYSIS AUTO W/O SCOPE: CPT

## 2022-04-21 PROCEDURE — 71045 X-RAY EXAM CHEST 1 VIEW: CPT

## 2022-04-21 PROCEDURE — 85025 COMPLETE CBC W/AUTO DIFF WBC: CPT

## 2022-04-21 PROCEDURE — 74011000250 HC RX REV CODE- 250: Performed by: FAMILY MEDICINE

## 2022-04-21 PROCEDURE — 74011250636 HC RX REV CODE- 250/636: Performed by: FAMILY MEDICINE

## 2022-04-21 PROCEDURE — 80307 DRUG TEST PRSMV CHEM ANLYZR: CPT

## 2022-04-21 PROCEDURE — 74011250637 HC RX REV CODE- 250/637: Performed by: FAMILY MEDICINE

## 2022-04-21 PROCEDURE — 84443 ASSAY THYROID STIM HORMONE: CPT

## 2022-04-21 PROCEDURE — 84484 ASSAY OF TROPONIN QUANT: CPT

## 2022-04-21 PROCEDURE — 65270000029 HC RM PRIVATE

## 2022-04-21 PROCEDURE — 99285 EMERGENCY DEPT VISIT HI MDM: CPT

## 2022-04-21 PROCEDURE — 74011250636 HC RX REV CODE- 250/636: Performed by: EMERGENCY MEDICINE

## 2022-04-21 PROCEDURE — 96365 THER/PROPH/DIAG IV INF INIT: CPT

## 2022-04-21 PROCEDURE — 86580 TB INTRADERMAL TEST: CPT | Performed by: FAMILY MEDICINE

## 2022-04-21 PROCEDURE — 83605 ASSAY OF LACTIC ACID: CPT

## 2022-04-21 PROCEDURE — 70450 CT HEAD/BRAIN W/O DYE: CPT

## 2022-04-21 PROCEDURE — 80053 COMPREHEN METABOLIC PANEL: CPT

## 2022-04-21 PROCEDURE — 82077 ASSAY SPEC XCP UR&BREATH IA: CPT

## 2022-04-21 RX ORDER — POLYETHYLENE GLYCOL 3350 17 G/17G
17 POWDER, FOR SOLUTION ORAL DAILY PRN
Status: DISCONTINUED | OUTPATIENT
Start: 2022-04-21 | End: 2022-04-24 | Stop reason: HOSPADM

## 2022-04-21 RX ORDER — LORAZEPAM 2 MG/ML
1 INJECTION INTRAMUSCULAR
Status: COMPLETED | OUTPATIENT
Start: 2022-04-21 | End: 2022-04-21

## 2022-04-21 RX ORDER — LOSARTAN POTASSIUM 50 MG/1
100 TABLET ORAL DAILY
Status: DISCONTINUED | OUTPATIENT
Start: 2022-04-22 | End: 2022-04-24 | Stop reason: HOSPADM

## 2022-04-21 RX ORDER — SODIUM CHLORIDE AND POTASSIUM CHLORIDE .9; .15 G/100ML; G/100ML
SOLUTION INTRAVENOUS CONTINUOUS
Status: DISPENSED | OUTPATIENT
Start: 2022-04-21 | End: 2022-04-22

## 2022-04-21 RX ORDER — TAMSULOSIN HYDROCHLORIDE 0.4 MG/1
0.4 CAPSULE ORAL DAILY
Status: DISCONTINUED | OUTPATIENT
Start: 2022-04-22 | End: 2022-04-24 | Stop reason: HOSPADM

## 2022-04-21 RX ORDER — ACETAMINOPHEN 325 MG/1
650 TABLET ORAL
Status: DISCONTINUED | OUTPATIENT
Start: 2022-04-21 | End: 2022-04-24 | Stop reason: HOSPADM

## 2022-04-21 RX ORDER — HALOPERIDOL 5 MG/ML
5 INJECTION INTRAMUSCULAR ONCE
Status: COMPLETED | OUTPATIENT
Start: 2022-04-21 | End: 2022-04-21

## 2022-04-21 RX ORDER — PANTOPRAZOLE SODIUM 40 MG/1
40 TABLET, DELAYED RELEASE ORAL DAILY
Status: DISCONTINUED | OUTPATIENT
Start: 2022-04-22 | End: 2022-04-24 | Stop reason: HOSPADM

## 2022-04-21 RX ORDER — HYDRALAZINE HYDROCHLORIDE 10 MG/1
10 TABLET, FILM COATED ORAL 3 TIMES DAILY
Status: DISCONTINUED | OUTPATIENT
Start: 2022-04-21 | End: 2022-04-22

## 2022-04-21 RX ORDER — SODIUM CHLORIDE 0.9 % (FLUSH) 0.9 %
5-40 SYRINGE (ML) INJECTION EVERY 8 HOURS
Status: DISCONTINUED | OUTPATIENT
Start: 2022-04-21 | End: 2022-04-24 | Stop reason: HOSPADM

## 2022-04-21 RX ORDER — DILTIAZEM HYDROCHLORIDE 120 MG/1
120 CAPSULE, COATED, EXTENDED RELEASE ORAL DAILY
Status: DISCONTINUED | OUTPATIENT
Start: 2022-04-22 | End: 2022-04-24 | Stop reason: HOSPADM

## 2022-04-21 RX ORDER — ACETAMINOPHEN 650 MG/1
650 SUPPOSITORY RECTAL
Status: DISCONTINUED | OUTPATIENT
Start: 2022-04-21 | End: 2022-04-24 | Stop reason: HOSPADM

## 2022-04-21 RX ORDER — SODIUM CHLORIDE 0.9 % (FLUSH) 0.9 %
5-40 SYRINGE (ML) INJECTION AS NEEDED
Status: DISCONTINUED | OUTPATIENT
Start: 2022-04-21 | End: 2022-04-24 | Stop reason: HOSPADM

## 2022-04-21 RX ORDER — HYDRALAZINE HYDROCHLORIDE 20 MG/ML
20 INJECTION INTRAMUSCULAR; INTRAVENOUS
Status: COMPLETED | OUTPATIENT
Start: 2022-04-21 | End: 2022-04-21

## 2022-04-21 RX ORDER — QUETIAPINE FUMARATE 25 MG/1
25 TABLET, FILM COATED ORAL
Status: DISCONTINUED | OUTPATIENT
Start: 2022-04-21 | End: 2022-04-24 | Stop reason: HOSPADM

## 2022-04-21 RX ADMIN — POTASSIUM CHLORIDE AND SODIUM CHLORIDE: 900; 150 INJECTION, SOLUTION INTRAVENOUS at 22:52

## 2022-04-21 RX ADMIN — HYDRALAZINE HYDROCHLORIDE 20 MG: 20 INJECTION INTRAMUSCULAR; INTRAVENOUS at 18:16

## 2022-04-21 RX ADMIN — HALOPERIDOL LACTATE 5 MG: 5 INJECTION, SOLUTION INTRAMUSCULAR at 19:11

## 2022-04-21 RX ADMIN — SODIUM CHLORIDE, PRESERVATIVE FREE 10 ML: 5 INJECTION INTRAVENOUS at 22:54

## 2022-04-21 RX ADMIN — LORAZEPAM 1 MG: 2 INJECTION INTRAMUSCULAR; INTRAVENOUS at 19:34

## 2022-04-21 RX ADMIN — PIPERACILLIN SODIUM AND TAZOBACTAM SODIUM 4.5 G: 4; .5 INJECTION, POWDER, LYOPHILIZED, FOR SOLUTION INTRAVENOUS at 19:14

## 2022-04-21 RX ADMIN — APIXABAN 5 MG: 5 TABLET, FILM COATED ORAL at 22:52

## 2022-04-21 RX ADMIN — QUETIAPINE FUMARATE 25 MG: 25 TABLET ORAL at 22:52

## 2022-04-21 RX ADMIN — HYDRALAZINE HYDROCHLORIDE 10 MG: 10 TABLET, FILM COATED ORAL at 22:53

## 2022-04-21 RX ADMIN — Medication 5 UNITS: at 22:53

## 2022-04-21 NOTE — ED NOTES
Patient screaming in room, \"what the fuck is this! ? Im going to punch you in the face! \". This rn attempted to redirect patient. Patient not receptive to redirection at this time. Patient continues to yell at this RN and says \"go fuck off\". Patient states \"im going to patricia that  he shouldn't have done that\". Patient continues with tangential speech and confusion/disorientation.  MD Notified

## 2022-04-21 NOTE — ED TRIAGE NOTES
Patient arrives from assisted living home, for increased confusion, urinary frequency and incontinence. EMS reports patient is uncooperative, agitated, but able to answer all orientation questions appropriately.

## 2022-04-21 NOTE — H&P
Hospitalist Admission History and Physical     NAME:  Ann Marie Hilton   Age:  78 y.o.  :   1945   MRN:   [id number]  PCP: Unknown, Provider, MD  Consulting MD:  Treatment Team: Attending Provider: Emilia Anderson DO; Primary Nurse: Yaniv Guillen, RN; Respiratory Therapist: Dennis Hudson, RT    Chief Complaint   Patient presents with    Altered mental status    Urinary Pain         HPI:   Patient is a 78 y.o. male who presented to the ED for AMS. History is limited due to patient's confusion and no papers brought with him. Hx of HTN, possible dementia, bilateral PE, and discharge from our facility on 21 for TONIA, AMS, and bilateral PE. I am unsure if he has been taking his medications at home. Received Haldol in ER due to outbursts so he is very sleepy when I see him. Vitals - BP elevated as high as 230/91    Labs - benign other than K 3.2 and lactic acid 3.3, TSH normal    CT head - Again-well evident aging/chronic microischemia findings with  generalized cerebral volume loss. Past Medical History:   Diagnosis Date    Asthma     B12 deficiency 2021    Hypertension         No past surgical history on file. No family history on file. Family history reviewed and negative except as noted above. Social History     Social History Narrative    Not on file        Social History     Tobacco Use    Smoking status: Not on file    Smokeless tobacco: Not on file   Substance Use Topics    Alcohol use: Not on file        Social History     Substance and Sexual Activity   Drug Use Not on file         No Known Allergies    Prior to Admission medications    Not on File           Review of Systems    Cannot obtain due to AMS.        Objective:     Patient Vitals for the past 24 hrs:   Temp Pulse Resp BP SpO2   22 1937 -- 72 18 (!) 207/105 98 %   22 1847 -- 63 14 -- 99 %   22 1845 -- 62 15 (!) 144/122 --   22 1826 -- 60 17 (!) 182/77 --   22 1748 -- (!) 56 17 (!) 206/101 97 %   04/21/22 1724 98.7 °F (37.1 °C) 64 18 (!) 230/91 98 %        No intake/output data recorded. No intake/output data recorded. Data Review:   Recent Results (from the past 24 hour(s))   CBC WITH AUTOMATED DIFF    Collection Time: 04/21/22  5:33 PM   Result Value Ref Range    WBC 8.2 4.3 - 11.1 K/uL    RBC 5.01 4.23 - 5.6 M/uL    HGB 14.7 13.6 - 17.2 g/dL    HCT 44.3 41.1 - 50.3 %    MCV 88.4 79.6 - 97.8 FL    MCH 29.3 26.1 - 32.9 PG    MCHC 33.2 31.4 - 35.0 g/dL    RDW 14.6 11.9 - 14.6 %    PLATELET 928 931 - 226 K/uL    MPV 9.5 9.4 - 12.3 FL    ABSOLUTE NRBC 0.00 0.0 - 0.2 K/uL    DF AUTOMATED      NEUTROPHILS 68 43 - 78 %    LYMPHOCYTES 19 13 - 44 %    MONOCYTES 9 4.0 - 12.0 %    EOSINOPHILS 2 0.5 - 7.8 %    BASOPHILS 1 0.0 - 2.0 %    IMMATURE GRANULOCYTES 1 0.0 - 5.0 %    ABS. NEUTROPHILS 5.6 1.7 - 8.2 K/UL    ABS. LYMPHOCYTES 1.6 0.5 - 4.6 K/UL    ABS. MONOCYTES 0.8 0.1 - 1.3 K/UL    ABS. EOSINOPHILS 0.2 0.0 - 0.8 K/UL    ABS. BASOPHILS 0.1 0.0 - 0.2 K/UL    ABS. IMM. GRANS. 0.0 0.0 - 0.5 K/UL   METABOLIC PANEL, COMPREHENSIVE    Collection Time: 04/21/22  5:33 PM   Result Value Ref Range    Sodium 144 136 - 145 mmol/L    Potassium 3.2 (L) 3.5 - 5.1 mmol/L    Chloride 111 (H) 98 - 107 mmol/L    CO2 27 21 - 32 mmol/L    Anion gap 6 (L) 7 - 16 mmol/L    Glucose 87 65 - 100 mg/dL    BUN 14 8 - 23 MG/DL    Creatinine 1.20 0.8 - 1.5 MG/DL    GFR est AA >60 >60 ml/min/1.73m2    GFR est non-AA >60 >60 ml/min/1.73m2    Calcium 9.5 8.3 - 10.4 MG/DL    Bilirubin, total 0.3 0.2 - 1.1 MG/DL    ALT (SGPT) 23 12 - 65 U/L    AST (SGOT) 17 15 - 37 U/L    Alk.  phosphatase 84 50 - 136 U/L    Protein, total 6.9 6.3 - 8.2 g/dL    Albumin 3.7 3.2 - 4.6 g/dL    Globulin 3.2 2.3 - 3.5 g/dL    A-G Ratio 1.2 1.2 - 3.5     TSH 3RD GENERATION    Collection Time: 04/21/22  5:33 PM   Result Value Ref Range    TSH 1.280 0.358 - 3.740 uIU/mL   LACTIC ACID    Collection Time: 04/21/22  5:33 PM   Result Value Ref Range    Lactic acid 3.3 (H) 0.4 - 2.0 MMOL/L   TROPONIN-HIGH SENSITIVITY    Collection Time: 04/21/22  5:33 PM   Result Value Ref Range    Troponin-High Sensitivity 13.6 0 - 14 pg/mL   POC URINE MACROSCOPIC    Collection Time: 04/21/22  5:36 PM   Result Value Ref Range    Spec. gravity (POC) 1.020 1.001 - 1.023      pH, urine  (POC) 6.5 5.0 - 9.0      Protein (POC) 100 (A) NEG mg/dL    Glucose, urine (POC) Negative NEG mg/dL    Ketones (POC) Negative NEG mg/dL    Bilirubin (POC) Negative NEG      Blood (POC) Negative NEG      Urobilinogen (POC) 0.2 0.2 - 1.0 EU/dL    Nitrite (POC) Negative NEG      Leukocyte esterase (POC) Negative NEG      Performed by Acosta Sotelo        Physical Exam:     General:  Sleepy, starting to wake up but mostly confused and goes back to sleep. Says he is cold   Eyes:  Conjunctivae/corneas clear. PERRL   Ears:  Normal    Nose: Nares normal.    Mouth/Throat: Lips, mucosa, and tongue normal. Teeth and gums normal.   Neck: no JVD. Back:   deferred. Lungs:   Clear to auscultation bilaterally. Heart:  Regular rate and rhythm, S1, S2 normal   Abdomen:   Soft, non-tender. Bowel sounds normal. No masses,  No organomegaly. Extremities: Extremities normal, atraumatic, no cyanosis or edema. Pulses: 2+ and symmetric all extremities. Skin: Skin color, texture, turgor normal. No rashes or lesions   Lymph nodes: Cervical, supraclavicular, and axillary nodes normal.   Neurologic: Cannot tell me the year or where he is located. Can tell me who the president is. Assessment and Plan     Principal Problem:    Hypertensive encephalopathy (4/21/2022)    Active Problems:    Bilateral pulmonary embolism ([address] (11/19/2021)      Lactic acidosis (11/19/2021)      Acute metabolic encephalopathy (59/03/2326)    HTN encephalopathy - starting to wake up and seems more calm. I called pharmacy but since he came from an assistant living facility, we cannot see what he has been taking.  Will restart medications given in December. Cardizem, ARB, hydralazine. Check UDS, UA, and ETOH level. Lactic acidosis - Level below 4 and no fever or leukocytosis. Do not think has an infection. Holding off on further abx. Repeat lactic acid pending. Will order blood cultures. Bilateral PEs- Eliquis BID    Dementia - Seroquel at night. May need PRN Haldol but he is pretty sleepy at this point so will hold off on this order for now. EKG to look at QTC.      PPD/PT/OT    Could not discuss code status with him so will make FULL for now    DVT prophylaxis - Eliquis    Signed By: Aurora Meeks DO   April 21, 2022

## 2022-04-21 NOTE — ED PROVIDER NOTES
45-year-old white male brought in by EMS from an assisted living facility. Report is mental status changes. EMS has left prior to my evaluation of the patient. I am unable to obtain any significant history from the patient. His responses to questioning are not reliable. We are unable to determine where he lives. The only history I have is from previous hospitalizations. He had an admission in 2019 for mental status changes and at that time he was thought to have possible dementia. Unable to confirm this at this time    The history is provided by the patient. Altered mental status     Urinary Pain          Past Medical History:   Diagnosis Date    Asthma     B12 deficiency 11/21/2021    Hypertension        No past surgical history on file. No family history on file. Social History     Socioeconomic History    Marital status: SINGLE     Spouse name: Not on file    Number of children: Not on file    Years of education: Not on file    Highest education level: Not on file   Occupational History    Not on file   Tobacco Use    Smoking status: Not on file    Smokeless tobacco: Not on file   Substance and Sexual Activity    Alcohol use: Not on file    Drug use: Not on file    Sexual activity: Not on file   Other Topics Concern    Not on file   Social History Narrative    Not on file     Social Determinants of Health     Financial Resource Strain:     Difficulty of Paying Living Expenses: Not on file   Food Insecurity:     Worried About Running Out of Food in the Last Year: Not on file    Mohinder of Food in the Last Year: Not on file   Transportation Needs:     Lack of Transportation (Medical): Not on file    Lack of Transportation (Non-Medical):  Not on file   Physical Activity:     Days of Exercise per Week: Not on file    Minutes of Exercise per Session: Not on file   Stress:     Feeling of Stress : Not on file   Social Connections:     Frequency of Communication with Friends and Family: Not on file    Frequency of Social Gatherings with Friends and Family: Not on file    Attends Sabianist Services: Not on file    Active Member of Clubs or Organizations: Not on file    Attends Club or Organization Meetings: Not on file    Marital Status: Not on file   Intimate Partner Violence:     Fear of Current or Ex-Partner: Not on file    Emotionally Abused: Not on file    Physically Abused: Not on file    Sexually Abused: Not on file   Housing Stability:     Unable to Pay for Housing in the Last Year: Not on file    Number of [place] in the Last Year: Not on file    Unstable Housing in the Last Year: Not on file         ALLERGIES: Patient has no known allergies. Review of Systems   Unable to perform ROS: Mental status change       Vitals:    04/21/22 1748 04/21/22 1826 04/21/22 1845 04/21/22 1847   BP: (!) 206/101 (!) 182/77 (!) 144/122    Pulse: (!) 56 60 62 63   Resp: 17 17 15 14   Temp:       SpO2: 97%   99%   Weight:       Height:                Physical Exam  Vitals and nursing note reviewed. Constitutional:       General: He is not in acute distress. Appearance: He is not ill-appearing. Comments: Intermittently agitated and yelling and cussing. HENT:      Head: Normocephalic and atraumatic. Nose: Nose normal.      Mouth/Throat:      Mouth: Mucous membranes are moist.   Eyes:      Conjunctiva/sclera: Conjunctivae normal.      Pupils: Pupils are equal, round, and reactive to light. Cardiovascular:      Rate and Rhythm: Normal rate and regular rhythm. Pulmonary:      Effort: Pulmonary effort is normal.      Breath sounds: Normal breath sounds. Abdominal:      General: There is no distension. Palpations: Abdomen is soft. Tenderness: There is no abdominal tenderness. There is no guarding. Musculoskeletal:         General: No swelling. Cervical back: Normal range of motion and neck supple. Skin:     General: Skin is warm and dry. Neurological:      Mental Status: He is alert. Comments: Patient is not able to accurately state his age or the year. He is able to state that he is in the hospital.  He is able to state his name. He does move all extremities and follows some simple commands. Psychiatric:         Behavior: Behavior is agitated. MDM  Number of Diagnoses or Management Options  Altered mental status, unspecified altered mental status type  Elevated lactic acid level  Hypertension, unspecified type  Diagnosis management comments: Blood work is unremarkable except for lactic acid 3.3. Urinalysis shows no infection. Chest x-ray no acute abnormality. Head CT preliminarily shows no acute abnormality. Patient is being treated with IV fluids and Zosyn for possible sepsis. Was also noted to be significantly hypertensive and was treated with hydralazine. Patient remained agitated yelling and cussing and threatening staff. For his own safety he was treated with 5 mg of haloperidol.   Hospitalist consulted for admission       Amount and/or Complexity of Data Reviewed  Clinical lab tests: ordered and reviewed  Tests in the radiology section of CPT®: ordered and reviewed  Tests in the medicine section of CPT®: ordered and reviewed  Discuss the patient with other providers: yes  Independent visualization of images, tracings, or specimens: yes    Risk of Complications, Morbidity, and/or Mortality  Presenting problems: moderate  Diagnostic procedures: moderate  Management options: moderate           EKG    Date/Time: 4/21/2022 7:28 PM  Performed by: Suzanne Harrison MD  Authorized by: Suzanne Harrison MD     ECG reviewed by ED Physician in the absence of a cardiologist: yes    Interpretation:     Interpretation: non-specific    Quality:     Tracing quality:  Limited by artifact  Rate:     ECG rate assessment: normal    Rhythm:     Rhythm: sinus rhythm    Ectopy:     Ectopy: none    QRS:     QRS axis: Normal  Conduction:     Conduction: normal    ST segments:     ST segments:  Non-specific

## 2022-04-21 NOTE — ED NOTES
Patient continues to yell out, yelling \"what the fuck are we doing\" who are you\" get me some meds! \" patient will not be redirected by this rn and continues to yell and attempt to remove cardiac monitor.  Provider aware

## 2022-04-22 LAB
ANION GAP SERPL CALC-SCNC: 7 MMOL/L (ref 7–16)
ATRIAL RATE: 0 BPM
BASOPHILS # BLD: 0.1 K/UL (ref 0–0.2)
BASOPHILS NFR BLD: 1 % (ref 0–2)
BUN SERPL-MCNC: 13 MG/DL (ref 8–23)
CALCIUM SERPL-MCNC: 9.5 MG/DL (ref 8.3–10.4)
CALCULATED P AXIS, ECG09: 139 DEGREES
CALCULATED R AXIS, ECG10: 50 DEGREES
CALCULATED T AXIS, ECG11: -133 DEGREES
CHLORIDE SERPL-SCNC: 114 MMOL/L (ref 98–107)
CO2 SERPL-SCNC: 24 MMOL/L (ref 21–32)
CREAT SERPL-MCNC: 1 MG/DL (ref 0.8–1.5)
DIAGNOSIS, 93000: NORMAL
DIFFERENTIAL METHOD BLD: NORMAL
EOSINOPHIL # BLD: 0.2 K/UL (ref 0–0.8)
EOSINOPHIL NFR BLD: 2 % (ref 0.5–7.8)
ERYTHROCYTE [DISTWIDTH] IN BLOOD BY AUTOMATED COUNT: 14.6 % (ref 11.9–14.6)
GLUCOSE SERPL-MCNC: 79 MG/DL (ref 65–100)
HCT VFR BLD AUTO: 44.3 % (ref 41.1–50.3)
HGB BLD-MCNC: 14.7 G/DL (ref 13.6–17.2)
IMM GRANULOCYTES # BLD AUTO: 0 K/UL (ref 0–0.5)
IMM GRANULOCYTES NFR BLD AUTO: 0 % (ref 0–5)
LYMPHOCYTES # BLD: 1.5 K/UL (ref 0.5–4.6)
LYMPHOCYTES NFR BLD: 19 % (ref 13–44)
MCH RBC QN AUTO: 28.9 PG (ref 26.1–32.9)
MCHC RBC AUTO-ENTMCNC: 33.2 G/DL (ref 31.4–35)
MCV RBC AUTO: 87 FL (ref 79.6–97.8)
MM INDURATION POC: 0 MM (ref 0–5)
MONOCYTES # BLD: 0.8 K/UL (ref 0.1–1.3)
MONOCYTES NFR BLD: 10 % (ref 4–12)
NEUTS SEG # BLD: 5.6 K/UL (ref 1.7–8.2)
NEUTS SEG NFR BLD: 69 % (ref 43–78)
NRBC # BLD: 0 K/UL (ref 0–0.2)
P-R INTERVAL, ECG05: 186 MS
PLATELET # BLD AUTO: 212 K/UL (ref 150–450)
PMV BLD AUTO: 10.3 FL (ref 9.4–12.3)
POTASSIUM SERPL-SCNC: 3.2 MMOL/L (ref 3.5–5.1)
PPD POC: NEGATIVE NEGATIVE
Q-T INTERVAL, ECG07: 419 MS
QRS DURATION, ECG06: 96 MS
QTC CALCULATION (BEZET), ECG08: 415 MS
RBC # BLD AUTO: 5.09 M/UL (ref 4.23–5.6)
SARS-COV-2, COV2: NORMAL
SODIUM SERPL-SCNC: 145 MMOL/L (ref 138–145)
VENTRICULAR RATE, ECG03: 59 BPM
WBC # BLD AUTO: 8.2 K/UL (ref 4.3–11.1)

## 2022-04-22 PROCEDURE — 74011250636 HC RX REV CODE- 250/636: Performed by: FAMILY MEDICINE

## 2022-04-22 PROCEDURE — U0005 INFEC AGEN DETEC AMPLI PROBE: HCPCS

## 2022-04-22 PROCEDURE — 2709999900 HC NON-CHARGEABLE SUPPLY

## 2022-04-22 PROCEDURE — 85025 COMPLETE CBC W/AUTO DIFF WBC: CPT

## 2022-04-22 PROCEDURE — 94760 N-INVAS EAR/PLS OXIMETRY 1: CPT

## 2022-04-22 PROCEDURE — 36415 COLL VENOUS BLD VENIPUNCTURE: CPT

## 2022-04-22 PROCEDURE — 97530 THERAPEUTIC ACTIVITIES: CPT

## 2022-04-22 PROCEDURE — 97165 OT EVAL LOW COMPLEX 30 MIN: CPT

## 2022-04-22 PROCEDURE — 87040 BLOOD CULTURE FOR BACTERIA: CPT

## 2022-04-22 PROCEDURE — 97535 SELF CARE MNGMENT TRAINING: CPT

## 2022-04-22 PROCEDURE — 97162 PT EVAL MOD COMPLEX 30 MIN: CPT

## 2022-04-22 PROCEDURE — 74011000258 HC RX REV CODE- 258: Performed by: FAMILY MEDICINE

## 2022-04-22 PROCEDURE — 74011250637 HC RX REV CODE- 250/637: Performed by: FAMILY MEDICINE

## 2022-04-22 PROCEDURE — 80048 BASIC METABOLIC PNL TOTAL CA: CPT

## 2022-04-22 PROCEDURE — 65270000029 HC RM PRIVATE

## 2022-04-22 PROCEDURE — 74011250637 HC RX REV CODE- 250/637: Performed by: NURSE PRACTITIONER

## 2022-04-22 PROCEDURE — 74011000250 HC RX REV CODE- 250: Performed by: FAMILY MEDICINE

## 2022-04-22 RX ORDER — HYDRALAZINE HYDROCHLORIDE 20 MG/ML
10 INJECTION INTRAMUSCULAR; INTRAVENOUS
Status: DISCONTINUED | OUTPATIENT
Start: 2022-04-22 | End: 2022-04-24 | Stop reason: HOSPADM

## 2022-04-22 RX ORDER — HYDRALAZINE HYDROCHLORIDE 10 MG/1
10 TABLET, FILM COATED ORAL 3 TIMES DAILY
Status: DISCONTINUED | OUTPATIENT
Start: 2022-04-22 | End: 2022-04-22

## 2022-04-22 RX ORDER — HYDRALAZINE HYDROCHLORIDE 25 MG/1
25 TABLET, FILM COATED ORAL 3 TIMES DAILY
Status: DISCONTINUED | OUTPATIENT
Start: 2022-04-22 | End: 2022-04-22

## 2022-04-22 RX ORDER — FOLIC ACID 1 MG/1
1 TABLET ORAL DAILY
Status: DISCONTINUED | OUTPATIENT
Start: 2022-04-23 | End: 2022-04-24 | Stop reason: HOSPADM

## 2022-04-22 RX ORDER — HYDRALAZINE HYDROCHLORIDE 50 MG/1
50 TABLET, FILM COATED ORAL 3 TIMES DAILY
Status: DISCONTINUED | OUTPATIENT
Start: 2022-04-22 | End: 2022-04-22

## 2022-04-22 RX ORDER — HYDRALAZINE HYDROCHLORIDE 25 MG/1
25 TABLET, FILM COATED ORAL 3 TIMES DAILY
Status: DISCONTINUED | OUTPATIENT
Start: 2022-04-23 | End: 2022-04-23

## 2022-04-22 RX ORDER — POTASSIUM CHLORIDE 20 MEQ/1
40 TABLET, EXTENDED RELEASE ORAL
Status: COMPLETED | OUTPATIENT
Start: 2022-04-22 | End: 2022-04-22

## 2022-04-22 RX ADMIN — APIXABAN 5 MG: 5 TABLET, FILM COATED ORAL at 08:33

## 2022-04-22 RX ADMIN — THIAMINE HYDROCHLORIDE 500 MG: 100 INJECTION, SOLUTION INTRAMUSCULAR; INTRAVENOUS at 16:29

## 2022-04-22 RX ADMIN — QUETIAPINE FUMARATE 25 MG: 25 TABLET ORAL at 21:17

## 2022-04-22 RX ADMIN — HYDRALAZINE HYDROCHLORIDE 50 MG: 50 TABLET, FILM COATED ORAL at 15:19

## 2022-04-22 RX ADMIN — SODIUM CHLORIDE, PRESERVATIVE FREE 10 ML: 5 INJECTION INTRAVENOUS at 15:10

## 2022-04-22 RX ADMIN — SODIUM CHLORIDE, PRESERVATIVE FREE 10 ML: 5 INJECTION INTRAVENOUS at 21:17

## 2022-04-22 RX ADMIN — PANTOPRAZOLE SODIUM 40 MG: 40 TABLET, DELAYED RELEASE ORAL at 08:32

## 2022-04-22 RX ADMIN — SODIUM CHLORIDE, PRESERVATIVE FREE 10 ML: 5 INJECTION INTRAVENOUS at 05:47

## 2022-04-22 RX ADMIN — APIXABAN 5 MG: 5 TABLET, FILM COATED ORAL at 17:42

## 2022-04-22 RX ADMIN — HYDRALAZINE HYDROCHLORIDE 50 MG: 50 TABLET, FILM COATED ORAL at 08:33

## 2022-04-22 RX ADMIN — HYDRALAZINE HYDROCHLORIDE 10 MG: 20 INJECTION INTRAMUSCULAR; INTRAVENOUS at 19:57

## 2022-04-22 RX ADMIN — THIAMINE HYDROCHLORIDE 500 MG: 100 INJECTION, SOLUTION INTRAMUSCULAR; INTRAVENOUS at 21:24

## 2022-04-22 RX ADMIN — HYDRALAZINE HYDROCHLORIDE 10 MG: 10 TABLET, FILM COATED ORAL at 21:22

## 2022-04-22 RX ADMIN — LOSARTAN POTASSIUM 100 MG: 50 TABLET, FILM COATED ORAL at 08:33

## 2022-04-22 RX ADMIN — POTASSIUM CHLORIDE 40 MEQ: 20 TABLET, EXTENDED RELEASE ORAL at 08:33

## 2022-04-22 RX ADMIN — TAMSULOSIN HYDROCHLORIDE 0.4 MG: 0.4 CAPSULE ORAL at 08:33

## 2022-04-22 NOTE — PROGRESS NOTES
ACUTE PHYSICAL THERAPY GOALS:  (Developed with and agreed upon by patient and/or caregiver. )  LTG:  (1.)Mr. Ann will move from supine to sit and sit to supine , scoot up and down and roll side to side in bed with INDEPENDENCE within 7 treatment day(s). (2.)Mr. Ann will transfer from bed to chair and chair to bed with MODIFIED INDEPENDENCE using the least restrictive device within 7 treatment day(s). (3.)Mr. Ann will ambulate with MODIFIED INDEPENDENCE for a minimum of 500 feet with the least restrictive device within 7 treatment day(s). ________________________________________________________________________________________________      PHYSICAL THERAPY ASSESSMENT: Initial Assessment and Daily Note PT Treatment Day # 1      Ann Marie Hilton is a 78 y.o. male   PRIMARY DIAGNOSIS: Hypertensive encephalopathy  Hypertensive encephalopathy [I67.4]       Reason for Referral:    ICD-10: Treatment Diagnosis: Generalized Muscle Weakness (M62.81)  Other abnormalities of gait and mobility (R26.89)  [place]PAT[place] / Plan[place] ONLY / Product Type: Medicare /     ASSESSMENT:     REHAB RECOMMENDATIONS:   Recommendation to date pending progress:  Setting:   Short-term Rehab   with potential for return to [facility] with HHPT pending progress  Equipment:    To Be Determined     PRIOR LEVEL OF FUNCTION:  (Prior to Hospitalization) INITIAL/CURRENT LEVEL OF FUNCTION:  (Most Recently Demonstrated)   Bed Mobility:   Independent  Sit to Stand:   Modified Independent  Transfers:   Modified Independent  Gait/Mobility:   Modified Independent Bed Mobility:   Standby Assistance  With repetitive cues and additional time  Sit to Stand:   Minimal Assistance x2  Transfers:   Minimal Assistance x2  Gait/Mobility:   Minimal Assistance x2     ASSESSMENT:  Mr. Virginia Bridges presents with a diagnosis of hypertensive encephalopathy.  At baseline he states he lives in an residential and is independent with all ADLs and uses [address] for mobility. He states he hasn't had any falls. Pt is a questionable historian as he demonstrates some confusion and difficulty following commands. Pt requires repetitive verbal and tactile cueing to perform bed mobility and other functional mobility and min A x2 for all transfers/gait in order to assist with safety. He demonstrates decreased safety awareness, activity tolerance, strength and overall functional mobility and would continue to benefit from skilled acute care PT to address these deficits. Based upon his progression he may be able to safely return home with HHPT but at this visit he is appropriate for STR stay.       SUBJECTIVE:   Mr. Amelia Johnson states, \"I go by [employer]"    SOCIAL HISTORY/LIVING ENVIRONMENT: Pt lives on the 3rd floor of [facility], independent with ADLs and uses SPC     OBJECTIVE:     PAIN: VITAL SIGNS: LINES/DRAINS:   Pre Treatment: Pain Screen  Pain Scale 1: Numeric (0 - 10)  Pain Intensity 1: 0  Post Treatment: 0   IV  O2 Device: None (Room air)     GROSS EVALUATION:   Within Functional Limits Abnormal/ Functional Abnormal/ Non-Functional (see comments) Not Tested Comments:   AROM [] [x] [] []    PROM [] [] [] []    Strength [] [x] [] []    Balance [] [x] [] []    Posture [] [] [] []    Sensation [x] [] [] []    Coordination [] [] [] []    Tone [] [] [] []    Edema [] [] [] []    Activity Tolerance [] [x] [] []     [] [] [] []      COGNITION/  PERCEPTION: Intact Impaired   (see comments) Comments:   Orientation [] [x] Oriented to person and place only    Vision [x] []    Hearing [] [x] [place]   Command Following [] [x]    Safety Awareness [] [x]     [] []      MOBILITY: I Mod I S SBA CGA Min Mod Max Total  NT x2 Comments:   Bed Mobility    Rolling [] [] [] [] [] [] [] [] [] [x] []    Supine to Sit [] [] [] [x] [] [] [] [] [] [] [] Needs additional time and repetitive verbal and tactile cues   Scooting [] [] [] [x] [] [] [] [] [] [] []    Sit to Supine [] [] [] [] [] [] [] [] [] [x] []    Transfers    Sit to Stand [] [] [] [] [] [x] [] [] [] [] [x]    Bed to Chair [] [] [] [] [] [x] [] [] [] [] [x]    Stand to Sit [] [] [] [] [] [x] [] [] [] [] [x]    I=Independent, Mod I=Modified Independent, S=Supervision, SBA=Standby Assistance, CGA=Contact Guard Assistance,   Min=Minimal Assistance, Mod=Moderate Assistance, Max=Maximal Assistance, Total=Total Assistance, NT=Not Tested  GAIT: I Mod I S SBA CGA Min Mod Max Total  NT x2 Comments:   Level of Assistance [] [] [] [] [] [x] [] [] [] [] [x]    Distance 5'    DME HHA x2    Gait Quality Wide PORTIA, decreased step length B, lateral trunk sway    Weightbearing Status N/A     I=Independent, Mod I=Modified Independent, S=Supervision, SBA=Standby Assistance, CGA=Contact Guard Assistance,   Min=Minimal Assistance, Mod=Moderate Assistance, Max=Maximal Assistance, Total=Total Assistance, NT=Not Tested    [facility] Form       How much difficulty does the patient currently have. .. Unable A Lot A Little None   1. Turning over in bed (including adjusting bedclothes, sheets and blankets)? [] 1   [] 2   [x] 3   [] 4   2. Sitting down on and standing up from a chair with arms ( e.g., wheelchair, bedside commode, etc.)   [] 1   [] 2   [x] 3   [] 4   3. Moving from lying on back to sitting on the side of the bed? [] 1   [] 2   [x] 3   [] 4   How much help from another person does the patient currently need. .. Total A Lot A Little None   4. Moving to and from a bed to a chair (including a wheelchair)? [] 1   [] 2   [x] 3   [] 4   5. Need to walk in hospital room? [] 1   [x] 2   [] 3   [] 4   6. Climbing 3-5 steps with a railing? [] 1   [x] 2   [] 3   [] 4   © 2007, Trustees of [facility] [employer], under license to [employer].  All rights reserved     Score:  Initial: 16 Most Recent: X (Date: -- )    Interpretation of Tool:  Represents activities that are increasingly more difficult (i.e. Bed mobility, Transfers, Gait). PLAN:   FREQUENCY/DURATION: PT Plan of Care: 3 times/week for duration of hospital stay or until stated goals are met, whichever comes first.    PROBLEM LIST:   (Skilled intervention is medically necessary to address:)  1. Decreased ADL/Functional Activities  2. Decreased Activity Tolerance  3. Decreased Balance  4. Decreased Cognition  5. Decreased Coordination  6. Decreased Gait Ability  7. Decreased Strength  8. Decreased Transfer Abilities   INTERVENTIONS PLANNED:   (Benefits and precautions of physical therapy have been discussed with the patient.)  1. Therapeutic Activity  2. Therapeutic Exercise/HEP  3. Neuromuscular Re-education  4. Gait Training  5. Manual Therapy  6. Education     TREATMENT:     EVALUATION: Moderate Complexity : (Untimed Charge)    TREATMENT:   ($$ Therapeutic Activity: 8-22 mins    )  Co-Treatment PT/OT necessary due to patient's decreased overall endurance/tolerance levels, as well as need for high level skilled assistance to complete functional transfers/mobility and functional tasks  Therapeutic Activity (15 Minutes): Therapeutic activity included Supine to Sit, Scooting, Transfer Training, Ambulation on level ground, Sitting balance  and Standing balance to improve functional Mobility, Strength and Activity tolerance.     TREATMENT GRID:  N/A    AFTER TREATMENT POSITION/PRECAUTIONS:  Alarm Activated, Chair, Needs within reach and RN notified    INTERDISCIPLINARY COLLABORATION:  RN/PCT, PT/PTA and OT/GARCÍA    TOTAL TREATMENT DURATION:  PT Patient Time In/Time Out  Time In: 6037  Time Out: [address], [address]

## 2022-04-22 NOTE — PROGRESS NOTES
Hospitalist Progress Note   Admit Date:  2022  5:22 PM   Name:  Merle Cornejo   Age:  78 y.o. Sex:  male  :  1945   MRN:  [id number]   Room:  1/    Presenting Complaint: Altered mental status and Urinary Pain    Reason(s) for Admission: Hypertensive encephalopathy [facility] AT [facility] Course & Interval History:   Patient is a 78 y.o. male who presented to the ED from [facility] with AMS. History was limited due to patient's confusion and no papers brought with him. Hx of HTN, possible dementia, bilateral PE, and discharge from our facility on 21 for TONIA, AMS, and bilateral PE. Received Haldol in ER    CT Head showing aging/chronic microischemia findings with  generalized cerebral volume loss.       Subjective/24hr Events (22): Denies pain/distress. N/V subjective fevers    ROS:  10 systems reviewed and negative except as noted above. Assessment & Plan:     Principal Problem:  Hypertensive encephalopathy (2022)  Hypertensive Urgency  Hypertension  22  -Still with some intermittent confusion  -BP meds adjusted for tighter blood pressure control  -Check BP q 2 hours x 3  -Medication compliance could be the issue   -UA negative, no white count  -Therapy with recs for rehab    Active Problems:  Bilateral pulmonary embolism ([address] (2021)  -Continue Eliquis  -Sat appropriate on RA, denies CP     Lactic acidosis (2021)(Resolved)  -Lactic 1.1 from 3.3    B12 Deficiency  22  -B12 level in a.m.     Mild Hypokalemia  22  -K+ 3.2  -Replace  -BMP/Mg in a.m.            Discharge Planning:      Therapy with recs for STR    Diet:  ADULT DIET Easy to Chew; Low Fat/Low Chol/High Fiber/2 gm Na  DVT PPx: Eliquis   Code status: Full Code    Hospital Problems as of 2022 Date Reviewed: 2021          Codes Class Noted - Resolved POA    * (Principal) Hypertensive encephalopathy ICD-10-CM: I67.4  ICD-9-CM: 437.2  2022 - Present Unknown Acute metabolic encephalopathy VDN-82-SE: G93.41  ICD-9-CM: 348.31  11/20/2021 - Present Yes        Bilateral pulmonary embolism ([address] ICD-10-CM: I26.99  ICD-9-CM: 415.19  11/19/2021 - Present Yes        Lactic acidosis ICD-10-CM: E87.2  ICD-9-CM: 276.2  11/19/2021 - Present Yes              Objective:     Patient Vitals for the past 24 hrs:   Temp Pulse Resp BP SpO2   04/22/22 1308 97.7 °F (36.5 °C) 60 16 (!) 174/80 97 %   04/22/22 1023 -- (!) 56 16 (!) 145/59 97 %   04/22/22 0820 97.8 °F (36.6 °C) (!) 50 16 (!) 181/78 96 %   04/22/22 0056 97.8 °F (36.6 °C) -- 18 -- 98 %   04/21/22 2253 -- 78 -- (!) 183/110 --   04/21/22 2129 98.3 °F (36.8 °C) 78 20 (!) 183/110 99 %   04/21/22 2028 -- -- -- -- 97 %   04/21/22 2007 -- 75 21 (!) 181/74 97 %   04/21/22 1937 -- 72 18 (!) 207/105 98 %   04/21/22 1847 -- 63 14 -- 99 %   04/21/22 1845 -- 62 15 (!) 144/122 --   04/21/22 1826 -- 60 17 (!) 182/77 --   04/21/22 1748 -- (!) 56 17 (!) 206/101 97 %   04/21/22 1724 98.7 °F (37.1 °C) 64 18 (!) 230/91 98 %     Oxygen Therapy  O2 Sat (%): 97 % (04/22/22 1308)  Pulse via Oximetry: 62 beats per minute (04/21/22 1847)  O2 Device: None (Room air) (04/22/22 1308)    Estimated body mass index is 30.13 kg/m² as calculated from the following:    Height as of this encounter: 5' 10\" (1.778 m). Weight as of this encounter: 95.3 kg (210 lb). Intake/Output Summary (Last 24 hours) at 4/22/2022 1424  Last data filed at 4/21/2022 1944  Gross per 24 hour   Intake 100 ml   Output --   Net 100 ml         Physical Exam:     Blood pressure (!) 174/80, pulse 60, temperature 97.7 °F (36.5 °C), resp. rate 16, height 5' 10\" (1.778 m), weight 95.3 kg (210 lb), SpO2 97 %. General:    Well nourished. No overt distress, intermittent confusion  Head:  Normocephalic, atraumatic  Eyes:  Sclerae appear normal.  Pupils equally round. ENT:  Nares appear normal, no drainage. Moist oral mucosa  Neck:  No restricted ROM. Trachea midline   CV:   RRR.   No m/r/g.  No jugular venous distension. Lungs:   CTAB. No wheezing, rhonchi, or rales. Respirations even, unlabored  Abdomen: Bowel sounds present. Soft, nontender, nondistended. Extremities: No cyanosis or clubbing. No edema  Skin:     No rashes and normal coloration. Warm and dry. Neuro:  CN II-XII grossly intact. Sensation intact. A&Ox 2-3  Psych:  Normal mood and affect. I have reviewed ordered lab tests and independently visualized imaging below:    Recent Labs:  Recent Results (from the past 48 hour(s))   EKG, 12 LEAD, INITIAL    Collection Time: 04/21/22  5:29 PM   Result Value Ref Range    Ventricular Rate 59 BPM    Atrial Rate 0 BPM    P-R Interval 186 ms    QRS Duration 96 ms    Q-T Interval 419 ms    QTC Calculation (Bezet) 415 ms    Calculated P Axis 139 degrees    Calculated R Axis 50 degrees    Calculated T Axis -133 degrees    Diagnosis       Sinus or ectopic atrial rhythm  Probable LVH with secondary repol abnrm    Confirmed by Wendie Kc MD (), RAGHAVENDRA PEÑA ([id number]) on 4/22/2022 6:36:03 AM     CBC WITH AUTOMATED DIFF    Collection Time: 04/21/22  5:33 PM   Result Value Ref Range    WBC 8.2 4.3 - 11.1 K/uL    RBC 5.01 4.23 - 5.6 M/uL    HGB 14.7 13.6 - 17.2 g/dL    HCT 44.3 41.1 - 50.3 %    MCV 88.4 79.6 - 97.8 FL    MCH 29.3 26.1 - 32.9 PG    MCHC 33.2 31.4 - 35.0 g/dL    RDW 14.6 11.9 - 14.6 %    PLATELET 017 250 - 366 K/uL    MPV 9.5 9.4 - 12.3 FL    ABSOLUTE NRBC 0.00 0.0 - 0.2 K/uL    DF AUTOMATED      NEUTROPHILS 68 43 - 78 %    LYMPHOCYTES 19 13 - 44 %    MONOCYTES 9 4.0 - 12.0 %    EOSINOPHILS 2 0.5 - 7.8 %    BASOPHILS 1 0.0 - 2.0 %    IMMATURE GRANULOCYTES 1 0.0 - 5.0 %    ABS. NEUTROPHILS 5.6 1.7 - 8.2 K/UL    ABS. LYMPHOCYTES 1.6 0.5 - 4.6 K/UL    ABS. MONOCYTES 0.8 0.1 - 1.3 K/UL    ABS. EOSINOPHILS 0.2 0.0 - 0.8 K/UL    ABS. BASOPHILS 0.1 0.0 - 0.2 K/UL    ABS. IMM.  GRANS. 0.0 0.0 - 0.5 K/UL   METABOLIC PANEL, COMPREHENSIVE    Collection Time: 04/21/22  5:33 PM   Result Value Ref Range    Sodium 144 136 - 145 mmol/L    Potassium 3.2 (L) 3.5 - 5.1 mmol/L    Chloride 111 (H) 98 - 107 mmol/L    CO2 27 21 - 32 mmol/L    Anion gap 6 (L) 7 - 16 mmol/L    Glucose 87 65 - 100 mg/dL    BUN 14 8 - 23 MG/DL    Creatinine 1.20 0.8 - 1.5 MG/DL    GFR est AA >60 >60 ml/min/1.73m2    GFR est non-AA >60 >60 ml/min/1.73m2    Calcium 9.5 8.3 - 10.4 MG/DL    Bilirubin, total 0.3 0.2 - 1.1 MG/DL    ALT (SGPT) 23 12 - 65 U/L    AST (SGOT) 17 15 - 37 U/L    Alk.  phosphatase 84 50 - 136 U/L    Protein, total 6.9 6.3 - 8.2 g/dL    Albumin 3.7 3.2 - 4.6 g/dL    Globulin 3.2 2.3 - 3.5 g/dL    A-G Ratio 1.2 1.2 - 3.5     TSH 3RD GENERATION    Collection Time: 04/21/22  5:33 PM   Result Value Ref Range    TSH 1.280 0.358 - 3.740 uIU/mL   LACTIC ACID    Collection Time: 04/21/22  5:33 PM   Result Value Ref Range    Lactic acid 3.3 (H) 0.4 - 2.0 MMOL/L   TROPONIN-HIGH SENSITIVITY    Collection Time: 04/21/22  5:33 PM   Result Value Ref Range    Troponin-High Sensitivity 13.6 0 - 14 pg/mL   POC URINE MACROSCOPIC    Collection Time: 04/21/22  5:36 PM   Result Value Ref Range    Spec. gravity (POC) 1.020 1.001 - 1.023      pH, urine  (POC) 6.5 5.0 - 9.0      Protein (POC) 100 (A) NEG mg/dL    Glucose, urine (POC) Negative NEG mg/dL    Ketones (POC) Negative NEG mg/dL    Bilirubin (POC) Negative NEG      Blood (POC) Negative NEG      Urobilinogen (POC) 0.2 0.2 - 1.0 EU/dL    Nitrite (POC) Negative NEG      Leukocyte esterase (POC) Negative NEG      Performed by Kadie Bose    LACTIC ACID    Collection Time: 04/21/22  8:11 PM   Result Value Ref Range    Lactic acid 1.1 0.4 - 2.0 MMOL/L   TROPONIN-HIGH SENSITIVITY    Collection Time: 04/21/22  8:11 PM   Result Value Ref Range    Troponin-High Sensitivity 16.4 (H) 0 - 14 pg/mL   ETHYL ALCOHOL    Collection Time: 04/21/22  8:11 PM   Result Value Ref Range    ALCOHOL(ETHYL),SERUM 5 MG/DL   URINALYSIS W/ RFLX MICROSCOPIC    Collection Time: 04/21/22  8:40 PM   Result Value Ref Range    Color YELLOW      Appearance CLEAR      Specific gravity 1.020 1.001 - 1.023      pH (UA) 6.0 5.0 - 9.0      Protein TRACE (A) NEG mg/dL    Glucose Negative mg/dL    Ketone Negative NEG mg/dL    Bilirubin Negative NEG      Blood Negative NEG      Urobilinogen 0.2 0.2 - 1.0 EU/dL    Nitrites Negative NEG      Leukocyte Esterase Negative NEG     DRUG SCREEN, URINE    Collection Time: 04/21/22  8:40 PM   Result Value Ref Range    PCP(PHENCYCLIDINE) Negative      BENZODIAZEPINES Negative      COCAINE Negative      AMPHETAMINES Negative      METHADONE Negative      THC (TH-CANNABINOL) Negative      OPIATES Negative      BARBITURATES Negative     METABOLIC PANEL, BASIC    Collection Time: 04/22/22  4:12 AM   Result Value Ref Range    Sodium 145 138 - 145 mmol/L    Potassium 3.2 (L) 3.5 - 5.1 mmol/L    Chloride 114 (H) 98 - 107 mmol/L    CO2 24 21 - 32 mmol/L    Anion gap 7 7 - 16 mmol/L    Glucose 79 65 - 100 mg/dL    BUN 13 8 - 23 MG/DL    Creatinine 1.00 0.8 - 1.5 MG/DL    GFR est AA >60 >60 ml/min/1.73m2    GFR est non-AA >60 >60 ml/min/1.73m2    Calcium 9.5 8.3 - 10.4 MG/DL   CBC WITH AUTOMATED DIFF    Collection Time: 04/22/22  4:12 AM   Result Value Ref Range    WBC 8.2 4.3 - 11.1 K/uL    RBC 5.09 4.23 - 5.6 M/uL    HGB 14.7 13.6 - 17.2 g/dL    HCT 44.3 41.1 - 50.3 %    MCV 87.0 79.6 - 97.8 FL    MCH 28.9 26.1 - 32.9 PG    MCHC 33.2 31.4 - 35.0 g/dL    RDW 14.6 11.9 - 14.6 %    PLATELET 749 709 - 390 K/uL    MPV 10.3 9.4 - 12.3 FL    ABSOLUTE NRBC 0.00 0.0 - 0.2 K/uL    DF AUTOMATED      NEUTROPHILS 69 43 - 78 %    LYMPHOCYTES 19 13 - 44 %    MONOCYTES 10 4.0 - 12.0 %    EOSINOPHILS 2 0.5 - 7.8 %    BASOPHILS 1 0.0 - 2.0 %    IMMATURE GRANULOCYTES 0 0.0 - 5.0 %    ABS. NEUTROPHILS 5.6 1.7 - 8.2 K/UL    ABS. LYMPHOCYTES 1.5 0.5 - 4.6 K/UL    ABS. MONOCYTES 0.8 0.1 - 1.3 K/UL    ABS. EOSINOPHILS 0.2 0.0 - 0.8 K/UL    ABS. BASOPHILS 0.1 0.0 - 0.2 K/UL    ABS. IMM.  GRANS. 0.0 0.0 - 0.5 K/UL       All Micro Results     Procedure Component Value Units Date/Time    CULTURE, BLOOD [[id number]] Collected: 04/22/22 0814    Order Status: Completed Updated: 04/22/22 0921    CULTURE, BLOOD [[id number]] Collected: 04/22/22 0413    Order Status: Completed Updated: 04/22/22 0529    CULTURE, BLOOD [[id number]] Collected: 04/21/22 2030    Order Status: Canceled Specimen: Blood     CULTURE, BLOOD [[id number]] Collected: 04/21/22 2030    Order Status: Canceled Specimen: Blood           Other Studies:  CT HEAD WO CONT    Result Date: 4/21/2022  CT BRAIN WITHOUT CONTRAST 4/21/2022 6:57 PM INDICATION: Altered mental status COMPARISON: CT brain 11/20/2021 Technique:  Multiple contiguous axial images are obtained encompassing the brain from the skull base to the vertex. For this CT scanner at least one of the following techniques is utilized to decrease patient radiation dose: Automatic exposure control, KVP and mA modulation based on patient weight, and iterative reconstruction. FINDINGS: The ventricles are midline and of appropriate size and configuration. There is again well evident generalized cerebral volume loss and increased patchy hypodensities in the ventricular white matter. The midline structures and posterior fossa are intact. There is no finding of an acute cortical stroke, mass lesion, or acute bleed. No extra-axial fluid collection. Old, small bilateral occipital lobe cortical infarcts are similar to the prior. The skull is intact, no discreet abnormality. The paranasal sinuses are not remarkable. The visualized orbits and mastoid air-cells are patent. Again-well evident aging/chronic microischemia findings with generalized cerebral volume loss. No acute intracranial abnormality. Appearance similar to the prior study. XR CHEST PORT    Result Date: 4/21/2022  1 View portable chest x-ray 4/21/2022 5:57 PM Indication: Altered mental status/confusion Comparison: Chest x-ray 11/28/2021 Findings:  This portable semierect AP chest of 1747 shows the patient rotated to the right. Mildly prominent cardiac silhouette again. Leads overlie the chest. Lungs mildly underventilated. No confluent infiltrate identified. No overt edema or significant effusion. Somewhat limited exam due to patient rotation to the right. Taking this into account no acute infiltrates are seen. Current Meds:  Current Facility-Administered Medications   Medication Dose Route Frequency    hydrALAZINE (APRESOLINE) tablet 50 mg  50 mg Oral TID    pantoprazole (PROTONIX) tablet 40 mg  40 mg Oral DAILY    losartan (COZAAR) tablet 100 mg  100 mg Oral DAILY    dilTIAZem ER (CARDIZEM CD) capsule 120 mg  120 mg Oral DAILY    apixaban (ELIQUIS) tablet 5 mg  5 mg Oral BID    tamsulosin (FLOMAX) capsule 0.4 mg  0.4 mg Oral DAILY    QUEtiapine (SEROquel) tablet 25 mg  25 mg Oral QHS    sodium chloride (NS) flush 5-40 mL  5-40 mL IntraVENous Q8H    sodium chloride (NS) flush 5-40 mL  5-40 mL IntraVENous PRN    acetaminophen (TYLENOL) tablet 650 mg  650 mg Oral Q6H PRN    Or    acetaminophen (TYLENOL) suppository 650 mg  650 mg Rectal Q6H PRN    polyethylene glycol (MIRALAX) packet 17 g  17 g Oral DAILY PRN    tuberculin injection 5 Units  5 Units IntraDERMal ONCE       Signed:  Dandy Santiago NP    Part of this note may have been written by using a voice dictation software. The note has been proof read but may still contain some grammatical/other typographical errors.

## 2022-04-22 NOTE — PROGRESS NOTES
2129: Patient's Admission history could not be completed due to alert mentation and aggressive behavior. 2300: patient combative; agitated and refuses to have BP taken.     0400: pt still refusing vital signs

## 2022-04-22 NOTE — PROGRESS NOTES
04/21/22 2129   Dual Skin Pressure Injury Assessment   Dual Skin Pressure Injury Assessment WDL   Second Care Provider (Based on [address] [place] [address])   (MALGORZATA RN)   Skin Integumentary   Skin Integumentary (WDL) WDL    Pressure  Injury Documentation No Pressure Injury Noted-Pressure Ulcer Prevention Initiated   Wound Prevention and Protection Methods   Orientation of Wound Prevention Mid;Posterior   Location of Wound Prevention Sacrum/Coccyx  (moisture area noted- nothing open)   Dressing Present  No   Wound Offloading (Prevention Methods) Bed, pressure reduction mattress;Repositioning;Pillows   Assessed with Malgorzata RN

## 2022-04-22 NOTE — PROGRESS NOTES
CM screened chart for potential discharge needs. No CM consult received. Upon review of chart, this CM notes that patient lives at [facility] which is a \"Senior Living Community\" and not an [employer]. \"  Patient had recent admission in 12/2021 and was discharged home with [facility] services. At that time, STR at SNF was recommended, however, patient was not agreeable to discharge to that level of care. PT/OT evaluations and recommendations are still pending. CM notes documentation of previous concerns for \"behaviors\" that resulted in patient not be able to admit to certain SNFs. CM anticipates that patient will be recommended for [facility] versus STR. PPD ordered for possible STR need, PCR Covid testing ordered for possible STR need and patient will not need insurance authorization prior to discharge for STR at SNF. C will continue to follow and remain available for ongoing discharge needs. Care Management Interventions  PCP Verified by CM: Yes (Sees PCP at the [facility])  Physical Therapy Consult: Yes  Occupational Therapy Consult: Yes  Support Systems: Other (Comment),Friend/Neighbor (Senior Living Community.   Not [facility])  Discharge Location  Patient Expects to be Discharged to[de-identified] Unable to determine at this time

## 2022-04-22 NOTE — PROGRESS NOTES
ACUTE OT GOALS:  (Developed with and agreed upon by patient and/or caregiver.)  1. Patient will complete lower body bathing and dressing with SUPERVISION and adaptive equipment as needed. 2.Patient will complete upper body bathing and dressing with SINGLETON[ERVISION and adaptive equipment as needed. 3. Patient will complete toileting with SUPERVISION. 4. Patient will tolerate 25 minutes of OT treatment with 1-2 rest breaks to increase activity tolerance for ADLs. 5. Patient will complete functional transfers with SUPERVISION and adaptive equipment as needed. 6. Patient will complete functional activity with SUPERVISION and adaptive equipment as needed. Timeframe: 7 visits     OCCUPATIONAL THERAPY ASSESSMENT: Initial Assessment and Daily Note OT Treatment Day # 1    Caesar Essex is a 78 y.o. male   PRIMARY DIAGNOSIS: Hypertensive encephalopathy  Hypertensive encephalopathy [I67.4]       Reason for Referral:    ICD-10: Treatment Diagnosis: Generalized Muscle Weakness (M62.81)  Other lack of cordination (R27.8)  Difficulty in walking, Not elsewhere classified (R26.2)  [place]PAT[place] / [place] ONLY / Product Type: Medicare /   ASSESSMENT:     REHAB RECOMMENDATIONS:   Recommendation to date pending progress:  Setting:   Short-term Rehab--unless [facility] provides 24/7 care and supervision   Equipment:    To Be Determined     PRIOR LEVEL OF FUNCTION:  (Prior to Hospitalization--questionable historian)  INITIAL/CURRENT LEVEL OF FUNCTION:  (Based on today's evaluation)   Bathing:   Independent  Dressing:   Independent  Feeding/Grooming:   Independent  Toileting:   Independent  Functional Mobility:   Independent Bathing:   Minimal Assistance  Dressing:   Minimal Assistance  Feeding/Grooming:   Contact Guard Assistance  Toileting:   Minimal Assistance  Functional Mobility:   Minimal Assistance     ASSESSMENT:  Mr. Bushra Valdivia presented to the hospital with AMS.  Pt remains confused but cooperative. Pt is a questionable historian due to his confusion but reported living at an [facility] and completing his ADLs independently. Today, pt was received supine in bed. Completed bed mobility Sharath. Required Sharath for LB dressing and CGA for grooming tasks/self-feeding sitting EOB. Sit>stand and transfer to chair with HHA Sharath. Required VCs for safety throughout session. Currently recommend STR unless pt has 24/7 supervision/assistance at his facility. Debbie Lundberg currently demonstrates overall deficits in strength, balance, activity tolerance, and ADL performance. Patient would benefit from skilled OT services at this time in order to address functional deficits and OT goals stated above. SUBJECTIVE:   Mr. Mita Jones states, \"These socks are pretty. \"    SOCIAL HISTORY/LIVING ENVIRONMENT: unknown--pt reported he lives \"in a mansion with lots of apartments\", per chart review--reported home [employer] [address] [place] [address] Street: Other (Comment),Friend/Neighbor ([facility]   Not [facility])    OBJECTIVE:     PAIN: VITAL SIGNS: LINES/DRAINS:   Pre Treatment: Pain Screen  Pain Scale 1: Numeric (0 - 10)  Pain Intensity 1: 0  Post Treatment: no change    IV  O2 Device: None (Room air)     GROSS EVALUATION:  BUEs Within Functional Limits Abnormal/ Functional Abnormal/ Non-Functional (see comments) Not Tested Comments:   AROM [x] [] [] []    PROM [] [] [] [x]    Strength [x] [] [] []    Balance [] [x] [] [] Fair+ sitting and standing    Posture [x] [] [] []    Sensation [x] [] [] []    Coordination [] [x] [] []    Tone [] [] [] [x]    Edema [] [] [] [x]    Activity Tolerance [] [x] [] []     [] [] [] []      COGNITION/  PERCEPTION: Intact Impaired   (see comments) Comments:   Orientation [] [x] Oriented to name    Vision [x] []    Hearing [x] []    Judgment/ Insight [] [x] Decreased safety awareness    Attention [] [x] Re-direction to task   Memory [] [x]    Command Following [] [x] Simple, one-step, commands    Emotional Regulation [x] []     [] []      ACTIVITIES OF DAILY LIVING: I Mod I S SBA CGA Min Mod Max Total NT Comments   BASIC ADLs:              Bathing/ Showering [] [] [] [] [] [] [] [] [] [x]    Toileting [] [] [] [] [] [] [] [] [] [x]    Dressing [] [] [] [] [] [x] [] [] [] [] Socks    Feeding [] [] [] [] [] [] [] [] [] [x]    Grooming [] [] [] [] [] [x] [] [] [] [] Sitting EOB   Personal Device Care [] [] [] [] [] [] [] [] [] [x]    Functional Mobility [] [] [] [] [] [x] [] [] [] []    I=Independent, Mod I=Modified Independent, S=Supervision, SBA=Standby Assistance, CGA=Contact Guard Assistance,   Min=Minimal Assistance, Mod=Moderate Assistance, Max=Maximal Assistance, Total=Total Assistance, NT=Not Tested    MOBILITY: I Mod I S SBA CGA Min Mod Max Total  NT x2 Comments:   Supine to sit [] [] [] [] [] [x] [] [] [] [] []    Sit to supine [] [] [] [] [] [] [] [] [] [x] [] Left sitting in the chair    Sit to stand [] [] [] [] [] [x] [] [] [] [] []    Bed to chair [] [] [] [] [] [x] [] [] [] [] [] HHA   I=Independent, Mod I=Modified Independent, S=Supervision, SBA=Standby Assistance, CGA=Contact Guard Assistance,   Min=Minimal Assistance, Mod=Moderate Assistance, Max=Maximal Assistance, Total=Total Assistance, NT=Not Tested    [facility] AM-PAC 6 Clicks   Daily Activity Inpatient Short Form        How much help from another person does the patient currently need. .. Total A Lot A Little None   1. Putting on and taking off regular lower body clothing? [] 1   [] 2   [x] 3   [] 4   2. Bathing (including washing, rinsing, drying)? [] 1   [] 2   [x] 3   [] 4   3. Toileting, which includes using toilet, bedpan or urinal?   [] 1   [] 2   [x] 3   [] 4   4. Putting on and taking off regular upper body clothing? [] 1   [] 2   [x] 3   [] 4   5. Taking care of personal grooming such as brushing teeth? [] 1   [] 2   [x] 3   [] 4   6. Eating meals?    [] 1   [] 2   [] 3   [x] 4   © 2007, Trustees of [facility], under license to [employer]. All rights reserved     Score:  Initial: 19 completed on 4/22/22 Most Recent: X (Date: -- )   Interpretation of Tool:  Represents activities that are increasingly more difficult (i.e. Bed mobility, Transfers, Gait). PLAN:   FREQUENCY/DURATION: OT Plan of Care: 3 times/week for duration of hospital stay or until stated goals are met, whichever comes first.    PROBLEM LIST:   (Skilled intervention is medically necessary to address:)  1. Decreased ADL/Functional Activities  2. Decreased Activity Tolerance  3. Decreased Balance  4. Decreased Cognition  5. Decreased Coordination  6. Decreased Strength  7. Decreased Transfer Abilities   INTERVENTIONS PLANNED:   (Benefits and precautions of occupational therapy have been discussed with the patient.)  1. Self Care Training  2. Therapeutic Activity  3. Therapeutic Exercise/HEP  4. Neuromuscular Re-education  5. Education     TREATMENT:     EVALUATION: Low Complexity : (Untimed Charge)    TREATMENT:   ($$ Self Care/Home Management: 8-22 mins    )  Co-Treatment PT/OT necessary due to patient's decreased overall endurance/tolerance levels, as well as need for high level skilled assistance to complete functional transfers/mobility and functional tasks  Self Care (15 Minutes): Self care including Lower Body Dressing, Self Feeding and Grooming to increase independence and decrease level of assistance required.     TREATMENT GRID:  N/A    AFTER TREATMENT POSITION/PRECAUTIONS:  Alarm Activated, Chair, Needs within reach and RN notified    INTERDISCIPLINARY COLLABORATION:  RN/PCT, PT/PTA and OT/GARCÍA    TOTAL TREATMENT DURATION:  OT Patient Time In/Time Out  Time In: 5550  Time Out: 87 Eva Bejarano OT

## 2022-04-22 NOTE — PROGRESS NOTES
Reviewed notes for any new spiritual concerns        Risk for decline    Friend -  Bee    Full code    No directives

## 2022-04-22 NOTE — PROGRESS NOTES
Verbal and bedside change of shift report given to NALLELY Landers by Santos Bansal RN. Report included the following information: SBAR, ED Summary, Kardex, Recent Results, MAR, Intake/output. Opportunity for questions and clarifications provided.

## 2022-04-23 LAB
ANION GAP SERPL CALC-SCNC: 6 MMOL/L (ref 7–16)
BUN SERPL-MCNC: 13 MG/DL (ref 8–23)
CALCIUM SERPL-MCNC: 9.3 MG/DL (ref 8.3–10.4)
CHLORIDE SERPL-SCNC: 116 MMOL/L (ref 98–107)
CO2 SERPL-SCNC: 24 MMOL/L (ref 21–32)
CREAT SERPL-MCNC: 1.1 MG/DL (ref 0.8–1.5)
GLUCOSE SERPL-MCNC: 88 MG/DL (ref 65–100)
MAGNESIUM SERPL-MCNC: 1.9 MG/DL (ref 1.8–2.4)
MM INDURATION POC: 0 MM (ref 0–5)
POTASSIUM SERPL-SCNC: 3.5 MMOL/L (ref 3.5–5.1)
PPD POC: NEGATIVE NEGATIVE
SARS-COV-2, COV2: NOT DETECTED
SODIUM SERPL-SCNC: 146 MMOL/L (ref 136–145)
SPECIMEN SOURCE, FCOV2M: NORMAL
VIT B12 SERPL-MCNC: 230 PG/ML (ref 193–986)

## 2022-04-23 PROCEDURE — 74011250636 HC RX REV CODE- 250/636: Performed by: FAMILY MEDICINE

## 2022-04-23 PROCEDURE — 82607 VITAMIN B-12: CPT

## 2022-04-23 PROCEDURE — 74011000250 HC RX REV CODE- 250: Performed by: FAMILY MEDICINE

## 2022-04-23 PROCEDURE — 65270000029 HC RM PRIVATE

## 2022-04-23 PROCEDURE — 80048 BASIC METABOLIC PNL TOTAL CA: CPT

## 2022-04-23 PROCEDURE — 74011250637 HC RX REV CODE- 250/637: Performed by: FAMILY MEDICINE

## 2022-04-23 PROCEDURE — 36415 COLL VENOUS BLD VENIPUNCTURE: CPT

## 2022-04-23 PROCEDURE — 74011250637 HC RX REV CODE- 250/637: Performed by: NURSE PRACTITIONER

## 2022-04-23 PROCEDURE — 74011000258 HC RX REV CODE- 258: Performed by: FAMILY MEDICINE

## 2022-04-23 PROCEDURE — 83735 ASSAY OF MAGNESIUM: CPT

## 2022-04-23 RX ORDER — HYDRALAZINE HYDROCHLORIDE 50 MG/1
50 TABLET, FILM COATED ORAL 3 TIMES DAILY
Status: DISCONTINUED | OUTPATIENT
Start: 2022-04-23 | End: 2022-04-24 | Stop reason: HOSPADM

## 2022-04-23 RX ADMIN — ACETAMINOPHEN 650 MG: 325 TABLET ORAL at 11:10

## 2022-04-23 RX ADMIN — PANTOPRAZOLE SODIUM 40 MG: 40 TABLET, DELAYED RELEASE ORAL at 09:13

## 2022-04-23 RX ADMIN — QUETIAPINE FUMARATE 25 MG: 25 TABLET ORAL at 22:23

## 2022-04-23 RX ADMIN — HYDRALAZINE HYDROCHLORIDE 25 MG: 25 TABLET, FILM COATED ORAL at 09:13

## 2022-04-23 RX ADMIN — OLANZAPINE 2.5 MG: 10 INJECTION, POWDER, FOR SOLUTION INTRAMUSCULAR at 12:18

## 2022-04-23 RX ADMIN — SODIUM CHLORIDE, PRESERVATIVE FREE 10 ML: 5 INJECTION INTRAVENOUS at 13:38

## 2022-04-23 RX ADMIN — SODIUM CHLORIDE, PRESERVATIVE FREE 10 ML: 5 INJECTION INTRAVENOUS at 22:23

## 2022-04-23 RX ADMIN — DILTIAZEM HYDROCHLORIDE 120 MG: 120 CAPSULE, COATED, EXTENDED RELEASE ORAL at 09:13

## 2022-04-23 RX ADMIN — THIAMINE HYDROCHLORIDE 500 MG: 100 INJECTION, SOLUTION INTRAMUSCULAR; INTRAVENOUS at 22:30

## 2022-04-23 RX ADMIN — THIAMINE HYDROCHLORIDE 500 MG: 100 INJECTION, SOLUTION INTRAMUSCULAR; INTRAVENOUS at 16:38

## 2022-04-23 RX ADMIN — APIXABAN 5 MG: 5 TABLET, FILM COATED ORAL at 17:18

## 2022-04-23 RX ADMIN — HYDRALAZINE HYDROCHLORIDE 10 MG: 20 INJECTION INTRAMUSCULAR; INTRAVENOUS at 11:10

## 2022-04-23 RX ADMIN — APIXABAN 5 MG: 5 TABLET, FILM COATED ORAL at 09:13

## 2022-04-23 RX ADMIN — THIAMINE HYDROCHLORIDE 500 MG: 100 INJECTION, SOLUTION INTRAMUSCULAR; INTRAVENOUS at 09:13

## 2022-04-23 RX ADMIN — FOLIC ACID 1 MG: 1 TABLET ORAL at 09:13

## 2022-04-23 RX ADMIN — HYDRALAZINE HYDROCHLORIDE 10 MG: 20 INJECTION INTRAMUSCULAR; INTRAVENOUS at 18:38

## 2022-04-23 RX ADMIN — SODIUM CHLORIDE, PRESERVATIVE FREE 10 ML: 5 INJECTION INTRAVENOUS at 05:38

## 2022-04-23 RX ADMIN — TAMSULOSIN HYDROCHLORIDE 0.4 MG: 0.4 CAPSULE ORAL at 09:13

## 2022-04-23 RX ADMIN — LOSARTAN POTASSIUM 100 MG: 50 TABLET, FILM COATED ORAL at 09:13

## 2022-04-23 RX ADMIN — HYDRALAZINE HYDROCHLORIDE 50 MG: 50 TABLET, FILM COATED ORAL at 16:38

## 2022-04-23 NOTE — PROGRESS NOTES
Hospitalist Progress Note   Admit Date:  2022  5:22 PM   Name:  Ilia Driscoll   Age:  78 y.o. Sex:  male  :  1945   MRN:  [id number]   Room:  721/01    Presenting Complaint: Altered mental status and Urinary Pain    Reason(s) for Admission: Hypertensive encephalopathy [facility] Course & Interval History:   Patient is a 78 y.o. male who presented to the ED from [facility] with AMS. History was limited due to patient's confusion and no papers brought with him. Hx of HTN, possible dementia, bilateral PE, and discharge from our facility on 21 for TONIA, AMS, and bilateral PE. Received Haldol in ER    CT Head showing aging/chronic microischemia findings with  generalized cerebral volume loss.       Subjective/24hr Events (22): Denies pain/distress. N/V subjective fevers. He is calm, comical, compliant with care this a.m.     ROS:  10 systems reviewed and negative except as noted above. Assessment & Plan:     Principal Problem:  Hypertensive encephalopathy (2022)  22  -Still with some intermittent confusion  -UA negative, no white count  -Therapy with recs for rehab  22  -Mentation improved;   -Seroquel scheduled with prn Zyprexa   -Attempt to speak to family about patient's baseline mentation unsuccessful.  He may likely be at baseline  -Monitor  -UDS negative  -Hold anticholinergics as able      Hypertensive Urgency  Hypertension  22  -BP meds adjusted for tighter blood pressure control  -Check BP q 2 hours x 3  -Medication compliance could be the issue  22   -BP remains elevated with no significant improvement  -Hydralazine increased for better BP control  -Monitor    Active Problems:  Bilateral pulmonary embolism ([address] (2021)  -Continue Eliquis  -Sat appropriate on RA, denies CP  22  -Stable on RA  -Will repeat CT in 1-2 days, if negative for PE can d/c Eliquis      Lactic acidosis (2021)(Resolved)  -Lactic 1.1 from 3.3    B12 Deficiency  4/22/22  -B12 level in a.m.   4/23/22  -B12 level 230    Mild Hypokalemia(Resolved)  4/22/22  -K+ 3.2  -Replace  -BMP/Mg in a.m.   4/23/22  -Resolved K+ 3.5, Mg 1.9          Discharge Planning:      Therapy with recs for STR    Diet:  ADULT DIET Easy to Chew; Low Fat/Low Chol/High Fiber/2 gm Na  DVT PPx: Eliquis   Code status: Full Code    Hospital Problems as of 4/23/2022 Date Reviewed: 11/19/2021          Codes Class Noted - Resolved POA    * (Principal) Hypertensive encephalopathy ICD-10-CM: I67.4  ICD-9-CM: 437.2  4/21/2022 - Present Unknown        Acute metabolic encephalopathy OGI-96-ROSARIO: G93.41  ICD-9-CM: 348.31  11/20/2021 - Present Yes        Bilateral pulmonary embolism ([address] ICD-10-CM: I26.99  ICD-9-CM: 415.19  11/19/2021 - Present Yes        Lactic acidosis ICD-10-CM: E87.2  ICD-9-CM: 276.2  11/19/2021 - Present Yes              Objective:     Patient Vitals for the past 24 hrs:   Temp Pulse Resp BP SpO2   04/23/22 1146 98.6 °F (37 °C) 72 16 (!) 181/73 96 %   04/23/22 0846 -- -- -- (!) 211/85 --   04/23/22 0737 97.7 °F (36.5 °C) 74 16 (!) 190/78 97 %   04/23/22 0358 97.9 °F (36.6 °C) 78 20 (!) 173/87 97 %   04/22/22 2122 97.9 °F (36.6 °C) 88 20 (!) 183/77 96 %   04/22/22 1950 -- -- -- (!) 220/96 --   04/22/22 1930 -- -- -- (!) 222/96 --   04/22/22 1915 97.9 °F (36.6 °C) 66 20 (!) 227/101 96 %   04/22/22 1510 97.4 °F (36.3 °C) 62 16 (!) 142/69 98 %   04/22/22 1308 97.7 °F (36.5 °C) 60 16 (!) 174/80 97 %     Oxygen Therapy  O2 Sat (%): 96 % (04/23/22 1146)  Pulse via Oximetry: 62 beats per minute (04/21/22 1847)  O2 Device: None (Room air) (04/23/22 0732)    Estimated body mass index is 30.13 kg/m² as calculated from the following:    Height as of this encounter: 5' 10\" (1.778 m). Weight as of this encounter: 95.3 kg (210 lb).   No intake or output data in the 24 hours ending 04/23/22 1304      Physical Exam:     Blood pressure (!) 181/73, pulse 72, temperature 98.6 °F (37 °C), resp. rate 16, height 5' 10\" (1.778 m), weight 95.3 kg (210 lb), SpO2 96 %. General:    Well nourished. No overt distress, intermittent confusion  Head:  Normocephalic, atraumatic  Eyes:  Sclerae appear normal.  Pupils equally round. ENT:  Nares appear normal, no drainage. Moist oral mucosa  Neck:  No restricted ROM. Trachea midline   CV:   RRR. No m/r/g. No jugular venous distension. Lungs:   CTAB. No wheezing, rhonchi, or rales. Respirations even, unlabored  Abdomen: Bowel sounds present. Soft, nontender, nondistended. Extremities: No cyanosis or clubbing. No edema  Skin:     No rashes and normal coloration. Warm and dry. Neuro:  CN II-XII grossly intact. Sensation intact. A&Ox 2-3  Psych:  Normal mood and affect.       I have reviewed ordered lab tests and independently visualized imaging below:    Recent Labs:  Recent Results (from the past 48 hour(s))   EKG, 12 LEAD, INITIAL    Collection Time: 04/21/22  5:29 PM   Result Value Ref Range    Ventricular Rate 59 BPM    Atrial Rate 0 BPM    P-R Interval 186 ms    QRS Duration 96 ms    Q-T Interval 419 ms    QTC Calculation (Bezet) 415 ms    Calculated P Axis 139 degrees    Calculated R Axis 50 degrees    Calculated T Axis -133 degrees    Diagnosis       Sinus or ectopic atrial rhythm  Probable LVH with secondary repol abnrm    Confirmed by Kiara Salcido MD (), RAGHAVENDRA PEÑA ([id number]) on 4/22/2022 6:36:03 AM     CBC WITH AUTOMATED DIFF    Collection Time: 04/21/22  5:33 PM   Result Value Ref Range    WBC 8.2 4.3 - 11.1 K/uL    RBC 5.01 4.23 - 5.6 M/uL    HGB 14.7 13.6 - 17.2 g/dL    HCT 44.3 41.1 - 50.3 %    MCV 88.4 79.6 - 97.8 FL    MCH 29.3 26.1 - 32.9 PG    MCHC 33.2 31.4 - 35.0 g/dL    RDW 14.6 11.9 - 14.6 %    PLATELET 937 238 - 839 K/uL    MPV 9.5 9.4 - 12.3 FL    ABSOLUTE NRBC 0.00 0.0 - 0.2 K/uL    DF AUTOMATED      NEUTROPHILS 68 43 - 78 %    LYMPHOCYTES 19 13 - 44 %    MONOCYTES 9 4.0 - 12.0 %    EOSINOPHILS 2 0.5 - 7.8 %    BASOPHILS 1 0.0 - 2.0 %    IMMATURE GRANULOCYTES 1 0.0 - 5.0 %    ABS. NEUTROPHILS 5.6 1.7 - 8.2 K/UL    ABS. LYMPHOCYTES 1.6 0.5 - 4.6 K/UL    ABS. MONOCYTES 0.8 0.1 - 1.3 K/UL    ABS. EOSINOPHILS 0.2 0.0 - 0.8 K/UL    ABS. BASOPHILS 0.1 0.0 - 0.2 K/UL    ABS. IMM. GRANS. 0.0 0.0 - 0.5 K/UL   METABOLIC PANEL, COMPREHENSIVE    Collection Time: 04/21/22  5:33 PM   Result Value Ref Range    Sodium 144 136 - 145 mmol/L    Potassium 3.2 (L) 3.5 - 5.1 mmol/L    Chloride 111 (H) 98 - 107 mmol/L    CO2 27 21 - 32 mmol/L    Anion gap 6 (L) 7 - 16 mmol/L    Glucose 87 65 - 100 mg/dL    BUN 14 8 - 23 MG/DL    Creatinine 1.20 0.8 - 1.5 MG/DL    GFR est AA >60 >60 ml/min/1.73m2    GFR est non-AA >60 >60 ml/min/1.73m2    Calcium 9.5 8.3 - 10.4 MG/DL    Bilirubin, total 0.3 0.2 - 1.1 MG/DL    ALT (SGPT) 23 12 - 65 U/L    AST (SGOT) 17 15 - 37 U/L    Alk.  phosphatase 84 50 - 136 U/L    Protein, total 6.9 6.3 - 8.2 g/dL    Albumin 3.7 3.2 - 4.6 g/dL    Globulin 3.2 2.3 - 3.5 g/dL    A-G Ratio 1.2 1.2 - 3.5     TSH 3RD GENERATION    Collection Time: 04/21/22  5:33 PM   Result Value Ref Range    TSH 1.280 0.358 - 3.740 uIU/mL   LACTIC ACID    Collection Time: 04/21/22  5:33 PM   Result Value Ref Range    Lactic acid 3.3 (H) 0.4 - 2.0 MMOL/L   TROPONIN-HIGH SENSITIVITY    Collection Time: 04/21/22  5:33 PM   Result Value Ref Range    Troponin-High Sensitivity 13.6 0 - 14 pg/mL   POC URINE MACROSCOPIC    Collection Time: 04/21/22  5:36 PM   Result Value Ref Range    Spec. gravity (POC) 1.020 1.001 - 1.023      pH, urine  (POC) 6.5 5.0 - 9.0      Protein (POC) 100 (A) NEG mg/dL    Glucose, urine (POC) Negative NEG mg/dL    Ketones (POC) Negative NEG mg/dL    Bilirubin (POC) Negative NEG      Blood (POC) Negative NEG      Urobilinogen (POC) 0.2 0.2 - 1.0 EU/dL    Nitrite (POC) Negative NEG      Leukocyte esterase (POC) Negative NEG      Performed by Sammie More    LACTIC ACID    Collection Time: 04/21/22  8:11 PM   Result Value Ref Range    Lactic acid 1.1 0.4 - 2.0 MMOL/L   TROPONIN-HIGH SENSITIVITY    Collection Time: 04/21/22  8:11 PM   Result Value Ref Range    Troponin-High Sensitivity 16.4 (H) 0 - 14 pg/mL   ETHYL ALCOHOL    Collection Time: 04/21/22  8:11 PM   Result Value Ref Range    ALCOHOL(ETHYL),SERUM 5 MG/DL   URINALYSIS W/ RFLX MICROSCOPIC    Collection Time: 04/21/22  8:40 PM   Result Value Ref Range    Color YELLOW      Appearance CLEAR      Specific gravity 1.020 1.001 - 1.023      pH (UA) 6.0 5.0 - 9.0      Protein TRACE (A) NEG mg/dL    Glucose Negative mg/dL    Ketone Negative NEG mg/dL    Bilirubin Negative NEG      Blood Negative NEG      Urobilinogen 0.2 0.2 - 1.0 EU/dL    Nitrites Negative NEG      Leukocyte Esterase Negative NEG     DRUG SCREEN, URINE    Collection Time: 04/21/22  8:40 PM   Result Value Ref Range    PCP(PHENCYCLIDINE) Negative      BENZODIAZEPINES Negative      COCAINE Negative      AMPHETAMINES Negative      METHADONE Negative      THC (TH-CANNABINOL) Negative      OPIATES Negative      BARBITURATES Negative     METABOLIC PANEL, BASIC    Collection Time: 04/22/22  4:12 AM   Result Value Ref Range    Sodium 145 138 - 145 mmol/L    Potassium 3.2 (L) 3.5 - 5.1 mmol/L    Chloride 114 (H) 98 - 107 mmol/L    CO2 24 21 - 32 mmol/L    Anion gap 7 7 - 16 mmol/L    Glucose 79 65 - 100 mg/dL    BUN 13 8 - 23 MG/DL    Creatinine 1.00 0.8 - 1.5 MG/DL    GFR est AA >60 >60 ml/min/1.73m2    GFR est non-AA >60 >60 ml/min/1.73m2    Calcium 9.5 8.3 - 10.4 MG/DL   CBC WITH AUTOMATED DIFF    Collection Time: 04/22/22  4:12 AM   Result Value Ref Range    WBC 8.2 4.3 - 11.1 K/uL    RBC 5.09 4.23 - 5.6 M/uL    HGB 14.7 13.6 - 17.2 g/dL    HCT 44.3 41.1 - 50.3 %    MCV 87.0 79.6 - 97.8 FL    MCH 28.9 26.1 - 32.9 PG    MCHC 33.2 31.4 - 35.0 g/dL    RDW 14.6 11.9 - 14.6 %    PLATELET 561 701 - 324 K/uL    MPV 10.3 9.4 - 12.3 FL    ABSOLUTE NRBC 0.00 0.0 - 0.2 K/uL    DF AUTOMATED      NEUTROPHILS 69 43 - 78 %    LYMPHOCYTES 19 13 - 44 %    MONOCYTES 10 4.0 - 12.0 %    EOSINOPHILS 2 0.5 - 7.8 %    BASOPHILS 1 0.0 - 2.0 %    IMMATURE GRANULOCYTES 0 0.0 - 5.0 %    ABS. NEUTROPHILS 5.6 1.7 - 8.2 K/UL    ABS. LYMPHOCYTES 1.5 0.5 - 4.6 K/UL    ABS. MONOCYTES 0.8 0.1 - 1.3 K/UL    ABS. EOSINOPHILS 0.2 0.0 - 0.8 K/UL    ABS. BASOPHILS 0.1 0.0 - 0.2 K/UL    ABS. IMM.  GRANS. 0.0 0.0 - 0.5 K/UL   CULTURE, BLOOD    Collection Time: 04/22/22  4:13 AM    Specimen: Blood   Result Value Ref Range    Special Requests: LEFT  HAND        Culture result: NO GROWTH 1 DAY     CULTURE, BLOOD    Collection Time: 04/22/22  8:14 AM    Specimen: Blood   Result Value Ref Range    Special Requests: RIGHT  HAND        Culture result: NO GROWTH 1 DAY     SARS-COV-2    Collection Time: 04/22/22  3:25 PM   Result Value Ref Range    SARS-CoV-2 by PCR Please find results under separate order     PLEASE READ & DOCUMENT PPD TEST IN 24 HRS    Collection Time: 04/22/22 10:53 PM   Result Value Ref Range    PPD Negative Negative    mm Induration 0 0 - 5 mm   METABOLIC PANEL, BASIC    Collection Time: 04/23/22  6:39 AM   Result Value Ref Range    Sodium 146 (H) 136 - 145 mmol/L    Potassium 3.5 3.5 - 5.1 mmol/L    Chloride 116 (H) 98 - 107 mmol/L    CO2 24 21 - 32 mmol/L    Anion gap 6 (L) 7 - 16 mmol/L    Glucose 88 65 - 100 mg/dL    BUN 13 8 - 23 MG/DL    Creatinine 1.10 0.8 - 1.5 MG/DL    GFR est AA >60 >60 ml/min/1.73m2    GFR est non-AA >60 >60 ml/min/1.73m2    Calcium 9.3 8.3 - 10.4 MG/DL   MAGNESIUM    Collection Time: 04/23/22  6:39 AM   Result Value Ref Range    Magnesium 1.9 1.8 - 2.4 mg/dL   VITAMIN B12    Collection Time: 04/23/22  6:39 AM   Result Value Ref Range    Vitamin B12 230 193 - 986 pg/mL       All Micro Results     Procedure Component Value Units Date/Time    CULTURE, BLOOD [[id number]] Collected: 04/22/22 0413    Order Status: Completed Specimen: Blood Updated: 04/23/22 0956     Special Requests: --        LEFT  HAND       Culture result: NO GROWTH 1 DAY       CULTURE, BLOOD [[id number]] Collected: 04/22/22 0814    Order Status: Completed Specimen: Blood Updated: 04/23/22 0956     Special Requests: --        RIGHT  HAND       Culture result: NO GROWTH 1 DAY       SARS-COV-2, PCR [[id number]] Collected: 04/22/22 1525    Order Status: Completed Updated: 04/22/22 1641    CULTURE, BLOOD [[id number]] Collected: 04/21/22 2030    Order Status: Canceled Specimen: Blood     CULTURE, BLOOD [[id number]] Collected: 04/21/22 2030    Order Status: Canceled Specimen: Blood           Other Studies:  No results found. Current Meds:  Current Facility-Administered Medications   Medication Dose Route Frequency    hydrALAZINE (APRESOLINE) tablet 50 mg  50 mg Oral TID    OLANZapine (ZyPREXA) 2.5 mg in sterile water (preservative free) 0.5 mL injection  2.5 mg IntraMUSCular BID PRN    thiamine (B-1) 500 mg in 0.9% sodium chloride 50 mL IVPB  500 mg IntraVENous TID    Followed by   Lonza Bent ON 4/25/2022] thiamine (B-1) 250 mg in 0.9% sodium chloride 50 mL IVPB  250 mg IntraVENous DAILY    folic acid (FOLVITE) tablet 1 mg  1 mg Oral DAILY    hydrALAZINE (APRESOLINE) 20 mg/mL injection 10 mg  10 mg IntraVENous Q6H PRN    pantoprazole (PROTONIX) tablet 40 mg  40 mg Oral DAILY    losartan (COZAAR) tablet 100 mg  100 mg Oral DAILY    dilTIAZem ER (CARDIZEM CD) capsule 120 mg  120 mg Oral DAILY    apixaban (ELIQUIS) tablet 5 mg  5 mg Oral BID    tamsulosin (FLOMAX) capsule 0.4 mg  0.4 mg Oral DAILY    QUEtiapine (SEROquel) tablet 25 mg  25 mg Oral QHS    sodium chloride (NS) flush 5-40 mL  5-40 mL IntraVENous Q8H    sodium chloride (NS) flush 5-40 mL  5-40 mL IntraVENous PRN    acetaminophen (TYLENOL) tablet 650 mg  650 mg Oral Q6H PRN    Or    acetaminophen (TYLENOL) suppository 650 mg  650 mg Rectal Q6H PRN    polyethylene glycol (MIRALAX) packet 17 g  17 g Oral DAILY PRN       Signed:  Martin Guillen NP    Part of this note may have been written by using a voice dictation software.   The note has been proof read but may still contain some grammatical/other typographical errors.

## 2022-04-24 VITALS
DIASTOLIC BLOOD PRESSURE: 75 MMHG | SYSTOLIC BLOOD PRESSURE: 158 MMHG | RESPIRATION RATE: 16 BRPM | HEART RATE: 72 BPM | OXYGEN SATURATION: 97 % | TEMPERATURE: 97.4 F | HEIGHT: 70 IN | BODY MASS INDEX: 30.06 KG/M2 | WEIGHT: 210 LBS

## 2022-04-24 PROCEDURE — 74011000250 HC RX REV CODE- 250: Performed by: FAMILY MEDICINE

## 2022-04-24 PROCEDURE — 74011000258 HC RX REV CODE- 258: Performed by: FAMILY MEDICINE

## 2022-04-24 PROCEDURE — 74011250637 HC RX REV CODE- 250/637: Performed by: FAMILY MEDICINE

## 2022-04-24 PROCEDURE — 74011250637 HC RX REV CODE- 250/637: Performed by: NURSE PRACTITIONER

## 2022-04-24 PROCEDURE — 74011250636 HC RX REV CODE- 250/636: Performed by: FAMILY MEDICINE

## 2022-04-24 RX ORDER — LOSARTAN POTASSIUM 100 MG/1
100 TABLET ORAL DAILY
Qty: 30 TABLET | Refills: 0 | Status: SHIPPED | OUTPATIENT
Start: 2022-04-25 | End: 2022-05-25

## 2022-04-24 RX ORDER — HYDRALAZINE HYDROCHLORIDE 50 MG/1
50 TABLET, FILM COATED ORAL 3 TIMES DAILY
Qty: 90 TABLET | Refills: 0 | Status: SHIPPED | OUTPATIENT
Start: 2022-04-24 | End: 2022-05-24

## 2022-04-24 RX ORDER — PANTOPRAZOLE SODIUM 40 MG/1
40 TABLET, DELAYED RELEASE ORAL DAILY
Qty: 30 TABLET | Refills: 0 | Status: SHIPPED | OUTPATIENT
Start: 2022-04-25 | End: 2022-05-25

## 2022-04-24 RX ORDER — QUETIAPINE FUMARATE 25 MG/1
25 TABLET, FILM COATED ORAL
Qty: 30 TABLET | Refills: 0 | Status: SHIPPED | OUTPATIENT
Start: 2022-04-24 | End: 2022-05-24

## 2022-04-24 RX ORDER — DILTIAZEM HYDROCHLORIDE 120 MG/1
120 CAPSULE, COATED, EXTENDED RELEASE ORAL DAILY
Qty: 30 CAPSULE | Refills: 0 | Status: SHIPPED | OUTPATIENT
Start: 2022-04-25 | End: 2022-05-25

## 2022-04-24 RX ORDER — TAMSULOSIN HYDROCHLORIDE 0.4 MG/1
0.4 CAPSULE ORAL DAILY
Qty: 30 CAPSULE | Refills: 0 | Status: SHIPPED | OUTPATIENT
Start: 2022-04-25 | End: 2022-05-25

## 2022-04-24 RX ADMIN — APIXABAN 5 MG: 5 TABLET, FILM COATED ORAL at 17:11

## 2022-04-24 RX ADMIN — FOLIC ACID 1 MG: 1 TABLET ORAL at 08:51

## 2022-04-24 RX ADMIN — DILTIAZEM HYDROCHLORIDE 120 MG: 120 CAPSULE, COATED, EXTENDED RELEASE ORAL at 08:51

## 2022-04-24 RX ADMIN — HYDRALAZINE HYDROCHLORIDE 50 MG: 50 TABLET, FILM COATED ORAL at 08:51

## 2022-04-24 RX ADMIN — PANTOPRAZOLE SODIUM 40 MG: 40 TABLET, DELAYED RELEASE ORAL at 08:51

## 2022-04-24 RX ADMIN — LOSARTAN POTASSIUM 100 MG: 50 TABLET, FILM COATED ORAL at 08:51

## 2022-04-24 RX ADMIN — TAMSULOSIN HYDROCHLORIDE 0.4 MG: 0.4 CAPSULE ORAL at 08:51

## 2022-04-24 RX ADMIN — HYDRALAZINE HYDROCHLORIDE 50 MG: 50 TABLET, FILM COATED ORAL at 16:52

## 2022-04-24 RX ADMIN — THIAMINE HYDROCHLORIDE 500 MG: 100 INJECTION, SOLUTION INTRAMUSCULAR; INTRAVENOUS at 08:53

## 2022-04-24 RX ADMIN — SODIUM CHLORIDE, PRESERVATIVE FREE 10 ML: 5 INJECTION INTRAVENOUS at 05:38

## 2022-04-24 RX ADMIN — APIXABAN 5 MG: 5 TABLET, FILM COATED ORAL at 08:51

## 2022-04-24 NOTE — PROGRESS NOTES
Patient refusing to sign discharge paperwork and is threatening to patricia us. Waiting on Ximena Bustamante to pick him and take him back to his assisted living.      Scripts and d/c paperwork given to [facility] ambulance

## 2022-04-24 NOTE — DISCHARGE INSTRUCTIONS
Patient Education     Altered Mental Status: Care Instructions  Your Care Instructions  Altered mental status is a change in how well your brain is working. As a result, you may be confused, be less alert than usual, or act in odd ways. This may include seeing or hearing things that aren't really there (hallucinations). A mental status change has many possible causes. For example, it may be the result of an infection, an imbalance of chemicals in the body, or a chronic disease such as diabetes or COPD. It can also be caused by things such as a head injury, taking certain medicines, or using alcohol or drugs. The doctor may do tests to look for the cause. These tests may include urine tests, blood tests, and imaging tests such as a CT scan. Sometimes a clear cause isn't found. But tests can help the doctor rule out a serious cause of your symptoms. A change in mental status can be scary. But mental status will often return to normal when the cause is treated. So it is important to get any follow-up testing or treatment the doctor has suggested. The doctor has checked you carefully, but problems can develop later. If you notice any problems or new symptoms, get medical treatment right away. Follow-up care is a key part of your treatment and safety. Be sure to make and go to all appointments, and call your doctor if you are having problems. It's also a good idea to know your test results and keep a list of the medicines you take. How can you care for yourself at home? · Be safe with medicines. Take your medicines exactly as prescribed. Call your doctor if you think you are having a problem with your medicine. · Have another adult stay with you until you are better. This can help keep you safe. Ask that person to watch for signs that your mental status is getting worse. When should you call for help? Call 911 anytime you think you may need emergency care.  For example, call if:  · You passed out (lost consciousness). Call your doctor now or seek immediate medical care if:  · Your mental status is getting worse. · You have new symptoms, such as a fever, chills, or shortness of breath. · You do not feel safe. Watch closely for changes in your health, and be sure to contact your doctor if:  · You do not get better as expected. Where can you learn more? Go to [employer]  Enter J452 in the search box to learn more about \"Altered Mental Status: Care Instructions. \"   © 8903-9363 Healthwise, Incorporated. Care instructions adapted under license by [employer] (which disclaims liability or warranty for this information). This care instruction is for use with your licensed healthcare professional. If you have questions about a medical condition or this instruction, always ask your healthcare professional. [address] any warranty or liability for your use of this information.   Content Version: 71.0.486277; Current as of: November 20, 2015

## 2022-04-24 NOTE — PROGRESS NOTES
Patient is up for discharge. Patient will be getting discharged back to his [facility][1]. CM met with patient and discussed HH. Patient agreeable to referral being sent back to [facility] for RN for medication management/PT/OT. CM also added Aid services to order. CM asked patient how was he going to get home and he stated \"probably my girlfriend\". Referral has been faxed to [facility]. No other needs identified at this time. 2:30 - CM notified that patient will need a [employer] ride home. CM arranged transport via [facility] for 3:00. RN notified. 2:45 - CM notified to cancel [employer] ride - patient will need ambulance transport home. CM arranged transport with Doreen Dill for next available, 5:00. [facility]oreen Dill packet completed and placed in patient's chart. RN aware of transport time. Care Management Interventions  PCP Verified by CM:  Yes [facility] [address] [facility]  Physical Therapy Consult: Yes  Occupational Therapy Consult: Yes  Support Systems: Other (Comment) (semior living)  Discharge Location  Patient Expects to be Discharged to[de-identified] Home with home health ([facility] )

## 2022-04-24 NOTE — PROGRESS NOTES
Problem: Falls - Risk of  Goal: *Absence of Falls  Description: Document Kym Trammell Fall Risk and appropriate interventions in the flowsheet. Outcome: Progressing Towards Goal  Note: Fall Risk Interventions:  Mobility Interventions: Bed/chair exit alarm    Mentation Interventions: Bed/chair exit alarm,Room close to nurse's station,Reorient patient    Medication Interventions: Bed/chair exit alarm    Elimination Interventions: Bed/chair exit alarm,Call light in reach              Problem: Patient Education: Go to Patient Education Activity  Goal: Patient/Family Education  Outcome: Progressing Towards Goal     Problem: Patient Education: Go to Patient Education Activity  Goal: Patient/Family Education  Outcome: Progressing Towards Goal     Problem: Patient Education: Go to Patient Education Activity  Goal: Patient/Family Education  Outcome: Progressing Towards Goal     Problem: Pressure Injury - Risk of  Goal: *Prevention of pressure injury  Description: Document Cornelius Scale and appropriate interventions in the flowsheet.   Outcome: Progressing Towards Goal  Note: Pressure Injury Interventions:  Sensory Interventions: Assess changes in LOC,Minimize linen layers    Moisture Interventions: Absorbent underpads,Minimize layers    Activity Interventions: Increase time out of bed    Mobility Interventions: HOB 30 degrees or less    Nutrition Interventions: Document food/fluid/supplement intake    Friction and Shear Interventions: HOB 30 degrees or less,Minimize layers                Problem: Patient Education: Go to Patient Education Activity  Goal: Patient/Family Education  Outcome: Progressing Towards Goal

## 2022-04-24 NOTE — PROGRESS NOTES
Problem: Falls - Risk of  Goal: *Absence of Falls  Description: Document Jodie Luna Fall Risk and appropriate interventions in the flowsheet. Outcome: Progressing Towards Goal  Note: Fall Risk Interventions:  Mobility Interventions: Bed/chair exit alarm,OT consult for ADLs,Patient to call before getting OOB,PT Consult for mobility concerns    Mentation Interventions: Bed/chair exit alarm,Door open when patient unattended    Medication Interventions: Bed/chair exit alarm,Patient to call before getting OOB,Teach patient to arise slowly    Elimination Interventions: Bed/chair exit alarm,Call light in reach,Patient to call for help with toileting needs,Toileting schedule/hourly rounds              Problem: Patient Education: Go to Patient Education Activity  Goal: Patient/Family Education  Outcome: Progressing Towards Goal     Problem: Patient Education: Go to Patient Education Activity  Goal: Patient/Family Education  Outcome: Progressing Towards Goal     Problem: Patient Education: Go to Patient Education Activity  Goal: Patient/Family Education  Outcome: Progressing Towards Goal     Problem: Pressure Injury - Risk of  Goal: *Prevention of pressure injury  Description: Document Cornelius Scale and appropriate interventions in the flowsheet.   Outcome: Progressing Towards Goal  Note: Pressure Injury Interventions:  Sensory Interventions: Assess changes in LOC,Check visual cues for pain    Moisture Interventions: Absorbent underpads,Check for incontinence Q2 hours and as needed    Activity Interventions: Increase time out of bed,PT/OT evaluation    Mobility Interventions: HOB 30 degrees or less,PT/OT evaluation    Nutrition Interventions: Document food/fluid/supplement intake,Discuss nutritional consult with provider,Offer support with meals,snacks and hydration    Friction and Shear Interventions: Apply protective barrier, creams and emollients,Foam dressings/transparent film/skin sealants                Problem: Patient Education: Go to Patient Education Activity  Goal: Patient/Family Education  Outcome: Progressing Towards Goal

## 2022-04-24 NOTE — PROGRESS NOTES
Hourly rounds completed this shift. All needs met at this time. Bed low and locked, call light within reach. Will continue to monitor and give bedside report to oncoming nurse.

## 2022-04-24 NOTE — DISCHARGE SUMMARY
Hospitalist Discharge Summary   Admit Date:  2022  5:22 PM   DC Note date: 2022  Name:  Jose Salazar   Age:  78 y.o. Sex:  male  :  1945   MRN:  [id number]   Room:  [facility]  PCP:  Unknown, Provider, MD    Presenting Complaint: Altered mental status and Urinary Pain    Initial Admission Diagnosis: Hypertensive encephalopathy [I67.4]     Problem List for this Hospitalization:  Hospital Problems as of 2022 Date Reviewed: 2021          Codes Class Noted - Resolved POA    * (Principal) Hypertensive encephalopathy ICD-10-CM: I67.4  ICD-9-CM: 437.2  2022 - Present Unknown        Acute metabolic encephalopathy API-51-TX: G93.41  ICD-9-CM: 348.31  2021 - Present Yes        Bilateral pulmonary embolism ([facility] [address] ICD-10-CM: I26.99  ICD-9-CM: 415.19  2021 - Present Yes        Lactic acidosis ICD-10-CM: E87.2  ICD-9-CM: 276.2  2021 - Present Yes            Did Patient have Sepsis (YES OR NO): No     Hospital Course:  Patient is a 69 y. o. male who presented to the ED from [facility] with AMS. History was limited due to patient's confusion and no papers brought with him. Hx of HTN, possible dementia, bilateral PE, and discharge from our facility on 21 for TONIA, AMS, and bilateral PE. Received Haldol in ER     CT Head showing aging/chronic microischemia findings with generalized cerebral volume loss. Patient's BP meds adjusted until satisfactory BP obtained, His mentation improved to A&O x 3. UA negative and BCx remained negative. Patient afebrile with normal white count. He was able to state that he has been running out of some of his medications too early and the [place] was refusing to refill, stating this has happened several times. Appears patient is taking too much of his medications at one time which may have led to his encephalopathy. Patient is A&O x 3, denies confusion, CP, N/V, dizziness, poor appetite.  Therapy with recs to return to his facility with HH. Patient stable for discharge and discharge back to his facility. He should follow-up with his PCP for eval of need for Eliquis. He would have completed 3 month of Eliquis in March for his PEs. Patient denied having  chest pain or SOB during this hospital visit,  He did voice understanding.        Disposition: [facility]  Diet: ADULT DIET Easy to Chew; Low Fat/Low Chol/High Fiber/2 gm Na  Code Status: Full Code    Follow Up Orders: Follow-up Appointments   Procedures    FOLLOW UP VISIT Appointment in: One Week With PCP for evaluation of need of Eliquis     With PCP for evaluation of need of Eliquis     Standing Status:   Standing     Number of Occurrences:   1     Order Specific Question:   Appointment in     Answer: One Week       Follow-up Information     Follow up With Specialties Details Why Contact Info    Unknown, Provider, MD Internal Medicine   Patient not available to ask            Follow up labs/diagnostics (ultimately defer to outpatient provider):  PCP for management of Eliquis     Time spent in patient discharge and coordination 38 minutes. Patient was stable at time of discharge. Instructions given to call a physician or return if any concerns. Discharge Info:   Current Discharge Medication List      START taking these medications    Details   apixaban (ELIQUIS) 5 mg tablet Take 1 Tablet by mouth two (2) times a day for 7 days. Qty: 14 Tablet, Refills: 0  Start date: 4/24/2022, End date: 5/1/2022    Comments: Patient needs to be evaluated for continued need of medication by his PCP. 3 months would have ended in March of this year      dilTIAZem ER (CARDIZEM CD) 120 mg capsule Take 1 Capsule by mouth daily for 30 days. Qty: 30 Capsule, Refills: 0  Start date: 4/25/2022, End date: 5/25/2022      hydrALAZINE (APRESOLINE) 50 mg tablet Take 1 Tablet by mouth three (3) times daily for 30 days.   Qty: 90 Tablet, Refills: 0  Start date: 4/24/2022, End date: 5/24/2022      losartan (COZAAR) 100 mg tablet Take 1 Tablet by mouth daily for 30 days. Qty: 30 Tablet, Refills: 0  Start date: 4/25/2022, End date: 5/25/2022      pantoprazole (PROTONIX) 40 mg tablet Take 1 Tablet by mouth daily for 30 days. Qty: 30 Tablet, Refills: 0  Start date: 4/25/2022, End date: 5/25/2022      QUEtiapine (SEROquel) 25 mg tablet Take 1 Tablet by mouth nightly for 30 days. Qty: 30 Tablet, Refills: 0  Start date: 4/24/2022, End date: 5/24/2022      tamsulosin (FLOMAX) 0.4 mg capsule Take 1 Capsule by mouth daily for 30 days. Qty: 30 Capsule, Refills: 0  Start date: 4/25/2022, End date: 5/25/2022             Procedures done this admission:  * No surgery found *    Consults this admission:  None    Echocardiogram/EKG results:  No results found for this or any previous visit. EKG Results     Procedure 720 Value Units Date/Time    EKG, 12 LEAD, INITIAL [[id number]] Collected: 04/21/22 1729    Order Status: Completed Updated: 04/22/22 0636     Ventricular Rate 59 BPM      Atrial Rate 0 BPM      P-R Interval 186 ms      QRS Duration 96 ms      Q-T Interval 419 ms      QTC Calculation (Bezet) 415 ms      Calculated P Axis 139 degrees      Calculated R Axis 50 degrees      Calculated T Axis -133 degrees      Diagnosis --     Sinus or ectopic atrial rhythm  Probable LVH with secondary repol abnrm    Confirmed by Murray Gallegos MD (), Irma Hunt ([id number]) on 4/22/2022 6:36:03 AM      EKG, 12 LEAD, INITIAL [[id number]]     Order Status: Completed           Diagnostic Imaging/Tests:   CT HEAD WO CONT    Result Date: 4/21/2022  CT BRAIN WITHOUT CONTRAST 4/21/2022 6:57 PM INDICATION: Altered mental status COMPARISON: CT brain 11/20/2021 Technique:  Multiple contiguous axial images are obtained encompassing the brain from the skull base to the vertex.   For this CT scanner at least one of the following techniques is utilized to decrease patient radiation dose: Automatic exposure control, KVP and mA modulation based on patient weight, and iterative reconstruction. FINDINGS: The ventricles are midline and of appropriate size and configuration. There is again well evident generalized cerebral volume loss and increased patchy hypodensities in the ventricular white matter. The midline structures and posterior fossa are intact. There is no finding of an acute cortical stroke, mass lesion, or acute bleed. No extra-axial fluid collection. Old, small bilateral occipital lobe cortical infarcts are similar to the prior. The skull is intact, no discreet abnormality. The paranasal sinuses are not remarkable. The visualized orbits and mastoid air-cells are patent. Again-well evident aging/chronic microischemia findings with generalized cerebral volume loss. No acute intracranial abnormality. Appearance similar to the prior study. XR CHEST PORT    Result Date: 4/21/2022  1 View portable chest x-ray 4/21/2022 5:57 PM Indication: Altered mental status/confusion Comparison: Chest x-ray 11/28/2021 Findings: This portable semierect AP chest of 1747 shows the patient rotated to the right. Mildly prominent cardiac silhouette again. Leads overlie the chest. Lungs mildly underventilated. No confluent infiltrate identified. No overt edema or significant effusion. Somewhat limited exam due to patient rotation to the right. Taking this into account no acute infiltrates are seen.        All Micro Results     Procedure Component Value Units Date/Time    CULTURE, BLOOD [[id number]] Collected: 04/22/22 0814    Order Status: Completed Specimen: Blood Updated: 04/24/22 1009     Special Requests: --        RIGHT  HAND       Culture result: NO GROWTH 2 DAYS       CULTURE, BLOOD [[id number]] Collected: 04/22/22 0413    Order Status: Completed Specimen: Blood Updated: 04/24/22 1009     Special Requests: --        LEFT  HAND       Culture result: NO GROWTH 2 DAYS       SARS-COV-2, PCR [[id number]] Collected: 04/22/22 1525    Order Status: Completed Specimen: Nasopharyngeal Updated: 04/23/22 1427     Specimen source Nasopharyngeal        SARS-CoV-2 by PCR Not detected        Comment:      The specimen is NEGATIVE for SARS-CoV-2, the novel coronavirus associated with COVID-19. This test has been authorized by the FDA under an Emergency Use Authorization (EUA) for use by authorized laboratories.         Fact sheet for Healthcare Providers: Comedy.comdate.co.nz       Fact sheet for Patients: Comedy.comdaminicabit.co.nz       Methodology: RT-PCR         CULTURE, BLOOD [[id number]] Collected: 04/21/22 2030    Order Status: Canceled Specimen: Blood     CULTURE, BLOOD [[id number]] Collected: 04/21/22 2030    Order Status: Canceled Specimen: Blood           Labs: Results:       BMP, Mg, Phos Recent Labs     04/23/22  0639 04/22/22 0412 04/21/22  1733   * 145 144   K 3.5 3.2* 3.2*   * 114* 111*   CO2 24 24 27   AGAP 6* 7 6*   BUN 13 13 14   CREA 1.10 1.00 1.20   CA 9.3 9.5 9.5   GLU 88 79 87   MG 1.9  --   --       CBC Recent Labs     04/22/22 0412 04/21/22  1733   WBC 8.2 8.2   RBC 5.09 5.01   HGB 14.7 14.7   HCT 44.3 44.3    224   GRANS 69 68   LYMPH 19 19   EOS 2 2   MONOS 10 9   BASOS 1 1   IG 0 1   ANEU 5.6 5.6   ABL 1.5 1.6   DEEPA 0.2 0.2   ABM 0.8 0.8   ABB 0.1 0.1   AIG 0.0 0.0      LFT Recent Labs     04/21/22  1733   ALT 23   AP 84   TP 6.9   ALB 3.7   GLOB 3.2   AGRAT 1.2      Cardiac Testing No results found for: BNPP, BNP, CPK, RCK1, RCK2, RCK3, RCK4, CKMB, CKNDX, CKND1, TROPT, TROIQ   Coagulation Tests Lab Results   Component Value Date/Time    aPTT >200.0 () 11/19/2021 04:29 PM      A1c No results found for: HBA1C, [id number]   Lipid Panel No results found for: CHOL, CHOLPOCT, CHOLX, CHLST, CHOLV, [id number], HDL, HDLP, LDL, LDLC, DLDLP, 437613, VLDLC, VLDL, TGLX, TRIGL, TRIGP, TGLPOCT, CHHD, [facility]   Thyroid Panel Lab Results   Component Value Date/Time    TSH 1.280 04/21/2022 05:33 PM    TSH 1.590 11/20/2021 04:06 PM Most Recent UA Lab Results   Component Value Date/Time    Color YELLOW 04/21/2022 08:40 PM    Appearance CLEAR 04/21/2022 08:40 PM    pH (UA) 6.0 04/21/2022 08:40 PM    Protein TRACE (A) 04/21/2022 08:40 PM    Glucose Negative 04/21/2022 08:40 PM    Ketone Negative 04/21/2022 08:40 PM    Bilirubin Negative 04/21/2022 08:40 PM    Blood Negative 04/21/2022 08:40 PM    Urobilinogen 0.2 04/21/2022 08:40 PM    Nitrites Negative 04/21/2022 08:40 PM    Leukocyte Esterase Negative 04/21/2022 08:40 PM    WBC 0 11/21/2021 09:03 PM    RBC 0-3 11/21/2021 09:03 PM    Epithelial cells 0 11/21/2021 09:03 PM    Bacteria 0 11/21/2021 09:03 PM    Casts 0 11/21/2021 09:03 PM          All Labs from Last 24 Hrs:  Recent Results (from the past 24 hour(s))   PLEASE READ & DOCUMENT PPD TEST IN 72 HRS    Collection Time: 04/23/22 10:53 PM   Result Value Ref Range    PPD Negative Negative    mm Induration 0 0 - 5 mm       Current Med List in Hospital:   Current Facility-Administered Medications   Medication Dose Route Frequency    hydrALAZINE (APRESOLINE) tablet 50 mg  50 mg Oral TID    OLANZapine (ZyPREXA) 2.5 mg in sterile water (preservative free) 0.5 mL injection  2.5 mg IntraMUSCular BID PRN    [START ON 4/25/2022] thiamine (B-1) 250 mg in 0.9% sodium chloride 50 mL IVPB  250 mg IntraVENous DAILY    folic acid (FOLVITE) tablet 1 mg  1 mg Oral DAILY    hydrALAZINE (APRESOLINE) 20 mg/mL injection 10 mg  10 mg IntraVENous Q6H PRN    pantoprazole (PROTONIX) tablet 40 mg  40 mg Oral DAILY    losartan (COZAAR) tablet 100 mg  100 mg Oral DAILY    dilTIAZem ER (CARDIZEM CD) capsule 120 mg  120 mg Oral DAILY    apixaban (ELIQUIS) tablet 5 mg  5 mg Oral BID    tamsulosin (FLOMAX) capsule 0.4 mg  0.4 mg Oral DAILY    QUEtiapine (SEROquel) tablet 25 mg  25 mg Oral QHS    sodium chloride (NS) flush 5-40 mL  5-40 mL IntraVENous Q8H    sodium chloride (NS) flush 5-40 mL  5-40 mL IntraVENous PRN    acetaminophen (TYLENOL) tablet 650 mg 650 mg Oral Q6H PRN    Or    acetaminophen (TYLENOL) suppository 650 mg  650 mg Rectal Q6H PRN    polyethylene glycol (MIRALAX) packet 17 g  17 g Oral DAILY PRN       No Known Allergies  Immunization History   Administered Date(s) Administered    COVID-19, Pfizer Purple top, DILUTE for use, 12+ yrs, 30mcg/0.3mL dose 02/23/2021    TB Skin Test (PPD) Intradermal 11/19/2021, 04/21/2022       Recent Vital Data:  Patient Vitals for the past 24 hrs:   Temp Pulse Resp BP SpO2   04/24/22 1149 97.4 °F (36.3 °C) 72 16 (!) 158/75 97 %   04/24/22 0743 -- (!) 56 16 (!) 186/79 97 %   04/24/22 0521 97.9 °F (36.6 °C) 60 18 -- 94 %   04/23/22 2223 97.4 °F (36.3 °C) 65 18 (!) 151/57 96 %   04/23/22 1835 -- -- -- (!) 173/119 --   04/23/22 1622 -- 67 17 (!) 187/75 97 %     Oxygen Therapy  O2 Sat (%): 97 % (04/24/22 1149)  Pulse via Oximetry: 62 beats per minute (04/21/22 1847)  O2 Device: None (Room air) (04/24/22 1149)    Estimated body mass index is 30.13 kg/m² as calculated from the following:    Height as of this encounter: 5' 10\" (1.778 m). Weight as of this encounter: 95.3 kg (210 lb). No intake or output data in the 24 hours ending 04/24/22 1246      Physical Exam:    General:    Well nourished. No overt distress  Head:  Normocephalic, atraumatic  Eyes:  Sclerae appear normal.  Pupils equally round. HENT:  Nares appear normal, no drainage. Moist mucous membranes  Neck:  No restricted ROM. Trachea midline  CV:   RRR. No m/r/g. No JVD  Lungs:   CTAB. No wheezing, rhonchi, or rales. Even, unlabored  Abdomen:   Soft, nontender, nondistended. Extremities: Warm and dry. No cyanosis or clubbing. No edema. Skin:     No rashes. Normal coloration  Neuro:  CN II-XII grossly intact. Psych:  Normal mood and affect. Signed:  Bren Perera NP    Part of this note may have been written by using a voice dictation software.   The note has been proof read but may still contain some grammatical/other typographical errors.

## 2022-04-24 NOTE — PROGRESS NOTES
Problem: Falls - Risk of  Goal: *Absence of Falls  Description: Document Shannon Mcburney Fall Risk and appropriate interventions in the flowsheet. 4/24/2022 1339 by Ray Rico RN  Outcome: Resolved/Met  4/24/2022 1014 by Ray Rico RN  Outcome: Progressing Towards Goal  Note: Fall Risk Interventions:  Mobility Interventions: Bed/chair exit alarm,OT consult for ADLs,Patient to call before getting OOB,PT Consult for mobility concerns    Mentation Interventions: Bed/chair exit alarm,Door open when patient unattended    Medication Interventions: Bed/chair exit alarm,Patient to call before getting OOB,Teach patient to arise slowly    Elimination Interventions: Bed/chair exit alarm,Call light in reach,Patient to call for help with toileting needs,Toileting schedule/hourly rounds              Problem: Patient Education: Go to Patient Education Activity  Goal: Patient/Family Education  4/24/2022 1339 by Ray Rico RN  Outcome: Resolved/Met  4/24/2022 1014 by Ray Rico RN  Outcome: Progressing Towards Goal     Problem: Patient Education: Go to Patient Education Activity  Goal: Patient/Family Education  4/24/2022 1339 by Ray Rico RN  Outcome: Resolved/Met  4/24/2022 1014 by Ray Rico RN  Outcome: Progressing Towards Goal     Problem: Patient Education: Go to Patient Education Activity  Goal: Patient/Family Education  4/24/2022 1339 by Ray Rico RN  Outcome: Resolved/Met  4/24/2022 1014 by Ray Rico RN  Outcome: Progressing Towards Goal     Problem: Pressure Injury - Risk of  Goal: *Prevention of pressure injury  Description: Document Cornelius Scale and appropriate interventions in the flowsheet.   4/24/2022 1339 by Ray Rico RN  Outcome: Resolved/Met  4/24/2022 1014 by Ray Rico RN  Outcome: Progressing Towards Goal  Note: Pressure Injury Interventions:  Sensory Interventions: Assess changes in LOC,Check visual cues for pain    Moisture Interventions: Absorbent underpads,Check for incontinence Q2 hours and as needed    Activity Interventions: Increase time out of bed,PT/OT evaluation    Mobility Interventions: HOB 30 degrees or less,PT/OT evaluation    Nutrition Interventions: Document food/fluid/supplement intake,Discuss nutritional consult with provider,Offer support with meals,snacks and hydration    Friction and Shear Interventions: Apply protective barrier, creams and emollients,Foam dressings/transparent film/skin sealants                Problem: Patient Education: Go to Patient Education Activity  Goal: Patient/Family Education  4/24/2022 1339 by Carmelina Avina RN  Outcome: Resolved/Met  4/24/2022 1014 by Carmelina Avina RN  Outcome: Progressing Towards Goal

## 2022-04-27 LAB
BACTERIA SPEC CULT: NORMAL
BACTERIA SPEC CULT: NORMAL
SERVICE CMNT-IMP: NORMAL
SERVICE CMNT-IMP: NORMAL

## 2022-10-04 ENCOUNTER — APPOINTMENT (OUTPATIENT)
Dept: GENERAL RADIOLOGY | Age: 77
DRG: 312 | End: 2022-10-04
Payer: OTHER GOVERNMENT

## 2022-10-04 ENCOUNTER — APPOINTMENT (OUTPATIENT)
Dept: CT IMAGING | Age: 77
DRG: 312 | End: 2022-10-04
Payer: OTHER GOVERNMENT

## 2022-10-04 ENCOUNTER — HOSPITAL ENCOUNTER (INPATIENT)
Age: 77
LOS: 9 days | Discharge: NURSING, REHAB OR LONG-TERM CARE | DRG: 312 | End: 2022-10-13
Attending: GENERAL PRACTICE | Admitting: INTERNAL MEDICINE
Payer: OTHER GOVERNMENT

## 2022-10-04 DIAGNOSIS — I95.9 TRANSIENT HYPOTENSION: ICD-10-CM

## 2022-10-04 DIAGNOSIS — G93.41 ACUTE METABOLIC ENCEPHALOPATHY: Primary | ICD-10-CM

## 2022-10-04 PROBLEM — I67.4 HYPERTENSIVE ENCEPHALOPATHY: Status: RESOLVED | Noted: 2022-04-21 | Resolved: 2022-10-04

## 2022-10-04 PROBLEM — E53.8 B12 DEFICIENCY: Status: ACTIVE | Noted: 2021-11-21

## 2022-10-04 PROBLEM — Z85.038 HISTORY OF COLON CANCER: Status: ACTIVE | Noted: 2019-02-13

## 2022-10-04 PROBLEM — Z86.73 HISTORY OF CVA (CEREBROVASCULAR ACCIDENT): Chronic | Status: ACTIVE | Noted: 2022-10-04

## 2022-10-04 PROBLEM — R56.9 SEIZURE-LIKE ACTIVITY (HCC): Status: ACTIVE | Noted: 2022-10-04

## 2022-10-04 PROBLEM — I73.9 CLAUDICATION OF LEFT LOWER EXTREMITY (HCC): Status: ACTIVE | Noted: 2020-01-23

## 2022-10-04 PROBLEM — Z72.0 TOBACCO ABUSE: Status: ACTIVE | Noted: 2019-11-10

## 2022-10-04 PROBLEM — K81.0 ACUTE CHOLECYSTITIS: Status: RESOLVED | Noted: 2021-11-19 | Resolved: 2022-10-04

## 2022-10-04 PROBLEM — R55 PRE-SYNCOPE: Status: ACTIVE | Noted: 2022-10-04

## 2022-10-04 PROBLEM — I10 PRIMARY HYPERTENSION: Status: ACTIVE | Noted: 2021-11-19

## 2022-10-04 PROBLEM — Z86.010 HISTORY OF COLON POLYPS: Status: ACTIVE | Noted: 2019-02-13

## 2022-10-04 PROBLEM — Z86.0100 HISTORY OF COLON POLYPS: Status: ACTIVE | Noted: 2019-02-13

## 2022-10-04 LAB
ALBUMIN SERPL-MCNC: 3.1 G/DL (ref 3.2–4.6)
ALBUMIN/GLOB SERPL: 0.9 {RATIO} (ref 1.2–3.5)
ALP SERPL-CCNC: 91 U/L (ref 50–136)
ALT SERPL-CCNC: 21 U/L (ref 12–65)
AMMONIA PLAS-SCNC: 21 UMOL/L (ref 11–32)
ANION GAP SERPL CALC-SCNC: 11 MMOL/L (ref 4–13)
AST SERPL-CCNC: 6 U/L (ref 15–37)
BASOPHILS # BLD: 0 K/UL (ref 0–0.2)
BASOPHILS NFR BLD: 0 % (ref 0–2)
BILIRUB SERPL-MCNC: 0.9 MG/DL (ref 0.2–1.1)
BUN SERPL-MCNC: 19 MG/DL (ref 8–23)
CALCIUM SERPL-MCNC: 9.7 MG/DL (ref 8.3–10.4)
CHLORIDE SERPL-SCNC: 108 MMOL/L (ref 101–110)
CO2 SERPL-SCNC: 22 MMOL/L (ref 21–32)
CREAT SERPL-MCNC: 1.3 MG/DL (ref 0.8–1.5)
DIFFERENTIAL METHOD BLD: ABNORMAL
EKG ATRIAL RATE: 83 BPM
EKG DIAGNOSIS: NORMAL
EKG P AXIS: 63 DEGREES
EKG P-R INTERVAL: 154 MS
EKG Q-T INTERVAL: 418 MS
EKG QRS DURATION: 94 MS
EKG QTC CALCULATION (BAZETT): 492 MS
EKG R AXIS: 45 DEGREES
EKG T AXIS: 97 DEGREES
EKG VENTRICULAR RATE: 83 BPM
EOSINOPHIL # BLD: 0.2 K/UL (ref 0–0.8)
EOSINOPHIL NFR BLD: 1 % (ref 0.5–7.8)
ERYTHROCYTE [DISTWIDTH] IN BLOOD BY AUTOMATED COUNT: 14.2 % (ref 11.9–14.6)
GLOBULIN SER CALC-MCNC: 3.6 G/DL (ref 2.3–3.5)
GLUCOSE SERPL-MCNC: 112 MG/DL (ref 65–100)
HCT VFR BLD AUTO: 43.7 % (ref 41.1–50.3)
HGB BLD-MCNC: 13.9 G/DL (ref 13.6–17.2)
IMM GRANULOCYTES # BLD AUTO: 0.2 K/UL (ref 0–0.5)
IMM GRANULOCYTES NFR BLD AUTO: 1 % (ref 0–5)
LACTATE SERPL-SCNC: 1.3 MMOL/L (ref 0.4–2)
LYMPHOCYTES # BLD: 1.2 K/UL (ref 0.5–4.6)
LYMPHOCYTES NFR BLD: 7 % (ref 13–44)
MCH RBC QN AUTO: 30.3 PG (ref 26.1–32.9)
MCHC RBC AUTO-ENTMCNC: 31.8 G/DL (ref 31.4–35)
MCV RBC AUTO: 95.4 FL (ref 79.6–97.8)
MONOCYTES # BLD: 1.2 K/UL (ref 0.1–1.3)
MONOCYTES NFR BLD: 7 % (ref 4–12)
NEUTS SEG # BLD: 14.6 K/UL (ref 1.7–8.2)
NEUTS SEG NFR BLD: 84 % (ref 43–78)
NRBC # BLD: 0 K/UL (ref 0–0.2)
PLATELET # BLD AUTO: 236 K/UL (ref 150–450)
PMV BLD AUTO: 10.6 FL (ref 9.4–12.3)
POTASSIUM SERPL-SCNC: 3.8 MMOL/L (ref 3.5–5.1)
PROCALCITONIN SERPL-MCNC: 0.24 NG/ML (ref 0–0.49)
PROT SERPL-MCNC: 6.7 G/DL (ref 6.3–8.2)
RBC # BLD AUTO: 4.58 M/UL (ref 4.23–5.6)
SODIUM SERPL-SCNC: 141 MMOL/L (ref 138–145)
VIT B12 SERPL-MCNC: 904 PG/ML (ref 193–986)
WBC # BLD AUTO: 17.4 K/UL (ref 4.3–11.1)

## 2022-10-04 PROCEDURE — 84145 PROCALCITONIN (PCT): CPT

## 2022-10-04 PROCEDURE — 6360000002 HC RX W HCPCS

## 2022-10-04 PROCEDURE — 1100000000 HC RM PRIVATE

## 2022-10-04 PROCEDURE — 82140 ASSAY OF AMMONIA: CPT

## 2022-10-04 PROCEDURE — 93005 ELECTROCARDIOGRAM TRACING: CPT | Performed by: GENERAL PRACTICE

## 2022-10-04 PROCEDURE — 71045 X-RAY EXAM CHEST 1 VIEW: CPT

## 2022-10-04 PROCEDURE — 85025 COMPLETE CBC W/AUTO DIFF WBC: CPT

## 2022-10-04 PROCEDURE — 6360000002 HC RX W HCPCS: Performed by: FAMILY MEDICINE

## 2022-10-04 PROCEDURE — 99285 EMERGENCY DEPT VISIT HI MDM: CPT

## 2022-10-04 PROCEDURE — 82607 VITAMIN B-12: CPT

## 2022-10-04 PROCEDURE — 2580000003 HC RX 258: Performed by: FAMILY MEDICINE

## 2022-10-04 PROCEDURE — 96365 THER/PROPH/DIAG IV INF INIT: CPT

## 2022-10-04 PROCEDURE — 87040 BLOOD CULTURE FOR BACTERIA: CPT

## 2022-10-04 PROCEDURE — 80053 COMPREHEN METABOLIC PANEL: CPT

## 2022-10-04 PROCEDURE — 83605 ASSAY OF LACTIC ACID: CPT

## 2022-10-04 PROCEDURE — 6370000000 HC RX 637 (ALT 250 FOR IP): Performed by: FAMILY MEDICINE

## 2022-10-04 PROCEDURE — 2580000003 HC RX 258

## 2022-10-04 PROCEDURE — 70450 CT HEAD/BRAIN W/O DYE: CPT

## 2022-10-04 RX ORDER — LANOLIN ALCOHOL/MO/W.PET/CERES
100 CREAM (GRAM) TOPICAL DAILY
Status: DISCONTINUED | OUTPATIENT
Start: 2022-10-04 | End: 2022-10-05

## 2022-10-04 RX ORDER — ASPIRIN 81 MG/1
81 TABLET ORAL DAILY
Status: DISCONTINUED | OUTPATIENT
Start: 2022-10-04 | End: 2022-10-05

## 2022-10-04 RX ORDER — POLYETHYLENE GLYCOL 3350 17 G/17G
17 POWDER, FOR SOLUTION ORAL DAILY PRN
Status: DISCONTINUED | OUTPATIENT
Start: 2022-10-04 | End: 2022-10-05

## 2022-10-04 RX ORDER — CYANOCOBALAMIN 1000 UG/ML
1000 INJECTION INTRAMUSCULAR; SUBCUTANEOUS ONCE
Status: COMPLETED | OUTPATIENT
Start: 2022-10-04 | End: 2022-10-04

## 2022-10-04 RX ORDER — SODIUM CHLORIDE 9 MG/ML
INJECTION, SOLUTION INTRAVENOUS PRN
Status: DISCONTINUED | OUTPATIENT
Start: 2022-10-04 | End: 2022-10-05

## 2022-10-04 RX ORDER — ENOXAPARIN SODIUM 100 MG/ML
40 INJECTION SUBCUTANEOUS EVERY 24 HOURS
Status: DISCONTINUED | OUTPATIENT
Start: 2022-10-04 | End: 2022-10-05

## 2022-10-04 RX ORDER — SODIUM CHLORIDE 9 MG/ML
INJECTION, SOLUTION INTRAVENOUS CONTINUOUS
Status: DISCONTINUED | OUTPATIENT
Start: 2022-10-04 | End: 2022-10-05

## 2022-10-04 RX ORDER — MAGNESIUM HYDROXIDE/ALUMINUM HYDROXICE/SIMETHICONE 120; 1200; 1200 MG/30ML; MG/30ML; MG/30ML
30 SUSPENSION ORAL EVERY 6 HOURS PRN
Status: DISCONTINUED | OUTPATIENT
Start: 2022-10-04 | End: 2022-10-05

## 2022-10-04 RX ORDER — ONDANSETRON 4 MG/1
4 TABLET, ORALLY DISINTEGRATING ORAL EVERY 8 HOURS PRN
Status: DISCONTINUED | OUTPATIENT
Start: 2022-10-04 | End: 2022-10-05

## 2022-10-04 RX ORDER — ACETAMINOPHEN 650 MG/1
650 SUPPOSITORY RECTAL EVERY 6 HOURS PRN
Status: DISCONTINUED | OUTPATIENT
Start: 2022-10-04 | End: 2022-10-05

## 2022-10-04 RX ORDER — SODIUM CHLORIDE 0.9 % (FLUSH) 0.9 %
5-40 SYRINGE (ML) INJECTION PRN
Status: DISCONTINUED | OUTPATIENT
Start: 2022-10-04 | End: 2022-10-05

## 2022-10-04 RX ORDER — ONDANSETRON 2 MG/ML
4 INJECTION INTRAMUSCULAR; INTRAVENOUS EVERY 6 HOURS PRN
Status: DISCONTINUED | OUTPATIENT
Start: 2022-10-04 | End: 2022-10-05

## 2022-10-04 RX ORDER — FOLIC ACID 1 MG/1
1 TABLET ORAL DAILY
Status: DISCONTINUED | OUTPATIENT
Start: 2022-10-05 | End: 2022-10-05

## 2022-10-04 RX ORDER — SODIUM CHLORIDE 0.9 % (FLUSH) 0.9 %
5-40 SYRINGE (ML) INJECTION EVERY 12 HOURS SCHEDULED
Status: DISCONTINUED | OUTPATIENT
Start: 2022-10-04 | End: 2022-10-05

## 2022-10-04 RX ORDER — ATORVASTATIN CALCIUM 40 MG/1
40 TABLET, FILM COATED ORAL NIGHTLY
Status: DISCONTINUED | OUTPATIENT
Start: 2022-10-04 | End: 2022-10-05

## 2022-10-04 RX ORDER — ACETAMINOPHEN 325 MG/1
650 TABLET ORAL EVERY 6 HOURS PRN
Status: DISCONTINUED | OUTPATIENT
Start: 2022-10-04 | End: 2022-10-05

## 2022-10-04 RX ADMIN — CEFTRIAXONE 1000 MG: 1 INJECTION, POWDER, FOR SOLUTION INTRAMUSCULAR; INTRAVENOUS at 16:59

## 2022-10-04 RX ADMIN — ATORVASTATIN CALCIUM 40 MG: 40 TABLET, FILM COATED ORAL at 20:00

## 2022-10-04 RX ADMIN — ASPIRIN 81 MG: 81 TABLET ORAL at 19:52

## 2022-10-04 RX ADMIN — Medication 100 MG: at 19:52

## 2022-10-04 RX ADMIN — ACETAMINOPHEN 650 MG: 325 TABLET, FILM COATED ORAL at 19:52

## 2022-10-04 RX ADMIN — Medication 10 ML: at 19:53

## 2022-10-04 RX ADMIN — CYANOCOBALAMIN 1000 MCG: 1000 INJECTION, SOLUTION INTRAMUSCULAR; SUBCUTANEOUS at 19:52

## 2022-10-04 RX ADMIN — SODIUM CHLORIDE: 9 INJECTION, SOLUTION INTRAVENOUS at 19:55

## 2022-10-04 RX ADMIN — ENOXAPARIN SODIUM 40 MG: 100 INJECTION SUBCUTANEOUS at 19:52

## 2022-10-04 ASSESSMENT — PAIN SCALES - GENERAL
PAINLEVEL_OUTOF10: 0
PAINLEVEL_OUTOF10: 0

## 2022-10-04 ASSESSMENT — ENCOUNTER SYMPTOMS
GASTROINTESTINAL NEGATIVE: 1
RESPIRATORY NEGATIVE: 1
EYES NEGATIVE: 1

## 2022-10-04 ASSESSMENT — PAIN - FUNCTIONAL ASSESSMENT
PAIN_FUNCTIONAL_ASSESSMENT: NONE - DENIES PAIN
PAIN_FUNCTIONAL_ASSESSMENT: 0-10

## 2022-10-04 NOTE — ED PROVIDER NOTES
[facility] Emergency Department Provider Note                     PCP:                Unknown Unknown               Age: 78 y.o. Sex: male           ICD-10-CM    1. Acute metabolic encephalopathy  M98.38       2. Transient hypotension  I95.9           DISPOSITION Decision To Admit 10/04/2022 05:59:04 PM       New Prescriptions    No medications on file       MDM  Number of Diagnoses or Management Options  Acute metabolic encephalopathy  Transient hypotension  Diagnosis management comments: Patient is disheveled with and assisted living situation refer to . Patient shows signs of possible sepsis most likely is urinary awaiting urinalysis lactic acid etc. before decision can be made on disposition. Will turn over to Dr. Wil Martinez at shift change. Amount and/or Complexity of Data Reviewed  Clinical lab tests: ordered and reviewed  Tests in the radiology section of CPT®: ordered and reviewed  Tests in the medicine section of CPT®: ordered and reviewed  Decide to obtain previous medical records or to obtain history from someone other than the patient: yes  Review and summarize past medical records: yes  Discuss the patient with other providers: yes  Independent visualization of images, tracings, or specimens: yes    Risk of Complications, Morbidity, and/or Mortality  Presenting problems: high  Diagnostic procedures: high  Management options: high    Patient Progress  Patient progress: stable      Orders Placed This Encounter   Procedures    Culture, Blood 1    Culture, Urine    XR CHEST PORTABLE    CT HEAD WO CONTRAST    CBC with Auto Differential    Comprehensive Metabolic Panel    Urinalysis with Reflex to Culture    Lactic Acid    Procalcitonin    Cardiac Monitor - ED Only    Continuous Pulse Oximetry    Orthostatic blood pressure and pulse    EKG 12 Lead        No follow-up provider specified.      Gregg Granda is a 78 y.o. male who presents to the Emergency Department with chief complaint of    Chief Complaint   Patient presents with    Dizziness      68-year-old male brought in by EMS from a assisted living. Patient is disheveled and uncooperative. Reportedly had some respiratory distress and blood pressure drop and some confusion. Patient is very angry and yelling at staff. The history is provided by the patient. Dizziness  Quality:  Lightheadedness  Severity:  Moderate  Onset quality:  Gradual  Timing:  Constant  Progression:  Worsening  Chronicity:  Chronic  Worsened by:  Nothing    Review of Systems   Constitutional: Negative. Negative for activity change, appetite change, chills and fever. HENT: Negative. Eyes: Negative. Respiratory: Negative. Cardiovascular: Negative. Gastrointestinal: Negative. Endocrine: Negative. Musculoskeletal: Negative. Skin: Negative. Neurological:  Positive for dizziness. Hematological: Negative. Psychiatric/Behavioral: Negative. All other systems reviewed and are negative. All other systems reviewed and are negative. Past Medical History:   Diagnosis Date    Asthma     B12 deficiency 11/21/2021    Hypertension         No past surgical history on file. No family history on file. Social Connections: Not on file        No Known Allergies     Vitals signs and nursing note reviewed. Patient Vitals for the past 4 hrs:   Temp   10/04/22 1429 98.7 °F (37.1 °C)          Physical Exam  Vitals and nursing note reviewed. Constitutional:       Appearance: Normal appearance. He is obese. HENT:      Head: Normocephalic. Right Ear: External ear normal.      Left Ear: External ear normal.      Nose: Nose normal. No congestion. Mouth/Throat:      Mouth: Mucous membranes are moist.      Pharynx: Oropharynx is clear. Eyes:      Extraocular Movements: Extraocular movements intact. Conjunctiva/sclera: Conjunctivae normal.      Pupils: Pupils are equal, round, and reactive to light. there is any question about the   accuracy of the report or need for clarification, then please call [phone], or text me through perfectserv for clarification or correction. XR CHEST PORTABLE   Final Result   The lungs are clear. The heart is normal in size. No pneumothorax. No pleural effusions. Eunice Coma Scale  Eye Opening: Spontaneous  Best Verbal Response: Oriented  Best Motor Response: Obeys commands  Warwick Coma Scale Score: 15                     CIWA Assessment  BP: 109/65  Heart Rate: 88                       Voice dictation software was used during the making of this note. This software is not perfect and grammatical and other typographical errors may be present. This note has not been completely proofread for errors.        Sj Fernandez MD  10/04/22 9087

## 2022-10-04 NOTE — H&P
Hospitalist History and Physical   Admit Date:  10/4/2022  2:09 PM   Name:  Jorge L Zuñiga   Age:  78 y.o. Sex:  male  :  1945   MRN:  [id number]   Room:  ER15/15    Presenting Complaint: Dizziness     Reason(s) for Admission: Seizure-like activity ([facility] [address] [R56.9]     History of Present Illness:   Jorge L Zuñiga is a 78 y.o. male with medical history of copd, tobacco abuse, b12 deficinecy and HTN  who presented from [facility] via EMS with cc dizziness. While sitting at dining room table, he c/o dizziness and staff reports he was shaking, seizure like activity. No known h/o seizures. Per EMS, BP 90/60 on arrival. He c/o back pain. He was uncooperative, angry and yelling at staff in ED. Labs  CAU95.9Z Procal 0.24  Poor historian. Disheveled. Says he has not had any food and fluids today. Remembers getting dizzy today but doesn't remember what happened afterwards. Has happened before but does not elaborate. Denies fever or chills. Has not urinated today d/t no PO intake. Says he pees if he drinks fluids and doesn't if he doesn't drink fluids. says he use to pee all the time. Per record review, has had several admissions with non specific delirium/encephalopathy. In 2019 had MRI showing infarct L occipital lobe, R BG lesion, prominent supratentorial white matter dz, non specific but compatible with chronic ischemic demyelination. Evidence of micro hemorrhage in the parietal and occipital lobes, non specific and my be secondary to chronic hypertensive encephalopathy, amyloid angiopathy, or tiny cavernous malformations. Left AMA during this admission. Does not appear that he saw neurology. S/p Rocephin in ED     Review of Systems:  Limited due to AMS   Assessment & Plan:     Near syncope - admit remote tele. Unable to r/o seizure. Start precautions and check EEG. Orthostatics daily. Possibly convulsive syncope from hypotension. NS IVF x24 hours. Hold BP meds.  Neuro checks q4h.     H/o CVA // dizziness - check MRI brain and MRA head/neck. Aspirin, high intensity statin. PT/OT/SLP. Leukocytosis - of unclear etiology. UA ordered. CXR okay. S/p rocephin in ED. Acute encephalopathy - baseline mentation unclear. admin IM b12 given h/o b12 def. Start PO folic acid and thiamine daily. Check tsh. Mri as above. Rule out seizures. Monitor for urinary retention. Hydrate. Check b12, tsh, , ammonia. Obesity - noted     Tobacco dependence syndrome - down to 1 cig per day    History of alcohol use disorder - quit drinking 5-6 years ago. HTN - hold home bp meds. Anticipated discharge needs:     Return to facility     Diet: ADULT DIET; Easy to Chew; Low Fat/Low Chol/High Fiber/MANNY  VTE ppx: loveonx   Code status: Full Code    Hospital Problems:  Principal Problem:    Pre-syncope  Active Problems:    Seizure-like activity ([facility] [address]    Tobacco abuse    History of CVA (cerebrovascular accident)    Primary hypertension    Acute metabolic encephalopathy    B12 deficiency  Resolved Problems:    * No resolved hospital problems. *       Past History:     Past Medical History:   Diagnosis Date    Acute cholecystitis 11/19/2021    Asthma     B12 deficiency 11/21/2021    History of CVA (cerebrovascular accident) 10/4/2022    Hypertension     Hypertensive encephalopathy 4/21/2022       No past surgical history on file. Colonoscopy   Social History     Tobacco Use    Smoking status: Not on file    Smokeless tobacco: Not on file   Substance Use Topics    Alcohol use: Not on file      Social History     Substance and Sexual Activity   Drug Use Not on file       No family history on file.      Immunization History   Administered Date(s) Administered    COVID-19, PFIZER PURPLE top, DILUTE for use, (age 15 y+), 30mcg/0.3mL 02/23/2021    PPD Test 11/19/2021, 04/21/2022     No Known Allergies  Prior to Admit Medications:  No current outpatient medications      Objective:   Patient Vitals for the past 24 hrs:   Temp Pulse Resp BP SpO2   10/04/22 1909 -- 80 20 -- --   10/04/22 1859 -- 83 26 -- --   10/04/22 1849 -- 83 21 -- --   10/04/22 1839 -- 92 25 -- --   10/04/22 1829 -- 91 18 -- --   10/04/22 1819 -- 93 22 -- --   10/04/22 1809 -- 91 21 -- --   10/04/22 1759 -- 81 20 -- --   10/04/22 1632 -- 85 22 130/71 --   10/04/22 1429 98.7 °F (37.1 °C) -- -- -- --   10/04/22 1405 -- 88 16 109/65 98 %       Oxygen Therapy  SpO2: 98 %  O2 Device: None (Room air)    Estimated body mass index is 30.13 kg/m² as calculated from the following:    Height as of this encounter: 5' 10\" (1.778 m). Weight as of this encounter: 210 lb (95.3 kg). Intake/Output Summary (Last 24 hours) at 10/4/2022 1953  Last data filed at 10/4/2022 1729  Gross per 24 hour   Intake 50 ml   Output --   Net 50 ml         Physical Exam:    Blood pressure 130/71, pulse 80, temperature 98.7 °F (37.1 °C), temperature source Oral, resp. rate 20, height 5' 10\" (1.778 m), weight 210 lb (95.3 kg), SpO2 98 %. Physical Exam  Vitals and nursing note reviewed. Constitutional:       General: He is not in acute distress. Appearance: He is obese. He is not ill-appearing. Comments: Disheveled    HENT:      Head: Normocephalic and atraumatic. Mouth/Throat:      Mouth: Mucous membranes are dry. Eyes:      Extraocular Movements: Extraocular movements intact. Conjunctiva/sclera: Conjunctivae normal.      Pupils: Pupils are equal, round, and reactive to light. Cardiovascular:      Rate and Rhythm: Normal rate and regular rhythm. Heart sounds: No murmur heard. Pulmonary:      Effort: Pulmonary effort is normal.      Breath sounds: Normal breath sounds. No wheezing or rales. Abdominal:      General: Abdomen is flat. Bowel sounds are normal.      Palpations: Abdomen is soft. Musculoskeletal:      Cervical back: Neck supple. Right lower leg: No edema. Left lower leg: No edema. Skin:     Coloration: Skin is not jaundiced or pale. Neurological:      General: No focal deficit present. Mental Status: He is alert and oriented to person, place, and time. Motor: No weakness. Psychiatric:         Attention and Perception: He is inattentive. Mood and Affect: Affect is labile. Behavior: Behavior is cooperative.          I have personally reviewed labs and tests showing:  Recent Labs:  Recent Results (from the past 24 hour(s))   CBC with Auto Differential    Collection Time: 10/04/22  2:11 PM   Result Value Ref Range    WBC 17.4 (H) 4.3 - 11.1 K/uL    RBC 4.58 4.23 - 5.6 M/uL    Hemoglobin 13.9 13.6 - 17.2 g/dL    Hematocrit 43.7 41.1 - 50.3 %    MCV 95.4 79.6 - 97.8 FL    MCH 30.3 26.1 - 32.9 PG    MCHC 31.8 31.4 - 35.0 g/dL    RDW 14.2 11.9 - 14.6 %    Platelets 239 306 - 847 K/uL    MPV 10.6 9.4 - 12.3 FL    nRBC 0.00 0.0 - 0.2 K/uL    Differential Type AUTOMATED      Seg Neutrophils 84 (H) 43 - 78 %    Lymphocytes 7 (L) 13 - 44 %    Monocytes 7 4.0 - 12.0 %    Eosinophils % 1 0.5 - 7.8 %    Basophils 0 0.0 - 2.0 %    Immature Granulocytes 1 0.0 - 5.0 %    Segs Absolute 14.6 (H) 1.7 - 8.2 K/UL    Absolute Lymph # 1.2 0.5 - 4.6 K/UL    Absolute Mono # 1.2 0.1 - 1.3 K/UL    Absolute Eos # 0.2 0.0 - 0.8 K/UL    Basophils Absolute 0.0 0.0 - 0.2 K/UL    Absolute Immature Granulocyte 0.2 0.0 - 0.5 K/UL   Comprehensive Metabolic Panel    Collection Time: 10/04/22  2:11 PM   Result Value Ref Range    Sodium 141 138 - 145 mmol/L    Potassium 3.8 3.5 - 5.1 mmol/L    Chloride 108 101 - 110 mmol/L    CO2 22 21 - 32 mmol/L    Anion Gap 11 4 - 13 mmol/L    Glucose 112 (H) 65 - 100 mg/dL    BUN 19 8 - 23 MG/DL    Creatinine 1.30 0.8 - 1.5 MG/DL    Est, Glom Filt Rate 57 (L) >60 ml/min/1.73m2    Calcium 9.7 8.3 - 10.4 MG/DL    Total Bilirubin 0.9 0.2 - 1.1 MG/DL    ALT 21 12 - 65 U/L    AST 6 (L) 15 - 37 U/L    Alk Phosphatase 91 50 - 136 U/L    Total Protein 6.7 6.3 - 8.2 g/dL    Albumin 3.1 (L) 3.2 - 4.6 g/dL    Globulin 3.6 (H) 2.3 - 3.5 g/dL    Albumin/Globulin Ratio 0.9 (L) 1.2 - 3.5     EKG 12 Lead    Collection Time: 10/04/22  2:12 PM   Result Value Ref Range    Ventricular Rate 83 BPM    Atrial Rate 83 BPM    P-R Interval 154 ms    QRS Duration 94 ms    Q-T Interval 418 ms    QTc Calculation (Bazett) 492 ms    P Axis 63 degrees    R Axis 45 degrees    T Axis 97 degrees    Diagnosis Sinus rhythm    Lactic Acid    Collection Time: 10/04/22  4:54 PM   Result Value Ref Range    Lactic Acid, Plasma 1.3 0.4 - 2.0 MMOL/L   Procalcitonin    Collection Time: 10/04/22  4:54 PM   Result Value Ref Range    Procalcitonin 0.24 0.00 - 0.49 ng/mL       I have personally reviewed imaging studies showing:  CT HEAD WO CONTRAST    Result Date: 10/4/2022  CT HEAD WITHOUT CONTRAST, 10/4/2022 History: Dizziness and shaking. Comparison: CT head without contrast 4/21/2022 Technique:   5 mm axial scans from the skull base to the vertex. All CT scans performed at this facility use one or all of the following: Automated exposure control, adjustment of the mA and/or kVp according to patient's size, iterative reconstruction. Findings:  No evidence of intracranial hemorrhage is seen. No abnormal extra-axial fluid collections are seen. No evidence for acute hydrocephalus is seen. No evidence of midline shift or herniation is seen. No abnormal edema pattern is seen in a vascular distribution to suggest large artery infarction. A stable defined defect is seen in the right basal ganglia consistent with a chronic lacunar infarction. Stable advanced white matter changes are seen most consistent with advanced chronic migraines hepatic changes. Evaluation with bone windows shows no acute osseous changes. The visualized sinuses, middle ears, and mastoid air cells are well aerated. 1.  No acute intracranial process evident by noncontrast CT study of the head. Only chronic appearing changes are seen as described above. This report was made using voice transcription. Despite my best efforts to avoid any, transcription errors may persist. If there is any question about the accuracy of the report or need for clarification, then please call [phone], or text me through perfectserv for clarification or correction. XR CHEST PORTABLE    Result Date: 10/4/2022  XR CHEST PORTABLE 10/4/2022 2:22 PM HISTORY: Syncope COMPARISON: Chest x-ray 4/21/2022. A portable AP view of the chest was obtained. The lungs are clear. The heart is normal in size. No pneumothorax. No pleural effusions. Echocardiogram:  No results found for this or any previous visit. Orders Placed This Encounter   Medications    cefTRIAXone (ROCEPHIN) 1,000 mg in sodium chloride 0.9 % 50 mL IVPB mini-bag     Order Specific Question:   Antimicrobial Indications     Answer:   Urinary Tract Infection    sodium chloride flush 0.9 % injection 5-40 mL    sodium chloride flush 0.9 % injection 5-40 mL    0.9 % sodium chloride infusion    enoxaparin (LOVENOX) injection 40 mg     Order Specific Question:   Indication of Use     Answer:   Prophylaxis-DVT/PE    OR Linked Order Group     ondansetron (ZOFRAN-ODT) disintegrating tablet 4 mg     ondansetron (ZOFRAN) injection 4 mg    polyethylene glycol (GLYCOLAX) packet 17 g    OR Linked Order Group     acetaminophen (TYLENOL) tablet 650 mg     acetaminophen (TYLENOL) suppository 650 mg    cyanocobalamin injection 4,307 mcg    folic acid (FOLVITE) tablet 1 mg    thiamine tablet 100 mg    aluminum & magnesium hydroxide-simethicone (MAALOX) 200-200-20 MG/5ML suspension 30 mL    atorvastatin (LIPITOR) tablet 40 mg    0.9 % sodium chloride infusion    aspirin EC tablet 81 mg         Signed:  Rosie Diamond DO, DO    Part of this note may have been written by using a voice dictation software. The note has been proof read but may still contain some grammatical/other typographical errors.

## 2022-10-04 NOTE — ED TRIAGE NOTES
Patient arrives to ED via EMS from [facility]. Patient was sitting at the dining room table complaining of feeling dizzy. Staff reports \"shaking, seizure like activity\". Patient does not have history of seizures. Patient was 90/60 when EMS arrived. Patient complains of back pain.

## 2022-10-05 ENCOUNTER — APPOINTMENT (OUTPATIENT)
Dept: GENERAL RADIOLOGY | Age: 77
DRG: 312 | End: 2022-10-05
Payer: OTHER GOVERNMENT

## 2022-10-05 LAB
25(OH)D3 SERPL-MCNC: 36.5 NG/ML (ref 30–100)
ALBUMIN SERPL-MCNC: 3 G/DL (ref 3.2–4.6)
ALBUMIN/GLOB SERPL: 1.1 {RATIO} (ref 1.2–3.5)
ALP SERPL-CCNC: 84 U/L (ref 50–136)
ALT SERPL-CCNC: 17 U/L (ref 12–65)
ANION GAP SERPL CALC-SCNC: 7 MMOL/L (ref 4–13)
APPEARANCE UR: CLEAR
AST SERPL-CCNC: 12 U/L (ref 15–37)
BACTERIA URNS QL MICRO: ABNORMAL /HPF
BASOPHILS # BLD: 0 K/UL (ref 0–0.2)
BASOPHILS NFR BLD: 0 % (ref 0–2)
BILIRUB SERPL-MCNC: 0.5 MG/DL (ref 0.2–1.1)
BILIRUB UR QL: NEGATIVE
BUN SERPL-MCNC: 31 MG/DL (ref 8–23)
CALCIUM SERPL-MCNC: 8.8 MG/DL (ref 8.3–10.4)
CASTS URNS QL MICRO: ABNORMAL /LPF
CHLORIDE SERPL-SCNC: 110 MMOL/L (ref 101–110)
CO2 SERPL-SCNC: 24 MMOL/L (ref 21–32)
COLOR UR: ABNORMAL
CREAT SERPL-MCNC: 1.8 MG/DL (ref 0.8–1.5)
CRYSTALS URNS QL MICRO: 0 /LPF
DIFFERENTIAL METHOD BLD: ABNORMAL
EOSINOPHIL # BLD: 0.4 K/UL (ref 0–0.8)
EOSINOPHIL NFR BLD: 3 % (ref 0.5–7.8)
EPI CELLS #/AREA URNS HPF: 0 /HPF
ERYTHROCYTE [DISTWIDTH] IN BLOOD BY AUTOMATED COUNT: 14 % (ref 11.9–14.6)
GLOBULIN SER CALC-MCNC: 2.7 G/DL (ref 2.3–3.5)
GLUCOSE SERPL-MCNC: 82 MG/DL (ref 65–100)
GLUCOSE UR STRIP.AUTO-MCNC: NEGATIVE MG/DL
HCT VFR BLD AUTO: 38.7 % (ref 41.1–50.3)
HGB BLD-MCNC: 12.6 G/DL (ref 13.6–17.2)
HGB UR QL STRIP: NEGATIVE
IMM GRANULOCYTES # BLD AUTO: 0.1 K/UL (ref 0–0.5)
IMM GRANULOCYTES NFR BLD AUTO: 1 % (ref 0–5)
KETONES UR QL STRIP.AUTO: ABNORMAL MG/DL
LEUKOCYTE ESTERASE UR QL STRIP.AUTO: NEGATIVE
LYMPHOCYTES # BLD: 0.9 K/UL (ref 0.5–4.6)
LYMPHOCYTES NFR BLD: 7 % (ref 13–44)
MAGNESIUM SERPL-MCNC: 2.2 MG/DL (ref 1.8–2.4)
MCH RBC QN AUTO: 30.5 PG (ref 26.1–32.9)
MCHC RBC AUTO-ENTMCNC: 32.6 G/DL (ref 31.4–35)
MCV RBC AUTO: 93.7 FL (ref 79.6–97.8)
MONOCYTES # BLD: 1.5 K/UL (ref 0.1–1.3)
MONOCYTES NFR BLD: 11 % (ref 4–12)
MUCOUS THREADS URNS QL MICRO: ABNORMAL /LPF
NEUTS SEG # BLD: 9.9 K/UL (ref 1.7–8.2)
NEUTS SEG NFR BLD: 78 % (ref 43–78)
NITRITE UR QL STRIP.AUTO: NEGATIVE
NRBC # BLD: 0 K/UL (ref 0–0.2)
OTHER OBSERVATIONS: ABNORMAL
PH UR STRIP: 5.5 [PH] (ref 5–9)
PLATELET # BLD AUTO: 229 K/UL (ref 150–450)
PMV BLD AUTO: 9.8 FL (ref 9.4–12.3)
POTASSIUM SERPL-SCNC: 3.8 MMOL/L (ref 3.5–5.1)
PROT SERPL-MCNC: 5.7 G/DL (ref 6.3–8.2)
PROT UR STRIP-MCNC: 30 MG/DL
RBC # BLD AUTO: 4.13 M/UL (ref 4.23–5.6)
RBC #/AREA URNS HPF: 0 /HPF
SODIUM SERPL-SCNC: 141 MMOL/L (ref 138–145)
SP GR UR REFRACTOMETRY: 1.01 (ref 1–1.02)
TSH W FREE THYROID IF ABNORMAL: 0.77 UIU/ML (ref 0.36–3.74)
URINE CULTURE IF INDICATED: ABNORMAL
UROBILINOGEN UR QL STRIP.AUTO: 1 EU/DL (ref 0.2–1)
WBC # BLD AUTO: 12.7 K/UL (ref 4.3–11.1)
WBC URNS QL MICRO: ABNORMAL /HPF

## 2022-10-05 PROCEDURE — 80053 COMPREHEN METABOLIC PANEL: CPT

## 2022-10-05 PROCEDURE — 85025 COMPLETE CBC W/AUTO DIFF WBC: CPT

## 2022-10-05 PROCEDURE — 1100000000 HC RM PRIVATE

## 2022-10-05 PROCEDURE — 84443 ASSAY THYROID STIM HORMONE: CPT

## 2022-10-05 PROCEDURE — 87086 URINE CULTURE/COLONY COUNT: CPT

## 2022-10-05 PROCEDURE — 97162 PT EVAL MOD COMPLEX 30 MIN: CPT

## 2022-10-05 PROCEDURE — 74018 RADEX ABDOMEN 1 VIEW: CPT

## 2022-10-05 PROCEDURE — APPNB30 APP NON BILLABLE TIME 0-30 MINS: Performed by: NURSE PRACTITIONER

## 2022-10-05 PROCEDURE — 2580000003 HC RX 258: Performed by: FAMILY MEDICINE

## 2022-10-05 PROCEDURE — 2580000003 HC RX 258: Performed by: INTERNAL MEDICINE

## 2022-10-05 PROCEDURE — 6360000002 HC RX W HCPCS: Performed by: INTERNAL MEDICINE

## 2022-10-05 PROCEDURE — 82306 VITAMIN D 25 HYDROXY: CPT

## 2022-10-05 PROCEDURE — 92610 EVALUATE SWALLOWING FUNCTION: CPT

## 2022-10-05 PROCEDURE — 99222 1ST HOSP IP/OBS MODERATE 55: CPT | Performed by: PSYCHIATRY & NEUROLOGY

## 2022-10-05 PROCEDURE — 6370000000 HC RX 637 (ALT 250 FOR IP): Performed by: FAMILY MEDICINE

## 2022-10-05 PROCEDURE — 81001 URINALYSIS AUTO W/SCOPE: CPT

## 2022-10-05 PROCEDURE — 83735 ASSAY OF MAGNESIUM: CPT

## 2022-10-05 PROCEDURE — 6370000000 HC RX 637 (ALT 250 FOR IP): Performed by: INTERNAL MEDICINE

## 2022-10-05 PROCEDURE — 97166 OT EVAL MOD COMPLEX 45 MIN: CPT

## 2022-10-05 RX ORDER — ATORVASTATIN CALCIUM 20 MG/1
20 TABLET, FILM COATED ORAL DAILY
Status: ON HOLD | COMMUNITY
End: 2022-10-13 | Stop reason: HOSPADM

## 2022-10-05 RX ORDER — ENOXAPARIN SODIUM 100 MG/ML
40 INJECTION SUBCUTANEOUS EVERY 24 HOURS
Status: DISCONTINUED | OUTPATIENT
Start: 2022-10-05 | End: 2022-10-13 | Stop reason: HOSPADM

## 2022-10-05 RX ORDER — ACETAMINOPHEN 650 MG/1
650 SUPPOSITORY RECTAL EVERY 6 HOURS PRN
Status: DISCONTINUED | OUTPATIENT
Start: 2022-10-05 | End: 2022-10-13 | Stop reason: HOSPADM

## 2022-10-05 RX ORDER — LOSARTAN POTASSIUM 50 MG/1
50 TABLET ORAL 2 TIMES DAILY
Status: ON HOLD | COMMUNITY
Start: 2019-11-21 | End: 2022-10-13 | Stop reason: SDUPTHER

## 2022-10-05 RX ORDER — ASPIRIN 81 MG/1
81 TABLET ORAL DAILY
Status: DISCONTINUED | OUTPATIENT
Start: 2022-10-06 | End: 2022-10-13 | Stop reason: HOSPADM

## 2022-10-05 RX ORDER — ONDANSETRON 2 MG/ML
4 INJECTION INTRAMUSCULAR; INTRAVENOUS EVERY 6 HOURS PRN
Status: DISCONTINUED | OUTPATIENT
Start: 2022-10-05 | End: 2022-10-13 | Stop reason: HOSPADM

## 2022-10-05 RX ORDER — POLYETHYLENE GLYCOL 3350 17 G/17G
17 POWDER, FOR SOLUTION ORAL DAILY PRN
Status: DISCONTINUED | OUTPATIENT
Start: 2022-10-05 | End: 2022-10-13 | Stop reason: HOSPADM

## 2022-10-05 RX ORDER — ACETAMINOPHEN 325 MG/1
650 TABLET ORAL EVERY 6 HOURS PRN
Status: DISCONTINUED | OUTPATIENT
Start: 2022-10-05 | End: 2022-10-13 | Stop reason: HOSPADM

## 2022-10-05 RX ORDER — SODIUM CHLORIDE 9 MG/ML
INJECTION, SOLUTION INTRAVENOUS CONTINUOUS
Status: DISCONTINUED | OUTPATIENT
Start: 2022-10-05 | End: 2022-10-06

## 2022-10-05 RX ORDER — LANOLIN ALCOHOL/MO/W.PET/CERES
100 CREAM (GRAM) TOPICAL DAILY
Status: DISCONTINUED | OUTPATIENT
Start: 2022-10-06 | End: 2022-10-06

## 2022-10-05 RX ORDER — FOLIC ACID 1 MG/1
1 TABLET ORAL DAILY
Status: DISCONTINUED | OUTPATIENT
Start: 2022-10-06 | End: 2022-10-06

## 2022-10-05 RX ORDER — ALBUTEROL SULFATE 90 UG/1
2 AEROSOL, METERED RESPIRATORY (INHALATION) EVERY 6 HOURS PRN
COMMUNITY

## 2022-10-05 RX ORDER — AMLODIPINE BESYLATE 5 MG/1
5 TABLET ORAL DAILY
Status: DISCONTINUED | OUTPATIENT
Start: 2022-10-05 | End: 2022-10-06

## 2022-10-05 RX ORDER — SODIUM CHLORIDE 9 MG/ML
INJECTION, SOLUTION INTRAVENOUS PRN
Status: DISCONTINUED | OUTPATIENT
Start: 2022-10-05 | End: 2022-10-13 | Stop reason: HOSPADM

## 2022-10-05 RX ORDER — ALLOPURINOL 300 MG/1
300 TABLET ORAL DAILY
COMMUNITY

## 2022-10-05 RX ORDER — LOSARTAN POTASSIUM 50 MG/1
50 TABLET ORAL DAILY
Status: DISCONTINUED | OUTPATIENT
Start: 2022-10-05 | End: 2022-10-13 | Stop reason: HOSPADM

## 2022-10-05 RX ORDER — ONDANSETRON 4 MG/1
4 TABLET, ORALLY DISINTEGRATING ORAL EVERY 8 HOURS PRN
Status: DISCONTINUED | OUTPATIENT
Start: 2022-10-05 | End: 2022-10-13 | Stop reason: HOSPADM

## 2022-10-05 RX ORDER — DILTIAZEM HYDROCHLORIDE 120 MG/1
120 CAPSULE, EXTENDED RELEASE ORAL DAILY
COMMUNITY
Start: 2019-11-21

## 2022-10-05 RX ORDER — MAGNESIUM HYDROXIDE/ALUMINUM HYDROXICE/SIMETHICONE 120; 1200; 1200 MG/30ML; MG/30ML; MG/30ML
30 SUSPENSION ORAL EVERY 6 HOURS PRN
Status: DISCONTINUED | OUTPATIENT
Start: 2022-10-05 | End: 2022-10-13 | Stop reason: HOSPADM

## 2022-10-05 RX ORDER — ATORVASTATIN CALCIUM 40 MG/1
40 TABLET, FILM COATED ORAL NIGHTLY
Status: DISCONTINUED | OUTPATIENT
Start: 2022-10-05 | End: 2022-10-13 | Stop reason: HOSPADM

## 2022-10-05 RX ORDER — SODIUM CHLORIDE 0.9 % (FLUSH) 0.9 %
5-40 SYRINGE (ML) INJECTION EVERY 12 HOURS SCHEDULED
Status: DISCONTINUED | OUTPATIENT
Start: 2022-10-05 | End: 2022-10-13 | Stop reason: HOSPADM

## 2022-10-05 RX ORDER — SODIUM CHLORIDE 0.9 % (FLUSH) 0.9 %
5-40 SYRINGE (ML) INJECTION PRN
Status: DISCONTINUED | OUTPATIENT
Start: 2022-10-05 | End: 2022-10-13 | Stop reason: HOSPADM

## 2022-10-05 RX ADMIN — FOLIC ACID 1 MG: 1 TABLET ORAL at 09:19

## 2022-10-05 RX ADMIN — ACETAMINOPHEN 650 MG: 325 TABLET ORAL at 18:13

## 2022-10-05 RX ADMIN — ASPIRIN 81 MG: 81 TABLET ORAL at 09:19

## 2022-10-05 RX ADMIN — CEFTRIAXONE 1000 MG: 1 INJECTION, POWDER, FOR SOLUTION INTRAMUSCULAR; INTRAVENOUS at 18:13

## 2022-10-05 RX ADMIN — ATORVASTATIN CALCIUM 40 MG: 40 TABLET, FILM COATED ORAL at 20:22

## 2022-10-05 RX ADMIN — SODIUM CHLORIDE, PRESERVATIVE FREE 5 ML: 5 INJECTION INTRAVENOUS at 20:22

## 2022-10-05 RX ADMIN — Medication 100 MG: at 09:19

## 2022-10-05 RX ADMIN — Medication 5 ML: at 09:23

## 2022-10-05 RX ADMIN — SODIUM CHLORIDE: 9 INJECTION, SOLUTION INTRAVENOUS at 05:26

## 2022-10-05 RX ADMIN — SODIUM CHLORIDE: 9 INJECTION, SOLUTION INTRAVENOUS at 18:14

## 2022-10-05 RX ADMIN — LOSARTAN POTASSIUM 50 MG: 50 TABLET, FILM COATED ORAL at 18:46

## 2022-10-05 RX ADMIN — AMLODIPINE BESYLATE 5 MG: 5 TABLET ORAL at 18:46

## 2022-10-05 ASSESSMENT — PAIN SCALES - GENERAL
PAINLEVEL_OUTOF10: 0
PAINLEVEL_OUTOF10: 0

## 2022-10-05 NOTE — ACP (ADVANCE CARE PLANNING)
Advance Care Planning     Advance Care Planning Inpatient Note  [facility] Department    Today's Date: 10/5/2022  Unit: [facility] EMERGENCY DEPT    Received request from HealthCare Provider. Upon review of chart and communication with care team, requested Health Care Provider's clarification of patient's decision making capacity. Patient was waiting to see the neurologist upon my arrival for the visit. As a result, we will delay assisting patient with the HCPOA document. Chaplains remain available for assistance with the HCPOA document if patient is capable of completing the document. Health Care Decision Makers:     No healthcare decision makers have been documented. Click here to complete [place] including selection of the Healthcare Decision Maker Relationship (ie \"Primary\")  Summary:  Per staff, patient has verbally communicated that he would like his friend, Jeanett Fothergill, to be his primary decision maker. Advance Care Planning Documents (Patient Wishes):  No documents are on file at present. Interventions:  Conferred with patient's care team and postponed conversation due to patient's neuro consult.    Informed care team that  follow-up is available upon request.     Care Preferences Communicated:   No    Outcomes/Plan:  ACP Discussion: Postponed    Electronically signed by Jeannie Love [facility] on 10/5/2022 at 2:22 PM

## 2022-10-05 NOTE — ED NOTES
[address] [place] [address] called to transport patient to facility; ETA 1230.      Keila Yung  10/05/22 1206

## 2022-10-05 NOTE — PROGRESS NOTES
TRANSFER - IN REPORT:    Verbal report received from Samuel King RN(name) on [facility]  being received from ER(unit) for routine progression of patient care      Report consisted of patients Situation, Background, Assessment and   Recommendations(SBAR). Information from the following report(s) Nurse Handoff Report, ED SBAR, MAR, Recent Results, and Event Log was reviewed with the receiving nurse. Opportunity for questions and clarification was provided. Report given to Tanesha Ross RN, who will assume primary care of patient on arrival to floor.

## 2022-10-05 NOTE — PROGRESS NOTES
Hospitalist Progress Note     Admit Date:  10/4/2022  2:09 PM   DC Note date: 10/5/2022  Name:  Lamar Manzo   Age:  78 y.o. Sex:  male  :  1945   MRN:  [id number]   Room:  [place] [address]  PCP:  Unknown Unknown    Presenting Complaint: Dizziness     Initial Admission Diagnosis: Seizure-like activity ([facility] [address] [R56.9]     Problem List for this Hospitalization (present on admission):    Principal Problem:    Pre-syncope  Active Problems:    Seizure-like activity ([facility] [address]    Tobacco abuse    History of CVA (cerebrovascular accident)    Primary hypertension    Acute metabolic encephalopathy    B12 deficiency  Resolved Problems:    * No resolved hospital problems. *      Hospital Course:  Lamar Manzo is a 78 y.o. male with medical history of copd, tobacco abuse, b12 deficinecy and HTN  who presented from [facility] via EMS with cc dizziness. While sitting at dining room table, he c/o dizziness and staff reports he was shaking, seizure like activity. No known h/o seizures. Per EMS, BP 90/60 on arrival. He c/o back pain. He was uncooperative, angry and yelling at staff in ED. Labs  BSK28.1N Procal 0.24  Poor historian. Disheveled. Says he has not had any food and fluids today. Remembers getting dizzy today but doesn't remember what happened afterwards. Has happened before but does not elaborate. Denies fever or chills. Has not urinated today d/t no PO intake. Says he pees if he drinks fluids and doesn't if he doesn't drink fluids. says he use to pee all the time. Per record review, has had several admissions with non specific delirium/encephalopathy. In 2019 had MRI showing infarct L occipital lobe, R BG lesion, prominent supratentorial white matter dz, non specific but compatible with chronic ischemic demyelination.  Evidence of micro hemorrhage in the parietal and occipital lobes, non specific and my be secondary to chronic hypertensive encephalopathy, amyloid angiopathy, or tiny cavernous malformations. Left AMA during this admission. Does not appear that he saw neurology. S/p Rocephin in ED.    10/5/2022  Patient seen and evaluated. Patient is Dr. Xu Hartmann again can transfer me to pod B if there is direct extension. His Dizziness has improved. He has a pending MRI brain. He is estranged from his daughter and no longer wants her to be his POA. He wants his friend lalit to be POA. Assessment & Plan:      Near syncope - admit remote tele. Unable to r/o seizure. Start precautions and check EEG. Orthostatics daily. Possibly convulsive syncope from hypotension. NS IVF x24 hours. Hold BP meds. Neuro checks q4h. 10/5/2022  Pt will not co-operate with EEG  Dizziness has resolved  MRI Brain is pending  Consult neurology         H/o CVA // dizziness - check MRI brain and MRA head/neck. Aspirin, high intensity statin. PT/OT/SLP. 10/5/2022  MRI'S are pending  Pt seems competent enough to answer questionnaire     Leukocytosis - of unclear etiology. UA ordered. CXR okay. S/p rocephin in ED.     10/5/2022  Continue rocpehin  F/up urine culture     Acute encephalopathy - baseline mentation unclear. admin IM b12 given h/o b12 def. Start PO folic acid and thiamine daily. Check tsh. Mri as above. Rule out seizures. Monitor for urinary retention. Hydrate. Check b12, tsh, , ammonia. 10/5/2022  Appears to be resolving  Unknown baseline but patient is able to hold a conversation with some episodes of confusion     Obesity   10/5/2022  - noted      Tobacco dependence syndrome   10/5/2022  - down to 1 cig per day     History of alcohol use disorder - quit drinking 5-6 years ago.   10/5/2022  Continue thiamine  Continue folic acid     HTN -   10/5/2022  Continue to hold home bp meds pending MRI results       Anticipated discharge needs:     Return to facility      Diet: ADULT DIET; Easy to Chew; Low Fat/Low Chol/High Fiber/MANNY  VTE ppx: loveonx   Code status: Full Code Disposition: tbd  Diet: ADULT DIET; Easy to Chew; Low Fat/Low Chol/High Fiber/MANNY  Code Status: Full Code      Time spent in patient discharge and coordination 45 minutes. Diagnostic Imaging/Tests:   CT HEAD WO CONTRAST    Result Date: 10/4/2022  1. No acute intracranial process evident by noncontrast CT study of the head. Only chronic appearing changes are seen as described above. This report was made using voice transcription. Despite my best efforts to avoid any, transcription errors may persist. If there is any question about the accuracy of the report or need for clarification, then please call [phone], or text me through [email or web address] for clarification or correction. XR CHEST PORTABLE    Result Date: 10/4/2022  The lungs are clear. The heart is normal in size. No pneumothorax. No pleural effusions.         Labs: Results:       BMP, Mg, Phos Recent Labs     10/04/22  1411 10/05/22  0519    141   K 3.8 3.8    110   CO2 22 24   ANIONGAP 11 7   BUN 19 31*   CREATININE 1.30 1.80*   LABGLOM 57* 39*   CALCIUM 9.7 8.8   GLUCOSE 112* 82   MG  --  2.2      CBC Recent Labs     10/04/22  1411 10/05/22  0519   WBC 17.4* 12.7*   RBC 4.58 4.13*   HGB 13.9 12.6*   HCT 43.7 38.7*   MCV 95.4 93.7   MCH 30.3 30.5   MCHC 31.8 32.6   RDW 14.2 14.0    229   MPV 10.6 9.8   NRBC 0.00 0.00   SEGS 84* 78   LYMPHOPCT 7* 7*   EOSRELPCT 1 3   MONOPCT 7 11   BASOPCT 0 0   IMMGRAN 1 1   SEGSABS 14.6* 9.9*   LYMPHSABS 1.2 0.9   EOSABS 0.2 0.4   MONOSABS 1.2 1.5*   BASOSABS 0.0 0.0   ABSIMMGRAN 0.2 0.1      LFT Recent Labs     10/04/22  1411 10/05/22  0519   BILITOT 0.9 0.5   ALKPHOS 91 84   AST 6* 12*   ALT 21 17   PROT 6.7 5.7*   LABALBU 3.1* 3.0*   GLOB 3.6* 2.7      Cardiac  Lab Results   Component Value Date/Time    TROPHS 16.4 04/21/2022 08:11 PM    TROPHS 13.6 04/21/2022 05:33 PM      Coags Lab Results   Component Value Date/Time    APTT >200.0 11/19/2021 04:29 PM      A1c No results found for: LABA1C, EAG   Lipids No results found for: CHOL, LDLCALC, LABVLDL, HDL, CHOLHDLRATIO, TRIG   Thyroid  Lab Results   Component Value Date/Time    TSHELE 0.77 10/05/2022 05:19 AM        Most Recent UA Lab Results   Component Value Date/Time    COLORU DARK YELLOW 10/05/2022 02:10 AM    APPEARANCE CLEAR 10/05/2022 02:10 AM    SPECGRAV 1.015 10/05/2022 02:10 AM    LABPH 5.5 10/05/2022 02:10 AM    PROTEINU 30 10/05/2022 02:10 AM    GLUCOSEU Negative 10/05/2022 02:10 AM    KETUA TRACE 10/05/2022 02:10 AM    BILIRUBINUR Negative 10/05/2022 02:10 AM    BILIRUBINUR Negative 04/21/2022 08:40 PM    BLOODU Negative 10/05/2022 02:10 AM    UROBILINOGEN 1.0 10/05/2022 02:10 AM    NITRU Negative 10/05/2022 02:10 AM    LEUKOCYTESUR Negative 10/05/2022 02:10 AM    WBCUA 0-3 10/05/2022 02:10 AM    RBCUA 0 10/05/2022 02:10 AM    EPITHUA 0 10/05/2022 02:10 AM    BACTERIA 1+ 10/05/2022 02:10 AM    LABCAST HYALINE 10/05/2022 02:10 AM    MUCUS 1+ 10/05/2022 02:10 AM        Recent Labs     10/05/22  0210 10/04/22  1654   CULTURE No growth after short period of incubation. Further results to follow after overnight incubation.  NO GROWTH AFTER 16 HOURS       All Labs from Last 24 Hrs:  Recent Results (from the past 24 hour(s))   CBC with Auto Differential    Collection Time: 10/04/22  2:11 PM   Result Value Ref Range    WBC 17.4 (H) 4.3 - 11.1 K/uL    RBC 4.58 4.23 - 5.6 M/uL    Hemoglobin 13.9 13.6 - 17.2 g/dL    Hematocrit 43.7 41.1 - 50.3 %    MCV 95.4 79.6 - 97.8 FL    MCH 30.3 26.1 - 32.9 PG    MCHC 31.8 31.4 - 35.0 g/dL    RDW 14.2 11.9 - 14.6 %    Platelets 693 801 - 512 K/uL    MPV 10.6 9.4 - 12.3 FL    nRBC 0.00 0.0 - 0.2 K/uL    Differential Type AUTOMATED      Seg Neutrophils 84 (H) 43 - 78 %    Lymphocytes 7 (L) 13 - 44 %    Monocytes 7 4.0 - 12.0 %    Eosinophils % 1 0.5 - 7.8 %    Basophils 0 0.0 - 2.0 %    Immature Granulocytes 1 0.0 - 5.0 %    Segs Absolute 14.6 (H) 1.7 - 8.2 K/UL    Absolute Lymph # 1.2 0.5 - 4.6 K/UL    Absolute Mono # 1.2 0.1 - 1.3 K/UL    Absolute Eos # 0.2 0.0 - 0.8 K/UL    Basophils Absolute 0.0 0.0 - 0.2 K/UL    Absolute Immature Granulocyte 0.2 0.0 - 0.5 K/UL   Comprehensive Metabolic Panel    Collection Time: 10/04/22  2:11 PM   Result Value Ref Range    Sodium 141 138 - 145 mmol/L    Potassium 3.8 3.5 - 5.1 mmol/L    Chloride 108 101 - 110 mmol/L    CO2 22 21 - 32 mmol/L    Anion Gap 11 4 - 13 mmol/L    Glucose 112 (H) 65 - 100 mg/dL    BUN 19 8 - 23 MG/DL    Creatinine 1.30 0.8 - 1.5 MG/DL    Est, Glom Filt Rate 57 (L) >60 ml/min/1.73m2    Calcium 9.7 8.3 - 10.4 MG/DL    Total Bilirubin 0.9 0.2 - 1.1 MG/DL    ALT 21 12 - 65 U/L    AST 6 (L) 15 - 37 U/L    Alk Phosphatase 91 50 - 136 U/L    Total Protein 6.7 6.3 - 8.2 g/dL    Albumin 3.1 (L) 3.2 - 4.6 g/dL    Globulin 3.6 (H) 2.3 - 3.5 g/dL    Albumin/Globulin Ratio 0.9 (L) 1.2 - 3.5     EKG 12 Lead    Collection Time: 10/04/22  2:12 PM   Result Value Ref Range    Ventricular Rate 83 BPM    Atrial Rate 83 BPM    P-R Interval 154 ms    QRS Duration 94 ms    Q-T Interval 418 ms    QTc Calculation (Bazett) 492 ms    P Axis 63 degrees    R Axis 45 degrees    T Axis 97 degrees    Diagnosis Sinus rhythm    Culture, Blood 1    Collection Time: 10/04/22  4:54 PM    Specimen: Blood   Result Value Ref Range    Special Requests RIGHT  FOREARM        Culture NO GROWTH AFTER 16 HOURS     Lactic Acid    Collection Time: 10/04/22  4:54 PM   Result Value Ref Range    Lactic Acid, Plasma 1.3 0.4 - 2.0 MMOL/L   Procalcitonin    Collection Time: 10/04/22  4:54 PM   Result Value Ref Range    Procalcitonin 0.24 0.00 - 0.49 ng/mL   Vitamin B12    Collection Time: 10/04/22  4:54 PM   Result Value Ref Range    Vitamin B-12 904 193 - 986 pg/mL   Ammonia    Collection Time: 10/04/22  7:48 PM   Result Value Ref Range    Ammonia 21 11 - 32 UMOL/L   Culture, Urine    Collection Time: 10/05/22  2:10 AM    Specimen: Urine, clean catch    URINE   Result Value Ref Range    Special Requests NO SPECIAL REQUESTS      Culture        No growth after short period of incubation. Further results to follow after overnight incubation.    Urinalysis with Reflex to Culture    Collection Time: 10/05/22  2:10 AM    Specimen: Urine   Result Value Ref Range    Color, UA DARK YELLOW      Appearance CLEAR      Specific Gravity, UA 1.015 1.001 - 1.023      pH, Urine 5.5 5.0 - 9.0      Protein, UA 30 (A) NEG mg/dL    Glucose, UA Negative mg/dL    Ketones, Urine TRACE (A) NEG mg/dL    Bilirubin Urine Negative NEG      Blood, Urine Negative NEG      Urobilinogen, Urine 1.0 0.2 - 1.0 EU/dL    Nitrite, Urine Negative NEG      Leukocyte Esterase, Urine Negative NEG      WBC, UA 0-3 0 /hpf    RBC, UA 0 0 /hpf    BACTERIA, URINE 1+ (H) 0 /hpf    Urine Culture if Indicated CULTURE NOT INDICATED BY UA RESULT      Epithelial Cells UA 0 0 /hpf    Casts HYALINE 0 /lpf    Crystals 0 0 /LPF    Mucus, UA 1+ (H) 0 /lpf    OTHER OBSERVATIONS RESULTS VERIFIED MANUALLY     CBC with Auto Differential    Collection Time: 10/05/22  5:19 AM   Result Value Ref Range    WBC 12.7 (H) 4.3 - 11.1 K/uL    RBC 4.13 (L) 4.23 - 5.6 M/uL    Hemoglobin 12.6 (L) 13.6 - 17.2 g/dL    Hematocrit 38.7 (L) 41.1 - 50.3 %    MCV 93.7 79.6 - 97.8 FL    MCH 30.5 26.1 - 32.9 PG    MCHC 32.6 31.4 - 35.0 g/dL    RDW 14.0 11.9 - 14.6 %    Platelets 915 010 - 778 K/uL    MPV 9.8 9.4 - 12.3 FL    nRBC 0.00 0.0 - 0.2 K/uL    Differential Type AUTOMATED      Seg Neutrophils 78 43 - 78 %    Lymphocytes 7 (L) 13 - 44 %    Monocytes 11 4.0 - 12.0 %    Eosinophils % 3 0.5 - 7.8 %    Basophils 0 0.0 - 2.0 %    Immature Granulocytes 1 0.0 - 5.0 %    Segs Absolute 9.9 (H) 1.7 - 8.2 K/UL    Absolute Lymph # 0.9 0.5 - 4.6 K/UL    Absolute Mono # 1.5 (H) 0.1 - 1.3 K/UL    Absolute Eos # 0.4 0.0 - 0.8 K/UL    Basophils Absolute 0.0 0.0 - 0.2 K/UL    Absolute Immature Granulocyte 0.1 0.0 - 0.5 K/UL   Magnesium    Collection Time: 10/05/22  5:19 AM   Result Value Ref Range Magnesium 2.2 1.8 - 2.4 mg/dL   Comprehensive Metabolic Panel    Collection Time: 10/05/22  5:19 AM   Result Value Ref Range    Sodium 141 138 - 145 mmol/L    Potassium 3.8 3.5 - 5.1 mmol/L    Chloride 110 101 - 110 mmol/L    CO2 24 21 - 32 mmol/L    Anion Gap 7 4 - 13 mmol/L    Glucose 82 65 - 100 mg/dL    BUN 31 (H) 8 - 23 MG/DL    Creatinine 1.80 (H) 0.8 - 1.5 MG/DL    Est, Glom Filt Rate 39 (L) >60 ml/min/1.73m2    Calcium 8.8 8.3 - 10.4 MG/DL    Total Bilirubin 0.5 0.2 - 1.1 MG/DL    ALT 17 12 - 65 U/L    AST 12 (L) 15 - 37 U/L    Alk Phosphatase 84 50 - 136 U/L    Total Protein 5.7 (L) 6.3 - 8.2 g/dL    Albumin 3.0 (L) 3.2 - 4.6 g/dL    Globulin 2.7 2.3 - 3.5 g/dL    Albumin/Globulin Ratio 1.1 (L) 1.2 - 3.5     TSH with Reflex    Collection Time: 10/05/22  5:19 AM   Result Value Ref Range    TSH w Free Thyroid if Abnormal 0.77 0.358 - 3.740 UIU/ML   Vitamin D 25 Hydroxy    Collection Time: 10/05/22  5:19 AM   Result Value Ref Range    Vit D, 25-Hydroxy 36.5 30.0 - 100.0 ng/mL       No Known Allergies  Immunization History   Administered Date(s) Administered    COVID-19, PFIZER PURPLE top, DILUTE for use, (age 15 y+), 30mcg/0.3mL 02/23/2021    PPD Test 11/19/2021, 04/21/2022       Recent Vital Data:  Patient Vitals for the past 24 hrs:   Temp Pulse Resp BP SpO2   10/05/22 0928 -- 98 20 (!) 170/77 96 %   10/05/22 0849 -- 95 19 (!) 164/91 95 %   10/05/22 0632 -- 66 16 (!) 178/78 --   10/05/22 0622 -- 65 18 -- --   10/05/22 0620 -- 66 22 -- --   10/05/22 0612 -- 68 25 -- --   10/05/22 0605 -- 69 25 -- --   10/05/22 0602 -- 67 17 (!) 191/73 --   10/05/22 0552 -- 67 28 -- --   10/05/22 0550 -- 69 20 -- --   10/05/22 0542 -- 65 19 -- --   10/05/22 0535 -- 65 24 -- --   10/05/22 0532 -- 64 25 (!) 175/80 --   10/05/22 0522 -- 68 25 -- --   10/05/22 0520 -- 69 24 -- --   10/05/22 0512 -- 68 23 -- --   10/05/22 0502 -- 72 13 -- --   10/05/22 0455 -- 66 21 (!) 194/77 --   10/05/22 0434 -- 67 21 (!) 191/79 96 %   10/05/22 0423 -- 70 26 -- 95 %   10/05/22 0403 -- 70 26 (!) 169/85 95 %   10/05/22 0353 -- 76 24 -- 95 %   10/05/22 0343 -- 74 29 -- 96 %   10/05/22 0336 -- 74 14 (!) 162/82 --   10/05/22 0303 -- 76 25 -- --   10/05/22 0253 -- 77 22 -- --   10/05/22 0243 -- 82 18 -- --   10/05/22 0233 -- 70 25 -- --   10/05/22 0223 -- 70 19 -- --   10/05/22 0213 -- 75 20 -- --   10/05/22 0204 -- 64 22 (!) 162/78 --   10/05/22 0203 -- 63 20 -- --   10/05/22 0154 -- 64 24 -- --   10/05/22 0153 -- 64 23 -- --   10/05/22 0144 -- 74 23 (!) 177/76 --   10/05/22 0134 -- 64 21 (!) 156/77 --   10/05/22 0124 -- 64 20 -- --   10/05/22 0114 -- 62 22 (!) 154/76 --   10/05/22 0108 -- 61 21 -- --   10/05/22 0104 -- 65 27 -- --   10/05/22 0058 -- 63 21 (!) 150/76 --   10/05/22 0054 -- 65 20 -- --   10/05/22 0048 -- 67 26 (!) 159/72 --   10/05/22 0044 -- 65 21 -- --   10/05/22 0038 -- 66 24 -- --   10/05/22 0028 -- 69 24 (!) 157/75 --   10/05/22 0018 -- 73 24 (!) 156/92 --   10/05/22 0008 -- 72 21 -- --   10/04/22 2358 -- 80 27 (!) 147/77 --   10/04/22 2336 -- 66 21 -- --   10/04/22 2326 -- 67 21 (!) 160/90 --   10/04/22 2316 -- 75 26 (!) 173/74 --   10/04/22 2306 -- 66 21 -- --   10/04/22 2256 -- 65 18 (!) 148/73 --   10/04/22 2250 -- 65 21 -- --   10/04/22 2246 -- 65 20 -- --   10/04/22 2240 -- 65 22 (!) 149/76 --   10/04/22 2230 -- 69 19 (!) 140/73 --   10/04/22 2220 -- 67 20 -- --   10/04/22 2210 -- 68 21 (!) 167/72 --   10/04/22 2200 -- 66 25 (!) 157/82 --   10/04/22 2150 -- 64 20 -- --   10/04/22 2140 -- 68 25 (!) 147/74 --   10/04/22 2130 -- 70 (!) 32 (!) 141/82 --   10/04/22 2120 -- 78 22 -- --   10/04/22 2110 -- 72 17 (!) 159/79 --   10/04/22 2040 -- 75 25 (!) 153/80 --   10/04/22 2030 -- 75 24 (!) 157/85 --   10/04/22 2020 -- 82 24 -- --   10/04/22 2010 -- 82 13 (!) 154/91 --   10/04/22 2000 -- 82 19 (!) 166/94 --   10/04/22 1950 -- 78 14 (!) 152/77 --   10/04/22 1909 -- 80 20 -- --   10/04/22 1859 -- 83 26 -- --   10/04/22 1849 -- 83 21 -- -- 10/04/22 1839 -- 92 25 -- --   10/04/22 1829 -- 91 18 -- --   10/04/22 1819 -- 93 22 -- --   10/04/22 1809 -- 91 21 -- --   10/04/22 1759 -- 81 20 -- --   10/04/22 1632 -- 85 22 130/71 --   10/04/22 1429 98.7 °F (37.1 °C) -- -- -- --   10/04/22 1405 -- 88 16 109/65 98 %       Oxygen Therapy  SpO2: 96 %  O2 Device: None (Room air)    Estimated body mass index is 30.13 kg/m² as calculated from the following:    Height as of this encounter: 5' 10\" (1.778 m). Weight as of this encounter: 210 lb (95.3 kg). Intake/Output Summary (Last 24 hours) at 10/5/2022 1144  Last data filed at 10/5/2022 0000  Gross per 24 hour   Intake 50 ml   Output 200 ml   Net -150 ml         Physical Exam:    General:    Well nourished. No overt distress awake and alert has to be redirected but is mostly appropriate head:  Normocephalic, atraumatic  Eyes:  Sclerae appear normal.  Pupils equally round. HENT:  Nares appear normal, no drainage. Moist mucous membranes  Neck:  No restricted ROM. Trachea midline  CV:   RRR. No m/r/g. No JVD  Lungs:   CTAB. No wheezing, rhonchi, or rales. Respirations even, unlabored  Abdomen:   Soft, nontender, nondistended. Extremities: Warm and dry. No cyanosis or clubbing. No edema. Skin:     No rashes. Normal coloration  Neuro:  CN II-XII grossly intact. Psych:  Normal mood and affect. Signed:  Tee Fiore MD    Part of this note may have been written by using a voice dictation software. The note has been proof read but may still contain some grammatical/other typographical errors.

## 2022-10-05 NOTE — FLOWSHEET NOTE
Admission Dual Skin Assessment completed.       10/05/22 7003   Dual Clinician Skin Assessment   Dual Skin Assessment (4 Eyes) WDL   Second Clinical  (First and Last Name) Mack Perez

## 2022-10-05 NOTE — ED NOTES
Patient provided breakfast tray. Patient does not want to change into hospital gown at this time.       Bennie Smith RN  10/05/22 0594

## 2022-10-05 NOTE — PROGRESS NOTES
Patient got agitated when he found out I was doing EEG study.  Wanted me to stop  Let Nurse and Dr jaylon Crawford  EEG Tech

## 2022-10-05 NOTE — PROGRESS NOTES
ACUTE OCCUPATIONAL THERAPY GOALS:   (Developed with and agreed upon by patient and/or caregiver.)  1. Patient will complete lower body bathing and dressing with MOD I and adaptive equipment as needed. 2. Patient will complete toileting with MOD I.   3. Patient will complete grooming ADL with MOD I.  4. Patient will tolerate 25 minutes of OT treatment with 1-2 rest breaks to increase activity tolerance for ADLs. 5. Patient will complete functional transfers with MOD I and adaptive equipment as needed. 6. Patient will tolerate 10 minutes BUE exercises to increase strength for safe, functional transfers. Timeframe: 7 visits     OCCUPATIONAL THERAPY Initial Assessment and Daily Note       OT Visit Days: 1  Acknowledge Orders  Time  OT Charge Capture  Rehab Caseload Tracker      Sandy Monte is a 78 y.o. male   PRIMARY DIAGNOSIS: Pre-syncope  Seizure-like activity ([facility] [address] [R56.9]       Reason for Referral: Generalized Muscle Weakness (M62.81)  Difficulty in walking, Not elsewhere classified (R26.2)  Other abnormalities of gait and mobility (R26.89)  Inpatient: Payor: MEDICARE / Plan: MEDICARE PART A / Product Type: *No Product type* /     ASSESSMENT:     REHAB RECOMMENDATIONS:   Recommendation to date pending progress:  Setting:  Short-term Rehab    Equipment:    To Be Determined     ASSESSMENT:  Mr. Georgi Aschoff is a 79 y/o male presents with dizziness and seizure-like activity. At baseline pt lives at [facility], is mod I-I all ADLs and uses SPC. Today pt presents with decreased activity tolerance, balance and mobility impacting ADLs. Pt overall SBA for bed mobility and CGA SPC for sit<>stand. Pt able to don slippers EOB with SBA for dynamic sitting balance. Pt became dizzy upon standing and returned self to supine unsafely even even with therapist attempt to intervene. Pt then became verbally aggressive toward therapists. Limited evaluation due to pt aggression and refusal to complete further mobility.  Pt is currently functioning below baseline and would benefit from skilled OT services to address OT goals and plan of care. Rec STR at d/c. Avelina VIZCARRA AM-PAC 6 Clicks Daily Activity Inpatient Short Form:    AM-PAC Daily Activity Inpatient   How much help for putting on and taking off regular lower body clothing?: A Little  How much help for Bathing?: A Lot  How much help for Toileting?: A Lot  How much help for putting on and taking off regular upper body clothing?: A Little  How much help for taking care of personal grooming?: A Little  How much help for eating meals?: None  AM-PAC Inpatient Daily Activity Raw Score: 17  AM-PAC Inpatient ADL T-Scale Score : 37.26  ADL Inpatient CMS 0-100% Score: 50.11  ADL Inpatient CMS G-Code Modifier : CK           SUBJECTIVE:     Mr. Yashira Ziegler states, \"Y'all don't know what you're doing, leave me alone\"     Social/Functional Lives With: Alone  Type of Home: Assisted living  Home Layout: One level  Bathroom Shower/Tub: Walk-in shower  Bathroom Equipment: Built-in shower seat  Home Equipment: Cane  ADL Assistance: Independent  Ambulation Assistance: Independent (uses a cane)  Transfer Assistance: Independent (uses a cane)    OBJECTIVE:     Adonis Baldwin / Carrol Foreman / Epifanio Colvin: IV    RESTRICTIONS/PRECAUTIONS:  Restrictions/Precautions: Fall Risk    PAIN: VITALS / O2:   Pre Treatment:   Pain Assessment: None - Denies Pain      Post Treatment: no c/o pain, resting comfortably in supine       Vitals      Attempted to obtain vitals after pt stated he was dizzy, however pt ripped BP cuff off his arm before BP finished    Oxygen            GROSS EVALUATION: INTACT IMPAIRED   (See Comments)   UE AROM [x] []   UE PROM [x] []   Strength [x]       Posture / Balance [] Posture: Good  Sitting - Static: Good  Sitting - Dynamic: Good  Standing - Static: Fair  Standing - Dynamic: Fair, -   Sensation [x]     Coordination [x]       Tone [x]       Edema [x]    Activity Tolerance [] Treatment limited secondary to agitation     Hand Dominance R [] L []      COGNITION/  PERCEPTION: INTACT IMPAIRED   (See Comments)   Orientation [x]     Vision [x]     Hearing [x]     Cognition  []  Decreased safety awareness   Perception [x]       MOBILITY: I Mod I S SBA CGA Min Mod Max Total  NT x2 Comments:   Bed Mobility    Rolling [] [] [] [] [] [] [] [] [] [x] []    Supine to Sit [] [] [] [x] [] [] [] [] [] [] []    Scooting [] [] [] [x] [] [] [] [] [] [] []    Sit to Supine [] [] [] [x] [] [] [] [] [] [] []    Transfers    Sit to Stand [] [] [] [] [x] [] [] [] [] [] [] SPC   Bed to Chair [] [] [] [] [] [] [] [] [] [x] []    Stand to Sit [] [] [] [] [x] [] [] [] [] [] [] SPC   Tub/Shower [] [] [] [] [] [] [] [] [] [x] []     Toilet [] [] [] [] [] [] [] [] [] [x] []      [] [] [] [] [] [] [] [] [] [x] []    I=Independent, Mod I=Modified Independent, S=Supervision/Setup, SBA=Standby Assistance, CGA=Contact Guard Assistance, Min=Minimal Assistance, Mod=Moderate Assistance, Max=Maximal Assistance, Total=Total Assistance, NT=Not Tested    ACTIVITIES OF DAILY LIVING: I Mod I S SBA CGA Min Mod Max Total NT Comments   BASIC ADLs:              Upper Body Bathing  [] [] [] [] [] [] [] [] [] [x]    Lower Body Bathing [] [] [] [] [] [] [] [] [] [x]    Toileting [] [] [] [] [] [] [] [] [] [x]    Upper Body Dressing [] [] [] [] [] [] [] [] [] [x]    Lower Body Dressing [] [] [] [x] [] [] [] [] [] [] Donning slippers EOB   Feeding [] [] [] [] [] [] [] [] [] [x]    Grooming [] [] [] [] [] [] [] [] [] [x]    Personal Device Care [] [] [] [] [] [] [] [] [] [x]    Functional Mobility [] [] [] [x] [x] [] [] [] [] [] SPC   I=Independent, Mod I=Modified Independent, S=Supervision/Setup, SBA=Standby Assistance, CGA=Contact Guard Assistance, Min=Minimal Assistance, Mod=Moderate Assistance, Max=Maximal Assistance, Total=Total Assistance, NT=Not Tested    PLAN:   FREQUENCY/DURATION   OT Plan of Care: 3 times/week for duration of hospital stay or until stated goals are met, whichever comes first.    PROBLEM LIST:   (Skilled intervention is medically necessary to address:)  Decreased ADL/Functional Activities  Decreased Activity Tolerance  Decreased Balance  Decreased Cognition  Decreased Coordination  Decreased Gait Ability  Decreased Safety Awareness  Decreased Transfer Abilities   INTERVENTIONS PLANNED:  (Benefits and precautions of occupational therapy have been discussed with the patient.)  Self Care Training  Therapeutic Activity  Therapeutic Exercise/HEP  Neuromuscular Re-education  Manual Therapy  Education         TREATMENT:     EVALUATION: MODERATE COMPLEXITY: (Untimed Charge)    TREATMENT:   Co-Treatment PT/OT necessary due to patient's decreased overall endurance/tolerance levels, as well as need for high level skilled assistance to complete functional transfers/mobility and functional tasks  No treatment given today, evaluation only.      TREATMENT GRID:  N/A    AFTER TREATMENT PRECAUTIONS: Bed, Call light within reach, Needs within reach, and RN notified    INTERDISCIPLINARY COLLABORATION:  RN/ PCT, PT/ PTA, and OT/ PEREZ    EDUCATION:  Education Given To: Patient  Education Provided: Role of Therapy  Education Method: Verbal  Barriers to Learning: Cognition  Education Outcome: Continued education needed    TOTAL TREATMENT DURATION AND TIME:  Time In: 3524  Time Out: 1000  Minutes: Chika Man 24, OT

## 2022-10-05 NOTE — ED NOTES
TRANSFER - OUT REPORT:    Verbal report given to receiving nurse on Estevan Daingerfield  being transferred to 8th floor for urgent transfer       Report consisted of patient's Situation, Background, Assessment and   Recommendations(SBAR). Information from the following report(s) Nurse Handoff Report was reviewed with the receiving nurse. Lines:   Peripheral IV 10/04/22 Right Forearm (Active)        Opportunity for questions and clarification was provided.       Patient transported with:  Transport      Marleny Saxena RN  10/05/22 3804

## 2022-10-05 NOTE — PROGRESS NOTES
Pt blood pressure 209/97. Pt refused to let BP be rechecked. Dr. Rafia Pascual notified at 4759. New orders placed. Pt medicated with Amlodipine and Losartan at 1846.

## 2022-10-05 NOTE — PROGRESS NOTES
SPEECH LANGUAGE PATHOLOGY: DYSPHAGIA  Initial Assessment    NAME: Geraldine Garcia  : 1945  MRN: [id number]    ADMISSION DATE: 10/4/2022  PRIMARY DIAGNOSIS: Pre-syncope  Seizure-like activity ([facility] [address] [R56.9]    ICD-10: Treatment Diagnosis: R13.11 Dysphagia, Oral Phase    RECOMMENDATIONS   Diet:  Diet Solids Recommendation: Easy to Chew  Liquid Consistency Recommendation: Thin    Medications: PO     Recommendations:  (No dysphagia treatment indicated)     Compensatory Swallowing Strategies:Upright as possible for all oral intake; Alternate solids and liquids;Eat/Feed slowly     Therapeutic Intervention:      Patient continues to require skilled intervention:  (To be determined pending imaging results)          ASSESSMENT    Dysphagia Diagnosis: Swallow function appears WFL  Dysphagia Impression : Patient presents with functional oropharyngeal swallow. Increased mastication time with solids due to missing dentition, but overall functional with oral prep. Pharyngeal swallow unremarkable    Recommend continue easy to chew diet and thin liquids. Medications as tolerated. No dysphagia treatment indicated. Mild confusion noted during evaluation. Unclear baseline level of function. MRI pending. Possible cognitive-linguistic evaluation pending imaging results. GENERAL    Subjective: Patien seen in ED this morning while eating breakfast. Cooperative with evaluation. Some confusion noted. Behavior/Cognition: Alert; Cooperative;Confused  Patient Position: up on side of bed  Communication Observation: Functional  Follows Directions: Complex  Respiratory Status: Room air              History of Present Injury/Illness: Mr. Magaly Luna  has a past medical history of Acute cholecystitis, Asthma, B12 deficiency, History of CVA (cerebrovascular accident), Hypertension, and Hypertensive encephalopathy. . He also  has no past surgical history on file.     Prior Dysphagia History: none     Current Diet : Easy to chew  Current Liquid Diet : Thin    Pain:   Patient does not c/o pain                   Vision: Within Functional Limits            Hearing: Within functional limits         OBJECTIVE    Oral Motor Function:  Patient Positioning: Upright in bed   Oral Motor   Labial: No impairment  Dentition: Edentulous  Oral Hygiene: Moist;Clean  Lingual: No impairment  Mandible: No impairment  Dentition: Edentulous        Baseline Vocal Quality: Normal    Oropharyngeal Phase:     Assessment Method(s): Observation;Palpation  Patient Position: up on side of bed  Vocal Quality: No Impairment  Consistency Presented: Mixed consistency; Regular; Thin  How Presented: Self-fed/presented;Spoon;Straw;Successive Swallows  Bolus Acceptance: No impairment  Bolus Formation/Control:  (Mildly prolonged due to missing dentition, but functional)  Propulsion: No impairment  Oral Residue: None  Initiation of Swallow: No impairment  Laryngeal Elevation: Functional  Aspiration Signs/Symptoms: None  Pharyngeal Phase Characteristics: No impairment, issues, or problems          Oral Phase - Comment: Functional given edentulous state  Pharyngeal Phase: Within normal limits    PLAN    Duration/Frequency: SLP will follow up for cognitive-linguistic evaluation if indicated by imaging or course of hospitalization. Frequency/Duration TBD. Dysphagia Outcome and Severity Scale (CICI)  Dysphagia Outcome Severity Scale: Level 6: Within functional limits/Modified independence  Interpretation of Tool: The Dysphagia Outcome and Severity Scale (CICI) is a simple, easy-to-use, 7-point scale developed to systematically rate the functional severity of dysphagia based on objective assessment and make recommendations for diet level, independence level, and type of nutrition.   Normal(7), Functional(6), Mild(5), Mild-Moderate(4), Moderate(3), Moderate-Severe(2), Severe(1)    Speech Therapy Prognosis  Prognosis: Good  Prognosis Considerations: Previous Level of Function;Participation Level    Education: Patient, RN  Patient Education: Role of SLP, recommendations  Patient Education Response: Verbalizes understanding    PRECAUTIONS/ALLERGIES: Patient has no known allergies.    Safety Devices in place: Yes  Type of devices: Left in bed, Call light within reach    Therapy Time  SLP Individual Minutes  Time In: 0323  Time Out: 3989  Minutes: [facility] [address] [facility], SLP  10/5/2022 9:03 AM

## 2022-10-05 NOTE — ED NOTES
RN called patient contact on chart for information for MRI screening form. Patient contact is Cinthya Bonilla who is a family friend. RN completed and updated information the best possible with family friend. RN contacted provider about RN attempt at MRI screening form.       Lynn Aviles RN  10/05/22 1182

## 2022-10-05 NOTE — CONSULTS
[facility]  [address] [facility] is a 78 y.o. male who presents on referral from the in-hospital service for an evaluation with regards to abnormal mental status  81-year-old with COPD tobacco abuse B12 deficiency hypertension does not describe intermittent dizziness which appears to be precipitated by looking to the left and is relieved by looking to history is right duration of onset of this complaint is a little unclear is on thiamine      Past Medical History:   Diagnosis Date    Acute cholecystitis 11/19/2021    Asthma     B12 deficiency 11/21/2021    History of CVA (cerebrovascular accident) 10/4/2022    Hypertension     Hypertensive encephalopathy 4/21/2022       No past surgical history on file. No family history on file.      Social History     Socioeconomic History    Marital status: Single         Current Facility-Administered Medications   Medication Dose Route Frequency Provider Last Rate Last Admin    sodium chloride flush 0.9 % injection 5-40 mL  5-40 mL IntraVENous 2 times per day Peterzach Dao, DO   5 mL at 10/05/22 0923    sodium chloride flush 0.9 % injection 5-40 mL  5-40 mL IntraVENous PRN Peter Feliberto, DO        0.9 % sodium chloride infusion   IntraVENous PRN Peter Vasquezas, DO        enoxaparin (LOVENOX) injection 40 mg  40 mg SubCUTAneous Q24H Peterzach Dao, DO   40 mg at 10/04/22 1952    ondansetron (ZOFRAN-ODT) disintegrating tablet 4 mg  4 mg Oral Q8H PRN Peter Dao, DO        Or    ondansetron (ZOFRAN) injection 4 mg  4 mg IntraVENous Q6H PRN Peter Vasquezas, DO        polyethylene glycol (GLYCOLAX) packet 17 g  17 g Oral Daily PRN Peter Vasquezas, DO        acetaminophen (TYLENOL) tablet 650 mg  650 mg Oral Q6H PRN Peter Dao, DO   650 mg at 10/04/22 1952    Or    acetaminophen (TYLENOL) suppository 650 mg  650 mg Rectal Q6H PRN Peter Vasquezas, DO        folic acid (FOLVITE) tablet 1 mg  1 mg Oral Daily Jony Magdaleno, DO   1 mg at 10/05/22 0919    thiamine tablet 100 mg  100 mg Oral Daily Jonydarin Garciaman, DO   100 mg at 10/05/22 0919    aluminum & magnesium hydroxide-simethicone (MAALOX) 200-200-20 MG/5ML suspension 30 mL  30 mL Oral Q6H PRN Jonydarin Magdaleno, DO        atorvastatin (LIPITOR) tablet 40 mg  40 mg Oral Nightly Jnoydarin Magdaleno, DO   40 mg at 10/04/22 2000    0.9 % sodium chloride infusion   IntraVENous Continuous Jony Magdaleno,  mL/hr at 10/05/22 0526 New Bag at 10/05/22 0526    aspirin EC tablet 81 mg  81 mg Oral Daily Jony Magdaleno, DO   81 mg at 10/05/22 0919     Current Outpatient Medications   Medication Sig Dispense Refill    dilTIAZem (TIAZAC) 120 MG extended release capsule Take 120 mg by mouth daily      losartan (COZAAR) 50 MG tablet Take 50 mg by mouth 2 times daily      atorvastatin (LIPITOR) 20 MG tablet Take 20 mg by mouth daily      albuterol sulfate HFA (PROVENTIL;VENTOLIN;PROAIR) 108 (90 Base) MCG/ACT inhaler Inhale 2 puffs into the lungs every 6 hours as needed      allopurinol (ZYLOPRIM) 300 MG tablet Take 300 mg by mouth daily          No Known Allergies    Review of Systems  Patient confused do not believe reliable ROS obtainable  BP (!) 170/77   Pulse 98   Temp 98.7 °F (37.1 °C) (Oral)   Resp 20   Ht 5' 10\" (1.778 m)   Wt 210 lb (95.3 kg)   SpO2 96%   BMI 30.13 kg/m²     Neurologic Exam  Awake but confused inappropriate does at times gives historical details with clarity but this is rare there is some degree of confabulation  Overall nonfocal exam he does have occasional myoclonic jerks however and a mild degree of intention tremor    Most recent MRI   No results found for this or any previous visit. Most recent MRA   No results found for this or any previous visit.         Most recent CTA  Results for orders placed during the hospital encounter of 10/04/22    CT HEAD WO CONTRAST    Narrative  CT HEAD WITHOUT CONTRAST, 10/4/2022    History: Dizziness and arlette. Comparison: CT head without contrast 4/21/2022    Technique:   5 mm axial scans from the skull base to the vertex. All CT scans  performed at this facility use one or all of the following: Automated exposure  control, adjustment of the mA and/or kVp according to patient's size, iterative  reconstruction. Findings:  No evidence of intracranial hemorrhage is seen. No abnormal  extra-axial fluid collections are seen. No evidence for acute hydrocephalus is  seen. No evidence of midline shift or herniation is seen. No abnormal edema  pattern is seen in a vascular distribution to suggest large artery infarction. A  stable defined defect is seen in the right basal ganglia consistent with a  chronic lacunar infarction. Stable advanced white matter changes are seen most  consistent with advanced chronic migraines hepatic changes. Evaluation with bone windows shows no acute osseous changes. The visualized  sinuses, middle ears, and mastoid air cells are well aerated. Impression  1. No acute intracranial process evident by noncontrast CT study of the head. Only chronic appearing changes are seen as described above. This report was made using voice transcription. Despite my best efforts to avoid  any, transcription errors may persist. If there is any question about the  accuracy of the report or need for clarification, then please call [phone], or text me through [employer] for clarification or correction. CT reviewed on PACS. In addition to ischemia differential also includes widespread demyelination and degenerative disease note is also made of prior findings consistent with amyloid angiopathy    Most recent Echo  No results found for this or any previous visit.          Most recent lipid panels  No results found for: CHOL, CHOLPOCT, CHOLX, CHLST, CHOLV, HDL, HDLPOC, HDLC, LDL, LDLC, VLDLC, VLDL, TGLX, TRIGL    Most recent Hgb A1C  No results found for: HBA1C, [address]      Assessment/Plan:  We do need to review the MRI scan. Most recent symptoms probably vestibular in terms of his dizziness. In terms of is intermittent encephalopathy and confusion HC aspect of the history is the presence of possible hemosiderin deposits which may indicate the presence of amyloid angiopathy this would preclude the usage of platelet inhibitors.   If indeed there is evidence more of Marchiafava-Bignami disease with similar process 1 could concentrate treatment with high-dose thiamine however all of our options here are restricted in terms of what they are going to obtain in terms of efficacy result this is an aspect of neurology where unfortunately therapeutic nihilism persists as we do not have any viable options          Javier Nunez MD

## 2022-10-05 NOTE — PROGRESS NOTES
Advance Care Planning     Advance Care Planning Inpatient Note  [facility] Department    Today's Date: 10/5/2022  Unit: [facility] EMERGENCY DEPT    Received request from HealthCare Provider. Upon review of chart and communication with care team, requested Health Care Provider's clarification of patient's decision making capacity. Patient was waiting to see the neurologist upon my arrival for the visit. As a result, we will delay assisting patient with the HCPOA document. Chaplains remain available for assistance with the HCPOA document if patient is capable of completing the document. Health Care Decision Makers:     No healthcare decision makers have been documented. Click here to complete [address] including selection of the Healthcare Decision Maker Relationship (ie \"Primary\")  Summary:  Per staff, patient has verbally communicated that he would like his friend, Ivonne Nicolas, to be his primary decision maker. Advance Care Planning Documents (Patient Wishes):  No documents are on file at present. Interventions:  Conferred with patient's care team and postponed conversation due to patient's neuro consult.    Informed care team that  follow-up is available upon request.     Care Preferences Communicated:   No    Outcomes/Plan:  ACP Discussion: Postponed    Electronically signed by Ascencion Vo, [address] on 10/5/2022 at 2:22 PM

## 2022-10-05 NOTE — PROGRESS NOTES
ACUTE PHYSICAL THERAPY GOALS:   (Developed with and agreed upon by patient and/or caregiver.)   Mr. Sonali Ahn will perform supine to sit and sit to supine independently in 7 days. Mr. Sonali Ahn will perform sit to stand and bed to chair with least restrictive device independently in 7 days. Mr. Sonali Ahn will perform gait with least restrictive device 200 ft in 7 days. PHYSICAL THERAPY Initial Assessment, Daily Note, and AM  (Link to Caseload Tracking:    Acknowledge Orders  Time In/Out  PT Charge Capture  Rehab Caseload Tracker    Vonda Foley is a 78 y.o. male   PRIMARY DIAGNOSIS: Pre-syncope  Seizure-like activity ([facility] [address] [R56.9]       Reason for Referral: Generalized Muscle Weakness (M62.81)  Difficulty in walking, Not elsewhere classified (R26.2)  History of falling (Z91.81)  Inpatient: Payor: Normanabelino Rodriguezo / Plan: MEDICARE PART A / Product Type: *No Product type* /     ASSESSMENT:     REHAB RECOMMENDATIONS:   Recommendation to date pending progress:  Setting:  Short-term Rehab    Equipment:    To Be Determined  Has a cane     ASSESSMENT:  Mr. Sonali Ahn presents with dizziness when moving however our evaluation was limited as he became uncooperative and started yelling when we tried to take his blood pressure. PT/OT cotx . We managed to have him sit up on the side of the bed at which point he started to complain of being dizzy. He then stood for a brief second. We were trying to ask him questions to get more insight into his symptoms when he suddenly laid down on his own saying how dizzy he was. PT attempted to take his blood pressure but he started screaming and ripped it off his arm. The session was ended right there. Mr. Sonali Ahn comes from an assisted living facility. Suggested discharge plan is difficult from such a limited evaluation however would suggest a rehab stay.   Mr. Sonali Ahn is functioning below baseline and is therefore appropriate for skilled PT to maximize his rehab potential..     [address] [facility] [address] [address] AM-PAC 6 Clicks Basic Mobility Inpatient Short Form  AM-PAC Mobility Inpatient   How much difficulty turning over in bed?: A Little  How much difficulty sitting down on / standing up from a chair with arms?: A Little  How much difficulty moving from lying on back to sitting on side of bed?: A Little  How much help from another person moving to and from a bed to a chair?: A Little  How much help from another person needed to walk in hospital room?: A Lot  How much help from another person for climbing 3-5 steps with a railing?: A Lot  AM-PAC Inpatient Mobility Raw Score : 16  AM-PAC Inpatient T-Scale Score : 40.78  Mobility Inpatient CMS 0-100% Score: 54.16  Mobility Inpatient CMS G-Code Modifier : CK    SUBJECTIVE:   Mr. Lund Res states, \"Im from [place]\"     Social/Functional Lives With: Alone  Type of Home: Assisted living  Home Layout: One level  Ambulation Assistance: Independent (uses a cane)  Transfer Assistance: Independent (uses a cane)    OBJECTIVE:     PAIN: VITALS / O2: PRECAUTION / Aalniz Elena / DRAINS:   Pre Treatment:    0      Post Treatment: 0 Vitals        Oxygen      IV    RESTRICTIONS/PRECAUTIONS:                    GROSS EVALUATION: Intact Impaired (Comments):   AROM [x]     PROM []    Strength [x]     Balance [] Posture: Good  Sitting - Static: Good  Sitting - Dynamic: Good  Standing - Static: Fair  Standing - Dynamic: Fair, -   Posture [] N/A   Sensation []     Coordination []      Tone []     Edema []    Activity Tolerance []      []      COGNITION/  PERCEPTION: Intact Impaired (Comments):   Orientation []     Vision []     Hearing []     Cognition  []       MOBILITY: I Mod I S SBA CGA Min Mod Max Total  NT x2 Comments:   Bed Mobility    Rolling [] [] [] [] [] [] [] [] [] [] []    Supine to Sit [] [] [] [] [] [] [] [] [] [] []    Scooting [] [] [] [] [] [] [] [] [] [] []    Sit to Supine [] [] [] [] [] [] [] [] [] [] []    Transfers    Sit to Stand [] [] [] [] [] [] [] [] [] [] []    Bed to Chair [] [] [] [] [] [] [] [] [] [] []    Stand to Sit [] [] [] [] [] [] [] [] [] [] []     [] [] [] [] [] [] [] [] [] [] []    I=Independent, Mod I=Modified Independent, S=Supervision, SBA=Standby Assistance, CGA=Contact Guard Assistance,   Min=Minimal Assistance, Mod=Moderate Assistance, Max=Maximal Assistance, Total=Total Assistance, NT=Not Tested    GAIT: I Mod I S SBA CGA Min Mod Max Total  NT x2 Comments:   Level of Assistance [] [] [] [] [] [] [] [] [] [] []    Distance   feet    DME SPC    Gait Quality Did not see any steps to assess    Weightbearing Status      Stairs      I=Independent, Mod I=Modified Independent, S=Supervision, SBA=Standby Assistance, CGA=Contact Guard Assistance,   Min=Minimal Assistance, Mod=Moderate Assistance, Max=Maximal Assistance, Total=Total Assistance, NT=Not Tested    PLAN:   FREQUENCY AND DURATION: 3 times/week for duration of hospital stay or until stated goals are met, whichever comes first.    THERAPY PROGNOSIS: Good    PROBLEM LIST:   (Skilled intervention is medically necessary to address:)  Decreased ADL/Functional Activities  Decreased Activity Tolerance  Decreased Balance  Decreased Cognition  Decreased Gait Ability  Decreased Safety Awareness  Decreased Strength  Decreased Transfer Abilities INTERVENTIONS PLANNED:   (Benefits and precautions of physical therapy have been discussed with the patient.)  Therapeutic Activity  Therapeutic Exercise/HEP  Neuromuscular Re-education  Gait Training  Education       TREATMENT:   EVALUATION: MODERATE COMPLEXITY: (Untimed Charge)    TREATMENT:   Co-Treatment PT/OT necessary due to patient's decreased overall endurance/tolerance levels, as well as need for high level skilled assistance to complete functional transfers/mobility and functional tasks  Therapeutic Activity (10 Minutes):  Therapeutic activity included Supine to Sit, Sit to Supine, Scooting, Transfer Training, Ambulation on level ground, Sitting balance , and Standing balance to improve functional Balance, Mobility, and Strength.     TREATMENT GRID:  N/A    AFTER TREATMENT PRECAUTIONS: Bed, Call light within reach, Needs within reach, and RN notified    INTERDISCIPLINARY COLLABORATION:  RN/ PCT, PT/ PTA, and OT/ PEREZ    EDUCATION: Education Given To: Patient  Education Provided: Role of Therapy  Education Method: Verbal  Barriers to Learning: None  Education Outcome: Verbalized understanding    TIME IN/OUT:  Time In: 0950  Time Out: 1000  Minutes: [address], PT

## 2022-10-05 NOTE — ED NOTES
RN messaged provider that MRI states that they need signature of provider on screening.       Carmen Layton RN  10/05/22 [id number]

## 2022-10-06 ENCOUNTER — APPOINTMENT (OUTPATIENT)
Dept: MRI IMAGING | Age: 77
DRG: 312 | End: 2022-10-06
Payer: OTHER GOVERNMENT

## 2022-10-06 LAB
ALBUMIN SERPL-MCNC: 2.7 G/DL (ref 3.2–4.6)
ALBUMIN/GLOB SERPL: 0.8 {RATIO} (ref 1.2–3.5)
ALP SERPL-CCNC: 88 U/L (ref 50–136)
ALT SERPL-CCNC: 14 U/L (ref 12–65)
ANION GAP SERPL CALC-SCNC: 8 MMOL/L (ref 4–13)
AST SERPL-CCNC: 22 U/L (ref 15–37)
BASOPHILS # BLD: 0 K/UL (ref 0–0.2)
BASOPHILS NFR BLD: 0 % (ref 0–2)
BILIRUB SERPL-MCNC: 0.4 MG/DL (ref 0.2–1.1)
BUN SERPL-MCNC: 24 MG/DL (ref 8–23)
CALCIUM SERPL-MCNC: 8.9 MG/DL (ref 8.3–10.4)
CHLORIDE SERPL-SCNC: 114 MMOL/L (ref 101–110)
CO2 SERPL-SCNC: 21 MMOL/L (ref 21–32)
CREAT SERPL-MCNC: 1.3 MG/DL (ref 0.8–1.5)
DIFFERENTIAL METHOD BLD: ABNORMAL
EOSINOPHIL # BLD: 0.4 K/UL (ref 0–0.8)
EOSINOPHIL NFR BLD: 4 % (ref 0.5–7.8)
ERYTHROCYTE [DISTWIDTH] IN BLOOD BY AUTOMATED COUNT: 13.9 % (ref 11.9–14.6)
GLOBULIN SER CALC-MCNC: 3.6 G/DL (ref 2.3–3.5)
GLUCOSE SERPL-MCNC: 94 MG/DL (ref 65–100)
HCT VFR BLD AUTO: 35.8 % (ref 41.1–50.3)
HGB BLD-MCNC: 11.8 G/DL (ref 13.6–17.2)
IMM GRANULOCYTES # BLD AUTO: 0 K/UL (ref 0–0.5)
IMM GRANULOCYTES NFR BLD AUTO: 0 % (ref 0–5)
LYMPHOCYTES # BLD: 0.9 K/UL (ref 0.5–4.6)
LYMPHOCYTES NFR BLD: 8 % (ref 13–44)
MAGNESIUM SERPL-MCNC: 2.3 MG/DL (ref 1.8–2.4)
MCH RBC QN AUTO: 30.3 PG (ref 26.1–32.9)
MCHC RBC AUTO-ENTMCNC: 33 G/DL (ref 31.4–35)
MCV RBC AUTO: 92 FL (ref 79.6–97.8)
MONOCYTES # BLD: 1.3 K/UL (ref 0.1–1.3)
MONOCYTES NFR BLD: 11 % (ref 4–12)
NEUTS SEG # BLD: 8.4 K/UL (ref 1.7–8.2)
NEUTS SEG NFR BLD: 77 % (ref 43–78)
NRBC # BLD: 0 K/UL (ref 0–0.2)
PLATELET # BLD AUTO: 277 K/UL (ref 150–450)
PMV BLD AUTO: 10.4 FL (ref 9.4–12.3)
POTASSIUM SERPL-SCNC: 3.7 MMOL/L (ref 3.5–5.1)
PROT SERPL-MCNC: 6.3 G/DL (ref 6.3–8.2)
RBC # BLD AUTO: 3.89 M/UL (ref 4.23–5.6)
SODIUM SERPL-SCNC: 143 MMOL/L (ref 136–145)
WBC # BLD AUTO: 11 K/UL (ref 4.3–11.1)

## 2022-10-06 PROCEDURE — 85025 COMPLETE CBC W/AUTO DIFF WBC: CPT

## 2022-10-06 PROCEDURE — 2580000003 HC RX 258: Performed by: INTERNAL MEDICINE

## 2022-10-06 PROCEDURE — 6370000000 HC RX 637 (ALT 250 FOR IP): Performed by: INTERNAL MEDICINE

## 2022-10-06 PROCEDURE — 6370000000 HC RX 637 (ALT 250 FOR IP): Performed by: HOSPITALIST

## 2022-10-06 PROCEDURE — 6370000000 HC RX 637 (ALT 250 FOR IP): Performed by: FAMILY MEDICINE

## 2022-10-06 PROCEDURE — 1100000000 HC RM PRIVATE

## 2022-10-06 PROCEDURE — 83735 ASSAY OF MAGNESIUM: CPT

## 2022-10-06 PROCEDURE — 6360000002 HC RX W HCPCS: Performed by: HOSPITALIST

## 2022-10-06 PROCEDURE — 36415 COLL VENOUS BLD VENIPUNCTURE: CPT

## 2022-10-06 PROCEDURE — 6360000002 HC RX W HCPCS: Performed by: INTERNAL MEDICINE

## 2022-10-06 PROCEDURE — 80053 COMPREHEN METABOLIC PANEL: CPT

## 2022-10-06 PROCEDURE — 70544 MR ANGIOGRAPHY HEAD W/O DYE: CPT

## 2022-10-06 RX ORDER — LORAZEPAM 2 MG/ML
1 INJECTION INTRAMUSCULAR PRN
Status: DISCONTINUED | OUTPATIENT
Start: 2022-10-06 | End: 2022-10-13 | Stop reason: HOSPADM

## 2022-10-06 RX ORDER — LANOLIN ALCOHOL/MO/W.PET/CERES
200 CREAM (GRAM) TOPICAL DAILY
Status: DISCONTINUED | OUTPATIENT
Start: 2022-10-06 | End: 2022-10-13 | Stop reason: HOSPADM

## 2022-10-06 RX ORDER — AMLODIPINE BESYLATE 10 MG/1
10 TABLET ORAL DAILY
Status: DISCONTINUED | OUTPATIENT
Start: 2022-10-06 | End: 2022-10-13 | Stop reason: HOSPADM

## 2022-10-06 RX ORDER — SODIUM CHLORIDE 9 MG/ML
INJECTION, SOLUTION INTRAVENOUS CONTINUOUS
Status: ACTIVE | OUTPATIENT
Start: 2022-10-06 | End: 2022-10-06

## 2022-10-06 RX ADMIN — LOSARTAN POTASSIUM 50 MG: 50 TABLET, FILM COATED ORAL at 09:19

## 2022-10-06 RX ADMIN — ASPIRIN 81 MG: 81 TABLET ORAL at 09:20

## 2022-10-06 RX ADMIN — AMLODIPINE BESYLATE 10 MG: 10 TABLET ORAL at 09:19

## 2022-10-06 RX ADMIN — SODIUM CHLORIDE, PRESERVATIVE FREE 10 ML: 5 INJECTION INTRAVENOUS at 21:50

## 2022-10-06 RX ADMIN — ATORVASTATIN CALCIUM 40 MG: 40 TABLET, FILM COATED ORAL at 21:50

## 2022-10-06 RX ADMIN — SODIUM CHLORIDE: 9 INJECTION, SOLUTION INTRAVENOUS at 04:46

## 2022-10-06 RX ADMIN — SODIUM CHLORIDE, PRESERVATIVE FREE 10 ML: 5 INJECTION INTRAVENOUS at 09:20

## 2022-10-06 RX ADMIN — ENOXAPARIN SODIUM 40 MG: 100 INJECTION SUBCUTANEOUS at 21:10

## 2022-10-06 RX ADMIN — Medication 200 MG: at 09:20

## 2022-10-06 RX ADMIN — Medication 1 MG: at 16:43

## 2022-10-06 ASSESSMENT — PAIN SCALES - GENERAL
PAINLEVEL_OUTOF10: 0
PAINLEVEL_OUTOF10: 0

## 2022-10-06 NOTE — PROGRESS NOTES
Patient started screaming as MRI staff and cussing in the department as well as shouting racial slurs. Code grey called.

## 2022-10-06 NOTE — PROGRESS NOTES
Duplicate orders received,  mr. Solange Barry was evaluated in the ER yesterday and placed on caseload. Please refer to evaluation for details. Mr. Solange Barry declined PT today.   [facility], PT

## 2022-10-06 NOTE — PROGRESS NOTES
Pt medicated with PRN Ativan for MRI. PRN Ativan pulled at 1640 and given at 1643. Pt medicated at bedside when transport arrived.

## 2022-10-06 NOTE — PROGRESS NOTES
Code grey called in MRI. This primary nurse called down and offered to come talk to pt, but pt was already being sent back to unit.

## 2022-10-06 NOTE — PROGRESS NOTES
SPEECH PATHOLOGY NOTE:    Duplicate orders received. Patient seen for bedside swallow evaluation yesterday, 10/5. \"Patient presents with functional oropharyngeal swallow. Increased mastication time with solids due to missing dentition, but overall functional with oral prep. Pharyngeal swallow unremarkable     Recommend continue easy to chew diet and thin liquids. Medications as tolerated. No dysphagia treatment indicated. \"    Will follow up for cognitive linguistic assessment if indicated. MRI pending.        Ehsan Molina MS, CCC-SLP

## 2022-10-06 NOTE — PROGRESS NOTES
SBAR report received from [facility]. Pt resting in bed. Pt noted to be agitated and refusing assessment. Pt refuses to answer orientation question and seems to purposely give incorrect answers. This RN assessed as much as the patient allowed as charted. Respirations even and unlabored on 2L HF NC. Pt denies pain at this time. No signs of distress noted. NS currently infusing at 100 mL/h. Safety measures are in place. Will continue to monitor.

## 2022-10-06 NOTE — PROGRESS NOTES
Hospitalist Progress Note     Admit Date:  10/4/2022  2:09 PM   DC Note date: 10/6/2022  Name:  Sammie Turner   Age:  78 y.o. Sex:  male  :  1945   MRN:  [id number]   Room:  [facility]  PCP:  Unknown Unknown    Hospital Course:  78 y.o. male with medical history of copd, tobacco abuse, b12 deficinecy and HTN  who presented from [facility] via EMS with cc dizziness. While sitting at dining room table, he c/o dizziness and staff reports he was shaking, seizure like activity. No known h/o seizures. Per EMS, BP 90/60 on arrival. He c/o back pain. He was uncooperative, angry and yelling at staff in ED. Labs  KHR78.2M Procal 0.24  Poor historian. Disheveled. Says he has not had any food and fluids today. Remembers getting dizzy today but doesn't remember what happened afterwards. Has happened before but does not elaborate. Denies fever or chills. Has not urinated today d/t no PO intake. Says he pees if he drinks fluids and doesn't if he doesn't drink fluids. says he use to pee all the time. Per record review, has had several admissions with non specific delirium/encephalopathy. In 2019 had MRI showing infarct L occipital lobe, R BG lesion, prominent supratentorial white matter dz, non specific but compatible with chronic ischemic demyelination. Evidence of micro hemorrhage in the parietal and occipital lobes, non specific and my be secondary to chronic hypertensive encephalopathy, amyloid angiopathy, or tiny cavernous malformations. Left AMA during this admission. Does not appear that he saw neurology. S/p Rocephin in ED. Today, was agitated and aggressive in mri, makes good urine    Assessment & Plan:      Near syncope - admit remote tele- in NSR. Orthostatic+. Doubt seizure. States he stopped alcohol for 20 y. Refused EEG    H/o CVA // dizziness - check MRI brain and MRA head/neck. Aspirin, high intensity statin. PT/OT/SLP.       KRISTEN, prerenal, resolved     Acute encephalopathy - baseline mentation unclear. admin IM b12 given h/o b12 def. Start PO folic acid and thiamine daily. Check tsh. Mri as above. Rule out seizures. Monitor for urinary retention. Hydrate. Check b12, tsh, , ammonia. HTN, not well controlled, meds advanced     Rx:  Suportive Rx  Monitor orthostatics  Added Midodrine  Follow MRI/ MRA pending    Disposition: rehab per PT, [facility]  Case d/w pt, RN, CM      Diagnostic Imaging/Tests:   CT HEAD WO CONTRAST  Result Date: 10/4/2022  1. No acute intracranial process evident by noncontrast CT study of the head. Only chronic appearing changes are seen as described above. This report was made using voice transcription. Despite my best efforts to avoid any, transcription errors may persist. If there is any question about the accuracy of the report or need for clarification, then please call [phone], or text me through perfectserv for clarification or correction. XR CHEST PORTABLE  Result Date: 10/4/2022  The lungs are clear. The heart is normal in size. No pneumothorax. No pleural effusions.         Labs: Results:       BMP, Mg, Phos Recent Labs     10/04/22  1411 10/05/22  0519 10/06/22  0340    141 143   K 3.8 3.8 3.7    110 114*   CO2 22 24 21   ANIONGAP 11 7 8   BUN 19 31* 24*   CREATININE 1.30 1.80* 1.30   LABGLOM 57* 39* 57*   CALCIUM 9.7 8.8 8.9   GLUCOSE 112* 82 94   MG  --  2.2 2.3        CBC Recent Labs     10/04/22  1411 10/05/22  0519 10/06/22  0340   WBC 17.4* 12.7* 11.0   RBC 4.58 4.13* 3.89*   HGB 13.9 12.6* 11.8*   HCT 43.7 38.7* 35.8*   MCV 95.4 93.7 92.0   MCH 30.3 30.5 30.3   MCHC 31.8 32.6 33.0   RDW 14.2 14.0 13.9    229 277   MPV 10.6 9.8 10.4   NRBC 0.00 0.00 0.00   SEGS 84* 78 77   LYMPHOPCT 7* 7* 8*   EOSRELPCT 1 3 4   MONOPCT 7 11 11   BASOPCT 0 0 0   IMMGRAN 1 1 0   SEGSABS 14.6* 9.9* 8.4*   LYMPHSABS 1.2 0.9 0.9   EOSABS 0.2 0.4 0.4   MONOSABS 1.2 1.5* 1.3   BASOSABS 0.0 0.0 0.0   ABSIMMGRAN 0.2 0.1 0.0        LFT Recent Labs     10/04/22  1411 10/05/22  0519 10/06/22  0340   BILITOT 0.9 0.5 0.4   ALKPHOS 91 84 88   AST 6* 12* 22   ALT 21 17 14   PROT 6.7 5.7* 6.3   LABALBU 3.1* 3.0* 2.7*   GLOB 3.6* 2.7 3.6*        Cardiac  Lab Results   Component Value Date/Time    TROPHS 16.4 04/21/2022 08:11 PM    TROPHS 13.6 04/21/2022 05:33 PM      Coags Lab Results   Component Value Date/Time    APTT >200.0 11/19/2021 04:29 PM      A1c No results found for: LABA1C, EAG   Lipids No results found for: CHOL, LDLCALC, LABVLDL, HDL, CHOLHDLRATIO, TRIG   Thyroid  Lab Results   Component Value Date/Time    TSHELE 0.77 10/05/2022 05:19 AM        Most Recent UA Lab Results   Component Value Date/Time    COLORU DARK YELLOW 10/05/2022 02:10 AM    APPEARANCE CLEAR 10/05/2022 02:10 AM    SPECGRAV 1.015 10/05/2022 02:10 AM    LABPH 5.5 10/05/2022 02:10 AM    PROTEINU 30 10/05/2022 02:10 AM    GLUCOSEU Negative 10/05/2022 02:10 AM    KETUA TRACE 10/05/2022 02:10 AM    BILIRUBINUR Negative 10/05/2022 02:10 AM    BILIRUBINUR Negative 04/21/2022 08:40 PM    BLOODU Negative 10/05/2022 02:10 AM    UROBILINOGEN 1.0 10/05/2022 02:10 AM    NITRU Negative 10/05/2022 02:10 AM    LEUKOCYTESUR Negative 10/05/2022 02:10 AM    WBCUA 0-3 10/05/2022 02:10 AM    RBCUA 0 10/05/2022 02:10 AM    EPITHUA 0 10/05/2022 02:10 AM    BACTERIA 1+ 10/05/2022 02:10 AM    LABCAST HYALINE 10/05/2022 02:10 AM    MUCUS 1+ 10/05/2022 02:10 AM        Recent Labs     10/05/22  0210 10/04/22  1654   CULTURE 50,000-100,000 COLONIES/mL MIXED SKIN IRINA ISOLATED NO GROWTH 2 DAYS         All Labs from Last 24 Hrs:  Recent Results (from the past 24 hour(s))   CBC with Auto Differential    Collection Time: 10/06/22  3:40 AM   Result Value Ref Range    WBC 11.0 4.3 - 11.1 K/uL    RBC 3.89 (L) 4.23 - 5.6 M/uL    Hemoglobin 11.8 (L) 13.6 - 17.2 g/dL    Hematocrit 35.8 (L) 41.1 - 50.3 %    MCV 92.0 79.6 - 97.8 FL    MCH 30.3 26.1 - 32.9 PG    MCHC 33.0 31.4 - 35.0 g/dL    RDW 13.9 11.9 - 14.6 % Platelets 044 643 - 348 K/uL    MPV 10.4 9.4 - 12.3 FL    nRBC 0.00 0.0 - 0.2 K/uL    Differential Type AUTOMATED      Seg Neutrophils 77 43 - 78 %    Lymphocytes 8 (L) 13 - 44 %    Monocytes 11 4.0 - 12.0 %    Eosinophils % 4 0.5 - 7.8 %    Basophils 0 0.0 - 2.0 %    Immature Granulocytes 0 0.0 - 5.0 %    Segs Absolute 8.4 (H) 1.7 - 8.2 K/UL    Absolute Lymph # 0.9 0.5 - 4.6 K/UL    Absolute Mono # 1.3 0.1 - 1.3 K/UL    Absolute Eos # 0.4 0.0 - 0.8 K/UL    Basophils Absolute 0.0 0.0 - 0.2 K/UL    Absolute Immature Granulocyte 0.0 0.0 - 0.5 K/UL   Magnesium    Collection Time: 10/06/22  3:40 AM   Result Value Ref Range    Magnesium 2.3 1.8 - 2.4 mg/dL   Comprehensive Metabolic Panel    Collection Time: 10/06/22  3:40 AM   Result Value Ref Range    Sodium 143 136 - 145 mmol/L    Potassium 3.7 3.5 - 5.1 mmol/L    Chloride 114 (H) 101 - 110 mmol/L    CO2 21 21 - 32 mmol/L    Anion Gap 8 4 - 13 mmol/L    Glucose 94 65 - 100 mg/dL    BUN 24 (H) 8 - 23 MG/DL    Creatinine 1.30 0.8 - 1.5 MG/DL    Est, Glom Filt Rate 57 (L) >60 ml/min/1.73m2    Calcium 8.9 8.3 - 10.4 MG/DL    Total Bilirubin 0.4 0.2 - 1.1 MG/DL    ALT 14 12 - 65 U/L    AST 22 15 - 37 U/L    Alk Phosphatase 88 50 - 136 U/L    Total Protein 6.3 6.3 - 8.2 g/dL    Albumin 2.7 (L) 3.2 - 4.6 g/dL    Globulin 3.6 (H) 2.3 - 3.5 g/dL    Albumin/Globulin Ratio 0.8 (L) 1.2 - 3.5         No Known Allergies  Immunization History   Administered Date(s) Administered    COVID-19, PFIZER PURPLE top, DILUTE for use, (age 15 y+), 30mcg/0.3mL 02/23/2021    PPD Test 11/19/2021, 04/21/2022       Recent Vital Data:  Patient Vitals for the past 24 hrs:   Temp Pulse Resp BP SpO2   10/06/22 1032 98.2 °F (36.8 °C) 69 16 (!) 172/73 99 %   10/06/22 0745 -- 72 -- -- --   10/06/22 0735 97.7 °F (36.5 °C) 61 18 (!) 173/70 99 %   10/06/22 0429 98.6 °F (37 °C) 74 20 (!) 176/79 98 %   10/06/22 0409 -- 65 -- -- --   10/06/22 0048 -- 74 -- -- --   10/05/22 2355 98 °F (36.7 °C) 67 20 (!) 160/54 95 %   10/05/22 2224 -- 73 -- -- --   10/05/22 1902 99.1 °F (37.3 °C) 91 20 (!) 152/54 100 %   10/05/22 1755 -- 82 -- -- --   10/05/22 1750 98.2 °F (36.8 °C) 82 20 (!) 209/97 --         Oxygen Therapy  SpO2: 99 %  Pulse via Oximetry: 97 beats per minute  O2 Device: Nasal cannula  Skin Assessment: Clean, dry, & intact  O2 Flow Rate (L/min): 2 L/min    Estimated body mass index is 25.47 kg/m² as calculated from the following:    Height as of an earlier encounter on 10/4/22: 5' 10\" (1.778 m). Weight as of this encounter: 177 lb 8 oz (80.5 kg). Intake/Output Summary (Last 24 hours) at 10/6/2022 1549  Last data filed at 10/6/2022 1539  Gross per 24 hour   Intake 236 ml   Output 875 ml   Net -639 ml           Physical Exam:    General:    Well nourished. Moderate distress, disoriented to place and time but can have a conversation, anxious  CV:   RRR. No m/r/g. No JVD  Lungs:   CTAB. No wheezing, rhonchi, or rales. Respirations even, unlabored  Abdomen:   Soft, nontender, nondistended. Extremities: Warm and dry. No cyanosis or clubbing. No edema. Skin:     No rashes. Normal coloration  Neuro:  grossly intact. Psych:  Normal mood and affect.     Signed:  Kiki Rosenbaum MD

## 2022-10-06 NOTE — CARE COORDINATION
10/06/22 1121   Service Assessment   Cognition Other (see comment)  (patient was confused when CM tried to do the assessment)   History Provided By Friend   Primary Caregiver Self   Support Systems Friends/Neighbors   PCP Verified by CM Yes   Prior Functional Level Independent in ADLs/IADLs   Current Functional Level Other (see comment)  (unable to assess at this time)   Can patient return to prior living arrangement Unknown at present   Ability to make needs known: Good   Family able to assist with home care needs: No   Would you like for me to discuss the discharge plan with any other family members/significant others, and if so, who? Yes   Financial Resources Medicare   Community Resources Assisted Living   Social/Functional History   Lives With Alone   Type of Home Assisted living   Home Layout One level   Receives Help From Other (comment)   Ambulation Assistance Needs assistance   Transfer Assistance Independent   Active  No   Patient told CM to call his friend, [facility] to answer questions. Patient lives alone. He uses a cane to ambulate. He has two daughters that he isn't talking to at this time. Yassine Long drives him to his appointments. DME: cane, nebulizer  CM following for discharge needs.

## 2022-10-06 NOTE — PROGRESS NOTES
Patient sleeping quietly in bed. Respirations even and unlabored on 2L NC. No signs of distress noted. NS currently infusing at 100 mL/h. Safety measures are in place. Will continue to monitor and give report to oncoming RN.

## 2022-10-06 NOTE — PROGRESS NOTES
Attempted second MRI after code grey called during first attempt. Pt premedicated with IV Ativan and transported down accompanied by this primary RN. Pt agreeable to attempting but unable to tolerate MRI machine. Pt was unable to stay still for imaging and clear images unable to be obtained. Pt transported back to floor via transport services and this primary RN. Pt resting in bed. Respirations even and unlabored on RA, no signs or symptoms of distress. Pt is alert and oriented. Call light in reach, safety measures in place. Will continue to monitor. MD made aware.

## 2022-10-07 LAB
BACTERIA SPEC CULT: NORMAL
BACTERIA SPEC CULT: NORMAL
SERVICE CMNT-IMP: NORMAL

## 2022-10-07 PROCEDURE — 6370000000 HC RX 637 (ALT 250 FOR IP): Performed by: INTERNAL MEDICINE

## 2022-10-07 PROCEDURE — 6370000000 HC RX 637 (ALT 250 FOR IP): Performed by: HOSPITALIST

## 2022-10-07 PROCEDURE — 1100000000 HC RM PRIVATE

## 2022-10-07 PROCEDURE — 97530 THERAPEUTIC ACTIVITIES: CPT

## 2022-10-07 PROCEDURE — 6360000002 HC RX W HCPCS: Performed by: INTERNAL MEDICINE

## 2022-10-07 PROCEDURE — 6370000000 HC RX 637 (ALT 250 FOR IP): Performed by: FAMILY MEDICINE

## 2022-10-07 PROCEDURE — 97535 SELF CARE MNGMENT TRAINING: CPT

## 2022-10-07 PROCEDURE — 2580000003 HC RX 258: Performed by: INTERNAL MEDICINE

## 2022-10-07 RX ADMIN — LOSARTAN POTASSIUM 50 MG: 50 TABLET, FILM COATED ORAL at 09:04

## 2022-10-07 RX ADMIN — ASPIRIN 81 MG: 81 TABLET ORAL at 09:03

## 2022-10-07 RX ADMIN — ATORVASTATIN CALCIUM 40 MG: 40 TABLET, FILM COATED ORAL at 21:29

## 2022-10-07 RX ADMIN — ENOXAPARIN SODIUM 40 MG: 100 INJECTION SUBCUTANEOUS at 21:29

## 2022-10-07 RX ADMIN — SODIUM CHLORIDE, PRESERVATIVE FREE 10 ML: 5 INJECTION INTRAVENOUS at 09:04

## 2022-10-07 RX ADMIN — Medication 200 MG: at 09:04

## 2022-10-07 RX ADMIN — AMLODIPINE BESYLATE 10 MG: 10 TABLET ORAL at 09:03

## 2022-10-07 RX ADMIN — SODIUM CHLORIDE, PRESERVATIVE FREE 5 ML: 5 INJECTION INTRAVENOUS at 21:31

## 2022-10-07 ASSESSMENT — PAIN SCALES - GENERAL
PAINLEVEL_OUTOF10: 0

## 2022-10-07 NOTE — PROGRESS NOTES
Patient resting in bed, alert and oriented, cooperative with care. Patient on 2 liters of Oxygen via NC. Patient denies pain or distress, safety measures in place, call light within reach.

## 2022-10-07 NOTE — PROGRESS NOTES
ACUTE OCCUPATIONAL THERAPY GOALS:   (Developed with and agreed upon by patient and/or caregiver.)  1. Patient will complete lower body bathing and dressing with MOD I and adaptive equipment as needed. 2. Patient will complete toileting with MOD I.   3. Patient will complete grooming ADL with MOD I.  4. Patient will tolerate 25 minutes of OT treatment with 1-2 rest breaks to increase activity tolerance for ADLs. 5. Patient will complete functional transfers with MOD I and adaptive equipment as needed. 6. Patient will tolerate 10 minutes BUE exercises to increase strength for safe, functional transfers    OCCUPATIONAL THERAPY: Daily Note PM   OT Visit Days: 2   Time  OT Charge Capture  Rehab Caseload Tracker  OT Orders    Valerie Kohler is a 78 y.o. male   PRIMARY DIAGNOSIS: Pre-syncope  Seizure-like activity ([facility] [address] [R56.9]  Transient hypotension [F60.7]  Acute metabolic encephalopathy [P64.89]       Inpatient: Payor: Norris Billings / Plan: Norris Billings / Product Type: *No Product type* /     ASSESSMENT:     REHAB RECOMMENDATIONS: CURRENT LEVEL OF FUNCTION:  (Most Recently Demonstrated)   Recommendation to date pending progress:  Setting:  Short-term Rehab    Equipment:    To Be Determined Bathing:  Supervision/Setup  Dressing:  Minimal Assist  Feeding/Grooming:  Supervision/Setup  Toileting: Total Assist  Functional Mobility:  Minimal Assist     ASSESSMENT:  Mr. Orville Mandujano was supine in bed upon arrival. Pt completed bed mobility with SBA. Pt completed functional mobility with min A using rolling walker. Pt was unsteady on feet. Pt sat at sink and completed ADL with the assistance listed below. Pt is progressing towards goals. Continue POC. SUBJECTIVE:     Mr. Orville Mandujano states, \"I can get up, get your hands off me. \"     Social/Functional Lives With: Alone  Type of Home: Assisted living  Home Layout: One level  Bathroom Shower/Tub: Walk-in shower  Bathroom Equipment: [id number] Equipment: [employer] Help From: Other (comment)  ADL Assistance: Independent  Ambulation Assistance: Needs assistance  Transfer Assistance: Independent  Active : No    OBJECTIVE:     Kayla Mcgee / [place] / AIRWAY: IV    RESTRICTIONS/PRECAUTIONS:  Restrictions/Precautions  Restrictions/Precautions: Fall Risk        PAIN: VITALS / O2:   Pre Treatment: 0           Post Treatment: 0 Vitals          Oxygen        MOBILITY: I Mod I S SBA CGA Min Mod Max Total  NT x2 Comments:   Bed Mobility    Rolling [] [] [] [] [] [] [] [] [] [] []    Supine to Sit [] [] [x] [] [] [] [] [] [] [] []    Scooting [] [] [] [] [] [] [] [] [] [] []    Sit to Supine [] [] [] [] [] [] [] [] [] [] []    Transfers    Sit to Stand [] [] [] [] [] [x] [] [] [] [] []    Bed to Chair [] [] [] [] [] [x] [] [] [] [] []    Stand to Sit [] [] [] [] [] [x] [] [] [] [] []    Tub/Shower [] [] [] [] [] [] [] [] [] [] []     Toilet [] [] [] [] [] [] [] [] [] [] []      [] [] [] [] [] [] [] [] [] [] []    I=Independent, Mod I=Modified Independent, S=Supervision/Setup, SBA=Standby Assistance, CGA=Contact Guard Assistance, Min=Minimal Assistance, Mod=Moderate Assistance, Max=Maximal Assistance, Total=Total Assistance, NT=Not Tested    ACTIVITIES OF DAILY LIVING: I Mod I S SBA CGA Min Mod Max Total NT Comments   BASIC ADLs:              Upper Body   Bathing [] [] [x] [] [] [] [] [] [] []    Lower Body Bathing [] [] [x] [] [] [] [] [] [] []    Toileting [] [] [] [] [] [] [] [] [x] [] Bowel hyg.     Upper Body Dressing [] [] [x] [] [] [] [] [] [] []    Lower Body Dressing [] [] [] [] [] [x] [] [] [] []    Feeding [] [] [] [] [] [] [] [] [] []    Grooming [] [] [x] [] [] [] [] [] [] []    Personal Device Care [] [] [] [] [] [] [] [] [] []    Functional Mobility [] [] [] [] [] [x] [] [] [] []    I=Independent, Mod I=Modified Independent, S=Supervision/Setup, SBA=Standby Assistance, CGA=Contact Guard Assistance, Min=Minimal Assistance, Mod=Moderate Assistance, Max=Maximal Assistance, Total=Total Assistance, NT=Not Tested    BALANCE: Good Fair+ Fair Fair- Poor NT Comments   Sitting Static [x] [] [] [] [] []    Sitting Dynamic [] [] [] [] [] []              Standing Static [] [] [] [] [x] []    Standing Dynamic [] [] [] [] [] []        PLAN:     FREQUENCY/DURATION   OT Plan of Care: 3 times/week for duration of hospital stay or until stated goals are met, whichever comes first.    TREATMENT:     TREATMENT:   Self Care (25 minutes): Patient participated in upper body bathing, lower body bathing, toileting, upper body dressing, lower body dressing, and grooming ADLs in unsupported sitting and standing with maximal verbal and manual cueing to increase independence and decrease assistance required. Patient also participated in energy conservation training to increase independence and decrease assistance required.      TREATMENT GRID:  N/A    AFTER TREATMENT PRECAUTIONS: Alarm Activated, Bed, Bed/Chair Locked, Call light within reach, Needs within reach, RN notified, and Side rails x3    INTERDISCIPLINARY COLLABORATION:  RN/ PCT, PT/ PTA, and OT/ PEREZ    EDUCATION:       TOTAL TREATMENT DURATION AND TIME:  Time In: 1355  Time Out: [id number]  Minutes: [address], IMELDA

## 2022-10-07 NOTE — CARE COORDINATION
Patient has been accepted by [facility]. This will need to be approved by the [place].  CM sent clinicals to [place] for approval. Awaiting approval.

## 2022-10-07 NOTE — PROGRESS NOTES
ACUTE PHYSICAL THERAPY GOALS:   (Developed with and agreed upon by patient and/or caregiver.)   Mr. Yury Bojorquez will perform supine to sit and sit to supine independently in 7 days. Mr. Yury Bojorquez will perform sit to stand and bed to chair with least restrictive device independently in 7 days. Mr. Yury Bojorquez will perform gait with least restrictive device 200 ft in 7 days    PHYSICAL THERAPY: Daily Note PM   (Link to Caseload Tracking: PT Visit Days : 2  Time In/Out PT Charge Capture  Rehab Caseload Tracker  Orders    Cady Chong is a 78 y.o. male   PRIMARY DIAGNOSIS: Pre-syncope  Seizure-like activity ([facility] [address] [R56.9]  Transient hypotension [P42.4]  Acute metabolic encephalopathy [I94.88]       Inpatient: Payor: Agatha Singh / Plan: Agatha Singh / Product Type: *No Product type* /     ASSESSMENT:     REHAB RECOMMENDATIONS:   Recommendation to date pending progress:  Setting:  Short-term Rehab    Equipment:    To Be Determined     ASSESSMENT:  Mr. Yury Bojorquez presents supine, agrees to therapy. He ambulates in room using rolling walker and min assist.  He doesn't like help but is unsteady and needs it. He noticeably soiled when he got out of bed and worked on standing balance activities while performing ADL with OT. He ambulated back to bed and was left with needs in reach. Good progress towards goals. Will continue with POC.       SUBJECTIVE:   Mr. 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[employer]\"     Social/Functional Lives With: Alone  Type of Home: Assisted living  Home Layout: One level  Bathroom Shower/Tub: Walk-in shower  Bathroom Equipment: Built-in shower seat  Home Equipment: [employer] Help From: Other (comment)  ADL Assistance: Independent  Ambulation Assistance: Needs assistance  Transfer Assistance: Independent  Active : No  OBJECTIVE:     PAIN: VITALS / O2: PRECAUTION / [place] / DRAINS:   Pre Treatment: 0         Post Treatment: 0 Vitals        Oxygen None    RESTRICTIONS/PRECAUTIONS:        MOBILITY: I Mod I S SBA CGA Min Mod Max Total  NT x2 Comments:   Bed Mobility    Rolling [] [] [] [] [] [] [] [] [] [] []    Supine to Sit [] [] [] [] [] [] [] [] [] [] []    Scooting [] [] [] [] [] [] [] [] [] [] []    Sit to Supine [] [] [] [] [] [] [] [] [] [] []    Transfers    Sit to Stand [] [] [] [] [] [] [] [] [] [] []    Bed to Chair [] [] [] [] [] [] [] [] [] [] []    Stand to Sit [] [] [] [] [] [] [] [] [] [] []     [] [] [] [] [] [] [] [] [] [] []    I=Independent, Mod I=Modified Independent, S=Supervision, SBA=Standby Assistance, CGA=Contact Guard Assistance,   Min=Minimal Assistance, Mod=Moderate Assistance, Max=Maximal Assistance, Total=Total Assistance, NT=Not Tested    BALANCE: Good Fair+ Fair Fair- Poor NT Comments   Sitting Static [] [] [] [] [] []    Sitting Dynamic [] [] [] [] [] []              Standing Static [] [] [] [] [] []    Standing Dynamic [] [] [] [] [] []      GAIT: I Mod I S SBA CGA Min Mod Max Total  NT x2 Comments:   Level of Assistance [] [] [] [] [] [] [] [] [] [] []    Distance   feet    DME Rolling Walker    Gait Quality Decreased step clearance, Decreased step length, Decreased stance, Narrow base of support, Shuffling , and Trunk sway increased    Weightbearing Status      Stairs      I=Independent, Mod I=Modified Independent, S=Supervision, SBA=Standby Assistance, CGA=Contact Guard Assistance,   Min=Minimal Assistance, Mod=Moderate Assistance, Max=Maximal Assistance, Total=Total Assistance, NT=Not Tested    PLAN:   FREQUENCY AND DURATION: 3 times/week for duration of hospital stay or until stated goals are met, whichever comes first.    TREATMENT:   TREATMENT:   Co-Treatment PT/OT necessary due to patient's decreased overall endurance/tolerance levels, as well as need for high level skilled assistance to complete functional transfers/mobility and functional tasks  Therapeutic Activity (25 Minutes):  Therapeutic activity included

## 2022-10-07 NOTE — PROGRESS NOTES
Hospitalist Progress Note     Admit Date:  10/4/2022  2:09 PM   DC Note date: 10/7/2022  Name:  Valeri Handley   Age:  78 y.o. Sex:  male  :  1945   MRN:  [id number]   Room:  [facility]  PCP:  Unknown Unknown    Hospital Course:  78 y.o. male with medical history of copd, tobacco abuse, b12 deficinecy and HTN  who presented from [facility] via EMS with cc dizziness. While sitting at dining room table, he c/o dizziness and staff reports he was shaking, seizure like activity. No known h/o seizures. Per EMS, BP 90/60 on arrival. He c/o back pain. He was uncooperative, angry and yelling at staff in ED. Labs  YIV92.5O Procal 0.24  Poor historian. Disheveled. Says he has not had any food and fluids today. Remembers getting dizzy today but doesn't remember what happened afterwards. Has happened before but does not elaborate. Denies fever or chills. Has not urinated today d/t no PO intake. Says he pees if he drinks fluids and doesn't if he doesn't drink fluids. says he use to pee all the time. Per record review, has had several admissions with non specific delirium/encephalopathy. In 2019 had MRI showing infarct L occipital lobe, R BG lesion, prominent supratentorial white matter dz, non specific but compatible with chronic ischemic demyelination. Evidence of micro hemorrhage in the parietal and occipital lobes, non specific and my be secondary to chronic hypertensive encephalopathy, amyloid angiopathy, or tiny cavernous malformations. Left AMA during this admission. Does not appear that he saw neurology. S/p Rocephin in ED. Today, no ac events, makes good urine, not orthostatic this morning    Assessment & Plan:      Near syncope - admit remote tele- in NSR. Orthostatic+. Doubt seizure. States he stopped alcohol for 20 y. Refused EEG. Did not allow MRI brain despite ativan given. H/o CVA // dizziness - check MRI brain and MRA head/neck. Aspirin, high intensity statin. PT/OT/SLP. KRISTEN, prerenal, resolved     Acute encephalopathy - baseline mentation unclear. admin IM b12 given h/o b12 def. Start PO folic acid and thiamine daily. Check tsh. Mri as above. Rule out seizures. Monitor for urinary retention. Hydrate. Check b12, tsh, , ammonia. HTN, not well controlled, meds advanced     Rx:  Suportive Rx  Monitor orthostatics  Added Midodrine    Disposition: rehab per PT, a [facility]  Case d/w pt, RN, CM      Diagnostic Imaging/Tests:   CT HEAD WO CONTRAST  Result Date: 10/4/2022  1. No acute intracranial process evident by noncontrast CT study of the head. Only chronic appearing changes are seen as described above. This report was made using voice transcription. Despite my best efforts to avoid any, transcription errors may persist. If there is any question about the accuracy of the report or need for clarification, then please call [phone], or text me through [email or web address] for clarification or correction. XR CHEST PORTABLE  Result Date: 10/4/2022  The lungs are clear. The heart is normal in size. No pneumothorax. No pleural effusions.         Labs: Results:       BMP, Mg, Phos Recent Labs     10/04/22  1411 10/05/22  0519 10/06/22  0340    141 143   K 3.8 3.8 3.7    110 114*   CO2 22 24 21   ANIONGAP 11 7 8   BUN 19 31* 24*   CREATININE 1.30 1.80* 1.30   LABGLOM 57* 39* 57*   CALCIUM 9.7 8.8 8.9   GLUCOSE 112* 82 94   MG  --  2.2 2.3        CBC Recent Labs     10/04/22  1411 10/05/22  0519 10/06/22  0340   WBC 17.4* 12.7* 11.0   RBC 4.58 4.13* 3.89*   HGB 13.9 12.6* 11.8*   HCT 43.7 38.7* 35.8*   MCV 95.4 93.7 92.0   MCH 30.3 30.5 30.3   MCHC 31.8 32.6 33.0   RDW 14.2 14.0 13.9    229 277   MPV 10.6 9.8 10.4   NRBC 0.00 0.00 0.00   SEGS 84* 78 77   LYMPHOPCT 7* 7* 8*   EOSRELPCT 1 3 4   MONOPCT 7 11 11   BASOPCT 0 0 0   IMMGRAN 1 1 0   SEGSABS 14.6* 9.9* 8.4*   LYMPHSABS 1.2 0.9 0.9   EOSABS 0.2 0.4 0.4   MONOSABS 1.2 1.5* 1.3   BASOSABS 0.0 0.0 0.0 ABSIMMGRAN 0.2 0.1 0.0        LFT Recent Labs     10/04/22  1411 10/05/22  0519 10/06/22  0340   BILITOT 0.9 0.5 0.4   ALKPHOS 91 84 88   AST 6* 12* 22   ALT 21 17 14   PROT 6.7 5.7* 6.3   LABALBU 3.1* 3.0* 2.7*   GLOB 3.6* 2.7 3.6*        Cardiac  Lab Results   Component Value Date/Time    TROPHS 16.4 04/21/2022 08:11 PM    TROPHS 13.6 04/21/2022 05:33 PM      Coags Lab Results   Component Value Date/Time    APTT >200.0 11/19/2021 04:29 PM      A1c No results found for: LABA1C, EAG   Lipids No results found for: CHOL, LDLCALC, LABVLDL, HDL, CHOLHDLRATIO, TRIG   Thyroid  Lab Results   Component Value Date/Time    TSHELE 0.77 10/05/2022 05:19 AM        Most Recent UA Lab Results   Component Value Date/Time    COLORU DARK YELLOW 10/05/2022 02:10 AM    APPEARANCE CLEAR 10/05/2022 02:10 AM    SPECGRAV 1.015 10/05/2022 02:10 AM    LABPH 5.5 10/05/2022 02:10 AM    PROTEINU 30 10/05/2022 02:10 AM    GLUCOSEU Negative 10/05/2022 02:10 AM    KETUA TRACE 10/05/2022 02:10 AM    BILIRUBINUR Negative 10/05/2022 02:10 AM    BILIRUBINUR Negative 04/21/2022 08:40 PM    BLOODU Negative 10/05/2022 02:10 AM    UROBILINOGEN 1.0 10/05/2022 02:10 AM    NITRU Negative 10/05/2022 02:10 AM    LEUKOCYTESUR Negative 10/05/2022 02:10 AM    WBCUA 0-3 10/05/2022 02:10 AM    RBCUA 0 10/05/2022 02:10 AM    EPITHUA 0 10/05/2022 02:10 AM    BACTERIA 1+ 10/05/2022 02:10 AM    LABCAST HYALINE 10/05/2022 02:10 AM    MUCUS 1+ 10/05/2022 02:10 AM        Recent Labs     10/05/22  0210 10/04/22  1654   CULTURE >100,000 COLONIES/mL MIXED SKIN IRINA ISOLATED  THREE OR MORE TYPES OF ORGANISMS ARE PRESENT. THIS IS INDICATIVE OF CONTAMINATION DUE TO IMPROPER COLLECTION TECHNIQUE. PLEASE REPEAT COLLECTION UNLESS PATIENT HAS STARTED ANTIBIOTIC TREATMENT. NO GROWTH 3 DAYS         All Labs from Last 24 Hrs:  No results found for this or any previous visit (from the past 24 hour(s)).       No Known Allergies  Immunization History   Administered Date(s) Administered    COVID-19, PFIZER PURPLE top, DILUTE for use, (age 15 y+), 30mcg/0.3mL 02/23/2021    PPD Test 11/19/2021, 04/21/2022       Recent Vital Data:  Patient Vitals for the past 24 hrs:   Temp Pulse Resp BP SpO2   10/07/22 1130 97.9 °F (36.6 °C) 92 -- (!) 178/92 96 %   10/07/22 1124 97.7 °F (36.5 °C) 72 19 (!) 172/92 95 %   10/07/22 0802 -- -- -- -- 99 %   10/07/22 0801 98.1 °F (36.7 °C) 88 19 (!) 167/77 (!) 86 %   10/07/22 0415 -- -- -- (!) 146/86 --   10/07/22 0353 97.7 °F (36.5 °C) 78 18 (!) 165/88 94 %   10/07/22 0000 -- 80 -- (!) 148/80 --   10/06/22 2325 99.5 °F (37.5 °C) 76 18 (!) 186/77 93 %         Oxygen Therapy  SpO2: 96 %  Pulse via Oximetry: 97 beats per minute  O2 Device: Nasal cannula  Skin Assessment: Clean, dry, & intact  O2 Flow Rate (L/min): 2 L/min    Estimated body mass index is 25.47 kg/m² as calculated from the following:    Height as of an earlier encounter on 10/4/22: 5' 10\" (1.778 m). Weight as of this encounter: 177 lb 8 oz (80.5 kg). Intake/Output Summary (Last 24 hours) at 10/7/2022 1252  Last data filed at 10/7/2022 0906  Gross per 24 hour   Intake 358 ml   Output 625 ml   Net -267 ml           Physical Exam:    General:    Well nourished. Moderate distress, disoriented to place and time but can have a conversation, anxious  CV:   RRR. No m/r/g. No JVD  Lungs:   CTAB. No wheezing, rhonchi, or rales. Respirations even, unlabored  Abdomen:   Soft, nontender, nondistended. Extremities: Warm and dry. No cyanosis or clubbing. No edema. Skin:     No rashes. Normal coloration  Neuro:  grossly intact. Psych:  Normal mood and affect.     Signed:  Unique Baker MD

## 2022-10-08 PROCEDURE — 2700000000 HC OXYGEN THERAPY PER DAY

## 2022-10-08 PROCEDURE — 6370000000 HC RX 637 (ALT 250 FOR IP): Performed by: HOSPITALIST

## 2022-10-08 PROCEDURE — 6370000000 HC RX 637 (ALT 250 FOR IP): Performed by: FAMILY MEDICINE

## 2022-10-08 PROCEDURE — 6370000000 HC RX 637 (ALT 250 FOR IP): Performed by: INTERNAL MEDICINE

## 2022-10-08 PROCEDURE — 1100000000 HC RM PRIVATE

## 2022-10-08 PROCEDURE — 2580000003 HC RX 258: Performed by: INTERNAL MEDICINE

## 2022-10-08 PROCEDURE — 6360000002 HC RX W HCPCS: Performed by: INTERNAL MEDICINE

## 2022-10-08 RX ADMIN — ATORVASTATIN CALCIUM 40 MG: 40 TABLET, FILM COATED ORAL at 20:45

## 2022-10-08 RX ADMIN — AMLODIPINE BESYLATE 10 MG: 10 TABLET ORAL at 09:06

## 2022-10-08 RX ADMIN — SODIUM CHLORIDE, PRESERVATIVE FREE 10 ML: 5 INJECTION INTRAVENOUS at 09:06

## 2022-10-08 RX ADMIN — ASPIRIN 81 MG: 81 TABLET ORAL at 09:06

## 2022-10-08 RX ADMIN — ENOXAPARIN SODIUM 40 MG: 100 INJECTION SUBCUTANEOUS at 20:45

## 2022-10-08 RX ADMIN — LOSARTAN POTASSIUM 50 MG: 50 TABLET, FILM COATED ORAL at 09:06

## 2022-10-08 RX ADMIN — SODIUM CHLORIDE, PRESERVATIVE FREE 5 ML: 5 INJECTION INTRAVENOUS at 20:45

## 2022-10-08 RX ADMIN — Medication 200 MG: at 09:06

## 2022-10-08 ASSESSMENT — PAIN SCALES - GENERAL: PAINLEVEL_OUTOF10: 0

## 2022-10-08 NOTE — PROGRESS NOTES
Patient resting in bed watching TV at this time. Patient has no complaints at this time. Patient denies any pain and appears comfortable. Call light within reach and patient instructed to call if assistance is needed.   Report to be given to oncoming RN 7p-7a

## 2022-10-08 NOTE — PROGRESS NOTES
Advance Care Planning     Advance Care Planning Inpatient Note  Spiritual Care Department    Today's Date: 10/8/2022  Unit: [facility] 8 MED SURG    Received request from patient. Upon review of chart and communication with care team, Spiritual Care will defer advance care planning with patient at this time. . Patient was/were present in the room during visit. Goals of ACP Conversation:  Discuss advance care planning documents    Health Care Decision Makers:     No healthcare decision makers have been documented.   Click here to complete [address] including selection of the Healthcare Decision Maker Relationship (ie \"Primary\")  Summary:  No Decision Maker named by patient at this time    Brittany [address] (Patient Wishes):  Healthcare Power of /Advance Directive Appointment of [address]     Assessment:      Interventions:  Encouraged ongoing ACP conversation with future decision makers and loved ones    Care Preferences Communicated:   No    Outcomes/Plan:  ACP Discussion: Postponed    Electronically signed by Christa Davila on 10/8/2022 at 10:27 AM

## 2022-10-08 NOTE — PROGRESS NOTES
Pt resting in bed with no complaints at this time. Pt is on 2L NC with even and unlabored respirations. Pt is alert and oriented times 4. No further needs expressed at this time.

## 2022-10-08 NOTE — PROGRESS NOTES
Patient resting in bed watching TV. Patient has not complaints at this time. Patient denies any pain and appears comfortable at this time. Call light within reach and patient instructed to call if assistance is needed. Will continue to monitor.

## 2022-10-08 NOTE — PROGRESS NOTES
Patient resting in bed; agitation noted. A&O to person; refuses answering other orientation questions. Respirations even and unlabored on 2L HF NC. Patient denies pain and reports no further needs at this time. No signs of distress noted. Safety measures are in place. Will continue to monitor.

## 2022-10-08 NOTE — PROGRESS NOTES
Hospitalist Progress Note     Admit Date:  10/4/2022  2:09 PM   DC Note date: 10/8/2022  Name:  Jerica Mitchell   Age:  78 y.o. Sex:  male  :  1945   MRN:  [id number]   Room:  [facility]  PCP:  Unknown Unknown    Hospital Course:  78 y.o. male with medical history of copd, tobacco abuse, b12 deficinecy and HTN  who presented from [facility] via EMS with cc dizziness. While sitting at dining room table, he c/o dizziness and staff reports he was shaking, seizure like activity. No known h/o seizures. Per EMS, BP 90/60 on arrival. He c/o back pain. He was uncooperative, angry and yelling at staff in ED. Labs  INN03.3N Procal 0.24  Poor historian. Disheveled. Says he has not had any food and fluids today. Remembers getting dizzy today but doesn't remember what happened afterwards. Has happened before but does not elaborate. Denies fever or chills. Has not urinated today d/t no PO intake. Says he pees if he drinks fluids and doesn't if he doesn't drink fluids. says he use to pee all the time. Per record review, has had several admissions with non specific delirium/encephalopathy. In 2019 had MRI showing infarct L occipital lobe, R BG lesion, prominent supratentorial white matter dz, non specific but compatible with chronic ischemic demyelination. Evidence of micro hemorrhage in the parietal and occipital lobes, non specific and my be secondary to chronic hypertensive encephalopathy, amyloid angiopathy, or tiny cavernous malformations. Left AMA during this admission. Does not appear that he saw neurology. S/p Rocephin in ED. Today, no ac events, makes good urine, paranoid at times, disoriented to place and time, wants to leave    Assessment & Plan:      Near syncope - admit remote tele- in NSR. Orthostatic+. Doubt seizure. States he stopped alcohol for 20 y. Refused EEG. Did not allow MRI brain despite ativan given. H/o CVA // dizziness - check MRI brain and MRA head/neck.  Aspirin, high intensity statin. PT/OT/SLP. KRISTEN, prerenal, resolved     Acute encephalopathy - baseline mentation unclear. admin IM b12 given h/o b12 def. Start PO folic acid and thiamine daily. Check tsh. Mri as above. Rule out seizures. Monitor for urinary retention. Hydrate. Check b12, tsh, , ammonia. HTN, not well controlled, meds advanced     Rx:  Suportive Rx  Monitor orthostatics  Added Midodrine    Disposition: awaiting rehab per PT, [facility]  Pt not competent to make a decision to leave  Case d/w pt, RN, CM      Diagnostic Imaging/Tests:   CT HEAD WO CONTRAST  Result Date: 10/4/2022  1. No acute intracranial process evident by noncontrast CT study of the head. Only chronic appearing changes are seen as described above. This report was made using voice transcription. Despite my best efforts to avoid any, transcription errors may persist. If there is any question about the accuracy of the report or need for clarification, then please call [phone], or text me through [email or web address] for clarification or correction. XR CHEST PORTABLE  Result Date: 10/4/2022  The lungs are clear. The heart is normal in size. No pneumothorax. No pleural effusions.         Labs: Results:       BMP, Mg, Phos Recent Labs     10/06/22  0340      K 3.7   *   CO2 21   ANIONGAP 8   BUN 24*   CREATININE 1.30   LABGLOM 57*   CALCIUM 8.9   GLUCOSE 94   MG 2.3        CBC Recent Labs     10/06/22  0340   WBC 11.0   RBC 3.89*   HGB 11.8*   HCT 35.8*   MCV 92.0   MCH 30.3   MCHC 33.0   RDW 13.9      MPV 10.4   NRBC 0.00   SEGS 77   LYMPHOPCT 8*   EOSRELPCT 4   MONOPCT 11   BASOPCT 0   IMMGRAN 0   SEGSABS 8.4*   LYMPHSABS 0.9   EOSABS 0.4   MONOSABS 1.3   BASOSABS 0.0   ABSIMMGRAN 0.0        LFT Recent Labs     10/06/22  0340   BILITOT 0.4   ALKPHOS 88   AST 22   ALT 14   PROT 6.3   LABALBU 2.7*   GLOB 3.6*        Cardiac  Lab Results   Component Value Date/Time    TROPHS 16.4 04/21/2022 08:11 PM    TROPHS 13.6 04/21/2022 05:33 PM      Coags Lab Results   Component Value Date/Time    APTT >200.0 11/19/2021 04:29 PM      A1c No results found for: LABA1C, EAG   Lipids No results found for: CHOL, LDLCALC, LABVLDL, HDL, CHOLHDLRATIO, TRIG   Thyroid  Lab Results   Component Value Date/Time    TSHELE 0.77 10/05/2022 05:19 AM        Most Recent UA Lab Results   Component Value Date/Time    COLORU DARK YELLOW 10/05/2022 02:10 AM    APPEARANCE CLEAR 10/05/2022 02:10 AM    SPECGRAV 1.015 10/05/2022 02:10 AM    LABPH 5.5 10/05/2022 02:10 AM    PROTEINU 30 10/05/2022 02:10 AM    GLUCOSEU Negative 10/05/2022 02:10 AM    KETUA TRACE 10/05/2022 02:10 AM    BILIRUBINUR Negative 10/05/2022 02:10 AM    BILIRUBINUR Negative 04/21/2022 08:40 PM    BLOODU Negative 10/05/2022 02:10 AM    UROBILINOGEN 1.0 10/05/2022 02:10 AM    NITRU Negative 10/05/2022 02:10 AM    LEUKOCYTESUR Negative 10/05/2022 02:10 AM    WBCUA 0-3 10/05/2022 02:10 AM    RBCUA 0 10/05/2022 02:10 AM    EPITHUA 0 10/05/2022 02:10 AM    BACTERIA 1+ 10/05/2022 02:10 AM    LABCAST HYALINE 10/05/2022 02:10 AM    MUCUS 1+ 10/05/2022 02:10 AM        Recent Labs     10/05/22  0210 10/04/22  1654   CULTURE >100,000 COLONIES/mL MIXED SKIN IRINA ISOLATED  THREE OR MORE TYPES OF ORGANISMS ARE PRESENT. THIS IS INDICATIVE OF CONTAMINATION DUE TO IMPROPER COLLECTION TECHNIQUE. PLEASE REPEAT COLLECTION UNLESS PATIENT HAS STARTED ANTIBIOTIC TREATMENT. NO GROWTH 4 DAYS         All Labs from Last 24 Hrs:  No results found for this or any previous visit (from the past 24 hour(s)).       No Known Allergies  Immunization History   Administered Date(s) Administered    COVID-19, PFIZER PURPLE top, DILUTE for use, (age 15 y+), 30mcg/0.3mL 02/23/2021    PPD Test 11/19/2021, 04/21/2022       Recent Vital Data:  Patient Vitals for the past 24 hrs:   Temp Pulse Resp BP SpO2   10/08/22 1045 98.8 °F (37.1 °C) 76 19 (!) 149/68 96 %   10/08/22 0801 98.6 °F (37 °C) 69 19 (!) 171/64 97 %   10/08/22 0459 --

## 2022-10-08 NOTE — PROGRESS NOTES
Patient sleeping quietly in bed. Respirations even and unlabored on 2L HF NC. No signs of distress noted. Safety measures are in place. Will continue to monitor and give report to oncoming RN.

## 2022-10-09 LAB
BACTERIA SPEC CULT: NORMAL
SERVICE CMNT-IMP: NORMAL

## 2022-10-09 PROCEDURE — 6370000000 HC RX 637 (ALT 250 FOR IP): Performed by: INTERNAL MEDICINE

## 2022-10-09 PROCEDURE — 1100000000 HC RM PRIVATE

## 2022-10-09 PROCEDURE — 6370000000 HC RX 637 (ALT 250 FOR IP): Performed by: HOSPITALIST

## 2022-10-09 PROCEDURE — 6360000002 HC RX W HCPCS: Performed by: INTERNAL MEDICINE

## 2022-10-09 PROCEDURE — 6370000000 HC RX 637 (ALT 250 FOR IP): Performed by: FAMILY MEDICINE

## 2022-10-09 PROCEDURE — 2580000003 HC RX 258: Performed by: INTERNAL MEDICINE

## 2022-10-09 RX ADMIN — ACETAMINOPHEN 650 MG: 325 TABLET ORAL at 17:51

## 2022-10-09 RX ADMIN — ASPIRIN 81 MG: 81 TABLET ORAL at 09:21

## 2022-10-09 RX ADMIN — ATORVASTATIN CALCIUM 40 MG: 40 TABLET, FILM COATED ORAL at 21:14

## 2022-10-09 RX ADMIN — SODIUM CHLORIDE, PRESERVATIVE FREE 10 ML: 5 INJECTION INTRAVENOUS at 09:31

## 2022-10-09 RX ADMIN — ACETAMINOPHEN 650 MG: 325 TABLET ORAL at 09:26

## 2022-10-09 RX ADMIN — Medication 200 MG: at 09:21

## 2022-10-09 RX ADMIN — ENOXAPARIN SODIUM 40 MG: 100 INJECTION SUBCUTANEOUS at 21:14

## 2022-10-09 RX ADMIN — SODIUM CHLORIDE, PRESERVATIVE FREE 10 ML: 5 INJECTION INTRAVENOUS at 21:14

## 2022-10-09 RX ADMIN — AMLODIPINE BESYLATE 10 MG: 10 TABLET ORAL at 09:21

## 2022-10-09 RX ADMIN — LOSARTAN POTASSIUM 50 MG: 50 TABLET, FILM COATED ORAL at 09:21

## 2022-10-09 ASSESSMENT — PAIN SCALES - GENERAL
PAINLEVEL_OUTOF10: 8
PAINLEVEL_OUTOF10: 7

## 2022-10-09 ASSESSMENT — PAIN DESCRIPTION - DESCRIPTORS
DESCRIPTORS: THROBBING
DESCRIPTORS: ACHING

## 2022-10-09 ASSESSMENT — PAIN DESCRIPTION - LOCATION
LOCATION: LEG
LOCATION: HAND

## 2022-10-09 ASSESSMENT — PAIN DESCRIPTION - ORIENTATION
ORIENTATION: RIGHT
ORIENTATION: RIGHT;LEFT

## 2022-10-09 NOTE — PROGRESS NOTES
Hospitalist Progress Note     Admit Date:  10/4/2022  2:09 PM   DC Note date: 10/9/2022  Name:  Cady Chong   Age:  78 y.o. Sex:  male  :  1945   MRN:  [id number]   Room:  [facility]  PCP:  Unknown Unknown    Hospital Course:  78 y.o. male with medical history of copd, tobacco abuse, b12 deficinecy and HTN  who presented from [facility] via EMS with cc dizziness. While sitting at dining room table, he c/o dizziness and staff reports he was shaking, seizure like activity. No known h/o seizures. Per EMS, BP 90/60 on arrival. He c/o back pain. He was uncooperative, angry and yelling at staff in ED. Labs  [id number] Procal 0.24  Poor historian. Disheveled. Says he has not had any food and fluids today. Remembers getting dizzy today but doesn't remember what happened afterwards. Has happened before but does not elaborate. Denies fever or chills. Has not urinated today d/t no PO intake. Says he pees if he drinks fluids and doesn't if he doesn't drink fluids. says he use to pee all the time. Per record review, has had several admissions with non specific delirium/encephalopathy. In 2019 had MRI showing infarct L occipital lobe, R BG lesion, prominent supratentorial white matter dz, non specific but compatible with chronic ischemic demyelination. Evidence of micro hemorrhage in the parietal and occipital lobes, non specific and my be secondary to chronic hypertensive encephalopathy, amyloid angiopathy, or tiny cavernous malformations. Left AMA during this admission. Does not appear that he saw neurology. S/p Rocephin in ED. Today, no ac events, calm, makes good urine, paranoid at times, disoriented to place and time    Assessment & Plan:      Near syncope - admit remote tele- in NSR. Orthostatic+. Doubt seizure. States he stopped alcohol for 20 y. Refused EEG. Did not allow MRI brain despite ativan given. H/o CVA // dizziness - check MRI brain and MRA head/neck.  Aspirin, high intensity statin. PT/OT/SLP. KRISTEN, prerenal, resolved     Acute encephalopathy - baseline mentation unclear. admin IM b12 given h/o b12 def. Start PO folic acid and thiamine daily. Check tsh. Mri as above. Rule out seizures. Monitor for urinary retention. Hydrate. Check b12, tsh, , ammonia. HTN, not well controlled, meds advanced     Rx:  Suportive Rx  Monitor orthostatics  Added Midodrine    Disposition: awaiting rehab per PT, [facility]  Pt not competent to make a decision to leave  Case d/w pt, RN, CM      Diagnostic Imaging/Tests:   CT HEAD WO CONTRAST  Result Date: 10/4/2022  1. No acute intracranial process evident by noncontrast CT study of the head. Only chronic appearing changes are seen as described above. This report was made using voice transcription. Despite my best efforts to avoid any, transcription errors may persist. If there is any question about the accuracy of the report or need for clarification, then please call [phone], or text me through [email or web address] for clarification or correction. XR CHEST PORTABLE  Result Date: 10/4/2022  The lungs are clear. The heart is normal in size. No pneumothorax. No pleural effusions. Labs: Results:       BMP, Mg, Phos No results for input(s): NA, K, CL, CO2, ANIONGAP, BUN, CREATININE, LABGLOM, GFRAA, CALCIUM, GLUCOSE, MG, PHOS in the last 72 hours. CBC No results for input(s): WBC, RBC, HGB, HCT, MCV, MCH, MCHC, RDW, PLT, MPV, NRBC, SEGS, LYMPHOPCT, EOSRELPCT, MONOPCT, BASOPCT, IMMGRAN, SEGSABS, LYMPHSABS, EOSABS, MONOSABS, BASOSABS, ABSIMMGRAN in the last 72 hours. LFT No results for input(s): BILITOT, BILIDIR, ALKPHOS, AST, ALT, PROT, LABALBU, GLOB in the last 72 hours.      Cardiac  Lab Results   Component Value Date/Time    TROPHS 16.4 04/21/2022 08:11 PM    TROPHS 13.6 04/21/2022 05:33 PM      Coags Lab Results   Component Value Date/Time    APTT >200.0 11/19/2021 04:29 PM      A1c No results found for: Keesha Trejo Lipids No results found for: CHOL, LDLCALC, LABVLDL, HDL, CHOLHDLRATIO, TRIG   Thyroid  Lab Results   Component Value Date/Time    TSHELE 0.77 10/05/2022 05:19 AM        Most Recent UA Lab Results   Component Value Date/Time    COLORU DARK YELLOW 10/05/2022 02:10 AM    APPEARANCE CLEAR 10/05/2022 02:10 AM    SPECGRAV 1.015 10/05/2022 02:10 AM    LABPH 5.5 10/05/2022 02:10 AM    PROTEINU 30 10/05/2022 02:10 AM    GLUCOSEU Negative 10/05/2022 02:10 AM    KETUA TRACE 10/05/2022 02:10 AM    BILIRUBINUR Negative 10/05/2022 02:10 AM    BILIRUBINUR Negative 04/21/2022 08:40 PM    BLOODU Negative 10/05/2022 02:10 AM    UROBILINOGEN 1.0 10/05/2022 02:10 AM    NITRU Negative 10/05/2022 02:10 AM    LEUKOCYTESUR Negative 10/05/2022 02:10 AM    WBCUA 0-3 10/05/2022 02:10 AM    RBCUA 0 10/05/2022 02:10 AM    EPITHUA 0 10/05/2022 02:10 AM    BACTERIA 1+ 10/05/2022 02:10 AM    LABCAST HYALINE 10/05/2022 02:10 AM    MUCUS 1+ 10/05/2022 02:10 AM        Recent Labs     10/05/22  0210 10/04/22  1654   CULTURE >100,000 COLONIES/mL MIXED SKIN IRINA ISOLATED  THREE OR MORE TYPES OF ORGANISMS ARE PRESENT. THIS IS INDICATIVE OF CONTAMINATION DUE TO IMPROPER COLLECTION TECHNIQUE. PLEASE REPEAT COLLECTION UNLESS PATIENT HAS STARTED ANTIBIOTIC TREATMENT. NO GROWTH 5 DAYS         All Labs from Last 24 Hrs:  No results found for this or any previous visit (from the past 24 hour(s)).       No Known Allergies  Immunization History   Administered Date(s) Administered    COVID-19, PFIZER PURPLE top, DILUTE for use, (age 15 y+), 30mcg/0.3mL 02/23/2021    PPD Test 11/19/2021, 04/21/2022       Recent Vital Data:  Patient Vitals for the past 24 hrs:   Temp Pulse Resp BP SpO2   10/09/22 0749 97.3 °F (36.3 °C) 60 16 133/76 96 %   10/09/22 0247 98.6 °F (37 °C) 72 16 (!) 174/68 93 %   10/08/22 2239 99.5 °F (37.5 °C) 72 16 (!) 166/74 94 %   10/08/22 1913 97.9 °F (36.6 °C) 69 16 (!) 156/83 96 %   10/08/22 1525 98.6 °F (37 °C) 78 19 (!) 157/62 96 % 10/08/22 1045 98.8 °F (37.1 °C) 76 19 (!) 149/68 96 %         Oxygen Therapy  SpO2: 96 %  Pulse via Oximetry: 97 beats per minute  O2 Device: Nasal cannula  Skin Assessment: Clean, dry, & intact  O2 Flow Rate (L/min): 2 L/min    Estimated body mass index is 24.13 kg/m² as calculated from the following:    Height as of 10/6/22: 5' 10\" (1.778 m). Weight as of this encounter: 168 lb 3.2 oz (76.3 kg). Intake/Output Summary (Last 24 hours) at 10/9/2022 1027  Last data filed at 10/8/2022 1913  Gross per 24 hour   Intake 240 ml   Output 300 ml   Net -60 ml           Physical Exam:    General:    Well nourished. Moderate distress, disoriented to place and time but can have a conversation, anxious  CV:   RRR. No m/r/g. No JVD  Lungs:   CTAB. No wheezing, rhonchi, or rales. Respirations even, unlabored  Abdomen:   Soft, nontender, nondistended. Extremities: Warm and dry. No cyanosis or clubbing. No edema. Skin:     No rashes. Normal coloration  Neuro:  grossly intact. Psych:  Normal mood and affect.     Signed:  Domenic Gunn MD

## 2022-10-09 NOTE — PROGRESS NOTES
Pt resting comfortably in bed. Respirations even and unlabored on 2L NC, no signs or symptoms of distress at this time. Pt is alert and oriented x4. No reports of pain at this time. Call light is in reach, safety measures in place. Will continue to monitor.

## 2022-10-09 NOTE — PROGRESS NOTES
Pt is resting in bed with no complaints of pain or discomfort at this time. Pt is on 2l NC. No further needs expressed at this time.

## 2022-10-09 NOTE — PROGRESS NOTES
Pt resting in bed with eyes closed at this time. Alert and oriented x 4. Respirations even and unlabored on room air. Bed is locked and low with call light within reach. No signs of distress at this time. Report received from [facility].

## 2022-10-10 PROCEDURE — 92523 SPEECH SOUND LANG COMPREHEN: CPT

## 2022-10-10 PROCEDURE — 6370000000 HC RX 637 (ALT 250 FOR IP): Performed by: HOSPITALIST

## 2022-10-10 PROCEDURE — 6370000000 HC RX 637 (ALT 250 FOR IP): Performed by: INTERNAL MEDICINE

## 2022-10-10 PROCEDURE — 6360000002 HC RX W HCPCS: Performed by: INTERNAL MEDICINE

## 2022-10-10 PROCEDURE — 6370000000 HC RX 637 (ALT 250 FOR IP): Performed by: FAMILY MEDICINE

## 2022-10-10 PROCEDURE — 1100000000 HC RM PRIVATE

## 2022-10-10 PROCEDURE — 2580000003 HC RX 258: Performed by: INTERNAL MEDICINE

## 2022-10-10 RX ADMIN — Medication 200 MG: at 08:32

## 2022-10-10 RX ADMIN — ASPIRIN 81 MG: 81 TABLET ORAL at 08:33

## 2022-10-10 RX ADMIN — AMLODIPINE BESYLATE 10 MG: 10 TABLET ORAL at 08:33

## 2022-10-10 RX ADMIN — ATORVASTATIN CALCIUM 40 MG: 40 TABLET, FILM COATED ORAL at 21:12

## 2022-10-10 RX ADMIN — SODIUM CHLORIDE, PRESERVATIVE FREE 10 ML: 5 INJECTION INTRAVENOUS at 21:13

## 2022-10-10 RX ADMIN — SODIUM CHLORIDE, PRESERVATIVE FREE 10 ML: 5 INJECTION INTRAVENOUS at 08:37

## 2022-10-10 RX ADMIN — ENOXAPARIN SODIUM 40 MG: 100 INJECTION SUBCUTANEOUS at 21:13

## 2022-10-10 RX ADMIN — LOSARTAN POTASSIUM 50 MG: 50 TABLET, FILM COATED ORAL at 08:33

## 2022-10-10 ASSESSMENT — PAIN SCALES - GENERAL: PAINLEVEL_OUTOF10: 0

## 2022-10-10 NOTE — PROGRESS NOTES
Pt resting comfortably in bed at this time. Respirations are even and unlabored on RA, no signs or symptoms of distress at this time. No complaints of pain at this time. Pt is alert and oriented x4. Call light in reach, safety measures in place. Will continue to monitor and prepare to give report to oncoming shift.

## 2022-10-10 NOTE — CARE COORDINATION
[facility] was [facility] today for [facility]. CM will call the [address] tomorrow and send updated clinicals.

## 2022-10-10 NOTE — PROGRESS NOTES
Pt resting in bed with eyes closed at this time. Pt alert and oriented x 4. Respirations even and unlabored on room air. No major events over night. Bed locked and low, call light within reach. Preparing to give report to oncoming RN.

## 2022-10-10 NOTE — PROGRESS NOTES
Hospitalist Progress Note     Admit Date:  10/4/2022  2:09 PM   DC Note date: 10/10/2022  Name:  Estevan Fernando   Age:  78 y.o. Sex:  male  :  1945   MRN:  [id number]   Room:  [facility]  PCP:  Unknown Unknown    Hospital Course:  78 y.o. male with medical history of copd, tobacco abuse, b12 deficinecy and HTN  who presented from [facility] via EMS with cc dizziness. While sitting at dining room table, he c/o dizziness and staff reports he was shaking, seizure like activity. No known h/o seizures. Per EMS, BP 90/60 on arrival. He c/o back pain. He was uncooperative, angry and yelling at staff in ED. Labs  JPE17.0P Procal 0.24  Poor historian. Disheveled. Says he has not had any food and fluids today. Remembers getting dizzy today but doesn't remember what happened afterwards. Has happened before but does not elaborate. Denies fever or chills. Has not urinated today d/t no PO intake. Says he pees if he drinks fluids and doesn't if he doesn't drink fluids. says he use to pee all the time. Per record review, has had several admissions with non specific delirium/encephalopathy. In 2019 had MRI showing infarct L occipital lobe, R BG lesion, prominent supratentorial white matter dz, non specific but compatible with chronic ischemic demyelination. Evidence of micro hemorrhage in the parietal and occipital lobes, non specific and my be secondary to chronic hypertensive encephalopathy, amyloid angiopathy, or tiny cavernous malformations. Left AMA during this admission. Does not appear that he saw neurology. S/p Rocephin in ED. Today, no ac events, sit at edge of bed, naked    Assessment & Plan:      Near syncope - admit remote tele- in NSR. Orthostatic+. Doubt seizure. States he stopped alcohol for 20 y. Refused EEG. Did not allow MRI brain despite ativan given. H/o CVA // dizziness - check MRI brain and MRA head/neck. Aspirin, high intensity statin. PT/OT/SLP.       KRISTEN, prerenal, resolved     Acute encephalopathy - baseline mentation unclear. admin IM b12 given h/o b12 def. Start PO folic acid and thiamine daily. Check tsh. Mri as above. Rule out seizures. Monitor for urinary retention. Hydrate. Check b12, tsh, , ammonia. HTN, not well controlled, meds advanced     Rx:  Suportive Rx  Monitor orthostatics  Added Midodrine    Disposition: awaiting rehab per PT, [facility]. Awaiting   Pt not competent to make a decision to leave  Case d/w pt, RN, CM      Diagnostic Imaging/Tests:   CT HEAD WO CONTRAST  Result Date: 10/4/2022  1. No acute intracranial process evident by noncontrast CT study of the head. Only chronic appearing changes are seen as described above. This report was made using voice transcription. Despite my best efforts to avoid any, transcription errors may persist. If there is any question about the accuracy of the report or need for clarification, then please call [phone], or text me through [email or web address] for clarification or correction. XR CHEST PORTABLE  Result Date: 10/4/2022  The lungs are clear. The heart is normal in size. No pneumothorax. No pleural effusions. Labs: Results:       BMP, Mg, Phos No results for input(s): NA, K, CL, CO2, ANIONGAP, BUN, CREATININE, LABGLOM, GFRAA, CALCIUM, GLUCOSE, MG, PHOS in the last 72 hours. CBC No results for input(s): WBC, RBC, HGB, HCT, MCV, MCH, MCHC, RDW, PLT, MPV, NRBC, SEGS, LYMPHOPCT, EOSRELPCT, MONOPCT, BASOPCT, IMMGRAN, SEGSABS, LYMPHSABS, EOSABS, MONOSABS, BASOSABS, ABSIMMGRAN in the last 72 hours. LFT No results for input(s): BILITOT, BILIDIR, ALKPHOS, AST, ALT, PROT, LABALBU, GLOB in the last 72 hours.      Cardiac  Lab Results   Component Value Date/Time    TROPHS 16.4 04/21/2022 08:11 PM    TROPHS 13.6 04/21/2022 05:33 PM      Coags Lab Results   Component Value Date/Time    APTT >200.0 11/19/2021 04:29 PM      A1c No results found for: LABA1C, EAG   Lipids No results found for: CHOL, LDLCALC, LABVLDL, HDL, CHOLHDLRATIO, TRIG   Thyroid  Lab Results   Component Value Date/Time    TSHELE 0.77 10/05/2022 05:19 AM        Most Recent UA Lab Results   Component Value Date/Time    COLORU DARK YELLOW 10/05/2022 02:10 AM    APPEARANCE CLEAR 10/05/2022 02:10 AM    SPECGRAV 1.015 10/05/2022 02:10 AM    LABPH 5.5 10/05/2022 02:10 AM    PROTEINU 30 10/05/2022 02:10 AM    GLUCOSEU Negative 10/05/2022 02:10 AM    KETUA TRACE 10/05/2022 02:10 AM    BILIRUBINUR Negative 10/05/2022 02:10 AM    BILIRUBINUR Negative 04/21/2022 08:40 PM    BLOODU Negative 10/05/2022 02:10 AM    UROBILINOGEN 1.0 10/05/2022 02:10 AM    NITRU Negative 10/05/2022 02:10 AM    LEUKOCYTESUR Negative 10/05/2022 02:10 AM    WBCUA 0-3 10/05/2022 02:10 AM    RBCUA 0 10/05/2022 02:10 AM    EPITHUA 0 10/05/2022 02:10 AM    BACTERIA 1+ 10/05/2022 02:10 AM    LABCAST HYALINE 10/05/2022 02:10 AM    MUCUS 1+ 10/05/2022 02:10 AM        Recent Labs     10/05/22  0210 10/04/22  1654   CULTURE >100,000 COLONIES/mL MIXED SKIN IRINA ISOLATED  THREE OR MORE TYPES OF ORGANISMS ARE PRESENT. THIS IS INDICATIVE OF CONTAMINATION DUE TO IMPROPER COLLECTION TECHNIQUE. PLEASE REPEAT COLLECTION UNLESS PATIENT HAS STARTED ANTIBIOTIC TREATMENT. NO GROWTH 5 DAYS         All Labs from Last 24 Hrs:  No results found for this or any previous visit (from the past 24 hour(s)).       No Known Allergies  Immunization History   Administered Date(s) Administered    COVID-19, PFIZER PURPLE top, DILUTE for use, (age 15 y+), 30mcg/0.3mL 02/23/2021    PPD Test 11/19/2021, 04/21/2022       Recent Vital Data:  Patient Vitals for the past 24 hrs:   Temp Pulse Resp BP SpO2   10/10/22 1111 97.9 °F (36.6 °C) (!) 130 18 (!) 149/79 98 %   10/10/22 0743 98.1 °F (36.7 °C) 52 18 (!) 122/59 97 %   10/10/22 0325 98.2 °F (36.8 °C) 58 18 (!) 149/64 96 %   10/09/22 2243 97.7 °F (36.5 °C) 59 18 (!) 157/83 97 %   10/09/22 2105 -- 67 -- -- --   10/09/22 1917 97.5 °F (36.4 °C) 63 18 (!) 152/67 96 % 10/09/22 1644 98.4 °F (36.9 °C) 68 16 (!) 144/86 96 %   10/09/22 1149 97.3 °F (36.3 °C) 65 16 131/69 96 %         Oxygen Therapy  SpO2: 98 %  Pulse via Oximetry: 97 beats per minute  O2 Device: None (Room air)  Skin Assessment: Clean, dry, & intact  O2 Flow Rate (L/min): 2 L/min    Estimated body mass index is 24.13 kg/m² as calculated from the following:    Height as of 10/6/22: 5' 10\" (1.778 m). Weight as of this encounter: 168 lb 3.2 oz (76.3 kg). Intake/Output Summary (Last 24 hours) at 10/10/2022 1135  Last data filed at 10/10/2022 0615  Gross per 24 hour   Intake --   Output 1050 ml   Net -1050 ml           Physical Exam:    General:    Well nourished. Moderate distress, disoriented to place and time but can have a conversation, anxious  CV:   RRR. No m/r/g. No JVD  Lungs:   CTAB. No wheezing, rhonchi, or rales. Respirations even, unlabored  Abdomen:   Soft, nontender, nondistended. Extremities: Warm and dry. No cyanosis or clubbing. No edema. Skin:     No rashes. Normal coloration  Neuro:  grossly intact. Psych:  Normal mood and affect.     Signed:  Lorraine Villa MD

## 2022-10-10 NOTE — PROGRESS NOTES
Pt resting in bed comfortably at this time. Respirations are even and unlabored on RA, no signs or symptoms of distress at this time. Pt is alert and oriented x4. Pt complaining of 5/10 leg pain, will medicate with PRN medication. Call light in reach, safety measures in place. Will continue to monitor.

## 2022-10-10 NOTE — PROGRESS NOTES
SPEECH LANGUAGE PATHOLOGY: COMBINED Dysphagia follow up AND cognitive- communicative evaluation      MRN: [id number]    ADMISSION DATE: 10/4/2022  ADMITTING DIAGNOSIS: has Primary hypertension; Acute metabolic encephalopathy; Bilateral pulmonary embolism ([facility] [address] KRISTEN (acute kidney injury) ([facility] [address] GI bleed; B12 deficiency; Lactic acidosis; Seizure-like activity ([facility] [address] Claudication of left lower extremity ([facility] [address] History of colon cancer; History of colon polyps; Tobacco abuse; History of CVA (cerebrovascular accident); and Pre-syncope on their problem list.      ICD-10: Treatment Diagnosis: R13.19 Other Dysphagia  R41.841 Cognitive-Communication Deficit    RECOMMENDATIONS   Diet Solids Recommendation: Easy to Chew  Liquid Consistency Recommendation: Thin  Recommended Form of Meds: PO     Additional Recommendations:   Recommendations:  (No dysphagia treatment indicated)  Compensatory Swallowing Strategies : Upright as possible for all oral intake; Alternate solids and liquids;Eat/Feed slowly     Patient continues to require skilled intervention:    D/C Recommendations:  (Speech therapy post acute for cognitive- communicative function. No additional speech therapy needs identified for acute phase of care - patient agreeable for ST to sign off.)     ASSESSMENT         Dysphagia Diagnosis: Swallow function appears WFL  Dysphagia Impression : Patient denied difficulty when PO intake when inquired on this date. Patient also deferred PO intake aside from coffee - patient consumed thin liquids without overt signs of aspiration. Patient does report previous difficulty with memory and physician's progress note reports chronic ischemic demylenation, previous ischemia to L occipital and R basal ganglia regions. Baseline unknown but patient reports having friends who assist with scheduling and transportation. Unsure if patient's current level of function is at baseline as patient is a poor historian.  Recommend continued speech therapy services post acute care for cognitive- communicative function though no immediate speech therapy needs identified for acute phase of care, speech therapy will sign off at this time. GENERAL   Subjective: Patient awake, alert when engaged upon entry. Agreeable to speech therapy services when engaged. Behavior/Cognition: Alert; Cooperative;Confused (Patient tangential.)  Communication Observation: Functional  Follows Directions: Simple  Patient Positioning: Upright in bed  Respiratory Status: Room air            Prior Dysphagia History: Denied prior difficulty when questioned. Current Diet : Easy to chew  Current Liquid Diet : Thin  Pain:   Patient does not c/o pain    Pain Location: Leg         Pain Level: 7                                OBJECTIVE   Dysphagia: Patient agreeable to intake of thin liquids only - coffee at bedside. Patient's oral phase of swallow was unremarkable. Pharyngeal phase of swallow was unremarkable as able to be assessed at bedside. No overt signs of aspiration observed with PO intake. Patient reports current diet being tolerated well and denied need for additional speech therapy intervention for dysphagia. Auditory Comprehension  Comprehension: Within Functional Limits       Expression  Primary Mode of Expression: Verbal  Verbal Expression  Verbal Expression: Within functional limits       Orientation  Overall Orientation Status: Impaired  Orientation Level: Oriented to person;Oriented to place  Attention  Attention: Exceptions to [facility]  Sustained Attention: Mild  Memory  Memory: Exceptions to [facility]  Short-term Memory: Moderate               Patient scored a 2/12 on COGNISTAT subtest for memory (delayed recall) indicating severe deficit per scoring criteria. Patient only oriented to self and place - stated year as 2024 and replaying \"I'm not sure\" when questioned of month.  Throughout session patient distractible by internal/external stimulation - commenting about buildings which can be seen outside of window when discussing different topics. Impression: Patient with unknown baseline though work up significant for prior neurological insults. Patient does exhibit moderate to severe cognitive deficits in areas of attention, orientation, and memory. Patient acknowledges difficulty present prior to hospitalization when questioned though unable to determine if/how different patient is from baseline. PLAN    Duration/Frequency: No treatment indicated at this time - recommend ST post acute. Speech Therapy Prognosis  Prognosis: Good  Prognosis Considerations: Previous Level of Function, Participation Level    Dysphagia Outcome and Severity Scale (CICI)  Dysphagia Outcome Severity Scale: Level 6: Within functional limits/Modified independence  Interpretation of Tool: The Dysphagia Outcome and Severity Scale (CICI) is a simple, easy-to-use, 7-point scale developed to systematically rate the functional severity of dysphagia based on objective assessment and make recommendations for diet level, independence level, and type of nutrition. Normal(7), Functional(6), Mild(5), Mild-Moderate(4), Moderate(3), Moderate-Severe(2), Severe(1)    MODIFIED AMADOU SCALE (mRS) SCORE: 2  Interpretation of Tool: The Modified [place] Scale is a scale used to quantify level of disability as it relates to a patient's functional abilities. No Symptoms(0); No significant disability despite symptoms; able to carry out all usual duties and activities(1); Slight disability; unable to carry out all previous activities but able to look after own affairs without assistance(2); Moderate disability; requiring some help but able to walk without assistance(3); Moderately severe disability; unable to walk without assistance and unable to attend to own bodily needs without assistance(4);  Severe disability; bedridden, incontinent, and requiring constant nursing care and attention(5)    Education: Patient, RN  Patient Education: Role of SLP, recommendations  Patient Education Response: Verbalizes understanding    PRECAUTIONS/ALLERGIES: Patient has no known allergies.       Safety Devices in place: Yes  Type of devices: Left in bed, Call light within reach    Therapy Time  SLP Individual Minutes  Time In: 9251  Time Out: [address]  Minutes: [address]  10/10/2022 11:22 AM

## 2022-10-11 PROCEDURE — 97530 THERAPEUTIC ACTIVITIES: CPT

## 2022-10-11 PROCEDURE — 2580000003 HC RX 258: Performed by: INTERNAL MEDICINE

## 2022-10-11 PROCEDURE — 97116 GAIT TRAINING THERAPY: CPT

## 2022-10-11 PROCEDURE — 6370000000 HC RX 637 (ALT 250 FOR IP): Performed by: FAMILY MEDICINE

## 2022-10-11 PROCEDURE — 2500000003 HC RX 250 WO HCPCS: Performed by: HOSPITALIST

## 2022-10-11 PROCEDURE — 1100000000 HC RM PRIVATE

## 2022-10-11 PROCEDURE — 97535 SELF CARE MNGMENT TRAINING: CPT

## 2022-10-11 PROCEDURE — 6370000000 HC RX 637 (ALT 250 FOR IP): Performed by: HOSPITALIST

## 2022-10-11 PROCEDURE — 6370000000 HC RX 637 (ALT 250 FOR IP): Performed by: INTERNAL MEDICINE

## 2022-10-11 RX ADMIN — SODIUM CHLORIDE, PRESERVATIVE FREE 10 ML: 5 INJECTION INTRAVENOUS at 19:17

## 2022-10-11 RX ADMIN — ASPIRIN 81 MG: 81 TABLET ORAL at 09:08

## 2022-10-11 RX ADMIN — TUBERCULIN PURIFIED PROTEIN DERIVATIVE 5 UNITS: 5 INJECTION, SOLUTION INTRADERMAL at 15:32

## 2022-10-11 RX ADMIN — AMLODIPINE BESYLATE 10 MG: 10 TABLET ORAL at 09:08

## 2022-10-11 RX ADMIN — LOSARTAN POTASSIUM 50 MG: 50 TABLET, FILM COATED ORAL at 09:08

## 2022-10-11 RX ADMIN — Medication 200 MG: at 09:08

## 2022-10-11 RX ADMIN — SODIUM CHLORIDE, PRESERVATIVE FREE 10 ML: 5 INJECTION INTRAVENOUS at 09:10

## 2022-10-11 ASSESSMENT — PAIN SCALES - GENERAL: PAINLEVEL_OUTOF10: 0

## 2022-10-11 NOTE — PROGRESS NOTES
ACUTE OCCUPATIONAL THERAPY GOALS:   (Developed with and agreed upon by patient and/or caregiver.)  1. Patient will complete lower body bathing and dressing with MOD I and adaptive equipment as needed. 2. Patient will complete toileting with MOD I.   3. Patient will complete grooming ADL with MOD I.  4. Patient will tolerate 25 minutes of OT treatment with 1-2 rest breaks to increase activity tolerance for ADLs. 5. Patient will complete functional transfers with MOD I and adaptive equipment as needed. 6. Patient will tolerate 10 minutes BUE exercises to increase strength for safe, functional transfers    OCCUPATIONAL THERAPY: Daily Note PM   OT Visit Days: 3   Time  OT Charge Capture  Rehab Caseload Tracker  OT Orders    Valeri Handley is a 78 y.o. male   PRIMARY DIAGNOSIS: Pre-syncope  Seizure-like activity ([facility] [address] [R56.9]  Transient hypotension [F99.8]  Acute metabolic encephalopathy [U37.83]       Inpatient: Payor: Verónica Dear / Plan: Verónica Dear / Product Type: *No Product type* /     ASSESSMENT:     REHAB RECOMMENDATIONS: CURRENT LEVEL OF FUNCTION:  (Most Recently Demonstrated)   Recommendation to date pending progress:  Setting:  Short-term Rehab    Equipment:    To Be Determined Bathing:  Not Tested  Dressing:  Minimal Assist  Feeding/Grooming:  Supervision/Setup  Toileting: Moderate Assist  Functional Mobility:  Contact Guard Assist     ASSESSMENT:  Mr. Angelina Petty was supine in bed upon arrival. Pt completed bed mobility with SBA. Pt completed functional mobility with CGA using cane. Pt completed bowel hygiene on bedside toilet with mod A. Pt completed grooming with supervision. Pt is progressing towards goals. Continue POC. SUBJECTIVE:     Mr. Angelina Petty states, \"Don't rush me. \"    Social/Functional Lives With: Alone  Type of Home: Assisted living  Home Layout: One level  Bathroom Shower/Tub: Walk-in shower  Bathroom Equipment: Built-in shower seat  Home Equipment: Irvin Brink Help From: Other (comment)  ADL Assistance: Independent  Ambulation Assistance: Needs assistance  Transfer Assistance: Independent  Active : No    OBJECTIVE:     Mariana Mendoza / Laymon Lu / AIRWAY: IV    RESTRICTIONS/PRECAUTIONS:  Restrictions/Precautions  Restrictions/Precautions: Fall Risk        PAIN: VITALS / O2:   Pre Treatment: 0           Post Treatment: 0 Vitals          Oxygen        MOBILITY: I Mod I S SBA CGA Min Mod Max Total  NT x2 Comments:   Bed Mobility    Rolling [] [] [] [] [] [] [] [] [] [] []    Supine to Sit [] [] [x] [] [] [] [] [] [] [] []    Scooting [] [] [] [] [] [] [] [] [] [] []    Sit to Supine [] [] [] [] [] [] [] [] [] [] []    Transfers    Sit to Stand [] [] [] [] [x] [] [] [] [] [] []    Bed to Chair [] [] [] [] [] [] [] [] [] [] []    Stand to Sit [] [] [] [] [x] [] [] [] [] [] []    Tub/Shower [] [] [] [] [] [] [] [] [] [] []     Toilet [] [] [] [] [] [] [] [] [] [] []      [] [] [] [] [] [] [] [] [] [] []    I=Independent, Mod I=Modified Independent, S=Supervision/Setup, SBA=Standby Assistance, CGA=Contact Guard Assistance, Min=Minimal Assistance, Mod=Moderate Assistance, Max=Maximal Assistance, Total=Total Assistance, NT=Not Tested    ACTIVITIES OF DAILY LIVING: I Mod I S SBA CGA Min Mod Max Total NT Comments   BASIC ADLs:              Upper Body   Bathing [] [] [] [] [] [] [] [] [] []    Lower Body Bathing [] [] [] [] [] [] [] [] [] []    Toileting [] [] [] [] [] [] [x] [] [] [] Bowel hyg.     Upper Body Dressing [] [] [] [] [] [] [] [] [] []    Lower Body Dressing [] [] [] [] [] [] [] [] [] []    Feeding [] [] [] [] [] [] [] [] [] []    Grooming [] [] [x] [] [] [] [] [] [] []    Personal Device Care [] [] [] [] [] [] [] [] [] []    Functional Mobility [] [] [] [] [x] [] [] [] [] []    I=Independent, Mod I=Modified Independent, S=Supervision/Setup, SBA=Standby Assistance, CGA=Contact Guard Assistance, Min=Minimal Assistance, Mod=Moderate Assistance, Max=Maximal Assistance, Total=Total Assistance, NT=Not Tested    BALANCE: Good Fair+ Fair Fair- Poor NT Comments   Sitting Static [x] [] [] [] [] []    Sitting Dynamic [] [] [] [] [] []              Standing Static [] [] [] [] [x] []    Standing Dynamic [] [] [] [] [] []        PLAN:     FREQUENCY/DURATION   OT Plan of Care: 3 times/week for duration of hospital stay or until stated goals are met, whichever comes first.    TREATMENT:     TREATMENT:   Self Care (38 minutes): Patient participated in toileting and grooming ADLs in unsupported sitting and standing with moderate verbal and manual cueing to increase independence and decrease assistance required. Patient also participated in energy conservation training to increase independence and decrease assistance required.      TREATMENT GRID:  N/A    AFTER TREATMENT PRECAUTIONS: Alarm Activated, Bed, Bed/Chair Locked, Call light within reach, Needs within reach, RN notified, and Side rails x3    INTERDISCIPLINARY COLLABORATION:  RN/ PCT, PT/ PTA, and OT/ PEREZ    EDUCATION:       TOTAL TREATMENT DURATION AND TIME:  Time In: 1322  Time Out: 1400  Minutes: [address], IMEL[place]

## 2022-10-11 NOTE — PROGRESS NOTES
1400  ACUTE PHYSICAL THERAPY GOALS:   (Developed with and agreed upon by patient and/or caregiver.)   Mr. Sonali Ahn will perform supine to sit and sit to supine independently in 7 days. Mr. Sonali Ahn will perform sit to stand and bed to chair with least restrictive device independently in 7 days. Mr. Sonali Ahn will perform gait with least restrictive device 200 ft in 7 days    PHYSICAL THERAPY: Daily Note AM   (Link to Caseload Tracking: PT Visit Days : 3  Time In/Out PT Charge Capture  Rehab Caseload Tracker  Orders    Vonda Foley is a 78 y.o. male   PRIMARY DIAGNOSIS: Pre-syncope  Seizure-like activity ([facility] [address] [R56.9]  Transient hypotension [Z72.0]  Acute metabolic encephalopathy [Z48.00]       Inpatient: Payor: Flakita Vargas / Plan: Flakita Vargas / Product Type: *No Product type* /     ASSESSMENT:     REHAB RECOMMENDATIONS:   Recommendation to date pending progress:  Setting:  Short-term Rehab    Equipment:    To Be Determined     ASSESSMENT:  Mr. Sonali Ahn required encouragement for participation,  but mobilized well initially with cuing. He increased gait distances this session, but remains unsteady and impulsive. At times he becomes agitated. The patient then performed multiple STS transfers and dynamic standing balance for ADLs as he was soiled. He attempted to urinate in the sink and required cueing for use of urinal and safety awareness. The patient remains unsafe for  d/c to ILF at this time and would benefit from continued PT/OT. SUBJECTIVE:   Mr. Sonali Ahn states, \"We are at [address] \"     Social/Functional Lives With: Alone  Type of Home: Assisted living  Home Layout: One level  Bathroom Shower/Tub: Walk-in shower  Bathroom Equipment: Built-in shower seat  Home Equipment: Nga Vargas Help From: Other (comment)  ADL Assistance: Independent  Ambulation Assistance: Needs assistance  Transfer Assistance: Independent  Active : No  OBJECTIVE:     PAIN: Leim Magdiel / O2: Isaac Rosas / Alaniz Elena / DRAINS:   Pre Treatment: 0         Post Treatment: 0 Vitals        Oxygen    None    RESTRICTIONS/PRECAUTIONS:        MOBILITY: I Mod I S SBA CGA Min Mod Max Total  NT x2 Comments:   Bed Mobility    Rolling [] [] [] [] [] [] [] [] [] [] []    Supine to Sit [] [] [] [] [x] [] [] [] [] [] []    Scooting [] [] [] [] [x] [] [] [] [] [] []    Sit to Supine [] [] [] [] [x] [] [] [] [] [] []    Transfers    Sit to Stand [] [] [] [] [x] [x] [] [] [] [] []    Bed to Chair [] [] [] [] [x] [x] [] [] [] [] [x]    Stand to Sit [] [] [] [] [x] [] [] [] [] [] []     [] [] [] [] [] [] [] [] [] [] []    I=Independent, Mod I=Modified Independent, S=Supervision, SBA=Standby Assistance, CGA=Contact Guard Assistance,   Min=Minimal Assistance, Mod=Moderate Assistance, Max=Maximal Assistance, Total=Total Assistance, NT=Not Tested    BALANCE: Good Fair+ Fair Fair- Poor NT Comments   Sitting Static [x] [] [] [] [] []    Sitting Dynamic [x] [] [] [] [] []              Standing Static [] [x] [] [] [] []    Standing Dynamic [] [x] [] [] [] []      GAIT: I Mod I S SBA CGA Min Mod Max Total  NT x2 Comments:   Level of Assistance [] [] [] [] [x] [x] [] [] [] [] []    Distance 100 feet    DME Rolling Walker    Gait Quality Decreased step clearance, Decreased step length, Decreased stance, Narrow base of support, Shuffling , and Trunk sway increased    Weightbearing Status      Stairs      I=Independent, Mod I=Modified Independent, S=Supervision, SBA=Standby Assistance, CGA=Contact Guard Assistance,   Min=Minimal Assistance, Mod=Moderate Assistance, Max=Maximal Assistance, Total=Total Assistance, NT=Not Tested    PLAN:   FREQUENCY AND DURATION: 3 times/week for duration of hospital stay or until stated goals are met, whichever comes first.    TREATMENT:   TREATMENT:   Co-Treatment PT/OT necessary due to patient's decreased overall endurance/tolerance levels, as well as need for high level skilled assistance to complete functional transfers/mobility and functional tasks  Therapeutic Activity (38 Minutes): Therapeutic activity included Supine to Sit, Sit to Supine, Scooting, Transfer Training, Ambulation on level ground, Sitting balance , and Standing balance to improve functional Activity tolerance, Balance, Mobility, and Strength.     TREATMENT GRID:  N/A    AFTER TREATMENT PRECAUTIONS: Alarm Activated, Bed, Bed/Chair Locked, Call light within reach, Needs within reach, and RN notified    INTERDISCIPLINARY COLLABORATION:  RN/ PCT, PT/ PTA, and OT/ PEREZ    EDUCATION:      TIME IN/OUT:  Time In: 1322  Time Out: 1400  Minutes: [address], PTA

## 2022-10-11 NOTE — PROGRESS NOTES
Pt resting in bed with eyes closed. Respirations present, even, unlabored. No evidence of pain. Call light within reach. Will continue to monitor.

## 2022-10-11 NOTE — PROGRESS NOTES
Pt resting in bed awake. Alert and oriented times 3 at this time. On RA. No s/sx of distress noted. Denies any pain at this time. Encouraged to call for assistance as needed. Call light within reach. Will continue to monitor.

## 2022-10-11 NOTE — PROGRESS NOTES
Hospitalist Progress Note     Admit Date:  10/4/2022  2:09 PM   DC Note date: 10/11/2022  Name:  Myrna Ricks   Age:  78 y.o. Sex:  male  :  1945   MRN:  [id number]   Room:  [facility]  PCP:  Unknown Unknown    Hospital Course:  78 y.o. male with medical history of copd, tobacco abuse, b12 deficinecy and HTN  who presented from [facility] via EMS with cc dizziness. While sitting at dining room table, he c/o dizziness and staff reports he was shaking, seizure like activity. No known h/o seizures. Per EMS, BP 90/60 on arrival. He c/o back pain. He was uncooperative, angry and yelling at staff in ED. Labs  WCA50.8N Procal 0.24  Poor historian. Disheveled. Says he has not had any food and fluids today. Remembers getting dizzy today but doesn't remember what happened afterwards. Has happened before but does not elaborate. Denies fever or chills. Has not urinated today d/t no PO intake. Says he pees if he drinks fluids and doesn't if he doesn't drink fluids. says he use to pee all the time. Per record review, has had several admissions with non specific delirium/encephalopathy. In 2019 had MRI showing infarct L occipital lobe, R BG lesion, prominent supratentorial white matter dz, non specific but compatible with chronic ischemic demyelination. Evidence of micro hemorrhage in the parietal and occipital lobes, non specific and my be secondary to chronic hypertensive encephalopathy, amyloid angiopathy, or tiny cavernous malformations. Left AMA during this admission. Does not appear that he saw neurology. S/p Rocephin in ED. Today, no ac events, calm, sit in a chair, working w/ PT    Assessment & Plan:      Near syncope - admit remote tele- in NSR. Orthostatic+. Doubt seizure. States he stopped alcohol for 20 y. Refused EEG. Did not allow MRI brain despite ativan given. H/o CVA // dizziness - check MRI brain and MRA head/neck. Aspirin, high intensity statin. PT/OT/SLP.       KRISTEN, prerenal, resolved     Acute encephalopathy - baseline mentation unclear. admin IM b12 given h/o b12 def. Start PO folic acid and thiamine daily. Check tsh. Mri as above. Rule out seizures. Monitor for urinary retention. Hydrate. Check b12, tsh, , ammonia. HTN, not well controlled, meds advanced     Rx:  Suportive Rx  Monitor orthostatics  Added Midodrine    Disposition: still awaiting rehab per PT, a [facility]. Pt not competent to make a decision to leave  Case d/w pt, RN, CM      Diagnostic Imaging/Tests:   CT HEAD WO CONTRAST  Result Date: 10/4/2022  1. No acute intracranial process evident by noncontrast CT study of the head. Only chronic appearing changes are seen as described above. This report was made using voice transcription. Despite my best efforts to avoid any, transcription errors may persist. If there is any question about the accuracy of the report or need for clarification, then please call [phone], or text me through perfectserv for clarification or correction. XR CHEST PORTABLE  Result Date: 10/4/2022  The lungs are clear. The heart is normal in size. No pneumothorax. No pleural effusions. Labs: Results:       BMP, Mg, Phos No results for input(s): NA, K, CL, CO2, ANIONGAP, BUN, CREATININE, LABGLOM, GFRAA, CALCIUM, GLUCOSE, MG, PHOS in the last 72 hours. CBC No results for input(s): WBC, RBC, HGB, HCT, MCV, MCH, MCHC, RDW, PLT, MPV, NRBC, SEGS, LYMPHOPCT, EOSRELPCT, MONOPCT, BASOPCT, IMMGRAN, SEGSABS, LYMPHSABS, EOSABS, MONOSABS, BASOSABS, ABSIMMGRAN in the last 72 hours. LFT No results for input(s): BILITOT, BILIDIR, ALKPHOS, AST, ALT, PROT, LABALBU, GLOB in the last 72 hours.      Cardiac  Lab Results   Component Value Date/Time    TROPHS 16.4 04/21/2022 08:11 PM    TROPHS 13.6 04/21/2022 05:33 PM      Coags Lab Results   Component Value Date/Time    APTT >200.0 11/19/2021 04:29 PM      A1c No results found for: LABA1C, EAG   Lipids No results found for: CHOL, LDLCALC, LABVLDL, HDL, CHOLHDLRATIO, TRIG   Thyroid  Lab Results   Component Value Date/Time    TSHELE 0.77 10/05/2022 05:19 AM        Most Recent UA Lab Results   Component Value Date/Time    COLORU DARK YELLOW 10/05/2022 02:10 AM    APPEARANCE CLEAR 10/05/2022 02:10 AM    SPECGRAV 1.015 10/05/2022 02:10 AM    LABPH 5.5 10/05/2022 02:10 AM    PROTEINU 30 10/05/2022 02:10 AM    GLUCOSEU Negative 10/05/2022 02:10 AM    KETUA TRACE 10/05/2022 02:10 AM    BILIRUBINUR Negative 10/05/2022 02:10 AM    BILIRUBINUR Negative 04/21/2022 08:40 PM    BLOODU Negative 10/05/2022 02:10 AM    UROBILINOGEN 1.0 10/05/2022 02:10 AM    NITRU Negative 10/05/2022 02:10 AM    LEUKOCYTESUR Negative 10/05/2022 02:10 AM    WBCUA 0-3 10/05/2022 02:10 AM    RBCUA 0 10/05/2022 02:10 AM    EPITHUA 0 10/05/2022 02:10 AM    BACTERIA 1+ 10/05/2022 02:10 AM    LABCAST HYALINE 10/05/2022 02:10 AM    MUCUS 1+ 10/05/2022 02:10 AM        Recent Labs     10/05/22  0210 10/04/22  1654   CULTURE >100,000 COLONIES/mL MIXED SKIN IRINA ISOLATED  THREE OR MORE TYPES OF ORGANISMS ARE PRESENT. THIS IS INDICATIVE OF CONTAMINATION DUE TO IMPROPER COLLECTION TECHNIQUE. PLEASE REPEAT COLLECTION UNLESS PATIENT HAS STARTED ANTIBIOTIC TREATMENT. NO GROWTH 5 DAYS         All Labs from Last 24 Hrs:  No results found for this or any previous visit (from the past 24 hour(s)).       No Known Allergies  Immunization History   Administered Date(s) Administered    COVID-19, PFIZER PURPLE top, DILUTE for use, (age 15 y+), 30mcg/0.3mL 02/23/2021    PPD Test 11/19/2021, 04/21/2022       Recent Vital Data:  Patient Vitals for the past 24 hrs:   Temp Pulse Resp BP SpO2   10/11/22 1108 97.5 °F (36.4 °C) 64 20 122/71 96 %   10/11/22 0718 97.3 °F (36.3 °C) 61 20 138/72 97 %   10/11/22 0413 98.4 °F (36.9 °C) 60 18 (!) 157/64 95 %   10/10/22 2354 98.4 °F (36.9 °C) 64 18 (!) 141/74 96 %   10/10/22 2010 97.7 °F (36.5 °C) 66 18 (!) 125/95 97 %   10/10/22 1446 98.2 °F (36.8 °C) 63 18 137/72 96 %         Oxygen Therapy  SpO2: 96 %  Pulse via Oximetry: 97 beats per minute  O2 Device: None (Room air)  Skin Assessment: Clean, dry, & intact  O2 Flow Rate (L/min): 2 L/min    Estimated body mass index is 24.13 kg/m² as calculated from the following:    Height as of 10/6/22: 5' 10\" (1.778 m). Weight as of this encounter: 168 lb 3.2 oz (76.3 kg). Intake/Output Summary (Last 24 hours) at 10/11/2022 1230  Last data filed at 10/10/2022 2010  Gross per 24 hour   Intake --   Output 300 ml   Net -300 ml           Physical Exam:    General:    Well nourished. Moderate distress, disoriented to place and time but can have a conversation, anxious  CV:   RRR. No m/r/g. No JVD  Lungs:   CTAB. No wheezing, rhonchi, or rales. Respirations even, unlabored  Abdomen:   Soft, nontender, nondistended. Extremities: Warm and dry. No cyanosis or clubbing. No edema. Skin:     No rashes. Normal coloration  Neuro:  grossly intact. Psych:  Normal mood and affect.     Signed:  Mitchell Cordova MD

## 2022-10-11 NOTE — PROGRESS NOTES
Pt resting in bed eating dinner. On RA. No s/sx of distress noted. Denies any pain. Encouraged to call for assistance as needed. Will continue to monitor.

## 2022-10-11 NOTE — CARE COORDINATION
MIL called Ivone Kohler,  with the [place]. She hasn't received the referral from Tesha Fabian so requested CM send referral. MIL explained that patient has a bed available at [facility] but is awaiting [facility]. CM faxed all the clinicals to the [place].

## 2022-10-12 LAB
ANION GAP SERPL CALC-SCNC: 8 MMOL/L (ref 2–11)
BASOPHILS # BLD: 0.1 K/UL (ref 0–0.2)
BASOPHILS NFR BLD: 1 % (ref 0–2)
BUN SERPL-MCNC: 19 MG/DL (ref 8–23)
CALCIUM SERPL-MCNC: 9.5 MG/DL (ref 8.3–10.4)
CHLORIDE SERPL-SCNC: 108 MMOL/L (ref 101–110)
CO2 SERPL-SCNC: 25 MMOL/L (ref 21–32)
CREAT SERPL-MCNC: 1 MG/DL (ref 0.8–1.5)
DIFFERENTIAL METHOD BLD: ABNORMAL
EOSINOPHIL # BLD: 0.2 K/UL (ref 0–0.8)
EOSINOPHIL NFR BLD: 2 % (ref 0.5–7.8)
ERYTHROCYTE [DISTWIDTH] IN BLOOD BY AUTOMATED COUNT: 13.2 % (ref 11.9–14.6)
GLUCOSE SERPL-MCNC: 127 MG/DL (ref 65–100)
HCT VFR BLD AUTO: 39 % (ref 41.1–50.3)
HGB BLD-MCNC: 12.7 G/DL (ref 13.6–17.2)
IMM GRANULOCYTES # BLD AUTO: 0.1 K/UL (ref 0–0.5)
IMM GRANULOCYTES NFR BLD AUTO: 1 % (ref 0–5)
LYMPHOCYTES # BLD: 1.4 K/UL (ref 0.5–4.6)
LYMPHOCYTES NFR BLD: 16 % (ref 13–44)
MCH RBC QN AUTO: 30.1 PG (ref 26.1–32.9)
MCHC RBC AUTO-ENTMCNC: 32.6 G/DL (ref 31.4–35)
MCV RBC AUTO: 92.4 FL (ref 82–102)
MM INDURATION, POC: 0 MM (ref 0–5)
MONOCYTES # BLD: 0.8 K/UL (ref 0.1–1.3)
MONOCYTES NFR BLD: 9 % (ref 4–12)
NEUTS SEG # BLD: 6.5 K/UL (ref 1.7–8.2)
NEUTS SEG NFR BLD: 71 % (ref 43–78)
NRBC # BLD: 0 K/UL (ref 0–0.2)
PLATELET # BLD AUTO: 307 K/UL (ref 150–450)
PMV BLD AUTO: 9.7 FL (ref 9.4–12.3)
POTASSIUM SERPL-SCNC: 3.8 MMOL/L (ref 3.5–5.1)
PPD, POC: NEGATIVE
RBC # BLD AUTO: 4.22 M/UL (ref 4.23–5.6)
SODIUM SERPL-SCNC: 141 MMOL/L (ref 133–143)
WBC # BLD AUTO: 8.9 K/UL (ref 4.3–11.1)

## 2022-10-12 PROCEDURE — 6370000000 HC RX 637 (ALT 250 FOR IP): Performed by: HOSPITALIST

## 2022-10-12 PROCEDURE — 6370000000 HC RX 637 (ALT 250 FOR IP): Performed by: FAMILY MEDICINE

## 2022-10-12 PROCEDURE — 80048 BASIC METABOLIC PNL TOTAL CA: CPT

## 2022-10-12 PROCEDURE — 2580000003 HC RX 258: Performed by: INTERNAL MEDICINE

## 2022-10-12 PROCEDURE — 1100000000 HC RM PRIVATE

## 2022-10-12 PROCEDURE — 85025 COMPLETE CBC W/AUTO DIFF WBC: CPT

## 2022-10-12 PROCEDURE — 6370000000 HC RX 637 (ALT 250 FOR IP): Performed by: INTERNAL MEDICINE

## 2022-10-12 PROCEDURE — 36415 COLL VENOUS BLD VENIPUNCTURE: CPT

## 2022-10-12 RX ADMIN — LOSARTAN POTASSIUM 50 MG: 50 TABLET, FILM COATED ORAL at 09:05

## 2022-10-12 RX ADMIN — ATORVASTATIN CALCIUM 40 MG: 40 TABLET, FILM COATED ORAL at 21:06

## 2022-10-12 RX ADMIN — ASPIRIN 81 MG: 81 TABLET ORAL at 09:05

## 2022-10-12 RX ADMIN — SODIUM CHLORIDE, PRESERVATIVE FREE 10 ML: 5 INJECTION INTRAVENOUS at 09:05

## 2022-10-12 RX ADMIN — SODIUM CHLORIDE, PRESERVATIVE FREE 5 ML: 5 INJECTION INTRAVENOUS at 21:07

## 2022-10-12 RX ADMIN — Medication 200 MG: at 09:05

## 2022-10-12 RX ADMIN — AMLODIPINE BESYLATE 10 MG: 10 TABLET ORAL at 09:05

## 2022-10-12 ASSESSMENT — PAIN SCALES - GENERAL: PAINLEVEL_OUTOF10: 0

## 2022-10-12 NOTE — PROGRESS NOTES
Physical Therapy Note:    Attempted to see patient this AM for physical therapy treatment  session. Patient adamantly refused therapy at this time. Will follow and re-attempt as schedule permits/patient available.  Thank you,    Ariana Mathew PTA     Rehab Caseload Tracker

## 2022-10-12 NOTE — CARE COORDINATION
Chart reviewed by . LOS 8 days. According to colleague, patient accepted to [facility] and is awaiting [address] CM placed call to [facility] 9091 [phone] ext. [phone], to confirm clinicals were received, sent by colleague. CM was unsuccessful at reaching [address] [place] left a , requesting call back. CM continues to follow. Patient awaits placement. Update 1:44pm- CM received update from Marion with [facility], who reports she has received [address] Attending - Dr. Izabella Mario aware. Patient remains hospitalized, pending medical clearance. Patient to [facility] d/c [place]. CM following.

## 2022-10-12 NOTE — PROGRESS NOTES
Hospitalist Progress Note   Admit Date:  10/4/2022  2:09 PM   Name:  Valerie Kohler   Age:  78 y.o. Sex:  male  :  1945   MRN:  [id number]   Room:  [facility]/    Presenting Complaint: Dizziness     Reason(s) for Admission: Seizure-like activity ([facility] [address] [R56.9]  Transient hypotension [I03.2]  Acute metabolic encephalopathy [Y42.93]     Hospital Course: This is a 78 y.o. male with medical history of copd, tobacco abuse, b12 deficinecy and HTN  who presented from [facility] via EMS with cc dizziness. While sitting at dining room table, he c/o dizziness and staff reports he was shaking, seizure like activity. No known h/o seizures. Per EMS, BP 90/60 on arrival. He c/o back pain. He was uncooperative, angry and yelling at staff in ED. Labs  XAY19.3S Procal 0.24  Poor historian. Disheveled. Says he has not had any food and fluids today. Remembers getting dizzy today but doesn't remember what happened afterwards. Has happened before but does not elaborate. Denies fever or chills. Has not urinated today d/t no PO intake. Says he pees if he drinks fluids and doesn't if he doesn't drink fluids. says he use to pee all the time. Per record review, has had several admissions with non specific delirium/encephalopathy. In 2019 had MRI showing infarct L occipital lobe, R BG lesion, prominent supratentorial white matter dz, non specific but compatible with chronic ischemic demyelination. Evidence of micro hemorrhage in the parietal and occipital lobes, non specific and my be secondary to chronic hypertensive encephalopathy, amyloid angiopathy, or tiny cavernous malformations. Left AMA during this admission. Does not appear that he saw neurology. S/p Rocephin in ED. Subjective & 24hr Events (10/12/22): Patient is alert awake not in acute distress. No fever no chills. No chest pain no shortness of breath. Assessment & Plan:      This is a 62-year-old male with    Near syncope  Normal sinus rhythm. Orthostatic vitals positive. Without any seizures. He stopped drinking alcohol about 20 years ago. Refused EEG. MRA of the brain showed decreased fluid within the M2 segments of the middle cerebral arteries right greater than the left as well as within the entire posterior circulation. Unclear whether these findings are secondary to intrinsic pathology versus motion artifact or in plane saturation artifact. History of CVA/dizziness  Patient refusing MRI. Aspirin, statin    Prerenal acute kidney injury resolved    Acute encephalopathy  Continue thiamine, folic acid. MRI ordered but patient refused. EEG ordered but patient refused. Hypertension  Continue medications. Anticipated discharge needs:    Patient has insurance authorization to be discharged to rehab. Likely discharge tomorrow. Diet:  ADULT DIET; Easy to Chew; Low Fat/Low Chol/High Fiber/MANNY  DVT PPx: Lovenox  Code status: Full Code    Hospital Problems:  Principal Problem:    Pre-syncope  Active Problems:    Seizure-like activity (HCC)    Tobacco abuse    History of CVA (cerebrovascular accident)    Primary hypertension    Acute metabolic encephalopathy    B12 deficiency  Resolved Problems:    * No resolved hospital problems.  *      Objective:   Patient Vitals for the past 24 hrs:   Temp Pulse Resp BP SpO2   10/12/22 1502 98.8 °F (37.1 °C) 64 18 (!) 142/60 96 %   10/12/22 1029 99 °F (37.2 °C) 66 18 126/67 100 %   10/12/22 0807 98.6 °F (37 °C) 58 18 (!) 139/90 96 %   10/12/22 0257 97.9 °F (36.6 °C) 59 18 (!) 144/64 95 %   10/11/22 2317 98.8 °F (37.1 °C) 61 18 (!) 143/65 96 %   10/11/22 1913 98.4 °F (36.9 °C) 67 18 133/73 96 %       Oxygen Therapy  SpO2: 96 %  Pulse via Oximetry: 97 beats per minute  O2 Device: None (Room air)  Skin Assessment: Clean, dry, & intact  O2 Flow Rate (L/min): 2 L/min    Estimated body mass index is 23.3 kg/m² as calculated from the following:    Height as of 10/6/22: 5' 10\" (1.778 m). Weight as of this encounter: 162 lb 6.4 oz (73.7 kg). Intake/Output Summary (Last 24 hours) at 10/12/2022 1605  Last data filed at 10/12/2022 1413  Gross per 24 hour   Intake 328 ml   Output 800 ml   Net -472 ml         Physical Exam:     Blood pressure (!) 142/60, pulse 64, temperature 98.8 °F (37.1 °C), resp. rate 18, height 5' 10\" (1.778 m), weight 162 lb 6.4 oz (73.7 kg), SpO2 96 %. General:    Well nourished. Alert,awake, chronically ill appearing,   Head:  Normocephalic, atraumatic  Eyes:  Sclerae appear normal.  Pupils equally round. ENT:  Nares appear normal, no drainage. Moist oral mucosa  Neck:  No restricted ROM. Trachea midline   CV:   RRR. No m/r/g. No jugular venous distension. Lungs:   CTAB. No wheezing, rhonchi, or rales. Symmetric expansion. Abdomen: Bowel sounds present. Soft, nontender, nondistended. Extremities: No cyanosis or clubbing. No edema  Skin:     No rashes and normal coloration. Warm and dry. Neuro:  CN II-XII grossly intact. Sensation intact. A&Ox3  Psych:  Normal mood and affect.       I have personally reviewed labs and tests showing:  Recent Labs:  Recent Results (from the past 48 hour(s))   CBC with Auto Differential    Collection Time: 10/12/22 10:02 AM   Result Value Ref Range    WBC 8.9 4.3 - 11.1 K/uL    RBC 4.22 (L) 4.23 - 5.6 M/uL    Hemoglobin 12.7 (L) 13.6 - 17.2 g/dL    Hematocrit 39.0 (L) 41.1 - 50.3 %    MCV 92.4 82 - 102 FL    MCH 30.1 26.1 - 32.9 PG    MCHC 32.6 31.4 - 35.0 g/dL    RDW 13.2 11.9 - 14.6 %    Platelets 503 764 - 358 K/uL    MPV 9.7 9.4 - 12.3 FL    nRBC 0.00 0.0 - 0.2 K/uL    Differential Type AUTOMATED      Seg Neutrophils 71 43 - 78 %    Lymphocytes 16 13 - 44 %    Monocytes 9 4.0 - 12.0 %    Eosinophils % 2 0.5 - 7.8 %    Basophils 1 0.0 - 2.0 %    Immature Granulocytes 1 0.0 - 5.0 %    Segs Absolute 6.5 1.7 - 8.2 K/UL    Absolute Lymph # 1.4 0.5 - 4.6 K/UL    Absolute Mono # 0.8 0.1 - 1.3 K/UL    Absolute Eos # 0.2 0.0 - 0.8 K/UL    Basophils Absolute 0.1 0.0 - 0.2 K/UL    Absolute Immature Granulocyte 0.1 0.0 - 0.5 K/UL   Basic Metabolic Panel w/ Reflex to MG    Collection Time: 10/12/22 10:02 AM   Result Value Ref Range    Sodium 141 133 - 143 mmol/L    Potassium 3.8 3.5 - 5.1 mmol/L    Chloride 108 101 - 110 mmol/L    CO2 25 21 - 32 mmol/L    Anion Gap 8 2 - 11 mmol/L    Glucose 127 (H) 65 - 100 mg/dL    BUN 19 8 - 23 MG/DL    Creatinine 1.00 0.8 - 1.5 MG/DL    Est, Glom Filt Rate >60 >60 ml/min/1.73m2    Calcium 9.5 8.3 - 10.4 MG/DL   PLEASE READ & DOCUMENT PPD TEST IN 24 HRS    Collection Time: 10/12/22  3:48 PM   Result Value Ref Range    PPD, (POC) Negative Negative    mm Induration 0 0 - 5 mm       I have personally reviewed imaging studies showing: Other Studies:  MRA HEAD WO CONTRAST   Final Result   1. Severely limited examination due to significant motion artifact. Decreased   flow is noted within M2 segments of the middle cerebral arteries right greater   than left as well as within the entire posterior circulation. Unclear whether   these findings are secondary to intrinsic pathology versus motion artifact or in   plane saturation artifact      XR ABDOMEN (KUB) (SINGLE AP VIEW)   Final Result   The bowel gas pattern is normal. There is no evidence of   obstruction. There is no free air visualized on this supine view. There are no   renal calculi. The lung bases are clear. Postoperative changes from previous   bowel surgery are noted. Calcified granulomas noted in the spleen. CT HEAD WO CONTRAST   Final Result   1. No acute intracranial process evident by noncontrast CT study of the head. Only chronic appearing changes are seen as described above. This report was made using voice transcription.  Despite my best efforts to avoid   any, transcription errors may persist. If there is any question about the   accuracy of the report or need for clarification, then please call [phone], or text me through perfectserv for clarification or correction. XR CHEST PORTABLE   Final Result   The lungs are clear. The heart is normal in size. No pneumothorax. No pleural effusions. Current Meds:  Current Facility-Administered Medications   Medication Dose Route Frequency    amLODIPine (NORVASC) tablet 10 mg  10 mg Oral Daily    thiamine tablet 200 mg  200 mg Oral Daily    LORazepam (ATIVAN) injection 1 mg  1 mg IntraVENous PRN    acetaminophen (TYLENOL) tablet 650 mg  650 mg Oral Q6H PRN    Or    acetaminophen (TYLENOL) suppository 650 mg  650 mg Rectal Q6H PRN    aluminum & magnesium hydroxide-simethicone (MAALOX) 200-200-20 MG/5ML suspension 30 mL  30 mL Oral Q6H PRN    ondansetron (ZOFRAN-ODT) disintegrating tablet 4 mg  4 mg Oral Q8H PRN    Or    ondansetron (ZOFRAN) injection 4 mg  4 mg IntraVENous Q6H PRN    polyethylene glycol (GLYCOLAX) packet 17 g  17 g Oral Daily PRN    sodium chloride flush 0.9 % injection 5-40 mL  5-40 mL IntraVENous PRN    atorvastatin (LIPITOR) tablet 40 mg  40 mg Oral Nightly    enoxaparin (LOVENOX) injection 40 mg  40 mg SubCUTAneous Q24H    aspirin EC tablet 81 mg  81 mg Oral Daily    sodium chloride flush 0.9 % injection 5-40 mL  5-40 mL IntraVENous 2 times per day    0.9 % sodium chloride infusion   IntraVENous PRN    losartan (COZAAR) tablet 50 mg  50 mg Oral Daily       Signed:  Katelyn Rosas MD    Part of this note may have been written by using a voice dictation software. The note has been proof read but may still contain some grammatical/other typographical errors.

## 2022-10-12 NOTE — PROGRESS NOTES
Pt resting in bed with eyes closed. Awakens when spoken to. Alert and oriented times 3 at this time. On RA. No s/sx of distress noted. Denies any pain. Encouraged to call for assistance as needed. Call light within reach. Will continue to monitor.

## 2022-10-12 NOTE — PROGRESS NOTES
Report received from Luke Raya RN. No distress on room air. Pt talking about being discharged. Angry about waiting. Encouragement given that case management is working on solution.

## 2022-10-13 VITALS
HEIGHT: 70 IN | OXYGEN SATURATION: 98 % | HEART RATE: 63 BPM | TEMPERATURE: 97.7 F | WEIGHT: 162.4 LBS | SYSTOLIC BLOOD PRESSURE: 134 MMHG | RESPIRATION RATE: 17 BRPM | BODY MASS INDEX: 23.25 KG/M2 | DIASTOLIC BLOOD PRESSURE: 68 MMHG

## 2022-10-13 LAB
ANION GAP SERPL CALC-SCNC: 8 MMOL/L (ref 2–11)
BASOPHILS # BLD: 0.1 K/UL (ref 0–0.2)
BASOPHILS NFR BLD: 1 % (ref 0–2)
BUN SERPL-MCNC: 19 MG/DL (ref 8–23)
CALCIUM SERPL-MCNC: 9.4 MG/DL (ref 8.3–10.4)
CHLORIDE SERPL-SCNC: 109 MMOL/L (ref 101–110)
CO2 SERPL-SCNC: 23 MMOL/L (ref 21–32)
CREAT SERPL-MCNC: 0.8 MG/DL (ref 0.8–1.5)
DIFFERENTIAL METHOD BLD: NORMAL
EOSINOPHIL # BLD: 0.3 K/UL (ref 0–0.8)
EOSINOPHIL NFR BLD: 3 % (ref 0.5–7.8)
ERYTHROCYTE [DISTWIDTH] IN BLOOD BY AUTOMATED COUNT: 13.1 % (ref 11.9–14.6)
GLUCOSE SERPL-MCNC: 87 MG/DL (ref 65–100)
HCT VFR BLD AUTO: 46.7 % (ref 41.1–50.3)
HGB BLD-MCNC: 15.3 G/DL (ref 13.6–17.2)
IMM GRANULOCYTES # BLD AUTO: 0.1 K/UL (ref 0–0.5)
IMM GRANULOCYTES NFR BLD AUTO: 1 % (ref 0–5)
LYMPHOCYTES # BLD: 1.8 K/UL (ref 0.5–4.6)
LYMPHOCYTES NFR BLD: 19 % (ref 13–44)
MCH RBC QN AUTO: 29.8 PG (ref 26.1–32.9)
MCHC RBC AUTO-ENTMCNC: 32.8 G/DL (ref 31.4–35)
MCV RBC AUTO: 90.9 FL (ref 82–102)
MM INDURATION, POC: 0 MM (ref 0–5)
MONOCYTES # BLD: 0.9 K/UL (ref 0.1–1.3)
MONOCYTES NFR BLD: 10 % (ref 4–12)
NEUTS SEG # BLD: 6.4 K/UL (ref 1.7–8.2)
NEUTS SEG NFR BLD: 67 % (ref 43–78)
NRBC # BLD: 0 K/UL (ref 0–0.2)
PLATELET # BLD AUTO: 265 K/UL (ref 150–450)
PMV BLD AUTO: 10.3 FL (ref 9.4–12.3)
POTASSIUM SERPL-SCNC: 4 MMOL/L (ref 3.5–5.1)
PPD, POC: NEGATIVE
RBC # BLD AUTO: 5.14 M/UL (ref 4.23–5.6)
SARS-COV-2 RDRP RESP QL NAA+PROBE: NOT DETECTED
SODIUM SERPL-SCNC: 140 MMOL/L (ref 133–143)
SOURCE: NORMAL
WBC # BLD AUTO: 9.5 K/UL (ref 4.3–11.1)

## 2022-10-13 PROCEDURE — 36415 COLL VENOUS BLD VENIPUNCTURE: CPT

## 2022-10-13 PROCEDURE — 87635 SARS-COV-2 COVID-19 AMP PRB: CPT

## 2022-10-13 PROCEDURE — 80048 BASIC METABOLIC PNL TOTAL CA: CPT

## 2022-10-13 PROCEDURE — 2580000003 HC RX 258: Performed by: INTERNAL MEDICINE

## 2022-10-13 PROCEDURE — 6370000000 HC RX 637 (ALT 250 FOR IP): Performed by: FAMILY MEDICINE

## 2022-10-13 PROCEDURE — 85025 COMPLETE CBC W/AUTO DIFF WBC: CPT

## 2022-10-13 PROCEDURE — 6370000000 HC RX 637 (ALT 250 FOR IP): Performed by: HOSPITALIST

## 2022-10-13 PROCEDURE — 6370000000 HC RX 637 (ALT 250 FOR IP): Performed by: INTERNAL MEDICINE

## 2022-10-13 RX ORDER — ASPIRIN 81 MG/1
81 TABLET ORAL DAILY
Qty: 30 TABLET | Refills: 0
Start: 2022-10-14 | End: 2022-11-13

## 2022-10-13 RX ORDER — LOSARTAN POTASSIUM 50 MG/1
50 TABLET ORAL DAILY
Qty: 30 TABLET | Refills: 0
Start: 2022-10-13 | End: 2022-11-12

## 2022-10-13 RX ORDER — THIAMINE MONONITRATE (VIT B1) 100 MG
100 TABLET ORAL DAILY
Qty: 30 TABLET | Refills: 0
Start: 2022-10-13 | End: 2022-11-12

## 2022-10-13 RX ORDER — ATORVASTATIN CALCIUM 40 MG/1
40 TABLET, FILM COATED ORAL NIGHTLY
Qty: 30 TABLET | Refills: 0
Start: 2022-10-13 | End: 2022-11-12

## 2022-10-13 RX ADMIN — LOSARTAN POTASSIUM 50 MG: 50 TABLET, FILM COATED ORAL at 08:57

## 2022-10-13 RX ADMIN — SODIUM CHLORIDE, PRESERVATIVE FREE 10 ML: 5 INJECTION INTRAVENOUS at 08:57

## 2022-10-13 RX ADMIN — AMLODIPINE BESYLATE 10 MG: 10 TABLET ORAL at 08:57

## 2022-10-13 RX ADMIN — ASPIRIN 81 MG: 81 TABLET ORAL at 08:57

## 2022-10-13 RX ADMIN — Medication 200 MG: at 08:57

## 2022-10-13 ASSESSMENT — PAIN SCALES - GENERAL: PAINLEVEL_OUTOF10: 0

## 2022-10-13 NOTE — CARE COORDINATION
Pt is medically cleared for dc today and will transfer to room 209D @ [address] for STR services. RN to call report to #[phone]. Transport scheduled for 1430 through [employer]. Pt and friend Colton Johnson) have been notified of pt's dc and transport time and they are in agreement with this plan. CM remains available to assist as needed. 10/13/22 1427   Service Assessment   History Provided By Friend;Medical Record   Primary Caregiver Self   Support Systems Children;Friends/Neighbors   PCP Verified by CM Yes   Prior Functional Level Independent in ADLs/IADLs   Current Functional Level Assistance with the following:;Mobility   Can patient return to prior living arrangement No   Ability to make needs known: Good   Family able to assist with home care needs: No   Would you like for me to discuss the discharge plan with any other family members/significant others, and if so, who? Yes  (friend/Ivett)   Financial Resources Medicare; ([place])   Community Resources Assisted Living   Social/Functional History   Lives With Alone   Type of Home Assisted living   Home Layout One level   Bathroom Shower/Tub Walk-in shower   [address] [facility] Help From Other (comment)   ADL Assistance Independent   Homemaking Responsibilities No   Ambulation Assistance Needs assistance   Transfer Assistance Independent   Active  No   Occupation Retired   Discharge Planning   Type of [address] [facility]  ([facility])   Current Services Prior To Admission Durable Medical Equipment   Current DME Prior to [address]   DME Ordered?  No   Potential Assistance Purchasing Medications No   Type of Home Care Services None   Patient expects to be discharged to: [address]  Discharge   Transition of Care Consult (CM Consult) SNF   Partner SNF No   Reason Why Partner SNF Not Chosen Not covered by payor  ([facility] required)   Services At/After Discharge PT;OT;Nursing services;Skilled Nursing Facility (SNF); Transport; In ambulance   [address] Provided? No   Mode of Transport at Discharge [address] Time of Discharge 1430   Confirm Follow Up Transport Friends   Condition of Participation: Discharge Planning   The Plan for Transition of Care is related to the following treatment goals: STR to improve pt's strength and functional abilities for a safe transition to home   The Patient and/or Patient Representative was provided with a Choice of Provider? Patient   The Patient and/Or Patient Representative agree with the Discharge Plan? Yes   Freedom of Choice list was provided with basic dialogue that supports the patient's individualized plan of care/goals, treatment preferences, and shares the quality data associated with the providers?   Yes

## 2022-10-13 NOTE — DISCHARGE SUMMARY
Hospitalist Discharge Summary   Admit Date:  10/4/2022  2:09 PM   DC Note date: 10/13/2022  Name:  Miller Isaac   Age:  78 y.o. Sex:  male  :  1945   MRN:  [id number]   Room:  [facility]  PCP:  Unknown Unknown    Presenting Complaint: Dizziness     Initial Admission Diagnosis: Seizure-like activity ([address] [R56.9]  Transient hypotension [R21.5]  Acute metabolic encephalopathy [K21.04]     Problem List for this Hospitalization (present on admission):    Principal Problem:    Pre-syncope  Active Problems:    Seizure-like activity ([address]    Tobacco abuse    History of CVA (cerebrovascular accident)    Primary hypertension    Acute metabolic encephalopathy    B12 deficiency  Resolved Problems:    * No resolved hospital problems. [facility] Course: This is a 78 y.o. male with medical history of copd, tobacco abuse, b12 deficinecy and HTN  who presented from [facility] via EMS with cc dizziness. While sitting at dining room table, he c/o dizziness and staff reports he was shaking, seizure like activity. No known h/o seizures. Per EMS, BP 90/60 on arrival. He c/o back pain. He was uncooperative, angry and yelling at staff in ED. Labs  LWJ25.7M Procal 0.24  Poor historian. Disheveled. Says he has not had any food and fluids today. Remembers getting dizzy today but doesn't remember what happened afterwards. Has happened before but does not elaborate. Denies fever or chills. Has not urinated today d/t no PO intake. Says he pees if he drinks fluids and doesn't if he doesn't drink fluids. says he use to pee all the time. Per record review, has had several admissions with non specific delirium/encephalopathy. In 2019 had MRI showing infarct L occipital lobe, R BG lesion, prominent supratentorial white matter dz, non specific but compatible with chronic ischemic demyelination.  Evidence of micro hemorrhage in the parietal and occipital lobes, non specific and my be secondary to chronic hypertensive encephalopathy, amyloid angiopathy, or tiny cavernous malformations. Left AMA during this admission. Does not appear that he saw neurology. S/p Ed in ED. Patient was admitted for near syncopal episode. Orthostatic vital signs were positive. No seizures witnessed. He refused EEG. He also refused MRI of the brain. MRA of the brain was done and showed decreased fluid within the M2 segments of the middle cerebral arteries right greater than the left as well as within the entire posterior circulation. Unclear whether these findings are secondary to intrinsic pathology versus motion artifact or implant saturation artifact. He has history of CVA/dizziness and refused MRI. He is currently on aspirin, statin. Prerenal acute kidney injury resolved with IV fluids. He has acute encephalopathy and is currently on thiamine and folic acid. He has hypertension for which she is currently on medications and blood pressure is fairly well controlled. He is being discharged to short-term rehab today. Disposition: Short-term rehab today. Diet: ADULT DIET; Easy to Chew; Low Fat/Low Chol/High Fiber/MANNY  Code Status: Full Code    Follow Ups:      Time spent in patient discharge and coordination 39 minutes. Follow up labs/diagnostics (ultimately defer to outpatient provider):  CBC, BMP in 3 to 5 days. Plan was discussed with the pt. All questions answered. Patient was stable at time of discharge. Instructions given to call a physician or return if any concerns.     Current Discharge Medication List        START taking these medications    Details   aspirin 81 MG EC tablet Take 1 tablet by mouth daily  Qty: 30 tablet, Refills: 0      vitamin B-1 (THIAMINE) 100 MG tablet Take 1 tablet by mouth daily  Qty: 30 tablet, Refills: 0           CONTINUE these medications which have CHANGED    Details   losartan (COZAAR) 50 MG tablet Take 1 tablet by mouth daily  Qty: 30 tablet, Refills: 0 atorvastatin (LIPITOR) 40 MG tablet Take 1 tablet by mouth nightly  Qty: 30 tablet, Refills: 0           CONTINUE these medications which have NOT CHANGED    Details   dilTIAZem (TIAZAC) 120 MG extended release capsule Take 120 mg by mouth daily      albuterol sulfate HFA (PROVENTIL;VENTOLIN;PROAIR) 108 (90 Base) MCG/ACT inhaler Inhale 2 puffs into the lungs every 6 hours as needed      allopurinol (ZYLOPRIM) 300 MG tablet Take 300 mg by mouth daily             Procedures done this admission:  * No surgery found *    Consults this admission:  IP CONSULT TO SPIRITUAL SERVICES  IP CONSULT TO NEUROLOGY    Echocardiogram results:  No results found for this or any previous visit. Diagnostic Imaging/Tests:   XR ABDOMEN (KUB) (SINGLE AP VIEW)    Result Date: 10/5/2022  The bowel gas pattern is normal. There is no evidence of obstruction. There is no free air visualized on this supine view. There are no renal calculi. The lung bases are clear. Postoperative changes from previous bowel surgery are noted. Calcified granulomas noted in the spleen. CT HEAD WO CONTRAST    Result Date: 10/4/2022  1. No acute intracranial process evident by noncontrast CT study of the head. Only chronic appearing changes are seen as described above. This report was made using voice transcription. Despite my best efforts to avoid any, transcription errors may persist. If there is any question about the accuracy of the report or need for clarification, then please call [phone], or text me through perfectserv for clarification or correction. MRA HEAD WO CONTRAST    Result Date: 10/7/2022  1. Severely limited examination due to significant motion artifact. Decreased flow is noted within M2 segments of the middle cerebral arteries right greater than left as well as within the entire posterior circulation.  Unclear whether these findings are secondary to intrinsic pathology versus motion artifact or in plane saturation artifact    XR CHEST PORTABLE    Result Date: 10/4/2022  The lungs are clear. The heart is normal in size. No pneumothorax. No pleural effusions. Labs: Results:       BMP, Mg, Phos Recent Labs     10/12/22  1002 10/13/22  0346    140   K 3.8 4.0    109   CO2 25 23   ANIONGAP 8 8   BUN 19 19   CREATININE 1.00 0.80   LABGLOM >60 >60   CALCIUM 9.5 9.4   GLUCOSE 127* 87      CBC Recent Labs     10/12/22  1002 10/13/22  0346   WBC 8.9 9.5   RBC 4.22* 5.14   HGB 12.7* 15.3   HCT 39.0* 46.7   MCV 92.4 90.9   MCH 30.1 29.8   MCHC 32.6 32.8   RDW 13.2 13.1    265   MPV 9.7 10.3   NRBC 0.00 0.00   SEGS 71 67   LYMPHOPCT 16 19   EOSRELPCT 2 3   MONOPCT 9 10   BASOPCT 1 1   IMMGRAN 1 1   SEGSABS 6.5 6.4   LYMPHSABS 1.4 1.8   EOSABS 0.2 0.3   MONOSABS 0.8 0.9   BASOSABS 0.1 0.1   ABSIMMGRAN 0.1 0.1      LFT No results for input(s): BILITOT, BILIDIR, ALKPHOS, AST, ALT, PROT, LABALBU, GLOB in the last 72 hours.    Cardiac  Lab Results   Component Value Date/Time    TROPHS 16.4 04/21/2022 08:11 PM    TROPHS 13.6 04/21/2022 05:33 PM      Coags Lab Results   Component Value Date/Time    APTT >200.0 11/19/2021 04:29 PM      A1c No results found for: LABA1C, EAG   Lipids No results found for: CHOL, LDLCALC, LABVLDL, HDL, CHOLHDLRATIO, TRIG   Thyroid  Lab Results   Component Value Date/Time    TSHELE 0.77 10/05/2022 05:19 AM        Most Recent UA Lab Results   Component Value Date/Time    COLORU DARK YELLOW 10/05/2022 02:10 AM    APPEARANCE CLEAR 10/05/2022 02:10 AM    SPECGRAV 1.015 10/05/2022 02:10 AM    LABPH 5.5 10/05/2022 02:10 AM    PROTEINU 30 10/05/2022 02:10 AM    GLUCOSEU Negative 10/05/2022 02:10 AM    KETUA TRACE 10/05/2022 02:10 AM    BILIRUBINUR Negative 10/05/2022 02:10 AM    BILIRUBINUR Negative 04/21/2022 08:40 PM    BLOODU Negative 10/05/2022 02:10 AM    UROBILINOGEN 1.0 10/05/2022 02:10 AM    NITRU Negative 10/05/2022 02:10 AM    LEUKOCYTESUR Negative 10/05/2022 02:10 AM    WBCUA 0-3 10/05/2022 02:10 AM    RBCUA 0 10/05/2022 02:10 AM    EPITHUA 0 10/05/2022 02:10 AM    BACTERIA 1+ 10/05/2022 02:10 AM    LABCAST HYALINE 10/05/2022 02:10 AM    MUCUS 1+ 10/05/2022 02:10 AM        Recent Labs     10/05/22  0210 10/04/22  1654   CULTURE >100,000 COLONIES/mL MIXED SKIN IRINA ISOLATED  THREE OR MORE TYPES OF ORGANISMS ARE PRESENT. THIS IS INDICATIVE OF CONTAMINATION DUE TO IMPROPER COLLECTION TECHNIQUE. PLEASE REPEAT COLLECTION UNLESS PATIENT HAS STARTED ANTIBIOTIC TREATMENT.  NO GROWTH 5 DAYS       All Labs from Last 24 Hrs:  Recent Results (from the past 24 hour(s))   PLEASE READ & DOCUMENT PPD TEST IN 24 HRS    Collection Time: 10/12/22  3:48 PM   Result Value Ref Range    PPD, (POC) Negative Negative    mm Induration 0 0 - 5 mm   CBC with Auto Differential    Collection Time: 10/13/22  3:46 AM   Result Value Ref Range    WBC 9.5 4.3 - 11.1 K/uL    RBC 5.14 4.23 - 5.6 M/uL    Hemoglobin 15.3 13.6 - 17.2 g/dL    Hematocrit 46.7 41.1 - 50.3 %    MCV 90.9 82 - 102 FL    MCH 29.8 26.1 - 32.9 PG    MCHC 32.8 31.4 - 35.0 g/dL    RDW 13.1 11.9 - 14.6 %    Platelets 839 815 - 892 K/uL    MPV 10.3 9.4 - 12.3 FL    nRBC 0.00 0.0 - 0.2 K/uL    Differential Type AUTOMATED      Seg Neutrophils 67 43 - 78 %    Lymphocytes 19 13 - 44 %    Monocytes 10 4.0 - 12.0 %    Eosinophils % 3 0.5 - 7.8 %    Basophils 1 0.0 - 2.0 %    Immature Granulocytes 1 0.0 - 5.0 %    Segs Absolute 6.4 1.7 - 8.2 K/UL    Absolute Lymph # 1.8 0.5 - 4.6 K/UL    Absolute Mono # 0.9 0.1 - 1.3 K/UL    Absolute Eos # 0.3 0.0 - 0.8 K/UL    Basophils Absolute 0.1 0.0 - 0.2 K/UL    Absolute Immature Granulocyte 0.1 0.0 - 0.5 K/UL   Basic Metabolic Panel w/ Reflex to MG    Collection Time: 10/13/22  3:46 AM   Result Value Ref Range    Sodium 140 133 - 143 mmol/L    Potassium 4.0 3.5 - 5.1 mmol/L    Chloride 109 101 - 110 mmol/L    CO2 23 21 - 32 mmol/L    Anion Gap 8 2 - 11 mmol/L    Glucose 87 65 - 100 mg/dL    BUN 19 8 - 23 MG/DL    Creatinine 0. 80 0.8 - 1.5 MG/DL    Est, Glom Filt Rate >60 >60 ml/min/1.73m2    Calcium 9.4 8.3 - 10.4 MG/DL       No Known Allergies  Immunization History   Administered Date(s) Administered    COVID-19, PFIZER PURPLE top, DILUTE for use, (age 15 y+), 30mcg/0.3mL 02/23/2021    PPD Test 11/19/2021, 04/21/2022, 10/11/2022       Recent Vital Data:  Patient Vitals for the past 24 hrs:   Temp Pulse Resp BP SpO2   10/13/22 1038 97.7 °F (36.5 °C) 63 17 134/68 98 %   10/13/22 0815 97.6 °F (36.4 °C) 71 18 (!) 164/73 96 %   10/13/22 0409 99 °F (37.2 °C) (!) 104 18 (!) 145/64 93 %   10/12/22 2342 97.7 °F (36.5 °C) 73 18 (!) 141/71 95 %   10/12/22 1914 98.6 °F (37 °C) 76 16 117/76 95 %   10/12/22 1502 98.8 °F (37.1 °C) 64 18 (!) 142/60 96 %       Oxygen Therapy  SpO2: 98 %  Pulse via Oximetry: 97 beats per minute  O2 Device: None (Room air)  Skin Assessment: Clean, dry, & intact  O2 Flow Rate (L/min): 2 L/min    Estimated body mass index is 23.3 kg/m² as calculated from the following:    Height as of 10/6/22: 5' 10\" (1.778 m). Weight as of this encounter: 162 lb 6.4 oz (73.7 kg). Intake/Output Summary (Last 24 hours) at 10/13/2022 1116  Last data filed at 10/13/2022 0914  Gross per 24 hour   Intake 896 ml   Output 601 ml   Net 295 ml         Physical Exam:    General:    Well nourished. No overt distress  Head:  Normocephalic, atraumatic  Eyes:  Sclerae appear normal.  Pupils equally round. HENT:  Nares appear normal, no drainage. Moist mucous membranes  Neck:  No restricted ROM. Trachea midline  CV:   RRR. No m/r/g. No JVD  Lungs:   CTAB. No wheezing, rhonchi, or rales. Respirations even, unlabored  Abdomen:   Soft, nontender, nondistended. Extremities: Warm and dry. No cyanosis or clubbing. No edema. Skin:     No rashes. Normal coloration  Neuro:  CN II-XII grossly intact. Psych:  Normal mood and affect. Signed:  Misty Nichols MD    Part of this note may have been written by using a voice dictation software. The note has been proof read but may still contain some grammatical/other typographical errors.

## 2022-10-13 NOTE — PROGRESS NOTES
Received report from [employer], RN. Patient awake and in bed. A&O x3. Respirations present. On room air. No signs of distress. No needs expressed. Bed low and locked. Call light within reach. Will continue to monitor.

## 2022-10-13 NOTE — PROGRESS NOTES
Pt resting in bed comfortably at this time, alert and oriented times 3 with periodic confusion. No distress noted, respirations even and unlabored on room air. Pt denies pain at this time. Pt instructed to call for assistance if needed, call light in place, will continue to monitor.

## 2022-10-18 NOTE — PROGRESS NOTES
Physician Progress Note      PATIENT:               Salvador Carmona  CSN #:                  [id number]  :                       1945  ADMIT DATE:       10/4/2022 2:09 PM  [address] [place] DATE:        10/13/2022 2:54 PM  RESPONDING  PROVIDER #:        Brenda Armendariz MD          QUERY TEXT:    Patient admitted with near syncope. Noted to have + orthostatic vital signs. If possible, please document in progress notes and discharge summary after   study the etiology of the syncope: The medical record reflects the following:  Risk Factors: Age, KRISTEN, Encephalopathy, HTN medications  Clinical Indicators: Noted in H&P Orthostatic vitals positive, possible   convulsive syncope from hypotension  Treatment: CT/MRI, UA,  Options provided:  -- Syncope due to orthostatic hypotension  -- Syncope due to KRISTEN  -- Other - I will add my own diagnosis  -- Disagree - Not applicable / Not valid  -- Disagree - Clinically unable to determine / Unknown  -- Refer to Clinical Documentation Reviewer    PROVIDER RESPONSE TEXT:    The syncope is due to orthostatic hypotension. Query created by:  Lisa Reyes on 10/17/2022 8:12 AM      Electronically signed by:  Brenda Armendariz MD 10/18/2022 3:58 PM

## 2022-11-07 NOTE — PROGRESS NOTES
TRANSFER - IN REPORT:    Verbal report received from Gopal Orona RN(name) on Merle Cornejo  being received from ED(unit) for routine progression of care      Report consisted of patients Situation, Background, Assessment and   Recommendations(SBAR). Information from the following report(s) Kardex, ED Summary, Intake/Output, MAR and Recent Results was reviewed with the receiving nurse. Opportunity for questions and clarification was provided. Assessment completed upon patients arrival to unit and care assumed. none

## 2024-10-22 ENCOUNTER — APPOINTMENT (OUTPATIENT)
Dept: GENERAL RADIOLOGY | Age: 79
End: 2024-10-22
Payer: OTHER GOVERNMENT

## 2024-10-22 ENCOUNTER — HOSPITAL ENCOUNTER (EMERGENCY)
Age: 79
Discharge: HOME | End: 2024-10-22
Payer: OTHER GOVERNMENT

## 2024-10-22 VITALS
HEIGHT: 70 IN | SYSTOLIC BLOOD PRESSURE: 180 MMHG | OXYGEN SATURATION: 96 % | WEIGHT: 200 LBS | HEART RATE: 95 BPM | RESPIRATION RATE: 18 BRPM | DIASTOLIC BLOOD PRESSURE: 74 MMHG | TEMPERATURE: 97.9 F | BODY MASS INDEX: 28.63 KG/M2

## 2024-10-22 DIAGNOSIS — M54.50 ACUTE LEFT-SIDED LOW BACK PAIN WITHOUT SCIATICA: Primary | ICD-10-CM

## 2024-10-22 PROCEDURE — 72100 X-RAY EXAM L-S SPINE 2/3 VWS: CPT

## 2024-10-22 PROCEDURE — 99283 EMERGENCY DEPT VISIT LOW MDM: CPT

## 2024-10-22 PROCEDURE — 73502 X-RAY EXAM HIP UNI 2-3 VIEWS: CPT

## 2024-10-22 RX ORDER — OXYCODONE HYDROCHLORIDE 5 MG/1
5 TABLET ORAL EVERY 6 HOURS PRN
Qty: 9 TABLET | Refills: 0 | Status: SHIPPED | OUTPATIENT
Start: 2024-10-22 | End: 2024-10-25

## 2024-10-22 RX ORDER — OXYCODONE HYDROCHLORIDE 5 MG/1
5 TABLET ORAL
Status: DISCONTINUED | OUTPATIENT
Start: 2024-10-22 | End: 2024-10-22

## 2024-10-22 ASSESSMENT — PAIN SCALES - GENERAL: PAINLEVEL_OUTOF10: 7

## 2024-10-22 ASSESSMENT — PAIN - FUNCTIONAL ASSESSMENT: PAIN_FUNCTIONAL_ASSESSMENT: 0-10

## 2024-10-22 NOTE — DISCHARGE INSTRUCTIONS
No acute findings on your x-rays.  No fractures.  He need to take pain medications at home.  Such as Tylenol and ibuprofen.  If pain persists, you need to follow-up with orthopedics. Use the minimum amount of opioid pain medication to have tolerable pain. You need to follow up with orthopedics. I have attached their information to your discharge paper work and also sent in an electronic referral for you.

## 2024-10-22 NOTE — ED NOTES
Spoke with pt's contact [facility] to confirm pickup. ETA 30 mins.     Lissette Tracy, TRAVIS  10/22/24 [id number]

## 2024-10-22 NOTE — ED PROVIDER NOTES
Social Determinants of Health     Social Connections: Unknown (3/20/2021)    Received from [employer]    Social Connections     Frequency of Communication with Friends and Family: Not asked     Frequency of Social Gatherings with Friends and Family: Not asked   Intimate Partner Violence: Unknown (3/20/2021)    Received from [employer]    Intimate Partner Violence     Fear of Current or Ex-Partner: Not asked     Emotionally Abused: Not asked     Physically Abused: Not asked     Sexually Abused: Not asked   Housing Stability: Not At Risk (3/10/2022)    Received from [employer]    Housing Stability     Was there a time when you did not have a steady place to sleep: Not asked     Worried that the place you are staying is making you sick: Not asked        Previous Medications    ALBUTEROL SULFATE HFA (PROVENTIL;VENTOLIN;PROAIR) 108 (90 BASE) MCG/ACT INHALER    Inhale 2 puffs into the lungs every 6 hours as needed    ALLOPURINOL (ZYLOPRIM) 300 MG TABLET    Take 300 mg by mouth daily    ASPIRIN 81 MG EC TABLET    Take 1 tablet by mouth daily    ATORVASTATIN (LIPITOR) 40 MG TABLET    Take 1 tablet by mouth nightly    DILTIAZEM (TIAZAC) 120 MG EXTENDED RELEASE CAPSULE    Take 120 mg by mouth daily    LOSARTAN (COZAAR) 50 MG TABLET    Take 1 tablet by mouth daily    VITAMIN B-1 (THIAMINE) 100 MG TABLET    Take 1 tablet by mouth daily        Results for orders placed or performed during the hospital encounter of 10/22/24   XR HIP LEFT (2-3 VIEWS)    Narrative    Left Hip    INDICATION: fall    COMPARISON:  None    TECHNIQUE: Three views of the left hip were obtained    FINDINGS: There is no evidence of fracture or dislocation. No bony abnormality  is seen in the proximal left femur or adjacent pelvis. Atherosclerotic changes  of the blood vessel with vascular calcifications.      Impression    No evidence of left hip fracture.    Atherosclerotic changes of the blood vessel with vascular

## 2024-10-22 NOTE — ED TRIAGE NOTES
Patient arrives via [facility] from home with CO low back pain after mechanical GLF yesterday. Ambulatory after fall with assistance. Denies head injury or LOC. Has not taken BP meds

## 2024-10-22 NOTE — ED NOTES
Patient mobility status  with mild difficulty. Provider aware     I have reviewed discharge instructions with the patient.  The patient verbalized understanding.    Patient left ED via Discharge Method: wheelchair to Home with Friend.    Opportunity for questions and clarification provided.     Patient given 1 scripts.            Lissette Tracy RN  10/22/24 [id number]

## 2024-10-31 ENCOUNTER — APPOINTMENT (OUTPATIENT)
Dept: CT IMAGING | Age: 79
DRG: 070 | End: 2024-10-31
Payer: OTHER GOVERNMENT

## 2024-10-31 ENCOUNTER — APPOINTMENT (OUTPATIENT)
Dept: GENERAL RADIOLOGY | Age: 79
DRG: 070 | End: 2024-10-31
Payer: OTHER GOVERNMENT

## 2024-10-31 ENCOUNTER — HOSPITAL ENCOUNTER (INPATIENT)
Age: 79
LOS: 13 days | Discharge: NURSING, REHAB OR LONG-TERM CARE | DRG: 070 | End: 2024-11-13
Attending: EMERGENCY MEDICINE
Payer: OTHER GOVERNMENT

## 2024-10-31 DIAGNOSIS — R10.84 GENERALIZED ABDOMINAL PAIN: ICD-10-CM

## 2024-10-31 DIAGNOSIS — G93.41 METABOLIC ENCEPHALOPATHY: ICD-10-CM

## 2024-10-31 DIAGNOSIS — K59.00 CONSTIPATION, UNSPECIFIED CONSTIPATION TYPE: ICD-10-CM

## 2024-10-31 DIAGNOSIS — J18.9 PNEUMONIA OF RIGHT LOWER LOBE DUE TO INFECTIOUS ORGANISM: Primary | ICD-10-CM

## 2024-10-31 DIAGNOSIS — R29.898 WEAKNESS OF BOTH LOWER EXTREMITIES: ICD-10-CM

## 2024-10-31 PROBLEM — F03.911 DEMENTIA WITH AGITATION (HCC): Status: ACTIVE | Noted: 2024-10-31

## 2024-10-31 PROBLEM — F17.200 TOBACCO USE DISORDER: Status: ACTIVE | Noted: 2024-10-31

## 2024-10-31 LAB
ALBUMIN SERPL-MCNC: 3.6 G/DL (ref 3.2–4.6)
ALBUMIN/GLOB SERPL: 1.1 (ref 1–1.9)
ALP SERPL-CCNC: 121 U/L (ref 40–129)
ALT SERPL-CCNC: 16 U/L (ref 8–55)
AMMONIA PLAS-SCNC: 19 UMOL/L (ref 16–60)
AMPHET UR QL SCN: NEGATIVE
ANION GAP SERPL CALC-SCNC: 12 MMOL/L (ref 7–16)
AST SERPL-CCNC: 20 U/L (ref 15–37)
BENZODIAZ UR QL: NEGATIVE
BILIRUB SERPL-MCNC: 0.4 MG/DL (ref 0–1.2)
BILIRUB UR QL: ABNORMAL
BUN SERPL-MCNC: 24 MG/DL (ref 8–23)
CALCIUM SERPL-MCNC: 10 MG/DL (ref 8.8–10.2)
CANNABINOIDS UR QL SCN: NEGATIVE
CHLORIDE SERPL-SCNC: 108 MMOL/L (ref 98–107)
CK SERPL-CCNC: 69 U/L (ref 21–215)
CO2 SERPL-SCNC: 24 MMOL/L (ref 20–29)
COCAINE UR QL SCN: NEGATIVE
CREAT SERPL-MCNC: 1.23 MG/DL (ref 0.8–1.3)
ERYTHROCYTE [DISTWIDTH] IN BLOOD BY AUTOMATED COUNT: 14.5 % (ref 11.9–14.6)
ETHANOL SERPL-MCNC: <11 MG/DL (ref 0–0.08)
FOLATE SERPL-MCNC: 36.5 NG/ML (ref 3.1–17.5)
GLOBULIN SER CALC-MCNC: 3.3 G/DL (ref 2.3–3.5)
GLUCOSE SERPL-MCNC: 147 MG/DL (ref 70–99)
GLUCOSE UR QL STRIP.AUTO: NEGATIVE MG/DL
HCT VFR BLD AUTO: 47.6 % (ref 41.1–50.3)
HGB BLD-MCNC: 16 G/DL (ref 13.6–17.2)
KETONES UR-MCNC: NEGATIVE MG/DL
LEUKOCYTE ESTERASE UR QL STRIP: NEGATIVE
LIPASE SERPL-CCNC: 34 U/L (ref 13–60)
MCH RBC QN AUTO: 30.3 PG (ref 26.1–32.9)
MCHC RBC AUTO-ENTMCNC: 33.6 G/DL (ref 31.4–35)
MCV RBC AUTO: 90.2 FL (ref 82–102)
NITRITE UR QL: NEGATIVE
NRBC # BLD: 0 K/UL (ref 0–0.2)
OPIATES UR QL: NEGATIVE
PH UR: 6 (ref 5–9)
PLATELET # BLD AUTO: 261 K/UL (ref 150–450)
PMV BLD AUTO: 10.3 FL (ref 9.4–12.3)
POTASSIUM SERPL-SCNC: 3.6 MMOL/L (ref 3.5–5.1)
PROT SERPL-MCNC: 6.9 G/DL (ref 6.3–8.2)
PROT UR QL: 100 MG/DL
RBC # BLD AUTO: 5.28 M/UL (ref 4.23–5.6)
RBC # UR STRIP: NEGATIVE
SERVICE CMNT-IMP: ABNORMAL
SODIUM SERPL-SCNC: 144 MMOL/L (ref 136–145)
SP GR UR: 1.02 (ref 1–1.02)
TROPONIN T SERPL HS-MCNC: 27 NG/L (ref 0–22)
TROPONIN T SERPL HS-MCNC: 32 NG/L (ref 0–22)
TSH W FREE THYROID IF ABNORMAL: 1.77 UIU/ML (ref 0.27–4.2)
UROBILINOGEN UR QL: 2 EU/DL (ref 0.2–1)
VIT B12 SERPL-MCNC: 1282 PG/ML (ref 193–986)
WBC # BLD AUTO: 9.6 K/UL (ref 4.3–11.1)

## 2024-10-31 PROCEDURE — 6360000004 HC RX CONTRAST MEDICATION: Performed by: EMERGENCY MEDICINE

## 2024-10-31 PROCEDURE — 1100000000 HC RM PRIVATE

## 2024-10-31 PROCEDURE — 83690 ASSAY OF LIPASE: CPT

## 2024-10-31 PROCEDURE — 71046 X-RAY EXAM CHEST 2 VIEWS: CPT

## 2024-10-31 PROCEDURE — 82607 VITAMIN B-12: CPT

## 2024-10-31 PROCEDURE — 84443 ASSAY THYROID STIM HORMONE: CPT

## 2024-10-31 PROCEDURE — 82550 ASSAY OF CK (CPK): CPT

## 2024-10-31 PROCEDURE — 82140 ASSAY OF AMMONIA: CPT

## 2024-10-31 PROCEDURE — 96365 THER/PROPH/DIAG IV INF INIT: CPT

## 2024-10-31 PROCEDURE — 80307 DRUG TEST PRSMV CHEM ANLYZR: CPT

## 2024-10-31 PROCEDURE — 71045 X-RAY EXAM CHEST 1 VIEW: CPT

## 2024-10-31 PROCEDURE — 36415 COLL VENOUS BLD VENIPUNCTURE: CPT

## 2024-10-31 PROCEDURE — 82077 ASSAY SPEC XCP UR&BREATH IA: CPT

## 2024-10-31 PROCEDURE — 6360000002 HC RX W HCPCS: Performed by: EMERGENCY MEDICINE

## 2024-10-31 PROCEDURE — 99285 EMERGENCY DEPT VISIT HI MDM: CPT

## 2024-10-31 PROCEDURE — 85027 COMPLETE CBC AUTOMATED: CPT

## 2024-10-31 PROCEDURE — 93005 ELECTROCARDIOGRAM TRACING: CPT | Performed by: EMERGENCY MEDICINE

## 2024-10-31 PROCEDURE — 87040 BLOOD CULTURE FOR BACTERIA: CPT

## 2024-10-31 PROCEDURE — 81003 URINALYSIS AUTO W/O SCOPE: CPT

## 2024-10-31 PROCEDURE — 2580000003 HC RX 258: Performed by: EMERGENCY MEDICINE

## 2024-10-31 PROCEDURE — 80053 COMPREHEN METABOLIC PANEL: CPT

## 2024-10-31 PROCEDURE — 96375 TX/PRO/DX INJ NEW DRUG ADDON: CPT

## 2024-10-31 PROCEDURE — 74177 CT ABD & PELVIS W/CONTRAST: CPT

## 2024-10-31 PROCEDURE — 84484 ASSAY OF TROPONIN QUANT: CPT

## 2024-10-31 PROCEDURE — 82746 ASSAY OF FOLIC ACID SERUM: CPT

## 2024-10-31 PROCEDURE — 70450 CT HEAD/BRAIN W/O DYE: CPT

## 2024-10-31 RX ORDER — IOPAMIDOL 755 MG/ML
100 INJECTION, SOLUTION INTRAVASCULAR
Status: COMPLETED | OUTPATIENT
Start: 2024-10-31 | End: 2024-10-31

## 2024-10-31 RX ORDER — BUDESONIDE AND FORMOTEROL FUMARATE DIHYDRATE 160; 4.5 UG/1; UG/1
2 AEROSOL RESPIRATORY (INHALATION) 2 TIMES DAILY
Status: ON HOLD | COMMUNITY
End: 2024-11-12 | Stop reason: HOSPADM

## 2024-10-31 RX ADMIN — AZITHROMYCIN MONOHYDRATE 500 MG: 500 INJECTION, POWDER, LYOPHILIZED, FOR SOLUTION INTRAVENOUS at 21:02

## 2024-10-31 RX ADMIN — WATER 1000 MG: 1 INJECTION INTRAMUSCULAR; INTRAVENOUS; SUBCUTANEOUS at 20:58

## 2024-10-31 RX ADMIN — IOPAMIDOL 100 ML: 755 INJECTION, SOLUTION INTRAVENOUS at 18:56

## 2024-10-31 ASSESSMENT — PAIN DESCRIPTION - LOCATION: LOCATION: ABDOMEN

## 2024-10-31 ASSESSMENT — PAIN DESCRIPTION - DESCRIPTORS: DESCRIPTORS: ACHING

## 2024-10-31 ASSESSMENT — PAIN SCALES - GENERAL: PAINLEVEL_OUTOF10: 10

## 2024-10-31 ASSESSMENT — LIFESTYLE VARIABLES
HOW MANY STANDARD DRINKS CONTAINING ALCOHOL DO YOU HAVE ON A TYPICAL DAY: PATIENT DOES NOT DRINK
HOW OFTEN DO YOU HAVE A DRINK CONTAINING ALCOHOL: NEVER

## 2024-10-31 ASSESSMENT — PAIN - FUNCTIONAL ASSESSMENT: PAIN_FUNCTIONAL_ASSESSMENT: 0-10

## 2024-10-31 NOTE — ED TRIAGE NOTES
Coming from home via EMS. Family states patient hasn't been able to move for a while. Patients complaint of abdominal pain that radiates to back. Family states patient has been incontinent for past week. Hasn't been able to ambulate or stand up due to the pain.

## 2024-10-31 NOTE — ED PROVIDER NOTES
Emergency Department Provider Note       PCP: Unknown, Unknown (Inactive)   Age: 79 y.o.   Sex: male     DISPOSITION Admitted 10/31/2024 09:19:28 PM            ICD-10-CM    1. Pneumonia of right lower lobe due to infectious organism  J18.9       2. Metabolic encephalopathy  G93.41       3. Weakness of both lower extremities  R29.898       4. Generalized abdominal pain  R10.84       5. Constipation, unspecified constipation type  K59.00           Medical Decision Making     Patient presented with generalized weakness and had been stuck in a chair for a week.  He seemed confused and likely has encephalopathy.  CT scan imaging was negative.  Neuroexam was nonfocal other than bilateral moderate lower extremity weakness with intact patellar reflexes.  I could not obtain ankle reflexes but patient could not get into a position where I could effectively check them.  Laboratory evaluation was reasonable.  There was no CK elevation consistent with rhabdo or acute kidney injury.  Chest x-ray was concerning for right lower lobe infiltrate and blood cultures were obtained and antibiotics provided.  Patient did not have criteria for sepsis.  He complained of abdominal pain as well but CT showed only constipation     1 or more acute illnesses that pose a threat to life or bodily function.       I independently ordered and reviewed each unique test.  I reviewed external records: ED visit note from an outside group.  I reviewed external records: provider visit note from outside specialist.  I reviewed external records: previous EKG including cardiologist interpretation.     The patients assessment required an independent historian: EMS.  The reason they were needed is  an altered level of consciousness and important historical information not provided by the patient.  I interpreted the X-rays right lower lobe infiltrate versus atelectasis.  I interpreted the CT Scan chronic ischemic changes, no obvious mass or infarct.  My  9.0      Protein, Urine,  (A) NEG mg/dL    Glucose, UA POC Negative NEG mg/dL    Ketones, Urine, POC Negative NEG mg/dL    Bilirubin, Urine, POC SMALL (A) NEG      Blood, UA POC Negative NEG      URINE UROBILINOGEN POC 2.0 (H) 0.2 - 1.0 EU/dL    Nitrite, Urine, POC Negative NEG      Leukocyte Est, UA POC Negative NEG      Performed by: Ivan Abraham    EKG 12 Lead   Result Value Ref Range    Ventricular Rate 70 BPM    Atrial Rate 70 BPM    P-R Interval 169 ms    QRS Duration 96 ms    Q-T Interval 381 ms    QTc Calculation (Bazett) 412 ms    P Axis 63 degrees    R Axis 31 degrees    T Axis 218 degrees    Diagnosis       Sinus rhythm  Abnormal R-wave progression, early transition  LVH with secondary repolarization abnormality           XR CHEST (2 VW)   Final Result   Right basal infiltrate. Long-term follow-up is advised.         Electronically signed by Mukesh Reid      CT HEAD WO CONTRAST   Final Result   No CT evidence of acute intracranial abnormality.      Electronically signed by [employer]      CT ABDOMEN PELVIS W IV CONTRAST Additional Contrast? None   Final Result      Stool-filled colon seen in the setting of constipation.      If providers have any questions about this report, I can be reached on   PerfectServe.         Electronically signed by Alvaro Galo      XR CHEST PORTABLE   Final Result   Significant rotation of the patient accentuates the   cardiomediastinal silhouette. Increased opacity at the right lung base is   present. An acute infiltrate cannot be excluded. Follow-up with PA and lateral   views of the chest.         Electronically signed by Mukesh Reid                   No results for input(s): \"COVID19\" in the last 72 hours.    Voice dictation software was used during the making of this note.  This software is not perfect and grammatical and other typographical errors may be present.  This note has not been completely proofread for errors.     Romel Lloyd,

## 2024-11-01 PROBLEM — N40.0 BPH (BENIGN PROSTATIC HYPERPLASIA): Status: ACTIVE | Noted: 2024-11-01

## 2024-11-01 PROBLEM — Z87.39 HISTORY OF GOUT: Status: ACTIVE | Noted: 2024-11-01

## 2024-11-01 LAB
25(OH)D3 SERPL-MCNC: 33 NG/ML (ref 30–100)
ANION GAP SERPL CALC-SCNC: 12 MMOL/L (ref 7–16)
BASOPHILS # BLD: 0.1 K/UL (ref 0–0.2)
BASOPHILS NFR BLD: 1 % (ref 0–2)
BUN SERPL-MCNC: 21 MG/DL (ref 8–23)
CALCIUM SERPL-MCNC: 9.4 MG/DL (ref 8.8–10.2)
CHLORIDE SERPL-SCNC: 110 MMOL/L (ref 98–107)
CO2 SERPL-SCNC: 22 MMOL/L (ref 20–29)
CREAT SERPL-MCNC: 0.94 MG/DL (ref 0.8–1.3)
DIFFERENTIAL METHOD BLD: NORMAL
EKG ATRIAL RATE: 70 BPM
EKG DIAGNOSIS: NORMAL
EKG P AXIS: 63 DEGREES
EKG P-R INTERVAL: 169 MS
EKG Q-T INTERVAL: 381 MS
EKG QRS DURATION: 96 MS
EKG QTC CALCULATION (BAZETT): 412 MS
EKG R AXIS: 31 DEGREES
EKG T AXIS: 218 DEGREES
EKG VENTRICULAR RATE: 70 BPM
EOSINOPHIL # BLD: 0.2 K/UL (ref 0–0.8)
EOSINOPHIL NFR BLD: 2 % (ref 0.5–7.8)
ERYTHROCYTE [DISTWIDTH] IN BLOOD BY AUTOMATED COUNT: 14.6 % (ref 11.9–14.6)
GLUCOSE SERPL-MCNC: 90 MG/DL (ref 70–99)
HCT VFR BLD AUTO: 45.5 % (ref 41.1–50.3)
HGB BLD-MCNC: 15.1 G/DL (ref 13.6–17.2)
IMM GRANULOCYTES # BLD AUTO: 0 K/UL (ref 0–0.5)
IMM GRANULOCYTES NFR BLD AUTO: 0 % (ref 0–5)
LYMPHOCYTES # BLD: 1.8 K/UL (ref 0.5–4.6)
LYMPHOCYTES NFR BLD: 20 % (ref 13–44)
MAGNESIUM SERPL-MCNC: 1.9 MG/DL (ref 1.8–2.4)
MCH RBC QN AUTO: 30.1 PG (ref 26.1–32.9)
MCHC RBC AUTO-ENTMCNC: 33.2 G/DL (ref 31.4–35)
MCV RBC AUTO: 90.8 FL (ref 82–102)
MONOCYTES # BLD: 0.8 K/UL (ref 0.1–1.3)
MONOCYTES NFR BLD: 9 % (ref 4–12)
NEUTS SEG # BLD: 6 K/UL (ref 1.7–8.2)
NEUTS SEG NFR BLD: 68 % (ref 43–78)
NRBC # BLD: 0 K/UL (ref 0–0.2)
PHOSPHATE SERPL-MCNC: 2.6 MG/DL (ref 2.5–4.5)
PLATELET # BLD AUTO: 228 K/UL (ref 150–450)
PMV BLD AUTO: 10.5 FL (ref 9.4–12.3)
POTASSIUM SERPL-SCNC: 3.4 MMOL/L (ref 3.5–5.1)
RBC # BLD AUTO: 5.01 M/UL (ref 4.23–5.6)
SODIUM SERPL-SCNC: 143 MMOL/L (ref 136–145)
URATE SERPL-MCNC: 5.2 MG/DL (ref 3.9–8.2)
WBC # BLD AUTO: 8.8 K/UL (ref 4.3–11.1)

## 2024-11-01 PROCEDURE — 94640 AIRWAY INHALATION TREATMENT: CPT

## 2024-11-01 PROCEDURE — 82306 VITAMIN D 25 HYDROXY: CPT

## 2024-11-01 PROCEDURE — 97165 OT EVAL LOW COMPLEX 30 MIN: CPT

## 2024-11-01 PROCEDURE — 80048 BASIC METABOLIC PNL TOTAL CA: CPT

## 2024-11-01 PROCEDURE — 6370000000 HC RX 637 (ALT 250 FOR IP)

## 2024-11-01 PROCEDURE — 97530 THERAPEUTIC ACTIVITIES: CPT

## 2024-11-01 PROCEDURE — 6360000002 HC RX W HCPCS

## 2024-11-01 PROCEDURE — 84550 ASSAY OF BLOOD/URIC ACID: CPT

## 2024-11-01 PROCEDURE — 6370000000 HC RX 637 (ALT 250 FOR IP): Performed by: STUDENT IN AN ORGANIZED HEALTH CARE EDUCATION/TRAINING PROGRAM

## 2024-11-01 PROCEDURE — 94760 N-INVAS EAR/PLS OXIMETRY 1: CPT

## 2024-11-01 PROCEDURE — 93010 ELECTROCARDIOGRAM REPORT: CPT | Performed by: INTERNAL MEDICINE

## 2024-11-01 PROCEDURE — 1100000000 HC RM PRIVATE

## 2024-11-01 PROCEDURE — 84100 ASSAY OF PHOSPHORUS: CPT

## 2024-11-01 PROCEDURE — 51798 US URINE CAPACITY MEASURE: CPT

## 2024-11-01 PROCEDURE — 97161 PT EVAL LOW COMPLEX 20 MIN: CPT

## 2024-11-01 PROCEDURE — 83735 ASSAY OF MAGNESIUM: CPT

## 2024-11-01 PROCEDURE — 85025 COMPLETE CBC W/AUTO DIFF WBC: CPT

## 2024-11-01 PROCEDURE — 92610 EVALUATE SWALLOWING FUNCTION: CPT

## 2024-11-01 PROCEDURE — 36415 COLL VENOUS BLD VENIPUNCTURE: CPT

## 2024-11-01 PROCEDURE — 2580000003 HC RX 258

## 2024-11-01 PROCEDURE — 97535 SELF CARE MNGMENT TRAINING: CPT

## 2024-11-01 RX ORDER — ACETAMINOPHEN 650 MG/1
650 SUPPOSITORY RECTAL EVERY 6 HOURS PRN
Status: DISCONTINUED | OUTPATIENT
Start: 2024-11-01 | End: 2024-11-13 | Stop reason: HOSPADM

## 2024-11-01 RX ORDER — SODIUM CHLORIDE 0.9 % (FLUSH) 0.9 %
5-40 SYRINGE (ML) INJECTION PRN
Status: DISCONTINUED | OUTPATIENT
Start: 2024-11-01 | End: 2024-11-13 | Stop reason: HOSPADM

## 2024-11-01 RX ORDER — OLANZAPINE 2.5 MG/1
2.5 TABLET, FILM COATED ORAL NIGHTLY
Status: DISCONTINUED | OUTPATIENT
Start: 2024-11-01 | End: 2024-11-02

## 2024-11-01 RX ORDER — ASPIRIN 81 MG/1
81 TABLET ORAL DAILY
Status: DISCONTINUED | OUTPATIENT
Start: 2024-11-01 | End: 2024-11-13 | Stop reason: HOSPADM

## 2024-11-01 RX ORDER — POLYETHYLENE GLYCOL 3350 17 G/17G
17 POWDER, FOR SOLUTION ORAL DAILY
Status: DISCONTINUED | OUTPATIENT
Start: 2024-11-01 | End: 2024-11-11

## 2024-11-01 RX ORDER — ALLOPURINOL 100 MG/1
300 TABLET ORAL DAILY
Status: DISCONTINUED | OUTPATIENT
Start: 2024-11-01 | End: 2024-11-13 | Stop reason: HOSPADM

## 2024-11-01 RX ORDER — MAGNESIUM HYDROXIDE/ALUMINUM HYDROXICE/SIMETHICONE 120; 1200; 1200 MG/30ML; MG/30ML; MG/30ML
30 SUSPENSION ORAL EVERY 6 HOURS PRN
Status: DISCONTINUED | OUTPATIENT
Start: 2024-11-01 | End: 2024-11-13 | Stop reason: HOSPADM

## 2024-11-01 RX ORDER — FOLIC ACID 1 MG/1
1 TABLET ORAL DAILY
Status: DISCONTINUED | OUTPATIENT
Start: 2024-11-01 | End: 2024-11-11

## 2024-11-01 RX ORDER — ATORVASTATIN CALCIUM 40 MG/1
40 TABLET, FILM COATED ORAL NIGHTLY
Status: DISCONTINUED | OUTPATIENT
Start: 2024-11-01 | End: 2024-11-11

## 2024-11-01 RX ORDER — POTASSIUM CHLORIDE 1500 MG/1
40 TABLET, EXTENDED RELEASE ORAL PRN
Status: DISCONTINUED | OUTPATIENT
Start: 2024-11-01 | End: 2024-11-13 | Stop reason: HOSPADM

## 2024-11-01 RX ORDER — BISACODYL 5 MG/1
5 TABLET, DELAYED RELEASE ORAL DAILY
Status: DISCONTINUED | OUTPATIENT
Start: 2024-11-01 | End: 2024-11-11

## 2024-11-01 RX ORDER — MAGNESIUM SULFATE IN WATER 40 MG/ML
2000 INJECTION, SOLUTION INTRAVENOUS PRN
Status: DISCONTINUED | OUTPATIENT
Start: 2024-11-01 | End: 2024-11-13 | Stop reason: HOSPADM

## 2024-11-01 RX ORDER — ALBUTEROL SULFATE 0.83 MG/ML
2.5 SOLUTION RESPIRATORY (INHALATION) EVERY 6 HOURS PRN
Status: DISCONTINUED | OUTPATIENT
Start: 2024-11-01 | End: 2024-11-13 | Stop reason: HOSPADM

## 2024-11-01 RX ORDER — TAMSULOSIN HYDROCHLORIDE 0.4 MG/1
0.4 CAPSULE ORAL DAILY
COMMUNITY

## 2024-11-01 RX ORDER — SODIUM CHLORIDE 0.9 % (FLUSH) 0.9 %
5-40 SYRINGE (ML) INJECTION EVERY 12 HOURS SCHEDULED
Status: DISCONTINUED | OUTPATIENT
Start: 2024-11-01 | End: 2024-11-13 | Stop reason: HOSPADM

## 2024-11-01 RX ORDER — DILTIAZEM HYDROCHLORIDE 120 MG/1
120 CAPSULE, COATED, EXTENDED RELEASE ORAL DAILY
Status: DISCONTINUED | OUTPATIENT
Start: 2024-11-01 | End: 2024-11-11

## 2024-11-01 RX ORDER — ONDANSETRON 4 MG/1
4 TABLET, ORALLY DISINTEGRATING ORAL EVERY 8 HOURS PRN
Status: DISCONTINUED | OUTPATIENT
Start: 2024-11-01 | End: 2024-11-13 | Stop reason: HOSPADM

## 2024-11-01 RX ORDER — ONDANSETRON 2 MG/ML
4 INJECTION INTRAMUSCULAR; INTRAVENOUS EVERY 6 HOURS PRN
Status: DISCONTINUED | OUTPATIENT
Start: 2024-11-01 | End: 2024-11-13 | Stop reason: HOSPADM

## 2024-11-01 RX ORDER — BISACODYL 10 MG
10 SUPPOSITORY, RECTAL RECTAL DAILY PRN
Status: DISCONTINUED | OUTPATIENT
Start: 2024-11-01 | End: 2024-11-13 | Stop reason: HOSPADM

## 2024-11-01 RX ORDER — SODIUM CHLORIDE 9 MG/ML
INJECTION, SOLUTION INTRAVENOUS PRN
Status: DISCONTINUED | OUTPATIENT
Start: 2024-11-01 | End: 2024-11-13 | Stop reason: HOSPADM

## 2024-11-01 RX ORDER — ACETAMINOPHEN 325 MG/1
650 TABLET ORAL EVERY 6 HOURS PRN
Status: DISCONTINUED | OUTPATIENT
Start: 2024-11-01 | End: 2024-11-13 | Stop reason: HOSPADM

## 2024-11-01 RX ORDER — TAMSULOSIN HYDROCHLORIDE 0.4 MG/1
0.4 CAPSULE ORAL DAILY
Status: DISCONTINUED | OUTPATIENT
Start: 2024-11-01 | End: 2024-11-13 | Stop reason: HOSPADM

## 2024-11-01 RX ORDER — POTASSIUM CHLORIDE 7.45 MG/ML
10 INJECTION INTRAVENOUS PRN
Status: DISCONTINUED | OUTPATIENT
Start: 2024-11-01 | End: 2024-11-13 | Stop reason: HOSPADM

## 2024-11-01 RX ORDER — LOSARTAN POTASSIUM 50 MG/1
50 TABLET ORAL DAILY
Status: DISCONTINUED | OUTPATIENT
Start: 2024-11-01 | End: 2024-11-12

## 2024-11-01 RX ORDER — ALBUTEROL SULFATE 90 UG/1
2 INHALANT RESPIRATORY (INHALATION) EVERY 6 HOURS PRN
Status: DISCONTINUED | OUTPATIENT
Start: 2024-11-01 | End: 2024-11-01 | Stop reason: CLARIF

## 2024-11-01 RX ORDER — LANOLIN ALCOHOL/MO/W.PET/CERES
100 CREAM (GRAM) TOPICAL DAILY
Status: DISCONTINUED | OUTPATIENT
Start: 2024-11-01 | End: 2024-11-11

## 2024-11-01 RX ORDER — ENOXAPARIN SODIUM 100 MG/ML
40 INJECTION SUBCUTANEOUS DAILY
Status: DISCONTINUED | OUTPATIENT
Start: 2024-11-01 | End: 2024-11-11

## 2024-11-01 RX ADMIN — SODIUM CHLORIDE, PRESERVATIVE FREE 10 ML: 5 INJECTION INTRAVENOUS at 09:14

## 2024-11-01 RX ADMIN — BISACODYL 5 MG: 5 TABLET, COATED ORAL at 15:45

## 2024-11-01 RX ADMIN — ALLOPURINOL 300 MG: 100 TABLET ORAL at 10:22

## 2024-11-01 RX ADMIN — Medication 100 MG: at 10:23

## 2024-11-01 RX ADMIN — ARFORMOTEROL TARTRATE: 15 SOLUTION RESPIRATORY (INHALATION) at 19:32

## 2024-11-01 RX ADMIN — TAMSULOSIN HYDROCHLORIDE 0.4 MG: 0.4 CAPSULE ORAL at 10:22

## 2024-11-01 RX ADMIN — LOSARTAN POTASSIUM 50 MG: 50 TABLET, FILM COATED ORAL at 10:22

## 2024-11-01 RX ADMIN — OLANZAPINE 2.5 MG: 2.5 TABLET, FILM COATED ORAL at 20:47

## 2024-11-01 RX ADMIN — FOLIC ACID 1 MG: 1 TABLET ORAL at 10:23

## 2024-11-01 RX ADMIN — ASPIRIN 81 MG: 81 TABLET, COATED ORAL at 10:22

## 2024-11-01 RX ADMIN — SODIUM CHLORIDE, PRESERVATIVE FREE 10 ML: 5 INJECTION INTRAVENOUS at 20:47

## 2024-11-01 RX ADMIN — ARFORMOTEROL TARTRATE: 15 SOLUTION RESPIRATORY (INHALATION) at 08:08

## 2024-11-01 RX ADMIN — ATORVASTATIN CALCIUM 40 MG: 40 TABLET, FILM COATED ORAL at 20:47

## 2024-11-01 RX ADMIN — DILTIAZEM HYDROCHLORIDE 120 MG: 120 CAPSULE, COATED, EXTENDED RELEASE ORAL at 10:37

## 2024-11-01 RX ADMIN — SODIUM CHLORIDE, PRESERVATIVE FREE 10 ML: 5 INJECTION INTRAVENOUS at 09:11

## 2024-11-01 RX ADMIN — Medication 3 MG: at 20:47

## 2024-11-01 ASSESSMENT — PAIN SCALES - GENERAL
PAINLEVEL_OUTOF10: 2
PAINLEVEL_OUTOF10: 10

## 2024-11-01 ASSESSMENT — PAIN DESCRIPTION - ONSET: ONSET: ON-GOING

## 2024-11-01 ASSESSMENT — PAIN - FUNCTIONAL ASSESSMENT: PAIN_FUNCTIONAL_ASSESSMENT: PREVENTS OR INTERFERES WITH ALL ACTIVE AND SOME PASSIVE ACTIVITIES

## 2024-11-01 ASSESSMENT — PAIN DESCRIPTION - LOCATION: LOCATION: ABDOMEN;BACK

## 2024-11-01 ASSESSMENT — PAIN DESCRIPTION - PAIN TYPE: TYPE: ACUTE PAIN

## 2024-11-01 ASSESSMENT — PAIN DESCRIPTION - FREQUENCY: FREQUENCY: CONTINUOUS

## 2024-11-01 ASSESSMENT — PAIN DESCRIPTION - DESCRIPTORS: DESCRIPTORS: ACHING;SORE

## 2024-11-01 NOTE — PROGRESS NOTES
Hospitalist Progress Note   Admit Date:  10/31/2024  5:15 PM   Name:  Seth Smith   Age:  79 y.o.  Sex:  male  :  1945   MRN:  [id number]   Room:  [facility]/    Presenting/Chief Complaint: Abdominal Pain     Reason(s) for Admission: Metabolic encephalopathy [G93.41]  Generalized abdominal pain [R10.84]  Weakness of both lower extremities [R29.898]  Constipation, unspecified constipation type [K59.00]  Pneumonia of right lower lobe due to infectious organism [J18.9]  Acute metabolic encephalopathy [G93.41]     Hospital Course:   Seth Smith is a 79 y.o. male with medical history of dementia, COPD, who presented with lower extremity weakness.  Also had AMS.      Subjective & 24hr Events:   No acute overnight events.    Alert and oriented x 2.  His current mentation likely around baseline.  No particular issues from him.      Assessment & Plan:       Acute metabolic encephalopathy -resolved  Dementia  -Unclear etiology for AMS, but currently resolved and back to his baseline  -Ammonia, TSH, B12, folate, UDS, ethanol levels all unremarkable  -He gets agitated for PT.  Will order Zyprexa      Weakness of bilateral lower extremities  -Magnesium levels within normal limits  -Likely due to decreased movement in the setting of dementia  -Continue PT OT      Constipation  -Noted in CT abdomen  -Continue daily MiraLAX.  Will add Dulcolax daily      Essential hypertension  -Continue losartan and diltiazem      BPH  -Continue tamsulosin      Gout  -Continue allopurinol  -Will check uric acid levels      Anticipated Discharge Arrangements:   Therapy has not evaluated yet    PT/OT evals ordered?  Therapy evals ordered  Diet:  ADULT DIET; Dysphagia - Soft and Bite Sized  VTE prophylaxis: Lovenox  Code status: Full Code      Non-peripheral Lines and Tubes (if present):          Telemetry (if present):           Hospital Problems:  Principal Problem:    Acute metabolic encephalopathy  Active Problems:      Soft, nontender, nondistended.  Extremities: No cyanosis or clubbing.  No edema  Skin:     No rashes.  Normal coloration.   Warm and dry.    Neuro:  AxO x2. CN II-XII grossly intact.    Psych:  Normal mood and affect.      I have personally reviewed labs and tests:  Recent Labs:  Recent Results (from the past 48 hour(s))   CBC    Collection Time: 10/31/24  5:22 PM   Result Value Ref Range    WBC 9.6 4.3 - 11.1 K/uL    RBC 5.28 4.23 - 5.6 M/uL    Hemoglobin 16.0 13.6 - 17.2 g/dL    Hematocrit 47.6 41.1 - 50.3 %    MCV 90.2 82 - 102 FL    MCH 30.3 26.1 - 32.9 PG    MCHC 33.6 31.4 - 35.0 g/dL    RDW 14.5 11.9 - 14.6 %    Platelets 261 150 - 450 K/uL    MPV 10.3 9.4 - 12.3 FL    nRBC 0.00 0.0 - 0.2 K/uL   Comprehensive Metabolic Panel    Collection Time: 10/31/24  5:22 PM   Result Value Ref Range    Sodium 144 136 - 145 mmol/L    Potassium 3.6 3.5 - 5.1 mmol/L    Chloride 108 (H) 98 - 107 mmol/L    CO2 24 20 - 29 mmol/L    Anion Gap 12 7 - 16 mmol/L    Glucose 147 (H) 70 - 99 mg/dL    BUN 24 (H) 8 - 23 MG/DL    Creatinine 1.23 0.80 - 1.30 MG/DL    Est, Glom Filt Rate 60 (L) >60 ml/min/1.73m2    Calcium 10.0 8.8 - 10.2 MG/DL    Total Bilirubin 0.4 0.0 - 1.2 MG/DL    ALT 16 8 - 55 U/L    AST 20 15 - 37 U/L    Alk Phosphatase 121 40 - 129 U/L    Total Protein 6.9 6.3 - 8.2 g/dL    Albumin 3.6 3.2 - 4.6 g/dL    Globulin 3.3 2.3 - 3.5 g/dL    Albumin/Globulin Ratio 1.1 1.0 - 1.9     Lipase    Collection Time: 10/31/24  5:22 PM   Result Value Ref Range    Lipase 34 13 - 60 U/L   Ethanol    Collection Time: 10/31/24  5:22 PM   Result Value Ref Range    Ethanol Lvl <11 MG/DL   CK    Collection Time: 10/31/24  5:22 PM   Result Value Ref Range    Total CK 69 21 - 215 U/L   Troponin    Collection Time: 10/31/24  5:22 PM   Result Value Ref Range    Troponin T 32.0 (H) 0 - 22 ng/L   Ammonia    Collection Time: 10/31/24  5:48 PM   Result Value Ref Range    Ammonia 19 16 - 60 umol/L   Drug Screen Psych, Urine    Collection Time:

## 2024-11-01 NOTE — PROGRESS NOTES
ACUTE PHYSICAL THERAPY GOALS:   (Developed with and agreed upon by patient and/or caregiver.)  LTG:  (1.)Mr. Smith will move from supine to sit and sit to supine , scoot up and down, and roll side to side in bed with STAND BY ASSIST within 7 treatment day(s).    (2.)Mr. Smith will transfer from bed to chair and chair to bed with MINIMAL ASSIST using the least restrictive device within 7 treatment day(s).    (3.)Mr. Smith will ambulate with MINIMAL ASSIST for 50+ feet with the least restrictive device within 7 treatment day(s).  (4.)Mr. Smith will tolerate 23+ minutes of therapeutic activity within 7 treatment days for increased activity tolerance and overall functional mobility.  ________________________________________________________________________________________________     PHYSICAL THERAPY Initial Assessment and AM  (Link to Caseload Tracking: PT Visit Days : 1  Acknowledge Orders  Time In/Out  PT Charge Capture  Rehab Caseload Tracker    Seth Smith is a 79 y.o. male   PRIMARY DIAGNOSIS: Acute metabolic encephalopathy  Metabolic encephalopathy [G93.41]  Generalized abdominal pain [R10.84]  Weakness of both lower extremities [R29.898]  Constipation, unspecified constipation type [K59.00]  Pneumonia of right lower lobe due to infectious organism [J18.9]  Acute metabolic encephalopathy [G93.41]       Reason for Referral: Generalized Muscle Weakness (M62.81)  Difficulty in walking, Not elsewhere classified (R26.2)  Other abnormalities of gait and mobility (R26.89)  History of falling (Z91.81)  Inpatient: Payor: [employer] / Plan: [employer] / Product Type: *No Product type* /     ASSESSMENT:     REHAB RECOMMENDATIONS:   Recommendation to date pending progress:  Setting:  Short-term Rehab    Equipment:    To Be Determined     ASSESSMENT:  Mr. Smith presents to hospital from home with decreased ability to walk, B LE weakness, abdominal pain. Pt with fall on 10/22 with

## 2024-11-01 NOTE — PROGRESS NOTES
TRANSFER - IN REPORT:    Verbal report received from TRAVIS Murray  on Seth Smith  being received from [facility] for routine progression of patient care      Report consisted of patient's Situation, Background, Assessment and   Recommendations(SBAR).     Information from the following report(s) Nurse Handoff Report was reviewed with the receiving nurse.    Opportunity for questions and clarification was provided.      Assessment completed upon patient's arrival to unit and care assumed.

## 2024-11-01 NOTE — PROGRESS NOTES
GOALS:  LTG: Patient will maintain adequate hydration/nutrition with optimum safety and efficiency of swallowing function with PO intake without overt signs or symptoms of aspiration for the highest appropriate diet level.  STG:  Patient will consume soft and bite-sized textures and thin liquids without overt signs or symptoms of airway compromise.  Patient will safely ingest diet trials during therapeutic feedings with SLP without overt signs or symptoms of respiratory compromise in efforts to advance diet.    SPEECH LANGUAGE PATHOLOGY: DYSPHAGIA Initial Assessment    Acknowledge Order  I  Therapy Time  I   Charges     I  Rehab Caseload Tracker      NAME: Seth Smith  : 1945  MRN: [id number]    ADMISSION DATE: 10/31/2024  PRIMARY DIAGNOSIS: Acute metabolic encephalopathy    ICD-10: Treatment Diagnosis: R13.11 Dysphagia, Oral Phase    RECOMMENDATIONS   Diet:    Soft and Bite-Sized  Thin Liquids    Medication: as tolerated   Compensatory Swallowing Strategies:   Slow rate of intake  Upright as possible for all oral intake   Therapeutic Intervention:   Patient/family education  Dysphagia treatment   Patient continues to require skilled intervention:  Yes. Recommend ongoing speech therapy services during this hospitalization.     Anticipated Discharge Needs: Do not anticipate ongoing speech therapy needs upon discharge.      ASSESSMENT    Patient presents with increased risk for dysphagia due to impaired mentation and impulsivity. Oral and pharyngeal swallow function appeared WNL as can be determined at bedside. However, he expresses preference to continue with soft bite sized diet as was ordered at admission.     Recommend continue with current diet per patient's request. Will follow up for diet tolerance and possible advancement if patient prefers.     GENERAL    Subjective: Patient oriented to person. Confused and agitated. Reports he has purchased the hospital and \"I am your boss now!\".     Recent

## 2024-11-01 NOTE — ED NOTES
TRANSFER - OUT REPORT:    Verbal report given to Azucena ROBLES on Seth Smith  being transferred to [facility] for routine progression of patient care       Report consisted of patient's Situation, Background, Assessment and   Recommendations(SBAR).     Information from the following report(s) Nurse Handoff Report was reviewed with the receiving nurse.    Charmaine Fall Assessment:    Presents to emergency department  because of falls (Syncope, seizure, or loss of consciousness): Yes  Age > 70: Yes  Altered Mental Status, Intoxication with alcohol or substance confusion (Disorientation, impaired judgment, poor safety awaremess, or inability to follow instructions): No  Impaired Mobility: Ambulates or transfers with assistive devices or assistance; Unable to ambulate or transer.: Yes             Lines:   Peripheral IV 10/31/24 Distal;Right Cephalic (Active)       Peripheral IV 10/31/24 Distal;Right Forearm (Active)   Site Assessment Clean, dry & intact 10/31/24 2250   Line Status Blood return noted 10/31/24 2250   Phlebitis Assessment No symptoms 10/31/24 2250   Infiltration Assessment 0 10/31/24 2250        Opportunity for questions and clarification was provided.      Patient transported with:  Monitor           Jose Rafael Tavarez RN  11/01/24 0155

## 2024-11-01 NOTE — H&P
Hospitalist History and Physical   Admit Date:  10/31/2024  5:15 PM   Name:  Seth Smith   Age:  79 y.o.  Sex:  male  :  1945   MRN:  [id number]   Room:  ER/    Presenting/Chief Complaint: Abdominal Pain     Reason(s) for Admission: Acute metabolic encephalopathy [G93.41]     History of Present Illness:   Seth Smith is a 79 y.o. male with medical history of dementia, hypertension, COPD, tobacco use disorder who presented via EMS with lower extremity weakness.  The patient is a poor historian, so majority of the history was obtained via chart review and talking with the patient's friend Ivett.  The patient initially presented to [place] on 10/22 following a fall.  Hip x-ray negative for acute fracture, but there is possible osteoporotic compression fracture at T12.  The patient's friend says that since that time the patient has remained in a recliner.  He does not want to get up and move around.  He will urinate in the bed/recliner, but he is not incontinent, he just does not want to get up.  In talking with the patient, he says he has pain in his abdomen that comes and goes.  Primarily notices it in the morning.  No vomiting or diarrhea.  Says he has not had a bowel movement in a few days.  Also has had decreased appetite.  He denies choking/difficulty swallowing liquids and solids.  He reports compliance with his medications.  I confirmed his medication list with the patient's friend, Ivett.  In the ER, patient noted to be altered with lower extremity weakness and possible right lower lobe infiltrate on chest x-ray.  He was given a dose of Rocephin and azithromycin for possible CAP.  Given these findings, hospitalist service consulted for admission.    Assessment & Plan:     Acute metabolic encephalopathy  Dementia  - Unclear etiology at this time.  CT head unremarkable.  Electrolytes relatively unremarkable.  Ammonia normal.  It is possible this is the patient's baseline, but  report, I can be reached on [employer]. Electronically signed by Alvaro Galo    XR CHEST (2 VW)    Result Date: 10/31/2024  Chest X-ray INDICATION: Rotated portable chest-infiltrate? COMPARISON:  None TECHNIQUE: PA and lateral views of the chest were obtained. FINDINGS: Minimal right lung base infiltrate. Mild to moderate cardiomegaly. Diffuse interstitial changes. Ectatic, elongated and atherosclerotic aorta. . The bony thorax is intact.      Right basal infiltrate. Long-term follow-up is advised. Electronically signed by Mukesh Reid    CT HEAD WO CONTRAST    Result Date: 10/31/2024  Head CT INDICATION: Altered mental status TECHNIQUE: Multiple 2D axial images obtained through the brain without intravenous contrast.  Radiation dose reduction techniques were used for this study:  All CT scans performed at this facility use one or all of the following: Automated exposure control, adjustment of the mA and/or kVp according to patient's size, iterative reconstruction. COMPARISON: 10/4/2020 FINDINGS: Age-related cerebral atrophy and chronic microvascular ischemic disease. There is no CT evidence of acute hemorrhage or infarction. The ventricles are normal in size. There are no extra-axial fluid collections. No masses are seen. The sinuses are clear. There are no bony lesions.     No CT evidence of acute intracranial abnormality. Electronically signed by Alvaro Galo    XR CHEST PORTABLE    Result Date: 10/31/2024  Chest X-ray INDICATION: ams COMPARISON: October 4, 2022 TECHNIQUE: AP/PA view of the chest was obtained. FINDINGS: Moderate rotation of the patient decreases sensitivity of evaluation, increases the cardiac silhouette and the mediastinal silhouette. Mild diffuse interstitial changes.. .  The bony thorax is intact.      Significant rotation of the patient accentuates the cardiomediastinal silhouette. Increased opacity at the right lung base is present. An acute infiltrate cannot be excluded. Follow-up with

## 2024-11-01 NOTE — PROGRESS NOTES
ACUTE OCCUPATIONAL THERAPY GOALS:   (Developed with and agreed upon by patient and/or caregiver.)  1. Patient will complete upper body bathing and dressing with min A, verbal cues, and adaptive equipment as needed.   2. Patient will complete toileting with max A, verbal cues, and adaptive equipment as needed.   3. Patient will complete functional transfers with min A, verbal cues, and adaptive equipment as needed.   4. Patient will tolerate at least 15 minutes of OT activity with less than 2 rest breaks while maintaining O2 sats >90%.   5. Patient will complete grooming in supported sitting at sink level with min A and verbal cues.  6. Patient will complete household distances with min A and adaptive equipment as needed.    Timeframe: 7 visits      OCCUPATIONAL THERAPY Initial Assessment, Daily Note, and AM       OT Visit Days: 1  Acknowledge Orders  Time  OT Charge Capture  Rehab Caseload Tracker      Falls risk    Seth Smith is a 79 y.o. male   PRIMARY DIAGNOSIS: Acute metabolic encephalopathy  Metabolic encephalopathy [G93.41]  Generalized abdominal pain [R10.84]  Weakness of both lower extremities [R29.898]  Constipation, unspecified constipation type [K59.00]  Pneumonia of right lower lobe due to infectious organism [J18.9]  Acute metabolic encephalopathy [G93.41]       Reason for Referral: Generalized Muscle Weakness (M62.81)  Other lack of cordination (R27.8)  Difficulty in walking, Not elsewhere classified (R26.2)  Other abnormalities of gait and mobility (R26.89)  History of falling (Z91.81)  Dizziness and Giddiness (R42)  Unspecified Lack of Coordination (R27.9)  Inpatient: Payor: [employer] / Plan: [employer] / Product Type: *No Product type* /     ASSESSMENT:     REHAB RECOMMENDATIONS:   Recommendation to date pending progress:  Setting:  Short-term Rehab    Equipment:    To Be Determined     ASSESSMENT:  Mr. Smith is a 80 YO right hand dominant WM admitted with decreased ability to

## 2024-11-01 NOTE — PLAN OF CARE
Problem: Respiratory - Adult  Goal: Achieves optimal ventilation and oxygenation  Outcome: Progressing  Flowsheets (Taken 11/1/2024 0810)  Achieves optimal ventilation and oxygenation:   Assess for changes in respiratory status   Assess for changes in mentation and behavior   Position to facilitate oxygenation and minimize respiratory effort   Oxygen supplementation based on oxygen saturation or arterial blood gases   Encourage broncho-pulmonary hygiene including cough, deep breathe, incentive spirometry   Assess and instruct to report shortness of breath or any respiratory difficulty   Respiratory therapy support as indicated      [FreeTextEntry1] : 66yo F with history of REM-related mild JANE, presenting  for follow up. She has not used her machine consistently. Been dealing with oral thrush since 2019. She also had PNA last year. She complains of poor quality sleep because she takes pain killers at night. She wakes up from pain intermittently. She often falls asleep without the machine and wakes up in the middle of the night and feels anxious and might not be able to fall back asleep. \par \par She had COVID in 8/20 and was hospitalized for this. She required oxygen for this and developed PEs in her right tish. She was on Eliquis, but no longer.

## 2024-11-01 NOTE — CARE COORDINATION
MSN, CM:  attempted to speak with patient this afternoon with no success.  Contacted patient's friend Ivett and all below information was gathered by her.  Patient lives alone in own condo with 12 steps for entrance.  Patient is independent with all ADL's and requires a cane for ambulation.  Patient does not receive any HH, rehab, palliative care or home oxygen currently.  Patient is retired and Ivett drives him to all appointments.  Patient was admitted for inability to move r/t abd pain that radiates to back and AMS.  CXR revealed RLL infiltrates; possible aspiration.  IV abx started.  PT/OT consulted for evaluation and recommendations.  Case Management will continue to follow for any discharge needs.       11/01/24 1310   Service Assessment   Patient Orientation Alert and Oriented;Self   Cognition Alert   History Provided By Friend   Primary Caregiver Self   Support Systems Friends/Neighbors   Patient's Healthcare Decision Maker is: Patient Declined (Legal Next of Kin Remains as Decision Maker)   PCP Verified by CM Yes   Last Visit to PCP Within last 3 months   Prior Functional Level Independent in ADLs/IADLs   Current Functional Level Other (see comment)   Can patient return to prior living arrangement Unknown at present   Ability to make needs known: Good   Family able to assist with home care needs: Yes   Would you like for me to discuss the discharge plan with any other family members/significant others, and if so, who? Yes  (friend and children)   Financial Resources  ([facility])   Community Resources None   Social/Functional History   Lives With Alone   Type of Home Condo   Home Layout One level   Home Access Stairs to enter with rails   Entrance Stairs - Number of Steps 12   Entrance Stairs - Rails Right   Bathroom Shower/Tub Tub/Shower unit   Bathroom Toilet Standard   Bathroom Equipment None   Bathroom Accessibility Accessible   Home Equipment Cane   Receives Help From Friend(s)   ADL Assistance

## 2024-11-01 NOTE — PROGRESS NOTES
Medication reconciliation completed with pt friend, Ivett Childers. Admission database completed to best of ability with her assistance.

## 2024-11-01 NOTE — PLAN OF CARE
Problem: Discharge Planning  Goal: Discharge to home or other facility with appropriate resources  Outcome: Progressing     Problem: Pain  Goal: Verbalizes/displays adequate comfort level or baseline comfort level  Outcome: Progressing     Problem: Skin/Tissue Integrity  Goal: Absence of new skin breakdown  Description: 1.  Monitor for areas of redness and/or skin breakdown  2.  Assess vascular access sites hourly  3.  Every 4-6 hours minimum:  Change oxygen saturation probe site  4.  Every 4-6 hours:  If on nasal continuous positive airway pressure, respiratory therapy assess nares and determine need for appliance change or resting period.  Outcome: Progressing     Problem: Safety - Adult  Goal: Free from fall injury  Outcome: Progressing      40 yo F with h/o DM presenting with R jaw pain and swelling. Pt states that she has a tooth extraction done 10 days ago at [facility] for recurrent tooth aches. Subsequent to this she developed persistent pan and swelling in the jaw and frequent fevers. She returned to [place] with these complaints and was started on clindamycin 3 days ago but has had no relief in her symptoms. Pain has made it difficult to full open her jaw and also eat. She denies any bleeding. In ED CT was done showing mandibular fracture. Pt denies any prior injury or trauma to her jaw.     In ED pt was given unasyn and tylenol  VS: 129/85  93  97.9  18  100% on rA

## 2024-11-01 NOTE — ED NOTES
Patient not keeping blood pressure cuff or pulse ox on. States it is hurting him. RN educated patient on importance of monitoring his O2 and BP     Ivan Abraham RN  10/31/24 9915

## 2024-11-02 LAB
ANION GAP SERPL CALC-SCNC: 9 MMOL/L (ref 7–16)
BASOPHILS # BLD: 0 K/UL (ref 0–0.2)
BASOPHILS NFR BLD: 1 % (ref 0–2)
BUN SERPL-MCNC: 13 MG/DL (ref 8–23)
CALCIUM SERPL-MCNC: 9.7 MG/DL (ref 8.8–10.2)
CHLORIDE SERPL-SCNC: 109 MMOL/L (ref 98–107)
CO2 SERPL-SCNC: 26 MMOL/L (ref 20–29)
CREAT SERPL-MCNC: 1.01 MG/DL (ref 0.8–1.3)
DIFFERENTIAL METHOD BLD: NORMAL
EOSINOPHIL # BLD: 0.2 K/UL (ref 0–0.8)
EOSINOPHIL NFR BLD: 3 % (ref 0.5–7.8)
ERYTHROCYTE [DISTWIDTH] IN BLOOD BY AUTOMATED COUNT: 14.4 % (ref 11.9–14.6)
GLUCOSE SERPL-MCNC: 93 MG/DL (ref 70–99)
HCT VFR BLD AUTO: 46.1 % (ref 41.1–50.3)
HGB BLD-MCNC: 15.2 G/DL (ref 13.6–17.2)
IMM GRANULOCYTES # BLD AUTO: 0 K/UL (ref 0–0.5)
IMM GRANULOCYTES NFR BLD AUTO: 0 % (ref 0–5)
LYMPHOCYTES # BLD: 1.4 K/UL (ref 0.5–4.6)
LYMPHOCYTES NFR BLD: 19 % (ref 13–44)
MCH RBC QN AUTO: 29.9 PG (ref 26.1–32.9)
MCHC RBC AUTO-ENTMCNC: 33 G/DL (ref 31.4–35)
MCV RBC AUTO: 90.6 FL (ref 82–102)
MONOCYTES # BLD: 0.6 K/UL (ref 0.1–1.3)
MONOCYTES NFR BLD: 8 % (ref 4–12)
NEUTS SEG # BLD: 5.1 K/UL (ref 1.7–8.2)
NEUTS SEG NFR BLD: 69 % (ref 43–78)
NRBC # BLD: 0 K/UL (ref 0–0.2)
PLATELET # BLD AUTO: 220 K/UL (ref 150–450)
PMV BLD AUTO: 9.8 FL (ref 9.4–12.3)
POTASSIUM SERPL-SCNC: 3.7 MMOL/L (ref 3.5–5.1)
RBC # BLD AUTO: 5.09 M/UL (ref 4.23–5.6)
SODIUM SERPL-SCNC: 144 MMOL/L (ref 136–145)
WBC # BLD AUTO: 7.3 K/UL (ref 4.3–11.1)

## 2024-11-02 PROCEDURE — 1100000000 HC RM PRIVATE

## 2024-11-02 PROCEDURE — 85025 COMPLETE CBC W/AUTO DIFF WBC: CPT

## 2024-11-02 PROCEDURE — 6370000000 HC RX 637 (ALT 250 FOR IP): Performed by: STUDENT IN AN ORGANIZED HEALTH CARE EDUCATION/TRAINING PROGRAM

## 2024-11-02 PROCEDURE — 6370000000 HC RX 637 (ALT 250 FOR IP)

## 2024-11-02 PROCEDURE — 2580000003 HC RX 258

## 2024-11-02 PROCEDURE — 36415 COLL VENOUS BLD VENIPUNCTURE: CPT

## 2024-11-02 PROCEDURE — 80048 BASIC METABOLIC PNL TOTAL CA: CPT

## 2024-11-02 RX ORDER — OLANZAPINE 5 MG/1
5 TABLET ORAL NIGHTLY
Status: DISCONTINUED | OUTPATIENT
Start: 2024-11-02 | End: 2024-11-04

## 2024-11-02 RX ADMIN — SODIUM CHLORIDE, PRESERVATIVE FREE 10 ML: 5 INJECTION INTRAVENOUS at 12:18

## 2024-11-02 RX ADMIN — ALLOPURINOL 300 MG: 100 TABLET ORAL at 12:13

## 2024-11-02 RX ADMIN — DILTIAZEM HYDROCHLORIDE 120 MG: 120 CAPSULE, COATED, EXTENDED RELEASE ORAL at 12:09

## 2024-11-02 RX ADMIN — LOSARTAN POTASSIUM 50 MG: 50 TABLET, FILM COATED ORAL at 12:09

## 2024-11-02 RX ADMIN — OLANZAPINE 5 MG: 5 TABLET, FILM COATED ORAL at 20:11

## 2024-11-02 RX ADMIN — TAMSULOSIN HYDROCHLORIDE 0.4 MG: 0.4 CAPSULE ORAL at 12:15

## 2024-11-02 RX ADMIN — SODIUM CHLORIDE, PRESERVATIVE FREE 10 ML: 5 INJECTION INTRAVENOUS at 20:12

## 2024-11-02 RX ADMIN — Medication 100 MG: at 12:15

## 2024-11-02 RX ADMIN — BISACODYL 5 MG: 5 TABLET, COATED ORAL at 12:15

## 2024-11-02 RX ADMIN — FOLIC ACID 1 MG: 1 TABLET ORAL at 12:15

## 2024-11-02 RX ADMIN — ASPIRIN 81 MG: 81 TABLET, COATED ORAL at 12:15

## 2024-11-02 RX ADMIN — Medication 3 MG: at 20:11

## 2024-11-02 RX ADMIN — ACETAMINOPHEN 650 MG: 325 TABLET ORAL at 14:44

## 2024-11-02 RX ADMIN — ATORVASTATIN CALCIUM 40 MG: 40 TABLET, FILM COATED ORAL at 20:11

## 2024-11-02 RX ADMIN — POLYETHYLENE GLYCOL 3350 17 G: 17 POWDER, FOR SOLUTION ORAL at 12:10

## 2024-11-02 ASSESSMENT — PAIN SCALES - GENERAL
PAINLEVEL_OUTOF10: 0
PAINLEVEL_OUTOF10: 3
PAINLEVEL_OUTOF10: 2
PAINLEVEL_OUTOF10: 3

## 2024-11-02 ASSESSMENT — PAIN DESCRIPTION - DESCRIPTORS
DESCRIPTORS: ACHING;SORE
DESCRIPTORS: ACHING

## 2024-11-02 ASSESSMENT — PAIN DESCRIPTION - PAIN TYPE: TYPE: ACUTE PAIN

## 2024-11-02 ASSESSMENT — PAIN DESCRIPTION - LOCATION
LOCATION: BACK
LOCATION: BACK

## 2024-11-02 ASSESSMENT — PAIN DESCRIPTION - ONSET: ONSET: ON-GOING

## 2024-11-02 ASSESSMENT — PAIN - FUNCTIONAL ASSESSMENT: PAIN_FUNCTIONAL_ASSESSMENT: PREVENTS OR INTERFERES SOME ACTIVE ACTIVITIES AND ADLS

## 2024-11-02 ASSESSMENT — PAIN DESCRIPTION - FREQUENCY: FREQUENCY: CONTINUOUS

## 2024-11-02 NOTE — PROGRESS NOTES
Lovenox  Code status: Full Code      Non-peripheral Lines and Tubes (if present):          Telemetry (if present):           Hospital Problems:  Principal Problem:    Acute metabolic encephalopathy  Active Problems:    Primary hypertension    Weakness of both lower extremities    Right lower lobe pneumonia    Tobacco use disorder    Dementia with agitation (HCC)    History of gout    BPH (benign prostatic hyperplasia)  Resolved Problems:    * No resolved hospital problems. *      Objective:   Patient Vitals for the past 24 hrs:   Temp Pulse Resp BP SpO2   11/02/24 1057 97.7 °F (36.5 °C) 60 20 (!) 168/92 97 %   11/01/24 2354 -- 72 -- -- --   11/01/24 2003 98.4 °F (36.9 °C) 74 20 (!) 150/75 97 %   11/01/24 1706 97.8 °F (36.6 °C) 70 20 138/75 97 %       Oxygen Therapy  SpO2: 97 %  Pulse via Oximetry: 58 beats per minute  Pulse Oximeter Device Mode: Intermittent  Pulse Oximeter Device Location: Finger  O2 Device: None (Room air)  Skin Protection for O2 Device: N/A    Estimated body mass index is 27.49 kg/m² as calculated from the following:    Height as of this encounter: 1.778 m (5' 10\").    Weight as of this encounter: 86.9 kg (191 lb 9.6 oz).    Intake/Output Summary (Last 24 hours) at 11/2/2024 1207  Last data filed at 11/1/2024 1729  Gross per 24 hour   Intake 120 ml   Output --   Net 120 ml         Physical Exam:     General:    Well nourished.  NAD.  Lying comfortably on bed.  Head:  Normocephalic, atraumatic  Eyes:  Sclerae appear normal.  Pupils equally round.  ENT:  Nares appear normal.  Moist oral mucosa  Neck:  No restricted ROM.  Trachea midline   CV:   RRR.  No m/r/g.  No jugular venous distension.  Lungs:   CTAB.  No wheezing, rhonchi, or rales.  Symmetric expansion.  Abdomen:   Soft, nontender, nondistended.  Extremities: No cyanosis or clubbing.  No edema  Skin:     No rashes.  Normal coloration.   Warm and dry.    Neuro:  AxO x2. CN II-XII grossly intact.    Psych:  Normal mood and affect.      I have  5:29 AM   Result Value Ref Range    Sodium 143 136 - 145 mmol/L    Potassium 3.4 (L) 3.5 - 5.1 mmol/L    Chloride 110 (H) 98 - 107 mmol/L    CO2 22 20 - 29 mmol/L    Anion Gap 12 7 - 16 mmol/L    Glucose 90 70 - 99 mg/dL    BUN 21 8 - 23 MG/DL    Creatinine 0.94 0.80 - 1.30 MG/DL    Est, Glom Filt Rate 83 >60 ml/min/1.73m2    Calcium 9.4 8.8 - 10.2 MG/DL   CBC with Auto Differential    Collection Time: 11/01/24  5:29 AM   Result Value Ref Range    WBC 8.8 4.3 - 11.1 K/uL    RBC 5.01 4.23 - 5.6 M/uL    Hemoglobin 15.1 13.6 - 17.2 g/dL    Hematocrit 45.5 41.1 - 50.3 %    MCV 90.8 82 - 102 FL    MCH 30.1 26.1 - 32.9 PG    MCHC 33.2 31.4 - 35.0 g/dL    RDW 14.6 11.9 - 14.6 %    Platelets 228 150 - 450 K/uL    MPV 10.5 9.4 - 12.3 FL    nRBC 0.00 0.0 - 0.2 K/uL    Differential Type AUTOMATED      Neutrophils % 68 43 - 78 %    Lymphocytes % 20 13 - 44 %    Monocytes % 9 4.0 - 12.0 %    Eosinophils % 2 0.5 - 7.8 %    Basophils % 1 0.0 - 2.0 %    Immature Granulocytes % 0 0.0 - 5.0 %    Neutrophils Absolute 6.0 1.7 - 8.2 K/UL    Lymphocytes Absolute 1.8 0.5 - 4.6 K/UL    Monocytes Absolute 0.8 0.1 - 1.3 K/UL    Eosinophils Absolute 0.2 0.0 - 0.8 K/UL    Basophils Absolute 0.1 0.0 - 0.2 K/UL    Immature Granulocytes Absolute 0.0 0.0 - 0.5 K/UL   Phosphorus    Collection Time: 11/01/24  5:29 AM   Result Value Ref Range    Phosphorus 2.6 2.5 - 4.5 MG/DL   Magnesium    Collection Time: 11/01/24  5:29 AM   Result Value Ref Range    Magnesium 1.9 1.8 - 2.4 mg/dL   Vitamin D 25 Hydroxy    Collection Time: 11/01/24  5:29 AM   Result Value Ref Range    Vit D, 25-Hydroxy 33.0 30.0 - 100.0 ng/mL   Uric Acid    Collection Time: 11/01/24  5:29 AM   Result Value Ref Range    Uric Acid 5.2 3.9 - 8.2 MG/DL   CBC with Auto Differential    Collection Time: 11/02/24  9:37 AM   Result Value Ref Range    WBC 7.3 4.3 - 11.1 K/uL    RBC 5.09 4.23 - 5.6 M/uL    Hemoglobin 15.2 13.6 - 17.2 g/dL    Hematocrit 46.1 41.1 - 50.3 %    MCV 90.6 82 - 102     potassium bicarb-citric acid (EFFER-K) effervescent tablet 40 mEq  40 mEq Oral PRN    Or    potassium chloride 10 mEq/100 mL IVPB (Peripheral Line)  10 mEq IntraVENous PRN    potassium chloride 10 mEq/100 mL IVPB (Peripheral Line)  10 mEq IntraVENous PRN    magnesium sulfate 2000 mg in 50 mL IVPB premix  2,000 mg IntraVENous PRN    ondansetron (ZOFRAN-ODT) disintegrating tablet 4 mg  4 mg Oral Q8H PRN    Or    ondansetron (ZOFRAN) injection 4 mg  4 mg IntraVENous Q6H PRN    polyethylene glycol (GLYCOLAX) packet 17 g  17 g Oral Daily    bisacodyl (DULCOLAX) suppository 10 mg  10 mg Rectal Daily PRN    aluminum & magnesium hydroxide-simethicone (MAALOX) 200-200-20 MG/5ML suspension 30 mL  30 mL Oral Q6H PRN    acetaminophen (TYLENOL) tablet 650 mg  650 mg Oral Q6H PRN    Or    acetaminophen (TYLENOL) suppository 650 mg  650 mg Rectal Q6H PRN    melatonin tablet 3 mg  3 mg Oral Nightly    enoxaparin (LOVENOX) injection 40 mg  40 mg SubCUTAneous Daily    allopurinol (ZYLOPRIM) tablet 300 mg  300 mg Oral Daily    folic acid (FOLVITE) tablet 1 mg  1 mg Oral Daily    tamsulosin (FLOMAX) capsule 0.4 mg  0.4 mg Oral Daily    albuterol (PROVENTIL) (2.5 MG/3ML) 0.083% nebulizer solution 2.5 mg  2.5 mg Nebulization Q6H PRN    bisacodyl (DULCOLAX) EC tablet 5 mg  5 mg Oral Daily       Signed:  Gia Brownlee MD    Part of this note may have been written by using a voice dictation software.  The note has been proof read but may still contain some grammatical/other typographical errors.

## 2024-11-02 NOTE — PROGRESS NOTES
Pt resting in bed.  Awakens when spoken to.  Currently refusing vital signs, assessment, and mayela-care/changing wet brief.  States that he \"just wants to sleep.\"  Will continue to monitor and assess.

## 2024-11-02 NOTE — PROGRESS NOTES
I and the nursing assistant tried to check vitals at midnight and 4am, but patient refused. Instructed the patient on the importance of checking them. He said he was fine and didn't need them checked and threatened to hit us if we didn't leave him alone.

## 2024-11-02 NOTE — PROGRESS NOTES
Patient has wet brief. I tried to change him. He kicked at me and said \"get the fuck out of here. I'm not wet, and I will beat the shit out of you if you don't leave me alone\". Tried to redirect the patient but unsuccessful. Shavonne Tobias RN, charge nurse notified.

## 2024-11-03 PROBLEM — J18.9 RIGHT LOWER LOBE PNEUMONIA: Status: RESOLVED | Noted: 2024-10-31 | Resolved: 2024-11-03

## 2024-11-03 PROBLEM — Z51.5 PALLIATIVE CARE ENCOUNTER: Status: ACTIVE | Noted: 2024-11-03

## 2024-11-03 LAB
ANION GAP SERPL CALC-SCNC: 9 MMOL/L (ref 7–16)
BASOPHILS # BLD: 0 K/UL (ref 0–0.2)
BASOPHILS NFR BLD: 1 % (ref 0–2)
BUN SERPL-MCNC: 16 MG/DL (ref 8–23)
CALCIUM SERPL-MCNC: 9.9 MG/DL (ref 8.8–10.2)
CHLORIDE SERPL-SCNC: 109 MMOL/L (ref 98–107)
CO2 SERPL-SCNC: 25 MMOL/L (ref 20–29)
CREAT SERPL-MCNC: 1.07 MG/DL (ref 0.8–1.3)
DIFFERENTIAL METHOD BLD: NORMAL
EOSINOPHIL # BLD: 0.2 K/UL (ref 0–0.8)
EOSINOPHIL NFR BLD: 3 % (ref 0.5–7.8)
ERYTHROCYTE [DISTWIDTH] IN BLOOD BY AUTOMATED COUNT: 14.5 % (ref 11.9–14.6)
GLUCOSE SERPL-MCNC: 90 MG/DL (ref 70–99)
HCT VFR BLD AUTO: 46.1 % (ref 41.1–50.3)
HGB BLD-MCNC: 15.1 G/DL (ref 13.6–17.2)
IMM GRANULOCYTES # BLD AUTO: 0 K/UL (ref 0–0.5)
IMM GRANULOCYTES NFR BLD AUTO: 1 % (ref 0–5)
LYMPHOCYTES # BLD: 1.8 K/UL (ref 0.5–4.6)
LYMPHOCYTES NFR BLD: 25 % (ref 13–44)
MCH RBC QN AUTO: 29.8 PG (ref 26.1–32.9)
MCHC RBC AUTO-ENTMCNC: 32.8 G/DL (ref 31.4–35)
MCV RBC AUTO: 90.9 FL (ref 82–102)
MONOCYTES # BLD: 0.7 K/UL (ref 0.1–1.3)
MONOCYTES NFR BLD: 10 % (ref 4–12)
NEUTS SEG # BLD: 4.4 K/UL (ref 1.7–8.2)
NEUTS SEG NFR BLD: 60 % (ref 43–78)
NRBC # BLD: 0 K/UL (ref 0–0.2)
PLATELET # BLD AUTO: 227 K/UL (ref 150–450)
PMV BLD AUTO: 10.5 FL (ref 9.4–12.3)
POTASSIUM SERPL-SCNC: 3.7 MMOL/L (ref 3.5–5.1)
RBC # BLD AUTO: 5.07 M/UL (ref 4.23–5.6)
SODIUM SERPL-SCNC: 144 MMOL/L (ref 136–145)
WBC # BLD AUTO: 7.2 K/UL (ref 4.3–11.1)

## 2024-11-03 PROCEDURE — 85025 COMPLETE CBC W/AUTO DIFF WBC: CPT

## 2024-11-03 PROCEDURE — 6370000000 HC RX 637 (ALT 250 FOR IP): Performed by: STUDENT IN AN ORGANIZED HEALTH CARE EDUCATION/TRAINING PROGRAM

## 2024-11-03 PROCEDURE — 36415 COLL VENOUS BLD VENIPUNCTURE: CPT

## 2024-11-03 PROCEDURE — 6360000002 HC RX W HCPCS

## 2024-11-03 PROCEDURE — 80048 BASIC METABOLIC PNL TOTAL CA: CPT

## 2024-11-03 PROCEDURE — 1100000000 HC RM PRIVATE

## 2024-11-03 PROCEDURE — 2580000003 HC RX 258

## 2024-11-03 PROCEDURE — 6360000002 HC RX W HCPCS: Performed by: FAMILY MEDICINE

## 2024-11-03 PROCEDURE — 6370000000 HC RX 637 (ALT 250 FOR IP)

## 2024-11-03 PROCEDURE — 2400000000

## 2024-11-03 RX ORDER — HYDRALAZINE HYDROCHLORIDE 20 MG/ML
10 INJECTION INTRAMUSCULAR; INTRAVENOUS ONCE
Status: COMPLETED | OUTPATIENT
Start: 2024-11-04 | End: 2024-11-03

## 2024-11-03 RX ADMIN — Medication 100 MG: at 09:34

## 2024-11-03 RX ADMIN — ALLOPURINOL 300 MG: 100 TABLET ORAL at 09:34

## 2024-11-03 RX ADMIN — ACETAMINOPHEN 650 MG: 325 TABLET ORAL at 09:43

## 2024-11-03 RX ADMIN — TAMSULOSIN HYDROCHLORIDE 0.4 MG: 0.4 CAPSULE ORAL at 09:34

## 2024-11-03 RX ADMIN — SODIUM CHLORIDE, PRESERVATIVE FREE 10 ML: 5 INJECTION INTRAVENOUS at 09:35

## 2024-11-03 RX ADMIN — ATORVASTATIN CALCIUM 40 MG: 40 TABLET, FILM COATED ORAL at 20:11

## 2024-11-03 RX ADMIN — FOLIC ACID 1 MG: 1 TABLET ORAL at 09:35

## 2024-11-03 RX ADMIN — Medication 3 MG: at 20:11

## 2024-11-03 RX ADMIN — BISACODYL 5 MG: 5 TABLET, COATED ORAL at 09:34

## 2024-11-03 RX ADMIN — LOSARTAN POTASSIUM 50 MG: 50 TABLET, FILM COATED ORAL at 09:34

## 2024-11-03 RX ADMIN — POLYETHYLENE GLYCOL 3350 17 G: 17 POWDER, FOR SOLUTION ORAL at 09:14

## 2024-11-03 RX ADMIN — SODIUM CHLORIDE, PRESERVATIVE FREE 10 ML: 5 INJECTION INTRAVENOUS at 20:12

## 2024-11-03 RX ADMIN — ALUMINUM HYDROXIDE, MAGNESIUM HYDROXIDE, AND SIMETHICONE 30 ML: 1200; 120; 1200 SUSPENSION ORAL at 17:22

## 2024-11-03 RX ADMIN — ASPIRIN 81 MG: 81 TABLET, COATED ORAL at 09:34

## 2024-11-03 RX ADMIN — OLANZAPINE 5 MG: 5 TABLET, FILM COATED ORAL at 20:11

## 2024-11-03 RX ADMIN — DILTIAZEM HYDROCHLORIDE 120 MG: 120 CAPSULE, COATED, EXTENDED RELEASE ORAL at 09:34

## 2024-11-03 RX ADMIN — HYDRALAZINE HYDROCHLORIDE 10 MG: 20 INJECTION INTRAMUSCULAR; INTRAVENOUS at 23:58

## 2024-11-03 RX ADMIN — ENOXAPARIN SODIUM 40 MG: 100 INJECTION SUBCUTANEOUS at 09:33

## 2024-11-03 ASSESSMENT — PAIN DESCRIPTION - DESCRIPTORS: DESCRIPTORS: ACHING

## 2024-11-03 ASSESSMENT — PAIN SCALES - GENERAL
PAINLEVEL_OUTOF10: 3
PAINLEVEL_OUTOF10: 0

## 2024-11-03 ASSESSMENT — PAIN DESCRIPTION - LOCATION: LOCATION: BACK

## 2024-11-03 ASSESSMENT — PAIN DESCRIPTION - ORIENTATION: ORIENTATION: LOWER

## 2024-11-03 NOTE — PROGRESS NOTES
The ending of Daylight Saving Time occurred at 0200 hrs. Documentation of patient care and medications administered is done with respect to the time change.

## 2024-11-03 NOTE — PLAN OF CARE
Problem: Discharge Planning  Goal: Discharge to home or other facility with appropriate resources  Outcome: Progressing  Flowsheets (Taken 11/3/2024 0800)  Discharge to home or other facility with appropriate resources:   Identify barriers to discharge with patient and caregiver   Refer to discharge planning if patient needs post-hospital services based on physician order or complex needs related to functional status, cognitive ability or social support system     Problem: Pain  Goal: Verbalizes/displays adequate comfort level or baseline comfort level  Outcome: Progressing     Problem: Skin/Tissue Integrity  Goal: Absence of new skin breakdown  Description: 1.  Monitor for areas of redness and/or skin breakdown  2.  Assess vascular access sites hourly  3.  Every 4-6 hours minimum:  Change oxygen saturation probe site  4.  Every 4-6 hours:  If on nasal continuous positive airway pressure, respiratory therapy assess nares and determine need for appliance change or resting period.  Outcome: Progressing     Problem: Safety - Adult  Goal: Free from fall injury  Outcome: Progressing     Problem: Respiratory - Adult  Goal: Achieves optimal ventilation and oxygenation  Outcome: Progressing     Problem: ABCDS Injury Assessment  Goal: Absence of physical injury  Outcome: Progressing  Flowsheets (Taken 11/3/2024 0742)  Absence of Physical Injury: Implement safety measures based on patient assessment

## 2024-11-03 NOTE — PROGRESS NOTES
Hospitalist Progress Note   Admit Date:  10/31/2024  5:15 PM   Name:  Seth Smith   Age:  79 y.o.  Sex:  male  :  1945   MRN:  [id number]   Room:  [facility]/    Presenting/Chief Complaint: Abdominal Pain     Reason(s) for Admission: Metabolic encephalopathy [G93.41]  Generalized abdominal pain [R10.84]  Weakness of both lower extremities [R29.898]  Constipation, unspecified constipation type [K59.00]  Pneumonia of right lower lobe due to infectious organism [J18.9]  Acute metabolic encephalopathy [G93.41]     Hospital Course:   Seth Smith is a 79 y.o. male with medical history of dementia, COPD, who presented with lower extremity weakness.  Also had AMS.      Subjective & 24hr Events:   No acute overnight events.  A little more, last night.    Alert and oriented x 2.  No changes in mentation.  Having breakfast.  When mentioned about being agitated with PT, he stated that he had a lot of pain.      Assessment & Plan:       Acute metabolic encephalopathy -resolved  Dementia  -Unclear etiology for AMS, but currently resolved and back to his baseline  -Ammonia, TSH, B12, folate, UDS, ethanol levels all unremarkable  -Agitation improving.  Continue current dose of Zyprexa  -He lives by himself, but he probably needs nursing home due to his dementia.  Not safe to be discharged back to his place, as he does not move around at home  -Will consult palliative care for goals of care.  Nursing staff was able to obtain phone numbers of daughters who are deemed to be living out of state at this moment. Arre[place] (Kathy) [phone]. Susy [phone]. Called them but they were not able to come to the phone.      Weakness of bilateral lower extremities  Debility  -Magnesium levels within normal limits  -Likely due to decreased movement and subsequent debility in the setting of dementia  -Continue PT OT      Constipation  -Noted in CT abdomen  -Continue daily MiraLAX and Dulcolax daily      Essential  sodium chloride flush 0.9 % injection 5-40 mL  5-40 mL IntraVENous PRN    0.9 % sodium chloride infusion   IntraVENous PRN    potassium chloride (KLOR-CON M) extended release tablet 40 mEq  40 mEq Oral PRN    Or    potassium bicarb-citric acid (EFFER-K) effervescent tablet 40 mEq  40 mEq Oral PRN    Or    potassium chloride 10 mEq/100 mL IVPB (Peripheral Line)  10 mEq IntraVENous PRN    potassium chloride 10 mEq/100 mL IVPB (Peripheral Line)  10 mEq IntraVENous PRN    magnesium sulfate 2000 mg in 50 mL IVPB premix  2,000 mg IntraVENous PRN    ondansetron (ZOFRAN-ODT) disintegrating tablet 4 mg  4 mg Oral Q8H PRN    Or    ondansetron (ZOFRAN) injection 4 mg  4 mg IntraVENous Q6H PRN    polyethylene glycol (GLYCOLAX) packet 17 g  17 g Oral Daily    bisacodyl (DULCOLAX) suppository 10 mg  10 mg Rectal Daily PRN    aluminum & magnesium hydroxide-simethicone (MAALOX) 200-200-20 MG/5ML suspension 30 mL  30 mL Oral Q6H PRN    acetaminophen (TYLENOL) tablet 650 mg  650 mg Oral Q6H PRN    Or    acetaminophen (TYLENOL) suppository 650 mg  650 mg Rectal Q6H PRN    melatonin tablet 3 mg  3 mg Oral Nightly    enoxaparin (LOVENOX) injection 40 mg  40 mg SubCUTAneous Daily    allopurinol (ZYLOPRIM) tablet 300 mg  300 mg Oral Daily    folic acid (FOLVITE) tablet 1 mg  1 mg Oral Daily    tamsulosin (FLOMAX) capsule 0.4 mg  0.4 mg Oral Daily    albuterol (PROVENTIL) (2.5 MG/3ML) 0.083% nebulizer solution 2.5 mg  2.5 mg Nebulization Q6H PRN    bisacodyl (DULCOLAX) EC tablet 5 mg  5 mg Oral Daily       Signed:  Gia Brownlee MD    Part of this note may have been written by using a voice dictation software.  The note has been proof read but may still contain some grammatical/other typographical errors.

## 2024-11-04 PROCEDURE — 6370000000 HC RX 637 (ALT 250 FOR IP)

## 2024-11-04 PROCEDURE — 92526 ORAL FUNCTION THERAPY: CPT

## 2024-11-04 PROCEDURE — 97530 THERAPEUTIC ACTIVITIES: CPT

## 2024-11-04 PROCEDURE — 51798 US URINE CAPACITY MEASURE: CPT

## 2024-11-04 PROCEDURE — 99223 1ST HOSP IP/OBS HIGH 75: CPT | Performed by: NURSE PRACTITIONER

## 2024-11-04 PROCEDURE — 6370000000 HC RX 637 (ALT 250 FOR IP): Performed by: STUDENT IN AN ORGANIZED HEALTH CARE EDUCATION/TRAINING PROGRAM

## 2024-11-04 PROCEDURE — 1100000000 HC RM PRIVATE

## 2024-11-04 PROCEDURE — 2580000003 HC RX 258

## 2024-11-04 PROCEDURE — 6360000002 HC RX W HCPCS: Performed by: STUDENT IN AN ORGANIZED HEALTH CARE EDUCATION/TRAINING PROGRAM

## 2024-11-04 RX ORDER — HALOPERIDOL 5 MG/ML
1 INJECTION INTRAMUSCULAR EVERY 30 MIN PRN
Status: DISCONTINUED | OUTPATIENT
Start: 2024-11-04 | End: 2024-11-07

## 2024-11-04 RX ORDER — HALOPERIDOL 5 MG/ML
1 INJECTION INTRAMUSCULAR EVERY 30 MIN PRN
Status: DISCONTINUED | OUTPATIENT
Start: 2024-11-04 | End: 2024-11-04

## 2024-11-04 RX ORDER — QUETIAPINE FUMARATE 25 MG/1
25 TABLET, FILM COATED ORAL NIGHTLY
Status: DISCONTINUED | OUTPATIENT
Start: 2024-11-04 | End: 2024-11-13 | Stop reason: HOSPADM

## 2024-11-04 RX ADMIN — POLYETHYLENE GLYCOL 3350 17 G: 17 POWDER, FOR SOLUTION ORAL at 09:00

## 2024-11-04 RX ADMIN — Medication 100 MG: at 09:02

## 2024-11-04 RX ADMIN — ASPIRIN 81 MG: 81 TABLET, COATED ORAL at 09:02

## 2024-11-04 RX ADMIN — LOSARTAN POTASSIUM 50 MG: 50 TABLET, FILM COATED ORAL at 09:02

## 2024-11-04 RX ADMIN — HALOPERIDOL LACTATE 1 MG: 5 INJECTION, SOLUTION INTRAMUSCULAR at 15:35

## 2024-11-04 RX ADMIN — QUETIAPINE FUMARATE 25 MG: 25 TABLET ORAL at 20:59

## 2024-11-04 RX ADMIN — ATORVASTATIN CALCIUM 40 MG: 40 TABLET, FILM COATED ORAL at 20:59

## 2024-11-04 RX ADMIN — SODIUM CHLORIDE, PRESERVATIVE FREE 10 ML: 5 INJECTION INTRAVENOUS at 21:00

## 2024-11-04 RX ADMIN — BISACODYL 5 MG: 5 TABLET, COATED ORAL at 09:02

## 2024-11-04 RX ADMIN — SODIUM CHLORIDE, PRESERVATIVE FREE 10 ML: 5 INJECTION INTRAVENOUS at 09:04

## 2024-11-04 RX ADMIN — ALLOPURINOL 300 MG: 100 TABLET ORAL at 09:01

## 2024-11-04 RX ADMIN — Medication 3 MG: at 20:59

## 2024-11-04 RX ADMIN — DILTIAZEM HYDROCHLORIDE 120 MG: 120 CAPSULE, COATED, EXTENDED RELEASE ORAL at 09:02

## 2024-11-04 RX ADMIN — TAMSULOSIN HYDROCHLORIDE 0.4 MG: 0.4 CAPSULE ORAL at 09:02

## 2024-11-04 RX ADMIN — FOLIC ACID 1 MG: 1 TABLET ORAL at 09:02

## 2024-11-04 ASSESSMENT — PAIN SCALES - GENERAL
PAINLEVEL_OUTOF10: 0

## 2024-11-04 NOTE — CONSULTS
Palliative Care    Patient: Seth Smith MRN: [id number]  SSN: [id number]    YOB: 1945  Age: 79 y.o.  Sex: male       Date of Request: 11/3/2024  Date of Consult:  11/4/2024  Reason for Consult:  goals of care  Requesting Physician: Dr Brownlee     Assessment/Plan:     Principal Diagnosis:    Debility, Unspecified  R53.81    Additional Diagnoses:   Dementia  F03.90   Fatigue, Lethargy  R53.83  Frailty  R54  Encounter for Palliative Care  Z51.5    Palliative Performance Scale (PPS):       Medical Decision Making:   Reviewed and summarized notes from admission to present   Discussed case with appropriate providers  Reviewed laboratory and x-ray data from admission to present     Pt sitting in recliner, no distress noted.  No visitors at bedside.  Pt is oriented to self and the hospital.  He reports fatigue at present. He denies pain at present, and was able to work with therapy.  Assured him of our ongoing care. Attempted to speak with pt's daughter, Va.  Left VM asking for return call.  We will continue to follow.      Will discuss findings with members of the interdisciplinary team.      Thank you for this referral.          .    Subjective:     History obtained from:  Patient and Chart    Chief Complaint: PNA, generalized weakness   History of Present Illness:  Mr Smith is a 80 yo male with PMH of CVA, HTN, COPD, Dementia, and other conditions listed below, who presented to the ER from home on 10/31/2024 with c/o lower extremity weakness for a couple weeks.  He had been seen in the ER on 10/22, after falling at home.  He was discharged from the ER, but a friend noted pt had been in his recliner since 10/22 due to pain.  Pt denied fevers, n/v/d, chest pain.  Work up in the ER was notable for PNA, and pt was admitted for further management.  Pt has worked with PT/OT, and STR has been recommended. There are concerns that pt is unable to live by himself moving forward.     Advance Directive:

## 2024-11-04 NOTE — PROGRESS NOTES
ACUTE PHYSICAL THERAPY GOALS:   (Developed with and agreed upon by patient and/or caregiver.)  (1.)Mr. Smith will move from supine to sit and sit to supine , scoot up and down, and roll side to side in bed with STAND BY ASSIST within 7 treatment day(s).    (2.)Mr. Smith will transfer from bed to chair and chair to bed with MINIMAL ASSIST using the least restrictive device within 7 treatment day(s).    (3.)Mr. Smith will ambulate with MINIMAL ASSIST for 50+ feet with the least restrictive device within 7 treatment day(s).  (4.)Mr. Smith will tolerate 23+ minutes of therapeutic activity within 7 treatment days for increased activity tolerance and overall functional mobility.    PHYSICAL THERAPY: Daily Note AM   (Link to Caseload Tracking: PT Visit Days : 1  Time In/Out PT Charge Capture  Rehab Caseload Tracker  Orders    Seth Smith is a 79 y.o. male   PRIMARY DIAGNOSIS: Acute metabolic encephalopathy  Metabolic encephalopathy [G93.41]  Generalized abdominal pain [R10.84]  Weakness of both lower extremities [R29.898]  Constipation, unspecified constipation type [K59.00]  Pneumonia of right lower lobe due to infectious organism [J18.9]  Acute metabolic encephalopathy [G93.41]       Inpatient: Payor: [employer] / Plan: [employer] / Product Type: *No Product type* /     ASSESSMENT:     REHAB RECOMMENDATIONS:   Recommendation to date pending progress:  Setting:  Short-term Rehab    Equipment:    To Be Determined     ASSESSMENT:  Mr. Smith was found seated in recliner and agreeable to PT. Pt remains pleasantly confused and oriented x2. Pt initially required mod/maxA to STS with a RW with a heavy posterior lean in standing. With a chair follow, cues for upright posture, and modA, pt was able to take several steps forward with a RW, but became quick to fatigue and needed to sit. Pt made minimal progress towards goals today, but will continue to be seen by PT in order to address

## 2024-11-04 NOTE — FLOWSHEET NOTE
11/03/24 2339   Vital Signs   BP (!) 184/104  (Dr. Javier Thomson notified. Orders received for Hydralazine 10mg IV)     2358 Hydralazine 10mg IV given    0015 /88

## 2024-11-04 NOTE — PROGRESS NOTES
Patient agitated and confused. Does not want us to change his wet brief. Started yelling and cursing at the tech to get out of his room. Redirected the patient and he reluctantly let us change his brief and cover him with a blanket but would not put on a gown. Patient has been awake, confused and restless all night.

## 2024-11-04 NOTE — PROGRESS NOTES
Patient has been very confused and trying to get out of bed several times tonight. Pulled his brief and gown off. States he is trying to leave. Redirected and reoriented patient. Safety measures remain in place.

## 2024-11-04 NOTE — PLAN OF CARE
Problem: Discharge Planning  Goal: Discharge to home or other facility with appropriate resources  Outcome: Progressing  Flowsheets (Taken 11/4/2024 0670)  Discharge to home or other facility with appropriate resources:   Identify barriers to discharge with patient and caregiver   Arrange for needed discharge resources and transportation as appropriate   Identify discharge learning needs (meds, wound care, etc)   Refer to discharge planning if patient needs post-hospital services based on physician order or complex needs related to functional status, cognitive ability or social support system     Problem: Safety - Adult  Goal: Free from fall injury  Outcome: Progressing

## 2024-11-04 NOTE — CARE COORDINATION
MSN, CM:  patient with some AMS overnight.  Patient recommended for SNF, referrals sent.  Case Management will continue to follow for acceptance.

## 2024-11-04 NOTE — CARE COORDINATION
MSN, CM:  spoke with patient's daughter Susy who request no documentation of conversation be completed.  Susy stated her and her sister Va wanted no responsibility or medical decision responsibility for patient.  She states patient was not in their life and neither her nor her sister wanted the responsibility.  Susy stated Ivett (patient friend) could make medical decisions for him if she wanted to.  Susy asked for patient nor Ivett know about this conversation.  She was assured by this CM that neither would know.  Contacted Ivett after conversation and she is willing to make decision and stated patient need to go to rehab before coming home.

## 2024-11-04 NOTE — PROGRESS NOTES
Patient witnessed urinating on the floor from the bed. Patient cleaned up. Routine daily bladder scan performed post void. 283mL noted on bladder scan. Will pass to oncoming shift and notify MD.

## 2024-11-04 NOTE — PROGRESS NOTES
GOALS:  LTG: Patient will maintain adequate hydration/nutrition with optimum safety and efficiency of swallowing function with PO intake without overt signs or symptoms of aspiration for the highest appropriate diet level.  STG: Ongoing 2024  Patient will consume soft and bite-sized textures and thin liquids without overt signs or symptoms of airway compromise.  Patient will safely ingest diet trials during therapeutic feedings with SLP without overt signs or symptoms of respiratory compromise in efforts to advance diet.    SPEECH LANGUAGE PATHOLOGY: DYSPHAGIA Daily Note #1    Acknowledge Order  I  Therapy Time  I   Charges     I  Rehab Caseload Tracker      NAME: Seth Smith  : 1945  MRN: [id number]    ADMISSION DATE: 10/31/2024  PRIMARY DIAGNOSIS: Acute metabolic encephalopathy    ICD-10: Treatment Diagnosis: R13.11 Dysphagia, Oral Phase    RECOMMENDATIONS   Diet:    Soft and Bite-Sized  Thin Liquids    Medication: as tolerated   Compensatory Swallowing Strategies:   Slow rate of intake  Upright as possible for all oral intake   Therapeutic Intervention:   Patient/family education  Dysphagia treatment   Patient continues to require skilled intervention:  Yes. Recommend ongoing speech therapy services during this hospitalization.     Anticipated Discharge Needs: Do not anticipate ongoing speech therapy needs upon discharge.      ASSESSMENT    Dysphagia: Patient able to consume PO trials of solids and thin liquids without overt signs of aspiration. Altered mental status does present as a barrier for safety with PO intake. Recommend to continue soft and bite size diet with thin liquids. Speech therapy will follow up x's 1 for diet tolerance.   GENERAL    Subjective: Patient awake, alert, and agreeable to speech therapy services. Oriented to self and place only. Verbal cues required throughout session for patient to participate in therapy tasks.     Recent Information/Background: Patient admitted

## 2024-11-04 NOTE — FLOWSHEET NOTE
11/03/24 1922   Vital Signs   BP (!) 173/97  (TRAVIS Correia notified - Dr. Lucinda Thomson notified)     No new orders received. Instructed to monitor.

## 2024-11-04 NOTE — PROGRESS NOTES
Hospitalist Progress Note   Admit Date:  10/31/2024  5:15 PM   Name:  Seth Smith   Age:  79 y.o.  Sex:  male  :  1945   MRN:  [id number]   Room:  8/    Presenting/Chief Complaint: Abdominal Pain     Reason(s) for Admission: Metabolic encephalopathy [G93.41]  Generalized abdominal pain [R10.84]  Weakness of both lower extremities [R29.898]  Constipation, unspecified constipation type [K59.00]  Pneumonia of right lower lobe due to infectious organism [J18.9]  Acute metabolic encephalopathy [G93.41]     Hospital Course:   Seth Smith is a 79 y.o. male with medical history of dementia, COPD, who presented with lower extremity weakness.  Also had AMS.      Subjective & 24hr Events:   Confused and agitated last night.  Cursing at staff.  Refused to change his brief.  Eventually agreed.    Alert and oriented x 2.  No changes in mentation.  Having breakfast.  Calm this morning. Had a bowel movement yesterday.      Assessment & Plan:       Acute metabolic encephalopathy -resolved  Dementia  -Unclear etiology for AMS, but currently resolved and back to his baseline  -Ammonia, TSH, B12, folate, UDS, ethanol levels all unremarkable  -Agitation persistent.  Will order Seroquel at night.  Discontinue Zyprexa.  -He lives by himself, but he probably needs nursing home due to his dementia.  Not safe to be discharged back to his place, as he does not move around at home  -Will consult palliative care for goals of care.  Nursing staff was able to obtain phone numbers of daughters who are deemed to be living out of state at this moment. Whitley Strong) [phone]. Susy [phone].       Weakness of bilateral lower extremities  Debility  -Magnesium levels within normal limits  -Likely due to decreased movement and subsequent debility in the setting of dementia  -Continue PT OT      Constipation  -Noted in CT abdomen  -Continue daily MiraLAX and Dulcolax daily      Essential   Well nourished.  NAD.  Having breakfast.  Head:  Normocephalic, atraumatic  Eyes:  Sclerae appear normal.  Pupils equally round.  ENT:  Nares appear normal.  Moist oral mucosa  Neck:  No restricted ROM.  Trachea midline   CV:   RRR.  No m/r/g.  No jugular venous distension.  Lungs:   CTA bilaterally.  No wheezing, rhonchi, or rales.  Symmetric expansion.  Abdomen:   Soft, nontender, nondistended.  Extremities: No cyanosis or clubbing.  No edema  Skin:     No rashes.  Normal coloration.   Warm and dry.    Neuro:  AxO x2. CN II-XII grossly intact.    Psych:  Normal mood and affect.      I have personally reviewed labs and tests:  Recent Labs:  Recent Results (from the past 48 hour(s))   Basic Metabolic Panel w/ Reflex to MG    Collection Time: 11/03/24  5:26 AM   Result Value Ref Range    Sodium 144 136 - 145 mmol/L    Potassium 3.7 3.5 - 5.1 mmol/L    Chloride 109 (H) 98 - 107 mmol/L    CO2 25 20 - 29 mmol/L    Anion Gap 9 7 - 16 mmol/L    Glucose 90 70 - 99 mg/dL    BUN 16 8 - 23 MG/DL    Creatinine 1.07 0.80 - 1.30 MG/DL    Est, Glom Filt Rate 71 >60 ml/min/1.73m2    Calcium 9.9 8.8 - 10.2 MG/DL   CBC with Auto Differential    Collection Time: 11/03/24  5:26 AM   Result Value Ref Range    WBC 7.2 4.3 - 11.1 K/uL    RBC 5.07 4.23 - 5.6 M/uL    Hemoglobin 15.1 13.6 - 17.2 g/dL    Hematocrit 46.1 41.1 - 50.3 %    MCV 90.9 82 - 102 FL    MCH 29.8 26.1 - 32.9 PG    MCHC 32.8 31.4 - 35.0 g/dL    RDW 14.5 11.9 - 14.6 %    Platelets 227 150 - 450 K/uL    MPV 10.5 9.4 - 12.3 FL    nRBC 0.00 0.0 - 0.2 K/uL    Differential Type AUTOMATED      Neutrophils % 60 43 - 78 %    Lymphocytes % 25 13 - 44 %    Monocytes % 10 4.0 - 12.0 %    Eosinophils % 3 0.5 - 7.8 %    Basophils % 1 0.0 - 2.0 %    Immature Granulocytes % 1 0.0 - 5.0 %    Neutrophils Absolute 4.4 1.7 - 8.2 K/UL    Lymphocytes Absolute 1.8 0.5 - 4.6 K/UL    Monocytes Absolute 0.7 0.1 - 1.3 K/UL    Eosinophils Absolute 0.2 0.0 - 0.8 K/UL    Basophils Absolute 0.0 0.0  (FLOMAX) capsule 0.4 mg  0.4 mg Oral Daily    albuterol (PROVENTIL) (2.5 MG/3ML) 0.083% nebulizer solution 2.5 mg  2.5 mg Nebulization Q6H PRN    bisacodyl (DULCOLAX) EC tablet 5 mg  5 mg Oral Daily       Signed:  Gia Brownlee MD    Part of this note may have been written by using a voice dictation software.  The note has been proof read but may still contain some grammatical/other typographical errors.

## 2024-11-05 PROCEDURE — 1100000000 HC RM PRIVATE

## 2024-11-05 PROCEDURE — 6370000000 HC RX 637 (ALT 250 FOR IP)

## 2024-11-05 PROCEDURE — 2580000003 HC RX 258

## 2024-11-05 PROCEDURE — 51798 US URINE CAPACITY MEASURE: CPT

## 2024-11-05 PROCEDURE — 6370000000 HC RX 637 (ALT 250 FOR IP): Performed by: STUDENT IN AN ORGANIZED HEALTH CARE EDUCATION/TRAINING PROGRAM

## 2024-11-05 PROCEDURE — 6360000002 HC RX W HCPCS

## 2024-11-05 RX ADMIN — ENOXAPARIN SODIUM 40 MG: 100 INJECTION SUBCUTANEOUS at 09:39

## 2024-11-05 RX ADMIN — DILTIAZEM HYDROCHLORIDE 120 MG: 120 CAPSULE, COATED, EXTENDED RELEASE ORAL at 09:38

## 2024-11-05 RX ADMIN — SODIUM CHLORIDE, PRESERVATIVE FREE 5 ML: 5 INJECTION INTRAVENOUS at 09:42

## 2024-11-05 RX ADMIN — ATORVASTATIN CALCIUM 40 MG: 40 TABLET, FILM COATED ORAL at 20:27

## 2024-11-05 RX ADMIN — ALLOPURINOL 300 MG: 100 TABLET ORAL at 09:38

## 2024-11-05 RX ADMIN — ACETAMINOPHEN 650 MG: 325 TABLET ORAL at 09:36

## 2024-11-05 RX ADMIN — BISACODYL 5 MG: 5 TABLET, COATED ORAL at 09:38

## 2024-11-05 RX ADMIN — SODIUM CHLORIDE, PRESERVATIVE FREE 10 ML: 5 INJECTION INTRAVENOUS at 20:28

## 2024-11-05 RX ADMIN — POLYETHYLENE GLYCOL 3350 17 G: 17 POWDER, FOR SOLUTION ORAL at 09:38

## 2024-11-05 RX ADMIN — ALUMINUM HYDROXIDE, MAGNESIUM HYDROXIDE, AND SIMETHICONE 30 ML: 1200; 120; 1200 SUSPENSION ORAL at 21:53

## 2024-11-05 RX ADMIN — Medication 3 MG: at 20:27

## 2024-11-05 RX ADMIN — LOSARTAN POTASSIUM 50 MG: 50 TABLET, FILM COATED ORAL at 09:38

## 2024-11-05 RX ADMIN — ASPIRIN 81 MG: 81 TABLET, COATED ORAL at 09:38

## 2024-11-05 RX ADMIN — TAMSULOSIN HYDROCHLORIDE 0.4 MG: 0.4 CAPSULE ORAL at 09:38

## 2024-11-05 RX ADMIN — Medication 100 MG: at 09:37

## 2024-11-05 RX ADMIN — QUETIAPINE FUMARATE 25 MG: 25 TABLET ORAL at 20:27

## 2024-11-05 RX ADMIN — FOLIC ACID 1 MG: 1 TABLET ORAL at 09:37

## 2024-11-05 ASSESSMENT — PAIN SCALES - GENERAL
PAINLEVEL_OUTOF10: 0
PAINLEVEL_OUTOF10: 5

## 2024-11-05 ASSESSMENT — PAIN DESCRIPTION - PAIN TYPE: TYPE: ACUTE PAIN

## 2024-11-05 ASSESSMENT — PAIN - FUNCTIONAL ASSESSMENT: PAIN_FUNCTIONAL_ASSESSMENT: PREVENTS OR INTERFERES SOME ACTIVE ACTIVITIES AND ADLS

## 2024-11-05 ASSESSMENT — PAIN DESCRIPTION - FREQUENCY: FREQUENCY: INTERMITTENT

## 2024-11-05 ASSESSMENT — PAIN DESCRIPTION - DESCRIPTORS: DESCRIPTORS: ACHING

## 2024-11-05 ASSESSMENT — PAIN DESCRIPTION - ONSET: ONSET: GRADUAL

## 2024-11-05 ASSESSMENT — PAIN DESCRIPTION - LOCATION: LOCATION: BACK

## 2024-11-05 ASSESSMENT — PAIN DESCRIPTION - ORIENTATION: ORIENTATION: LOWER

## 2024-11-05 NOTE — PROGRESS NOTES
Hospitalist Progress Note   Admit Date:  10/31/2024  5:15 PM   Name:  Seth Smith   Age:  79 y.o.  Sex:  male  :  1945   MRN:  [id number]   Room:  8/    Presenting/Chief Complaint: Abdominal Pain     Reason(s) for Admission: Metabolic encephalopathy [G93.41]  Generalized abdominal pain [R10.84]  Weakness of both lower extremities [R29.898]  Constipation, unspecified constipation type [K59.00]  Pneumonia of right lower lobe due to infectious organism [J18.9]  Acute metabolic encephalopathy [G93.41]     Hospital Course:   Seth Smith is a 79 y.o. male with medical history of dementia, COPD, who presented with lower extremity weakness.  Also had AMS.      Subjective & 24hr Events:   Patient was unable to void on his own last night.  He had 400 cc on bladder scan.  Straight cath x 1 with return of 500 mL urine.  This morning patient is resting in bed.  No fever.  No chest pain.    Assessment & Plan:     This is a 79y Male with:    Acute metabolic encephalopathy -resolved  Dementia  -Unclear etiology for AMS, but currently resolved and back to his baseline  -Ammonia, TSH, B12, folate, UDS, ethanol levels all unremarkable  -Agitation persistent.  Will order Seroquel at night.  Discontinue Zyprexa.  -He lives by himself, but he probably needs nursing home due to his dementia.  Not safe to be discharged back to his place, as he does not move around at home  -Will consult palliative care for goals of care.  Nursing staff was able to obtain phone numbers of daughters who are deemed to be living out of state at this moment. Whitley Strong) [phone]. Susy [phone].  Patient has abandoned his daughters and they are not involved in his care.  Patient's friend Ms Root is the POA.    Weakness of bilateral lower extremities  Debility  -Magnesium levels within normal limits  -Likely due to decreased movement and subsequent debility in the setting of dementia  -Continue PT  OT      Constipation  -Noted in CT abdomen  -Continue daily MiraLAX and Dulcolax daily      Essential hypertension  -Continue losartan and diltiazem      BPH  -Continue tamsulosin      Gout  -Uric acid levels within normal limits  -Continue allopurinol      Palliative care encounter  -Consulted palliative care as mentioned above.     Anticipated Discharge Arrangements:   Skilled Nursing Facility pending insurance authorization.    PT/OT evals ordered?  Therapy evals ordered  Diet:  ADULT DIET; Dysphagia - Soft and Bite Sized  VTE prophylaxis: Lovenox  Code status: Full Code      Non-peripheral Lines and Tubes (if present):          Telemetry (if present):           Hospital Problems:  Principal Problem:    Acute metabolic encephalopathy  Active Problems:    Primary hypertension    Weakness of both lower extremities    Tobacco use disorder    Dementia with agitation (HCC)    History of gout    BPH (benign prostatic hyperplasia)    Palliative care encounter  Resolved Problems:    Right lower lobe pneumonia      Objective:   Patient Vitals for the past 24 hrs:   Temp Pulse Resp BP SpO2   11/05/24 1114 97.3 °F (36.3 °C) 76 18 (!) 147/78 95 %   11/05/24 0724 97.5 °F (36.4 °C) 74 18 (!) 155/89 99 %   11/05/24 0307 97.3 °F (36.3 °C) 78 18 (!) 131/91 91 %   11/04/24 2316 97.5 °F (36.4 °C) 90 18 136/86 92 %   11/04/24 1919 97.9 °F (36.6 °C) 96 19 (!) 155/79 97 %   11/04/24 1536 -- 96 18 123/76 95 %       Oxygen Therapy  SpO2: 95 %  Pulse via Oximetry: 58 beats per minute  Pulse Oximeter Device Mode: Intermittent  Pulse Oximeter Device Location: Finger  O2 Device: None (Room air)  Skin Protection for O2 Device: N/A    Estimated body mass index is 27.39 kg/m² as calculated from the following:    Height as of this encounter: 1.778 m (5' 10\").    Weight as of this encounter: 86.6 kg (190 lb 14.7 oz).    Intake/Output Summary (Last 24 hours) at 11/5/2024 1228  Last data filed at 11/5/2024 0630  Gross per 24 hour   Intake --  mg in 50 mL IVPB premix  2,000 mg IntraVENous PRN    ondansetron (ZOFRAN-ODT) disintegrating tablet 4 mg  4 mg Oral Q8H PRN    Or    ondansetron (ZOFRAN) injection 4 mg  4 mg IntraVENous Q6H PRN    polyethylene glycol (GLYCOLAX) packet 17 g  17 g Oral Daily    bisacodyl (DULCOLAX) suppository 10 mg  10 mg Rectal Daily PRN    aluminum & magnesium hydroxide-simethicone (MAALOX) 200-200-20 MG/5ML suspension 30 mL  30 mL Oral Q6H PRN    acetaminophen (TYLENOL) tablet 650 mg  650 mg Oral Q6H PRN    Or    acetaminophen (TYLENOL) suppository 650 mg  650 mg Rectal Q6H PRN    melatonin tablet 3 mg  3 mg Oral Nightly    enoxaparin (LOVENOX) injection 40 mg  40 mg SubCUTAneous Daily    allopurinol (ZYLOPRIM) tablet 300 mg  300 mg Oral Daily    folic acid (FOLVITE) tablet 1 mg  1 mg Oral Daily    tamsulosin (FLOMAX) capsule 0.4 mg  0.4 mg Oral Daily    albuterol (PROVENTIL) (2.5 MG/3ML) 0.083% nebulizer solution 2.5 mg  2.5 mg Nebulization Q6H PRN    bisacodyl (DULCOLAX) EC tablet 5 mg  5 mg Oral Daily       Signed:  Sven Quinteros MD    Part of this note may have been written by using a voice dictation software.  The note has been proof read but may still contain some grammatical/other typographical errors.

## 2024-11-05 NOTE — PROGRESS NOTES
Physical Therapy Note:    Attempted to see patient this AM for physical therapy treatment  session. Patient is supine in bed and adamantly refused out of bed or any mobility. Will follow and re-attempt as schedule permits/patient available. Thank you,    Marguerite Beebe, [facility]     Rehab Caseload Tracker

## 2024-11-05 NOTE — PROGRESS NOTES
Patient with 408cc of urine per bladder scan and doesn't feel the need to void. On call notified. Orders for straight cath received.    500cc of urine out. Paitent in bed resting comfortably with no other need at this time.

## 2024-11-05 NOTE — PLAN OF CARE
Problem: Discharge Planning  Goal: Discharge to home or other facility with appropriate resources  11/4/2024 2231 by Jaimie Hogan RN  Outcome: Progressing  11/4/2024 1441 by Fozia Johnston RN  Outcome: Progressing  Flowsheets (Taken 11/4/2024 0746)  Discharge to home or other facility with appropriate resources:   Identify barriers to discharge with patient and caregiver   Arrange for needed discharge resources and transportation as appropriate   Identify discharge learning needs (meds, wound care, etc)   Refer to discharge planning if patient needs post-hospital services based on physician order or complex needs related to functional status, cognitive ability or social support system     Problem: Pain  Goal: Verbalizes/displays adequate comfort level or baseline comfort level  11/4/2024 2231 by Jaimie Hogan RN  Outcome: Progressing  11/4/2024 1441 by Fozia Johnston RN  Outcome: Progressing  Flowsheets (Taken 11/4/2024 0746)  Verbalizes/displays adequate comfort level or baseline comfort level:   Encourage patient to monitor pain and request assistance   Assess pain using appropriate pain scale     Problem: Skin/Tissue Integrity  Goal: Absence of new skin breakdown  Description: 1.  Monitor for areas of redness and/or skin breakdown  2.  Assess vascular access sites hourly  3.  Every 4-6 hours minimum:  Change oxygen saturation probe site  4.  Every 4-6 hours:  If on nasal continuous positive airway pressure, respiratory therapy assess nares and determine need for appliance change or resting period.  11/4/2024 2231 by Jaimie Hogan RN  Outcome: Progressing  11/4/2024 1441 by Fozia Johnston RN  Outcome: Progressing     Problem: Safety - Adult  Goal: Free from fall injury  11/4/2024 2231 by Jaimie Hogan RN  Outcome: Progressing  11/4/2024 1441 by Fozia Johnston RN  Outcome: Progressing     Problem: Respiratory - Adult  Goal: Achieves optimal ventilation and oxygenation  11/4/2024 2231 by Samira

## 2024-11-05 NOTE — PROGRESS NOTES
OT Note:    OT attempted to see patient for therapy. Pt did not want to participate at this time. OT will re-attempt to see patient at a later date/time.    Thanks,  IMELDA Mcintyre

## 2024-11-06 PROCEDURE — 6370000000 HC RX 637 (ALT 250 FOR IP): Performed by: STUDENT IN AN ORGANIZED HEALTH CARE EDUCATION/TRAINING PROGRAM

## 2024-11-06 PROCEDURE — 1100000000 HC RM PRIVATE

## 2024-11-06 PROCEDURE — 6370000000 HC RX 637 (ALT 250 FOR IP)

## 2024-11-06 PROCEDURE — 97112 NEUROMUSCULAR REEDUCATION: CPT

## 2024-11-06 PROCEDURE — 2580000003 HC RX 258

## 2024-11-06 PROCEDURE — 97530 THERAPEUTIC ACTIVITIES: CPT

## 2024-11-06 PROCEDURE — 6360000002 HC RX W HCPCS

## 2024-11-06 PROCEDURE — 92526 ORAL FUNCTION THERAPY: CPT

## 2024-11-06 PROCEDURE — 97535 SELF CARE MNGMENT TRAINING: CPT

## 2024-11-06 RX ADMIN — ASPIRIN 81 MG: 81 TABLET, COATED ORAL at 09:03

## 2024-11-06 RX ADMIN — ENOXAPARIN SODIUM 40 MG: 100 INJECTION SUBCUTANEOUS at 09:01

## 2024-11-06 RX ADMIN — BISACODYL 5 MG: 5 TABLET, COATED ORAL at 09:01

## 2024-11-06 RX ADMIN — ALLOPURINOL 300 MG: 100 TABLET ORAL at 09:01

## 2024-11-06 RX ADMIN — ALUMINUM HYDROXIDE, MAGNESIUM HYDROXIDE, AND SIMETHICONE 30 ML: 1200; 120; 1200 SUSPENSION ORAL at 21:26

## 2024-11-06 RX ADMIN — LOSARTAN POTASSIUM 50 MG: 50 TABLET, FILM COATED ORAL at 09:01

## 2024-11-06 RX ADMIN — FOLIC ACID 1 MG: 1 TABLET ORAL at 09:00

## 2024-11-06 RX ADMIN — Medication 3 MG: at 21:26

## 2024-11-06 RX ADMIN — ACETAMINOPHEN 650 MG: 325 TABLET ORAL at 16:06

## 2024-11-06 RX ADMIN — ACETAMINOPHEN 650 MG: 325 TABLET ORAL at 21:26

## 2024-11-06 RX ADMIN — Medication 100 MG: at 09:00

## 2024-11-06 RX ADMIN — QUETIAPINE FUMARATE 25 MG: 25 TABLET ORAL at 21:26

## 2024-11-06 RX ADMIN — SODIUM CHLORIDE, PRESERVATIVE FREE 5 ML: 5 INJECTION INTRAVENOUS at 09:05

## 2024-11-06 RX ADMIN — ATORVASTATIN CALCIUM 40 MG: 40 TABLET, FILM COATED ORAL at 21:26

## 2024-11-06 RX ADMIN — POLYETHYLENE GLYCOL 3350 17 G: 17 POWDER, FOR SOLUTION ORAL at 09:00

## 2024-11-06 RX ADMIN — TAMSULOSIN HYDROCHLORIDE 0.4 MG: 0.4 CAPSULE ORAL at 09:01

## 2024-11-06 RX ADMIN — DILTIAZEM HYDROCHLORIDE 120 MG: 120 CAPSULE, COATED, EXTENDED RELEASE ORAL at 09:04

## 2024-11-06 RX ADMIN — SODIUM CHLORIDE, PRESERVATIVE FREE 10 ML: 5 INJECTION INTRAVENOUS at 21:35

## 2024-11-06 ASSESSMENT — PAIN SCALES - GENERAL
PAINLEVEL_OUTOF10: 0
PAINLEVEL_OUTOF10: 5
PAINLEVEL_OUTOF10: 0
PAINLEVEL_OUTOF10: 0
PAINLEVEL_OUTOF10: 5

## 2024-11-06 ASSESSMENT — PAIN - FUNCTIONAL ASSESSMENT: PAIN_FUNCTIONAL_ASSESSMENT: PREVENTS OR INTERFERES SOME ACTIVE ACTIVITIES AND ADLS

## 2024-11-06 ASSESSMENT — PAIN DESCRIPTION - PAIN TYPE: TYPE: ACUTE PAIN

## 2024-11-06 ASSESSMENT — PAIN DESCRIPTION - ORIENTATION
ORIENTATION: RIGHT;LEFT
ORIENTATION: RIGHT;LEFT

## 2024-11-06 ASSESSMENT — PAIN DESCRIPTION - LOCATION
LOCATION: LEG
LOCATION: LEG

## 2024-11-06 ASSESSMENT — PAIN DESCRIPTION - DESCRIPTORS: DESCRIPTORS: ACHING

## 2024-11-06 ASSESSMENT — PAIN DESCRIPTION - FREQUENCY: FREQUENCY: INTERMITTENT

## 2024-11-06 ASSESSMENT — PAIN DESCRIPTION - ONSET: ONSET: ON-GOING

## 2024-11-06 NOTE — PROGRESS NOTES
Hospitalist Progress Note   Admit Date:  10/31/2024  5:15 PM   Name:  Seth Smith   Age:  79 y.o.  Sex:  male  :  1945   MRN:  [id number]   Room:  808/01    Presenting/Chief Complaint: Abdominal Pain     Reason(s) for Admission: Metabolic encephalopathy [G93.41]  Generalized abdominal pain [R10.84]  Weakness of both lower extremities [R29.898]  Constipation, unspecified constipation type [K59.00]  Pneumonia of right lower lobe due to infectious organism [J18.9]  Acute metabolic encephalopathy [G93.41]     Hospital Course:   Seth Smith is a 79 y.o. male with medical history of dementia, COPD, who presented with lower extremity weakness.  Also had AMS.      Subjective & 24hr Events:   Patient is resting in bed.  No fever no chills.  No chest pain.    Assessment & Plan:     This is a 79y Male with:    Acute metabolic encephalopathy -resolved  Dementia  -Unclear etiology for AMS, but currently resolved and back to his baseline  -Ammonia, TSH, B12, folate, UDS, ethanol levels all unremarkable  -Agitation persistent.  Will order Seroquel at night.  Discontinue Zyprexa.  -He lives by himself, but he probably needs nursing home due to his dementia.  Not safe to be discharged back to his place, as he does not move around at home  -Will consult palliative care for goals of care.  Nursing staff was able to obtain phone numbers of daughters who are deemed to be living out of state at this moment. Whitley Strong) [phone]. Susy [phone].  Patient has abandoned his daughters and they are not involved in his care.  Patient's friend Ms Root is the POA.    Weakness of bilateral lower extremities  Debility  -Magnesium levels within normal limits  -Likely due to decreased movement and subsequent debility in the setting of dementia  -Continue PT OT      Constipation  -Noted in CT abdomen  -Continue daily MiraLAX and Dulcolax daily      Essential hypertension  -Continue losartan and

## 2024-11-06 NOTE — CARE COORDINATION
MSN, CM:  patient has been accepted to [facility] for rehab services with possible long term care.  [facility] and left  for return phone call.  Case Management will continue to follow.

## 2024-11-06 NOTE — PROGRESS NOTES
ACUTE PHYSICAL THERAPY GOALS:   (Developed with and agreed upon by patient and/or caregiver.)  (1.)Mr. Smith will move from supine to sit and sit to supine , scoot up and down, and roll side to side in bed with STAND BY ASSIST within 7 treatment day(s).    (2.)Mr. Smith will transfer from bed to chair and chair to bed with MINIMAL ASSIST using the least restrictive device within 7 treatment day(s).    (3.)Mr. Smith will ambulate with MINIMAL ASSIST for 50+ feet with the least restrictive device within 7 treatment day(s).  (4.)Mr. Smith will tolerate 23+ minutes of therapeutic activity within 7 treatment days for increased activity tolerance and overall functional mobility.    PHYSICAL THERAPY: Daily Note AM   (Link to Caseload Tracking: PT Visit Days : 2  Time In/Out PT Charge Capture  Rehab Caseload Tracker  Orders    Seth Smith is a 79 y.o. male   PRIMARY DIAGNOSIS: Acute metabolic encephalopathy  Metabolic encephalopathy [G93.41]  Generalized abdominal pain [R10.84]  Weakness of both lower extremities [R29.898]  Constipation, unspecified constipation type [K59.00]  Pneumonia of right lower lobe due to infectious organism [J18.9]  Acute metabolic encephalopathy [G93.41]       Inpatient: Payor: [employer] / Plan: [employer] / Product Type: *No Product type* /     ASSESSMENT:     REHAB RECOMMENDATIONS:   Recommendation to date pending progress:  Setting:  Short-term Rehab    Equipment:    To Be Determined     ASSESSMENT:  Mr. Smith was supine upon contact and agreeable to PT. Of note, patient is confused. Patient performed supine to sit and transfer to standing with min assist x 2, additional time, and cues for technique/hand placement. Once standing patient ambulated 15' to recliner chair with rolling walker, min assist x 2 and cues for sequencing with rolling walker as well as proximity to rolling walker. Patient then participated in prolonged static/dynamic

## 2024-11-06 NOTE — PROGRESS NOTES
GOALS:  LTG: Patient will maintain adequate hydration/nutrition with optimum safety and efficiency of swallowing function with PO intake without overt signs or symptoms of aspiration for the highest appropriate diet level.  STG: Goal Met 2024  Patient will consume soft and bite-sized textures and thin liquids without overt signs or symptoms of airway compromise.  Patient will safely ingest diet trials during therapeutic feedings with SLP without overt signs or symptoms of respiratory compromise in efforts to advance diet.    SPEECH LANGUAGE PATHOLOGY: DYSPHAGIA Daily Note #2 and Discharge    Acknowledge Order  I  Therapy Time  I   Charges     I  Rehab Caseload Tracker      NAME: Seth Smith  : 1945  MRN: [id number]    ADMISSION DATE: 10/31/2024  PRIMARY DIAGNOSIS: Acute metabolic encephalopathy    ICD-10: Treatment Diagnosis: R13.11 Dysphagia, Oral Phase    RECOMMENDATIONS   Diet:    Easy to Chew  Thin Liquids    Medication: as tolerated   Compensatory Swallowing Strategies:   Slow rate of intake  Upright as possible for all oral intake   Therapeutic Intervention:   Patient/family education  Dysphagia treatment   Patient continues to require skilled intervention:  No. Please re-consult if new concerns arise.      Anticipated Discharge Needs: Do not anticipate ongoing speech therapy needs upon discharge.      ASSESSMENT    Dysphagia: Patient's oral and pharyngeal phases of swallow were unremarkable. Appropriate for diet advancement for easy to chew foods with thin liquids. No additional speech therapy intervention required at this time. Will sign off.   GENERAL    Subjective: Patient awake, alert, and agreeable to speech therapy services. Oriented to self and place. Physician's progress note reports patient's cognition likely back at baseline.     Recent Information/Background: Patient admitted with abdominal pain and encephalopathy. RLL infiltrates on chest xray. Denies dysphagia

## 2024-11-07 PROCEDURE — 97535 SELF CARE MNGMENT TRAINING: CPT

## 2024-11-07 PROCEDURE — 2580000003 HC RX 258: Performed by: HOSPITALIST

## 2024-11-07 PROCEDURE — 97112 NEUROMUSCULAR REEDUCATION: CPT

## 2024-11-07 PROCEDURE — 1100000000 HC RM PRIVATE

## 2024-11-07 PROCEDURE — 6360000002 HC RX W HCPCS: Performed by: STUDENT IN AN ORGANIZED HEALTH CARE EDUCATION/TRAINING PROGRAM

## 2024-11-07 PROCEDURE — 2580000003 HC RX 258

## 2024-11-07 PROCEDURE — 6360000002 HC RX W HCPCS: Performed by: HOSPITALIST

## 2024-11-07 PROCEDURE — 6370000000 HC RX 637 (ALT 250 FOR IP)

## 2024-11-07 PROCEDURE — 6370000000 HC RX 637 (ALT 250 FOR IP): Performed by: STUDENT IN AN ORGANIZED HEALTH CARE EDUCATION/TRAINING PROGRAM

## 2024-11-07 PROCEDURE — 6370000000 HC RX 637 (ALT 250 FOR IP): Performed by: HOSPITALIST

## 2024-11-07 PROCEDURE — 97530 THERAPEUTIC ACTIVITIES: CPT

## 2024-11-07 PROCEDURE — 51702 INSERT TEMP BLADDER CATH: CPT

## 2024-11-07 RX ORDER — LIDOCAINE HYDROCHLORIDE 20 MG/ML
JELLY TOPICAL PRN
Status: DISCONTINUED | OUTPATIENT
Start: 2024-11-07 | End: 2024-11-13 | Stop reason: HOSPADM

## 2024-11-07 RX ADMIN — QUETIAPINE FUMARATE 25 MG: 25 TABLET ORAL at 19:46

## 2024-11-07 RX ADMIN — ALLOPURINOL 300 MG: 100 TABLET ORAL at 09:14

## 2024-11-07 RX ADMIN — OLANZAPINE 5 MG: 10 INJECTION, POWDER, FOR SOLUTION INTRAMUSCULAR at 13:15

## 2024-11-07 RX ADMIN — BISACODYL 5 MG: 5 TABLET, COATED ORAL at 09:14

## 2024-11-07 RX ADMIN — ASPIRIN 81 MG: 81 TABLET, COATED ORAL at 09:17

## 2024-11-07 RX ADMIN — TAMSULOSIN HYDROCHLORIDE 0.4 MG: 0.4 CAPSULE ORAL at 09:14

## 2024-11-07 RX ADMIN — ALUMINUM HYDROXIDE, MAGNESIUM HYDROXIDE, AND SIMETHICONE 30 ML: 1200; 120; 1200 SUSPENSION ORAL at 19:46

## 2024-11-07 RX ADMIN — ACETAMINOPHEN 650 MG: 325 TABLET ORAL at 09:13

## 2024-11-07 RX ADMIN — ATORVASTATIN CALCIUM 40 MG: 40 TABLET, FILM COATED ORAL at 19:46

## 2024-11-07 RX ADMIN — DILTIAZEM HYDROCHLORIDE 120 MG: 120 CAPSULE, COATED, EXTENDED RELEASE ORAL at 09:14

## 2024-11-07 RX ADMIN — HALOPERIDOL LACTATE 1 MG: 5 INJECTION, SOLUTION INTRAMUSCULAR at 11:30

## 2024-11-07 RX ADMIN — LOSARTAN POTASSIUM 50 MG: 50 TABLET, FILM COATED ORAL at 09:17

## 2024-11-07 RX ADMIN — Medication 3 MG: at 19:46

## 2024-11-07 RX ADMIN — LIDOCAINE HYDROCHLORIDE: 20 JELLY TOPICAL at 14:00

## 2024-11-07 RX ADMIN — POLYETHYLENE GLYCOL 3350 17 G: 17 POWDER, FOR SOLUTION ORAL at 09:14

## 2024-11-07 RX ADMIN — SODIUM CHLORIDE, PRESERVATIVE FREE 10 ML: 5 INJECTION INTRAVENOUS at 09:18

## 2024-11-07 RX ADMIN — SODIUM CHLORIDE, PRESERVATIVE FREE 10 ML: 5 INJECTION INTRAVENOUS at 19:53

## 2024-11-07 RX ADMIN — Medication 100 MG: at 09:14

## 2024-11-07 RX ADMIN — FOLIC ACID 1 MG: 1 TABLET ORAL at 09:17

## 2024-11-07 ASSESSMENT — PAIN SCALES - GENERAL
PAINLEVEL_OUTOF10: 5
PAINLEVEL_OUTOF10: 3
PAINLEVEL_OUTOF10: 5
PAINLEVEL_OUTOF10: 10
PAINLEVEL_OUTOF10: 6
PAINLEVEL_OUTOF10: 4
PAINLEVEL_OUTOF10: 0

## 2024-11-07 ASSESSMENT — PAIN SCALES - WONG BAKER: WONGBAKER_NUMERICALRESPONSE: NO HURT

## 2024-11-07 ASSESSMENT — PAIN DESCRIPTION - LOCATION: LOCATION: BACK

## 2024-11-07 ASSESSMENT — PAIN DESCRIPTION - DESCRIPTORS: DESCRIPTORS: ACHING

## 2024-11-07 NOTE — PLAN OF CARE
Problem: Discharge Planning  Goal: Discharge to home or other facility with appropriate resources  Outcome: Progressing  Flowsheets (Taken 11/7/2024 0719)  Discharge to home or other facility with appropriate resources:   Identify barriers to discharge with patient and caregiver   Arrange for needed discharge resources and transportation as appropriate   Identify discharge learning needs (meds, wound care, etc)   Refer to discharge planning if patient needs post-hospital services based on physician order or complex needs related to functional status, cognitive ability or social support system     Problem: Pain  Goal: Verbalizes/displays adequate comfort level or baseline comfort level  Outcome: Progressing  Flowsheets (Taken 11/7/2024 0719)  Verbalizes/displays adequate comfort level or baseline comfort level:   Encourage patient to monitor pain and request assistance   Assess pain using appropriate pain scale   Administer analgesics based on type and severity of pain and evaluate response     Problem: Safety - Adult  Goal: Free from fall injury  Outcome: Progressing     Problem: Safety - Medical Restraint  Goal: Remains free of injury from restraints (Restraint for Interference with Medical Device)  Description: INTERVENTIONS:  1. Determine that other, less restrictive measures have been tried or would not be effective before applying the restraint  2. Evaluate the patient's condition at the time of restraint application  3. Inform patient/family regarding the reason for restraint  4. Q2H: Monitor safety, psychosocial status, comfort, nutrition and hydration  Outcome: Progressing  Flowsheets  Taken 11/7/2024 1412  Remains free of injury from restraints (restraint for interference with medical device):   Determine that other, less restrictive measures have been tried or would not be effective before applying the restraint   Evaluate the patient's condition at the time of restraint application   Inform

## 2024-11-07 NOTE — PROGRESS NOTES
Pt resting in bed. A/O x2. Pt pleasantly confused. Room air. No signs of distress at this time. Pt expresses bilateral leg pain 5/10. Tylenol administered. No other needs or wants expressed at this time. Pt instructed to use call light for assistance. Bed in lowest position wheels locked. Care onging.

## 2024-11-07 NOTE — PROGRESS NOTES
ACUTE PHYSICAL THERAPY GOALS:   (Developed with and agreed upon by patient and/or caregiver.)  (1.)Mr. Smith will move from supine to sit and sit to supine , scoot up and down, and roll side to side in bed with STAND BY ASSIST within 7 treatment day(s).    (2.)Mr. Smith will transfer from bed to chair and chair to bed with MINIMAL ASSIST using the least restrictive device within 7 treatment day(s).    (3.)Mr. Smith will ambulate with MINIMAL ASSIST for 50+ feet with the least restrictive device within 7 treatment day(s).  (4.)Mr. Smith will tolerate 23+ minutes of therapeutic activity within 7 treatment days for increased activity tolerance and overall functional mobility.    PHYSICAL THERAPY: Daily Note AM   (Link to Caseload Tracking: PT Visit Days : 3  Time In/Out PT Charge Capture  Rehab Caseload Tracker  Orders    Seth Smith is a 79 y.o. male   PRIMARY DIAGNOSIS: Acute metabolic encephalopathy  Metabolic encephalopathy [G93.41]  Generalized abdominal pain [R10.84]  Weakness of both lower extremities [R29.898]  Constipation, unspecified constipation type [K59.00]  Pneumonia of right lower lobe due to infectious organism [J18.9]  Acute metabolic encephalopathy [G93.41]       Inpatient: Payor: [employer] / Plan: [employer] / Product Type: *No Product type* /     ASSESSMENT:     REHAB RECOMMENDATIONS:   Recommendation to date pending progress:  Setting:  Short-term Rehab    Equipment:    To Be Determined     ASSESSMENT:  Mr. Smith was supine upon contact and agreeable to PT. Of note, patient is confused and borderline agitated at times. Patient found soiled. Patient rolled left and right with min assist and cues for technique as well as to maintain side lying while getting cleaned up. Patient then performed uspine to sit with min assist x 2 and cues for technique. Encouraged patient to attempt mobility/ambulation but he would only agree to transferring to recliner chair.  Patient transferred to standing and to recliner chair with min assist x 2, rolling walker, and cues for sequencing with rolling walker especially when turning. After a seated rest break patient participated in LE exercises to BLE's with cues for improved quality of exercise. Slow ,steady progress towards PT goals. Will continue PT efforts.     SUBJECTIVE:   Mr. Smith states, \"I'm not walking\"     Social/Functional Lives With: Alone  Type of Home: Condo  Home Layout: One level  Home Access: Stairs to enter with rails  Entrance Stairs - Number of Steps: 12  Entrance Stairs - Rails: Right  Bathroom Shower/Tub: Tub/Shower unit  Bathroom Toilet: Standard  Bathroom Equipment: None  Bathroom Accessibility: Accessible  Home Equipment: Cane  Receives Help From: Friend(s)  ADL Assistance: Independent  Homemaking Assistance: Independent  Homemaking Responsibilities: Yes  Ambulation Assistance: Independent  Transfer Assistance: Independent  Active : No  Patient's  Info: friends  Occupation: Retired  OBJECTIVE:     PAIN: VITALS / O2: PRECAUTION / LINES / DRAINS:   Pre Treatment: Denies pain  Pain Assessment: None - Denies Pain      Post Treatment: 0/10 Vitals        Oxygen    None    RESTRICTIONS/PRECAUTIONS:        MOBILITY: I Mod I S SBA CGA Min Mod Max Total  NT x2 Comments:   Bed Mobility    Rolling [] [] [] [] [] [] [] [] [] [] []    Supine to Sit [] [] [] [] [] [x] [] [] [] [] [x]    Scooting [] [] [] [] [] [] [] [] [] [] []    Sit to Supine [] [] [] [] [] [] [] [] [] [] []    Transfers    Sit to Stand [] [] [] [] [] [x] [] [] [] [] [x]    Bed to Chair [] [] [] [] [] [x] [] [] [] [] [x]    Stand to Sit [] [] [] [] [] [x] [] [] [] [] [x]     [] [] [] [] [] [] [] [] [] [] []    I=Independent, Mod I=Modified Independent, S=Supervision, SBA=Standby Assistance, CGA=Contact Guard Assistance,   Min=Minimal Assistance, Mod=Moderate Assistance, Max=Maximal Assistance, Total=Total Assistance, NT=Not

## 2024-11-07 NOTE — PROGRESS NOTES
ACUTE OCCUPATIONAL THERAPY GOALS:   (Developed with and agreed upon by patient and/or caregiver.)  1. Patient will complete upper body bathing and dressing with min A, verbal cues, and adaptive equipment as needed.   2. Patient will complete toileting with max A, verbal cues, and adaptive equipment as needed.   3. Patient will complete functional transfers with min A, verbal cues, and adaptive equipment as needed.   4. Patient will tolerate at least 15 minutes of OT activity with less than 2 rest breaks while maintaining O2 sats >90%.   5. Patient will complete grooming in supported sitting at sink level with min A and verbal cues.  6. Patient will complete household distances with min A and adaptive equipment as needed.     Timeframe: 7 visits    OCCUPATIONAL THERAPY: Daily Note AM   OT Visit Days: 3   Time In/Out  OT Charge Capture  Rehab Caseload Tracker  OT Orders    Seth Smith is a 79 y.o. male   PRIMARY DIAGNOSIS: Acute metabolic encephalopathy  Metabolic encephalopathy [G93.41]  Generalized abdominal pain [R10.84]  Weakness of both lower extremities [R29.898]  Constipation, unspecified constipation type [K59.00]  Pneumonia of right lower lobe due to infectious organism [J18.9]  Acute metabolic encephalopathy [G93.41]       Inpatient: Payor: [employer] / Plan: [employer] / Product Type: *No Product type* /     ASSESSMENT:     REHAB RECOMMENDATIONS:   Recommendation to date pending progress:  Setting:  Short-term Rehab    Equipment:    To Be Determined     ASSESSMENT:  Mr. Smith is doing fair today. Cooperative for therapy. Noted to have soiled brief. Pt rolled with  min A and dependent for bowel hygiene. Pt was able to complete bed mobility with min A today. Fair sitting balance noted at edge of bed. Pt was able to stand with min A x2 and transfer over to chair. Pt did not want to perform any mobility at this time. Left with all needs in reach. Making some progress with goals. Will continue

## 2024-11-07 NOTE — PROGRESS NOTES
Hospitalist Progress Note   Admit Date:  10/31/2024  5:15 PM   Name:  Seth Smith   Age:  79 y.o.  Sex:  male  :  1945   MRN:  [id number]   Room:  8/    Presenting/Chief Complaint: Abdominal Pain     Reason(s) for Admission: Metabolic encephalopathy [G93.41]  Generalized abdominal pain [R10.84]  Weakness of both lower extremities [R29.898]  Constipation, unspecified constipation type [K59.00]  Pneumonia of right lower lobe due to infectious organism [J18.9]  Acute metabolic encephalopathy [G93.41]     Hospital Course:   Seth Smith is a 79 y.o. male with medical history of dementia, COPD, who presented with lower extremity weakness.  Also had AMS.      Subjective & 24hr Events:   Patient is resting in bed.  Still confused intermittently.  Nursing staff reported, that the patient has not voided.  Bladder scan was done and shows 500 mL urine.  He received straight cath yesterday and the day before.  Orders in for Valadez catheter.  This afternoon, patient is very agitated.  Had to be restrained, as he is attempting to pull at lines.    Assessment & Plan:     This is a 79y Male with:    Acute metabolic encephalopathy -intermittently confused  Dementia  -Unclear etiology for AMS, but currently resolved and back to his baseline  -Ammonia, TSH, B12, folate, UDS, ethanol levels all unremarkable  -Agitation persistent.  Will order Seroquel at night.  Discontinue Zyprexa.  -He lives by himself, but he probably needs nursing home due to his dementia.  Not safe to be discharged back to his place, as he does not move around at home  -Will consult palliative care for goals of care.  Nursing staff was able to obtain phone numbers of daughters who are deemed to be living out of state at this moment. Whitley Strong) [phone]. Susy [phone].  Patient has abandoned his daughters and they are not involved in his care.  Patient's friend Ms Root is the POA.   soft wrist restraints  and mittens in place as patient is pulling at lines.    Urinary retention  Valadez catheter ordered as patient had straight cath x 2 in the last 2 days.  Continue Flomax.    Weakness of bilateral lower extremities  Debility  -Magnesium levels within normal limits  -Likely due to decreased movement and subsequent debility in the setting of dementia  -Continue PT OT      Constipation  -Noted on CT abdomen  -Continue daily MiraLAX and Dulcolax daily      Essential hypertension  -Continue losartan and diltiazem      BPH  -Continue tamsulosin      Gout  -Uric acid levels within normal limits  -Continue allopurinol      Palliative care encounter  -Consulted palliative care as mentioned above.     Anticipated Discharge Arrangements:   Skilled Nursing Facility pending insurance authorization.    PT/OT evals ordered?  Therapy evals ordered  Diet:  ADULT DIET; Easy to Chew  VTE prophylaxis: Lovenox  Code status: Full Code      Non-peripheral Lines and Tubes (if present):          Telemetry (if present):           Hospital Problems:  Principal Problem:    Acute metabolic encephalopathy  Active Problems:    Primary hypertension    Weakness of both lower extremities    Tobacco use disorder    Dementia with agitation (HCC)    History of gout    BPH (benign prostatic hyperplasia)    Palliative care encounter  Resolved Problems:    Right lower lobe pneumonia      Objective:   Patient Vitals for the past 24 hrs:   Temp Pulse Resp BP SpO2   11/07/24 0734 97.2 °F (36.2 °C) 60 18 (!) 155/71 96 %   11/07/24 0421 97.7 °F (36.5 °C) 72 17 (!) 151/80 94 %   11/07/24 0053 97.5 °F (36.4 °C) 92 17 (!) 150/60 95 %   11/06/24 2004 98.1 °F (36.7 °C) 86 17 (!) 159/91 93 %   11/06/24 1538 -- 84 18 127/81 95 %   11/06/24 1137 97.5 °F (36.4 °C) 86 19 125/81 98 %       Oxygen Therapy  SpO2: 96 %  Pulse via Oximetry: 58 beats per minute  Pulse Oximeter Device Mode: Intermittent  Pulse Oximeter Device Location: Finger  O2 Device: None (Room air)  Skin

## 2024-11-08 ENCOUNTER — APPOINTMENT (OUTPATIENT)
Dept: GENERAL RADIOLOGY | Age: 79
DRG: 070 | End: 2024-11-08
Payer: OTHER GOVERNMENT

## 2024-11-08 LAB
AMMONIA PLAS-SCNC: <10 UMOL/L (ref 16–60)
ANION GAP SERPL CALC-SCNC: 8 MMOL/L (ref 7–16)
APPEARANCE UR: CLEAR
BACTERIA URNS QL MICRO: NEGATIVE /HPF
BASOPHILS # BLD: 0.1 K/UL (ref 0–0.2)
BASOPHILS NFR BLD: 1 % (ref 0–2)
BILIRUB UR QL: NEGATIVE
BUN SERPL-MCNC: 21 MG/DL (ref 8–23)
CALCIUM SERPL-MCNC: 9.5 MG/DL (ref 8.8–10.2)
CASTS URNS QL MICRO: 0 /LPF
CHLORIDE SERPL-SCNC: 108 MMOL/L (ref 98–107)
CO2 SERPL-SCNC: 25 MMOL/L (ref 20–29)
COLOR UR: ABNORMAL
CREAT SERPL-MCNC: 1.02 MG/DL (ref 0.8–1.3)
CRYSTALS URNS QL MICRO: 0 /LPF
DIFFERENTIAL METHOD BLD: NORMAL
EOSINOPHIL # BLD: 0.3 K/UL (ref 0–0.8)
EOSINOPHIL NFR BLD: 3 % (ref 0.5–7.8)
EPI CELLS #/AREA URNS HPF: ABNORMAL /HPF
ERYTHROCYTE [DISTWIDTH] IN BLOOD BY AUTOMATED COUNT: 14.6 % (ref 11.9–14.6)
GLUCOSE SERPL-MCNC: 85 MG/DL (ref 70–99)
GLUCOSE UR STRIP.AUTO-MCNC: NEGATIVE MG/DL
HCT VFR BLD AUTO: 42.5 % (ref 41.1–50.3)
HGB BLD-MCNC: 14.2 G/DL (ref 13.6–17.2)
HGB UR QL STRIP: ABNORMAL
HYALINE CASTS URNS QL MICRO: ABNORMAL /LPF
IMM GRANULOCYTES # BLD AUTO: 0 K/UL (ref 0–0.5)
IMM GRANULOCYTES NFR BLD AUTO: 0 % (ref 0–5)
KETONES UR QL STRIP.AUTO: ABNORMAL MG/DL
LEUKOCYTE ESTERASE UR QL STRIP.AUTO: ABNORMAL
LYMPHOCYTES # BLD: 1.4 K/UL (ref 0.5–4.6)
LYMPHOCYTES NFR BLD: 16 % (ref 13–44)
MCH RBC QN AUTO: 30.1 PG (ref 26.1–32.9)
MCHC RBC AUTO-ENTMCNC: 33.4 G/DL (ref 31.4–35)
MCV RBC AUTO: 90.2 FL (ref 82–102)
MONOCYTES # BLD: 0.8 K/UL (ref 0.1–1.3)
MONOCYTES NFR BLD: 10 % (ref 4–12)
MUCOUS THREADS URNS QL MICRO: 0 /LPF
NEUTS SEG # BLD: 6.1 K/UL (ref 1.7–8.2)
NEUTS SEG NFR BLD: 70 % (ref 43–78)
NITRITE UR QL STRIP.AUTO: NEGATIVE
NRBC # BLD: 0 K/UL (ref 0–0.2)
PH UR STRIP: 6 (ref 5–9)
PLATELET # BLD AUTO: 231 K/UL (ref 150–450)
PMV BLD AUTO: 10.4 FL (ref 9.4–12.3)
POTASSIUM SERPL-SCNC: 4.4 MMOL/L (ref 3.5–5.1)
PROT UR STRIP-MCNC: ABNORMAL MG/DL
RBC # BLD AUTO: 4.71 M/UL (ref 4.23–5.6)
RBC #/AREA URNS HPF: ABNORMAL /HPF
SODIUM SERPL-SCNC: 141 MMOL/L (ref 136–145)
SP GR UR REFRACTOMETRY: 1.02 (ref 1–1.02)
URINE CULTURE IF INDICATED: ABNORMAL
UROBILINOGEN UR QL STRIP.AUTO: 0.2 EU/DL (ref 0.2–1)
WBC # BLD AUTO: 8.7 K/UL (ref 4.3–11.1)
WBC URNS QL MICRO: ABNORMAL /HPF

## 2024-11-08 PROCEDURE — 1100000000 HC RM PRIVATE

## 2024-11-08 PROCEDURE — 81001 URINALYSIS AUTO W/SCOPE: CPT

## 2024-11-08 PROCEDURE — 87086 URINE CULTURE/COLONY COUNT: CPT

## 2024-11-08 PROCEDURE — 71045 X-RAY EXAM CHEST 1 VIEW: CPT

## 2024-11-08 PROCEDURE — 2580000003 HC RX 258: Performed by: HOSPITALIST

## 2024-11-08 PROCEDURE — 85025 COMPLETE CBC W/AUTO DIFF WBC: CPT

## 2024-11-08 PROCEDURE — 6370000000 HC RX 637 (ALT 250 FOR IP)

## 2024-11-08 PROCEDURE — 2580000003 HC RX 258

## 2024-11-08 PROCEDURE — 82140 ASSAY OF AMMONIA: CPT

## 2024-11-08 PROCEDURE — 6360000002 HC RX W HCPCS: Performed by: HOSPITALIST

## 2024-11-08 PROCEDURE — 6370000000 HC RX 637 (ALT 250 FOR IP): Performed by: STUDENT IN AN ORGANIZED HEALTH CARE EDUCATION/TRAINING PROGRAM

## 2024-11-08 PROCEDURE — 36415 COLL VENOUS BLD VENIPUNCTURE: CPT

## 2024-11-08 PROCEDURE — 80048 BASIC METABOLIC PNL TOTAL CA: CPT

## 2024-11-08 RX ADMIN — DILTIAZEM HYDROCHLORIDE 120 MG: 120 CAPSULE, COATED, EXTENDED RELEASE ORAL at 08:47

## 2024-11-08 RX ADMIN — BISACODYL 5 MG: 5 TABLET, COATED ORAL at 08:48

## 2024-11-08 RX ADMIN — TAMSULOSIN HYDROCHLORIDE 0.4 MG: 0.4 CAPSULE ORAL at 08:48

## 2024-11-08 RX ADMIN — ATORVASTATIN CALCIUM 40 MG: 40 TABLET, FILM COATED ORAL at 19:54

## 2024-11-08 RX ADMIN — SODIUM CHLORIDE, PRESERVATIVE FREE 10 ML: 5 INJECTION INTRAVENOUS at 19:55

## 2024-11-08 RX ADMIN — SODIUM CHLORIDE, PRESERVATIVE FREE 5 ML: 5 INJECTION INTRAVENOUS at 08:49

## 2024-11-08 RX ADMIN — FOLIC ACID 1 MG: 1 TABLET ORAL at 08:48

## 2024-11-08 RX ADMIN — ALLOPURINOL 300 MG: 100 TABLET ORAL at 08:42

## 2024-11-08 RX ADMIN — Medication 100 MG: at 08:48

## 2024-11-08 RX ADMIN — Medication 3 MG: at 19:54

## 2024-11-08 RX ADMIN — ASPIRIN 81 MG: 81 TABLET, COATED ORAL at 08:48

## 2024-11-08 RX ADMIN — QUETIAPINE FUMARATE 25 MG: 25 TABLET ORAL at 19:54

## 2024-11-08 RX ADMIN — POLYETHYLENE GLYCOL 3350 17 G: 17 POWDER, FOR SOLUTION ORAL at 08:47

## 2024-11-08 RX ADMIN — OLANZAPINE 5 MG: 10 INJECTION, POWDER, FOR SOLUTION INTRAMUSCULAR at 18:13

## 2024-11-08 RX ADMIN — LOSARTAN POTASSIUM 50 MG: 50 TABLET, FILM COATED ORAL at 08:48

## 2024-11-08 ASSESSMENT — PAIN SCALES - GENERAL: PAINLEVEL_OUTOF10: 0

## 2024-11-08 ASSESSMENT — PAIN SCALES - WONG BAKER
WONGBAKER_NUMERICALRESPONSE: NO HURT
WONGBAKER_NUMERICALRESPONSE: NO HURT

## 2024-11-08 NOTE — PROGRESS NOTES
Comprehensive Nutrition Assessment    Type and Reason for Visit: Initial, LOS  Length of Stay    Nutrition Recommendations/Plan:   Meals and Snacks:  Diet: Continue current order  Oral Nutriton Supplement Therapy:   Medical food supplement therapy:  Initiate Ensure Enlive (high calorie high protein) 350 calories, 20 grams protein per 8 ounce serving      Malnutrition Assessment:  Malnutrition Status: At risk for malnutrition (AMS, variable intake this admission)    No diane wasting observed.    Nutrition Assessment:  Nutrition History: Pt unable to provide nutrition hx due to acute altered mentation on dementia. No family in room.      Do You Have Any Cultural, Latter-day, or Ethnic Food Preferences?: No   Weight History: 5/8/23: 191# (gastro OV)  CBW: 188# (11/8/24- bed scale)  Limited weigh hx available, but appears as though patient without significant weight changes.   Nutrition Background:       PMH: dementia, HTN, COPD, tobacco use disorder. Presented with lower extremity weakness after a fall, abdominal pain, decreased appetite, constipation, dysphagia. Admitted with acute metabolic encephalopathy.   Nutrition Interval:  11/1: Soft and Bite-Size with Thin Liquids per SLP  11/6: Easy to Chew with Thin Liquids per SLP    Visited with patient in room. Resting in bed. ~ 25% of lunch consumed at bedside. TRAVIS Dinh reports pt eats 50% of meals on average and requires assistance with tray set up. Pt reports his appetite is \"not good, because he is getting fat\". Encouraged adequate PO intake. Offered Ensure TID, pt agreed. Reviewed with TRAVIS Dinh.     Current Nutrition Therapies:  ADULT DIET; Easy to Chew    Current Intake:   Average Meal Intake:  (50%) Average Supplements Intake: None Ordered      Anthropometric Measures:  Height: 177.8 cm (5' 10\")  Current Body Wt: 85 kg (187 lb 6.3 oz) (11/8), Weight source: Bed scale  BMI: 26.9, Overweight (BMI 25.0-29.9)  Admission Body Weight: 90.7 kg (199 lb 15.3 oz) (10/31 -  stated)  Ideal Body Weight (Kg) (Calculated): 75 kg (166 lbs), 112.9 %  BMI Category Overweight (BMI 25.0-29.9)    Estimated Daily Nutrient Needs:  Energy (kcal/day): 4625-7992 (20-25 kcal/kg) (Kcal/kg Weight used: 85 kg Current  Protein (g/day):  (1-1.2 g/kg) Weight Used: (Current) 85 kg  Fluid (ml/day):   (1 ml/kcal)    Nutrition Diagnosis:   Inadequate oral intake related to cognitive or neurological impairment as evidenced by  (acute metabolic encephalopathy, avg intake of 50% of meals)    Nutrition Interventions:   Food and/or Nutrient Delivery: Continue Current Diet, Start Oral Nutrition Supplement     Coordination of Nutrition Care: Continue to monitor while inpatient      Goals:      Active Goal: Meet at least 75% of estimated needs, by next RD assessment       Nutrition Monitoring and Evaluation:      Food/Nutrient Intake Outcomes: Food and Nutrient Intake, Supplement Intake  Physical Signs/Symptoms Outcomes: Biochemical Data, Meal Time Behavior, Weight, GI Status    Discharge Planning:    No discharge needs at this time    Lois Bowden RD

## 2024-11-08 NOTE — PROGRESS NOTES
Physician Progress Note      PATIENT:               LINDA VIZCAINO  [facility] #:                  [id number]  :                       1945  ADMIT DATE:       10/31/2024 5:15 PM  DISCH DATE:  RESPONDING  PROVIDER #:        Sven Quinteros MD          QUERY TEXT:    Patient admitted with PNA, weakness, & encephalopathy. Per HP:  It is possible   he has an aspiration pneumonitis given his acute encephalopathy and   underlying dementia. Per other progress notes: Pneumonia of right lower lobe   due to infectious organism. If possible, please document in the progress notes   and discharge summary if aspiration pneumonitis was:    The medical record reflects the following:  Risk Factors: Acute metabolic encephalopathy, Dementia  Clinical Indicators: Per HP: Chest x-ray with possible right lower lobe   infiltrate.  As patient is afebrile and has no tachypnea, tachycardia or   leukocytosis, I think pneumonia is less likely.  It is possible he has an   aspiration pneumonitis given his acute encephalopathy and underlying dementia.   Per other progress notes: Pneumonia of right lower lobe due to infectious   organism  Treatment: Rocephin and azithromycin  Options provided:  -- Aspiration pneumonia  -- Pneumonia, treating for both gram positive and gram negative organisms  -- Viral pneumonia  -- Bacterial PNA  -- Other - I will add my own diagnosis  -- Disagree - Not applicable / Not valid  -- Disagree - Clinically unable to determine / Unknown  -- Refer to Clinical Documentation Reviewer    PROVIDER RESPONSE TEXT:    This patient has aspiration pneumonia.    Query created by: Crystal Son on 2024 7:40 AM      Electronically signed by:  Sven Quinteros MD 2024 3:53 PM

## 2024-11-08 NOTE — CARE COORDINATION
MSN, CM:  patient has been accepted to [facility] for rehab services.  Attempted contact with Jessi again this AM with no success.  Attempted [facility] and [facility] which [facility] with [facility] success either.  [facility].  Case Management will continue to follow.

## 2024-11-08 NOTE — PROGRESS NOTES
Hospitalist Progress Note   Admit Date:  10/31/2024  5:15 PM   Name:  Seth Smith   Age:  79 y.o.  Sex:  male  :  1945   MRN:  [id number]   Room:  [facility]/    Presenting/Chief Complaint: Abdominal Pain     Reason(s) for Admission: Metabolic encephalopathy [G93.41]  Generalized abdominal pain [R10.84]  Weakness of both lower extremities [R29.898]  Constipation, unspecified constipation type [K59.00]  Pneumonia of right lower lobe due to infectious organism [J18.9]  Acute metabolic encephalopathy [G93.41]     Hospital Course:   Seth Smith is a 79 y.o. male with medical history of dementia, COPD, who presented with lower extremity weakness.  Also had AMS.      Subjective & 24hr Events:   Patient was restrained only for a few hours yesterday when Valadez catheter was placed.  Still confused but not agitated.  No fever.    Assessment & Plan:     This is a 79y Male with:    Acute metabolic encephalopathy -intermittently confused  Dementia  -Unclear etiology for AMS, but currently resolved and back to his baseline  -Ammonia, TSH, B12, folate, UDS, ethanol levels all unremarkable  -Agitation persistent.  Will order Seroquel at night.  Discontinue Zyprexa.  -He lives by himself, but he probably needs nursing home due to his dementia.  Not safe to be discharged back to his place, as he does not move around at home  -Will consult palliative care for goals of care.  Nursing staff was able to obtain phone numbers of daughters who are deemed to be living out of state at this moment. Whitley Strong) [phone]. Susy [phone].  Patient has abandoned his daughters and they are not involved in his care.  Patient's friend Ms Root is the POA.   patient was placed in restraints only for a brief period of time yesterday.   Today he is pleasant and not agitated.    Urinary retention  Valadez catheter ordered as patient had straight cath x 2 in the last 2 days.  Continue Flomax.  Urology  Height as of this encounter: 1.778 m (5' 10\").    Weight as of this encounter: 85 kg (187 lb 8 oz).    Intake/Output Summary (Last 24 hours) at 11/8/2024 0954  Last data filed at 11/8/2024 0440  Gross per 24 hour   Intake --   Output 1010 ml   Net -1010 ml           Physical Exam:     General:    Well nourished.  Alert,awake male, NAD.   Head:  Normocephalic, atraumatic  Eyes:  Sclerae appear normal.  Pupils equally round.  ENT:  Nares appear normal.  Moist oral mucosa  Neck:  No restricted ROM.  Trachea midline   CV:   RRR.  No m/r/g.  No jugular venous distension.  Lungs:   CTA bilaterally.  No wheezing, rhonchi, or rales.  Symmetric expansion.  Abdomen:   Soft, nontender, nondistended.  Extremities: No cyanosis or clubbing.  No edema  Skin:     No rashes.  Normal coloration.   Warm and dry.    Neuro:  AxO x2. CN II-XII grossly intact.    Psych:  Normal mood and affect.      I have personally reviewed labs and tests:  Recent Labs:  Recent Results (from the past 48 hour(s))   CBC with Auto Differential    Collection Time: 11/08/24  8:12 AM   Result Value Ref Range    WBC 8.7 4.3 - 11.1 K/uL    RBC 4.71 4.23 - 5.6 M/uL    Hemoglobin 14.2 13.6 - 17.2 g/dL    Hematocrit 42.5 41.1 - 50.3 %    MCV 90.2 82 - 102 FL    MCH 30.1 26.1 - 32.9 PG    MCHC 33.4 31.4 - 35.0 g/dL    RDW 14.6 11.9 - 14.6 %    Platelets 231 150 - 450 K/uL    MPV 10.4 9.4 - 12.3 FL    nRBC 0.00 0.0 - 0.2 K/uL    Differential Type AUTOMATED      Neutrophils % 70 43 - 78 %    Lymphocytes % 16 13 - 44 %    Monocytes % 10 4.0 - 12.0 %    Eosinophils % 3 0.5 - 7.8 %    Basophils % 1 0.0 - 2.0 %    Immature Granulocytes % 0 0.0 - 5.0 %    Neutrophils Absolute 6.1 1.7 - 8.2 K/UL    Lymphocytes Absolute 1.4 0.5 - 4.6 K/UL    Monocytes Absolute 0.8 0.1 - 1.3 K/UL    Eosinophils Absolute 0.3 0.0 - 0.8 K/UL    Basophils Absolute 0.1 0.0 - 0.2 K/UL    Immature Granulocytes Absolute 0.0 0.0 - 0.5 K/UL   Basic Metabolic Panel w/ Reflex to MG    Collection Time:

## 2024-11-08 NOTE — PLAN OF CARE
Problem: Discharge Planning  Goal: Discharge to home or other facility with appropriate resources  Outcome: Progressing  Flowsheets (Taken 11/8/2024 1321)  Discharge to home or other facility with appropriate resources:   Identify barriers to discharge with patient and caregiver   Arrange for needed discharge resources and transportation as appropriate   Identify discharge learning needs (meds, wound care, etc)   Refer to discharge planning if patient needs post-hospital services based on physician order or complex needs related to functional status, cognitive ability or social support system     Problem: Safety - Adult  Goal: Free from fall injury  Outcome: Progressing

## 2024-11-09 PROCEDURE — 2580000003 HC RX 258

## 2024-11-09 PROCEDURE — 6370000000 HC RX 637 (ALT 250 FOR IP)

## 2024-11-09 PROCEDURE — 6360000002 HC RX W HCPCS

## 2024-11-09 PROCEDURE — 6370000000 HC RX 637 (ALT 250 FOR IP): Performed by: STUDENT IN AN ORGANIZED HEALTH CARE EDUCATION/TRAINING PROGRAM

## 2024-11-09 PROCEDURE — 1100000000 HC RM PRIVATE

## 2024-11-09 RX ADMIN — DILTIAZEM HYDROCHLORIDE 120 MG: 120 CAPSULE, COATED, EXTENDED RELEASE ORAL at 08:35

## 2024-11-09 RX ADMIN — SODIUM CHLORIDE, PRESERVATIVE FREE 5 ML: 5 INJECTION INTRAVENOUS at 08:39

## 2024-11-09 RX ADMIN — FOLIC ACID 1 MG: 1 TABLET ORAL at 08:35

## 2024-11-09 RX ADMIN — ASPIRIN 81 MG: 81 TABLET, COATED ORAL at 08:35

## 2024-11-09 RX ADMIN — QUETIAPINE FUMARATE 25 MG: 25 TABLET ORAL at 19:49

## 2024-11-09 RX ADMIN — ALLOPURINOL 300 MG: 100 TABLET ORAL at 08:35

## 2024-11-09 RX ADMIN — ATORVASTATIN CALCIUM 40 MG: 40 TABLET, FILM COATED ORAL at 19:49

## 2024-11-09 RX ADMIN — Medication 3 MG: at 19:49

## 2024-11-09 RX ADMIN — POLYETHYLENE GLYCOL 3350 17 G: 17 POWDER, FOR SOLUTION ORAL at 08:36

## 2024-11-09 RX ADMIN — BISACODYL 5 MG: 5 TABLET, COATED ORAL at 08:35

## 2024-11-09 RX ADMIN — Medication 100 MG: at 08:35

## 2024-11-09 RX ADMIN — SODIUM CHLORIDE, PRESERVATIVE FREE 10 ML: 5 INJECTION INTRAVENOUS at 19:49

## 2024-11-09 RX ADMIN — LOSARTAN POTASSIUM 50 MG: 50 TABLET, FILM COATED ORAL at 08:36

## 2024-11-09 RX ADMIN — TAMSULOSIN HYDROCHLORIDE 0.4 MG: 0.4 CAPSULE ORAL at 08:36

## 2024-11-09 NOTE — PROGRESS NOTES
Pt resting in bed watching tv. On RA. No s/sx of distress noted. No complaints of pain. Call light within reach. Bed alarm set. Door open.

## 2024-11-09 NOTE — PLAN OF CARE
Problem: Discharge Planning  Goal: Discharge to home or other facility with appropriate resources  Outcome: Progressing     Problem: Pain  Goal: Verbalizes/displays adequate comfort level or baseline comfort level  Outcome: Progressing     Problem: Skin/Tissue Integrity  Goal: Absence of new skin breakdown  Description: 1.  Monitor for areas of redness and/or skin breakdown  2.  Assess vascular access sites hourly  3.  Every 4-6 hours minimum:  Change oxygen saturation probe site  4.  Every 4-6 hours:  If on nasal continuous positive airway pressure, respiratory therapy assess nares and determine need for appliance change or resting period.  Outcome: Progressing     Problem: Safety - Adult  Goal: Free from fall injury  Outcome: Progressing     Problem: Respiratory - Adult  Goal: Achieves optimal ventilation and oxygenation  Outcome: Progressing     Problem: ABCDS Injury Assessment  Goal: Absence of physical injury  Outcome: Progressing     Problem: Safety - Medical Restraint  Goal: Remains free of injury from restraints (Restraint for Interference with Medical Device)  Description: INTERVENTIONS:  1. Determine that other, less restrictive measures have been tried or would not be effective before applying the restraint  2. Evaluate the patient's condition at the time of restraint application  3. Inform patient/family regarding the reason for restraint  4. Q2H: Monitor safety, psychosocial status, comfort, nutrition and hydration  Outcome: Progressing       Ongoing SW/CM Assessment/Plan of Care Note     See SW/CM flowsheets for goals and other objective data.    Patient/Family discharge goal (s):  Goal #1: Communication facilitated  Goal #2: Adjustment/coping issues addressed       PT Recommendation:  Recommendation for Discharge: PT: Post acute therapy, 24 Hour assist    OT Recommendation:  Recommendations for Discharge: OT: 24 Hour assist, Post acute therapy, Sub-acute nursing home (pending tolerating 3 hours of therapy)    Disposition:       Progress note:   Spoke with [facility], they are still evaluating their ability to accept an adolescent pt.  Discussed case at [facility]T rounds, therapy indicating acute rehab may be more appropriate for pt given her age.  Message left with pt's Mom Yudi x 2 today.  Pt received a transfusion today for hgb of 6.6.     1405: Spoke with pt's Mom Yudi, she is here now.  She is in agreement with referral to [facility].   Visited pt and she is also in agreement with IPR referral.  Referral initiated to Kasey @ [facility].     1524: Received call back from Heather @ [facility], their MD, Dr Emir Mendez has accepted pt when she is medically stable for DC and they will need to clarify where pt will be staying when she is able to leave rehab. Will follow up with I[facility] tomorrow.     1543: Visited with pt's Mom Yudi, pt is resting in bed, informed Yudi re above.  She is very agreeable with plan for [facility].  CM also inquired where [place] would be going after rehab and Mom stated that \"they still have to discuss that\".  CM informed Mom that rehab will need to know there is a safe DC plan before they would be able to accept her.

## 2024-11-09 NOTE — PROGRESS NOTES
Hospitalist Progress Note   Admit Date:  10/31/2024  5:15 PM   Name:  Seth Smith   Age:  79 y.o.  Sex:  male  :  1945   MRN:  [id number]   Room:  808/    Presenting/Chief Complaint: Abdominal Pain     Reason(s) for Admission: Metabolic encephalopathy [G93.41]  Generalized abdominal pain [R10.84]  Weakness of both lower extremities [R29.898]  Constipation, unspecified constipation type [K59.00]  Pneumonia of right lower lobe due to infectious organism [J18.9]  Acute metabolic encephalopathy [G93.41]     Hospital Course:   Seth Smith is a 79 y.o. male with medical history of dementia, COPD, who presented with lower extremity weakness.  Also had AMS.  Mental status fluctuating.  Currently awaiting insurance authorization for discharge to short-term rehab.      Subjective & 24hr Events:   Patient is resting in bed.  Still confused but not agitated or combative.  No fever.     Assessment & Plan:     This is a 79y Male with:    Acute metabolic encephalopathy -intermittently confused  Dementia  -Unclear etiology for AMS, but currently resolved and back to his baseline  -Ammonia, TSH, B12, folate, UDS, ethanol levels all unremarkable  -Agitation persistent.  Will order Seroquel at night.  Discontinue Zyprexa.  -He lives by himself, but he probably needs nursing home due to his dementia.  Not safe to be discharged back to his place, as he does not move around at home  -Will consult palliative care for goals of care.  Nursing staff was able to obtain phone numbers of daughters who are deemed to be living out of state at this moment. Whitley Strong) [phone]. Susy [phone].  Patient has abandoned his daughters and they are not involved in his care.  Patient's friend Ms Root is the POA.   patient was placed in restraints only for a brief period of time.    Today, he is pleasant and not agitated.    Acute urinary retention  Valadez catheter in place.  Continue  MG/3ML) 0.083% nebulizer solution 2.5 mg  2.5 mg Nebulization Q6H PRN    bisacodyl (DULCOLAX) EC tablet 5 mg  5 mg Oral Daily       Signed:  Sven Quinteros MD    Part of this note may have been written by using a voice dictation software.  The note has been proof read but may still contain some grammatical/other typographical errors.

## 2024-11-09 NOTE — PROGRESS NOTES
Pt resting in bed with eyes closed. Awakens when spoken to. Alert and oriented to person and place at this time. On RA. No s/sx of distress noted. No complaints of pain. Valadez in place and draining clear yellow urine. Door open. Call light within reach.
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
## 2024-11-10 LAB
BACTERIA SPEC CULT: NORMAL
SERVICE CMNT-IMP: NORMAL

## 2024-11-10 PROCEDURE — 6370000000 HC RX 637 (ALT 250 FOR IP)

## 2024-11-10 PROCEDURE — 2580000003 HC RX 258

## 2024-11-10 PROCEDURE — 6360000002 HC RX W HCPCS

## 2024-11-10 PROCEDURE — 6370000000 HC RX 637 (ALT 250 FOR IP): Performed by: STUDENT IN AN ORGANIZED HEALTH CARE EDUCATION/TRAINING PROGRAM

## 2024-11-10 PROCEDURE — 1100000000 HC RM PRIVATE

## 2024-11-10 RX ADMIN — ASPIRIN 81 MG: 81 TABLET, COATED ORAL at 08:26

## 2024-11-10 RX ADMIN — DILTIAZEM HYDROCHLORIDE 120 MG: 120 CAPSULE, COATED, EXTENDED RELEASE ORAL at 08:26

## 2024-11-10 RX ADMIN — Medication 100 MG: at 08:26

## 2024-11-10 RX ADMIN — Medication 3 MG: at 20:07

## 2024-11-10 RX ADMIN — QUETIAPINE FUMARATE 25 MG: 25 TABLET ORAL at 20:07

## 2024-11-10 RX ADMIN — ALLOPURINOL 300 MG: 100 TABLET ORAL at 08:26

## 2024-11-10 RX ADMIN — ATORVASTATIN CALCIUM 40 MG: 40 TABLET, FILM COATED ORAL at 20:07

## 2024-11-10 RX ADMIN — ALUMINUM HYDROXIDE, MAGNESIUM HYDROXIDE, AND SIMETHICONE 30 ML: 1200; 120; 1200 SUSPENSION ORAL at 17:09

## 2024-11-10 RX ADMIN — POLYETHYLENE GLYCOL 3350 17 G: 17 POWDER, FOR SOLUTION ORAL at 08:27

## 2024-11-10 RX ADMIN — FOLIC ACID 1 MG: 1 TABLET ORAL at 08:26

## 2024-11-10 RX ADMIN — SODIUM CHLORIDE, PRESERVATIVE FREE 10 ML: 5 INJECTION INTRAVENOUS at 20:07

## 2024-11-10 RX ADMIN — LOSARTAN POTASSIUM 50 MG: 50 TABLET, FILM COATED ORAL at 08:26

## 2024-11-10 RX ADMIN — BISACODYL 5 MG: 5 TABLET, COATED ORAL at 08:26

## 2024-11-10 RX ADMIN — SODIUM CHLORIDE, PRESERVATIVE FREE 10 ML: 5 INJECTION INTRAVENOUS at 08:28

## 2024-11-10 RX ADMIN — TAMSULOSIN HYDROCHLORIDE 0.4 MG: 0.4 CAPSULE ORAL at 08:26

## 2024-11-10 RX ADMIN — ENOXAPARIN SODIUM 40 MG: 100 INJECTION SUBCUTANEOUS at 08:26

## 2024-11-10 NOTE — PROGRESS NOTES
Hospitalist Progress Note   Admit Date:  10/31/2024  5:15 PM   Name:  Seth Smith   Age:  79 y.o.  Sex:  male  :  1945   MRN:  [id number]   Room:  808/    Presenting/Chief Complaint: Abdominal Pain     Reason(s) for Admission: Metabolic encephalopathy [G93.41]  Generalized abdominal pain [R10.84]  Weakness of both lower extremities [R29.898]  Constipation, unspecified constipation type [K59.00]  Pneumonia of right lower lobe due to infectious organism [J18.9]  Acute metabolic encephalopathy [G93.41]     Hospital Course:   Seth Smith is a 79 y.o. male with medical history of dementia, COPD, who presented with lower extremity weakness.  Also had AMS.  Mental status fluctuating.  Currently awaiting insurance authorization for discharge to short-term rehab.       Subjective & 24hr Events:   Mr. Smith seen today while in his room. He is pleasant, though has been intermittently agitated. We are waiting on STR placement and we spoke about this. He is onboard. I set up his breakfast tray for him and he reports no acute complaints.      Assessment & Plan:   Acute metabolic encephalopathy -intermittently confused  Dementia  -Unclear etiology for AMS, but currently resolved and back to his baseline  -Ammonia, TSH, B12, folate, UDS, ethanol levels all unremarkable  -Agitation intermittently, does have Seroquel at night.  Discontinue Zyprexa.  -He lives by himself, but he probably needs nursing home due to his dementia.  Not safe to be discharged back to his place, as he does not move around at home  -Consulted palliative care for goals of care.  Nursing staff was able to obtain phone numbers of daughters who are deemed to be living out of state at this moment. Whitley Strong) [phone]. Susy [phone].  Patient has abandoned his daughters and they are not involved in his care.  -Patient's friend Ms Root is the POA.  - patient was placed in restraints only for a brief  period of time.   -11/9 pleasant and not agitated.  -11/10 continues to be pleasant       Acute urinary retention  -Valadez catheter in place.  -Continue Flomax.  -11/9 Voiding trial in 48 hours if patient remains inpatient.  -Urology follow-up outpatient.       Weakness of bilateral lower extremities  Debility  B12, TSH within normal limits.  -Magnesium levels within normal limits  -Likely due to decreased movement and subsequent debility in the setting of dementia  -Continue PT OT       Constipation  -Noted on CT abdomen  -Continue daily MiraLAX and Dulcolax daily        Essential hypertension  -Continue losartan and diltiazem        BPH  -Continue tamsulosin        Gout  -Uric acid levels within normal limits  -Continue allopurinol        Palliative care encounter  -Palliative care was initially consulted.      Anticipated Discharge Arrangements:   Skilled Nursing Facility    PT/OT evals ordered?  Therapy evals ordered  Diet:  ADULT DIET; Easy to Chew  ADULT ORAL NUTRITION SUPPLEMENT; Breakfast, Lunch; Standard High Calorie/High Protein Oral Supplement  VTE prophylaxis: Lovenox  Code status: Full Code      Non-peripheral Lines and Tubes (if present):      Urinary Catheter 11/07/24 [address] (Active)        Telemetry (if present):           Hospital Problems:  Principal Problem:    Acute metabolic encephalopathy  Active Problems:    Primary hypertension    Weakness of both lower extremities    Tobacco use disorder    Dementia with agitation (HCC)    History of gout    BPH (benign prostatic hyperplasia)    Palliative care encounter  Resolved Problems:    Right lower lobe pneumonia      Objective:   Patient Vitals for the past 24 hrs:   Temp Pulse Resp BP SpO2   11/10/24 1143 97.7 °F (36.5 °C) 74 18 (!) 160/76 95 %   11/10/24 0738 97.3 °F (36.3 °C) 54 16 (!) 162/64 96 %   11/10/24 0301 97.7 °F (36.5 °C) 58 17 (!) 146/71 96 %   11/09/24 2305 98.6 °F (37 °C) 70 17 (!) 149/78 96 %   11/09/24 1910 98.6 °F (37 °C) 84 17

## 2024-11-10 NOTE — PROGRESS NOTES
Pt resting in bed with eyes closed. Awakens when spoken to. Alert and oriented to person and place at this time. On RA. No s/sx of distress noted. No complaints of pain. Call light within reach. Door open.

## 2024-11-11 LAB
ANION GAP SERPL CALC-SCNC: 8 MMOL/L (ref 7–16)
BASOPHILS # BLD: 0.1 K/UL (ref 0–0.2)
BASOPHILS NFR BLD: 1 % (ref 0–2)
BUN SERPL-MCNC: 19 MG/DL (ref 8–23)
CALCIUM SERPL-MCNC: 9.3 MG/DL (ref 8.8–10.2)
CHLORIDE SERPL-SCNC: 109 MMOL/L (ref 98–107)
CO2 SERPL-SCNC: 26 MMOL/L (ref 20–29)
CREAT SERPL-MCNC: 1.07 MG/DL (ref 0.8–1.3)
DIFFERENTIAL METHOD BLD: NORMAL
EOSINOPHIL # BLD: 0.2 K/UL (ref 0–0.8)
EOSINOPHIL NFR BLD: 3 % (ref 0.5–7.8)
ERYTHROCYTE [DISTWIDTH] IN BLOOD BY AUTOMATED COUNT: 14.4 % (ref 11.9–14.6)
GLUCOSE SERPL-MCNC: 89 MG/DL (ref 70–99)
HCT VFR BLD AUTO: 41.9 % (ref 41.1–50.3)
HGB BLD-MCNC: 13.6 G/DL (ref 13.6–17.2)
IMM GRANULOCYTES # BLD AUTO: 0.1 K/UL (ref 0–0.5)
IMM GRANULOCYTES NFR BLD AUTO: 1 % (ref 0–5)
LYMPHOCYTES # BLD: 1.8 K/UL (ref 0.5–4.6)
LYMPHOCYTES NFR BLD: 23 % (ref 13–44)
MCH RBC QN AUTO: 29.8 PG (ref 26.1–32.9)
MCHC RBC AUTO-ENTMCNC: 32.5 G/DL (ref 31.4–35)
MCV RBC AUTO: 91.7 FL (ref 82–102)
MONOCYTES # BLD: 0.8 K/UL (ref 0.1–1.3)
MONOCYTES NFR BLD: 10 % (ref 4–12)
NEUTS SEG # BLD: 5 K/UL (ref 1.7–8.2)
NEUTS SEG NFR BLD: 62 % (ref 43–78)
NRBC # BLD: 0 K/UL (ref 0–0.2)
PLATELET # BLD AUTO: 222 K/UL (ref 150–450)
PMV BLD AUTO: 10.2 FL (ref 9.4–12.3)
POTASSIUM SERPL-SCNC: 3.9 MMOL/L (ref 3.5–5.1)
RBC # BLD AUTO: 4.57 M/UL (ref 4.23–5.6)
SODIUM SERPL-SCNC: 143 MMOL/L (ref 136–145)
WBC # BLD AUTO: 7.8 K/UL (ref 4.3–11.1)

## 2024-11-11 PROCEDURE — 80048 BASIC METABOLIC PNL TOTAL CA: CPT

## 2024-11-11 PROCEDURE — 1100000000 HC RM PRIVATE

## 2024-11-11 PROCEDURE — 97530 THERAPEUTIC ACTIVITIES: CPT

## 2024-11-11 PROCEDURE — 6370000000 HC RX 637 (ALT 250 FOR IP): Performed by: HOSPITALIST

## 2024-11-11 PROCEDURE — 36415 COLL VENOUS BLD VENIPUNCTURE: CPT

## 2024-11-11 PROCEDURE — 2580000003 HC RX 258

## 2024-11-11 PROCEDURE — 6370000000 HC RX 637 (ALT 250 FOR IP)

## 2024-11-11 PROCEDURE — 6370000000 HC RX 637 (ALT 250 FOR IP): Performed by: STUDENT IN AN ORGANIZED HEALTH CARE EDUCATION/TRAINING PROGRAM

## 2024-11-11 PROCEDURE — 85025 COMPLETE CBC W/AUTO DIFF WBC: CPT

## 2024-11-11 RX ORDER — BISACODYL 5 MG/1
10 TABLET, DELAYED RELEASE ORAL DAILY PRN
Status: DISCONTINUED | OUTPATIENT
Start: 2024-11-11 | End: 2024-11-11 | Stop reason: SDUPTHER

## 2024-11-11 RX ORDER — POLYETHYLENE GLYCOL 3350 17 G/17G
17 POWDER, FOR SOLUTION ORAL DAILY PRN
Status: DISCONTINUED | OUTPATIENT
Start: 2024-11-11 | End: 2024-11-13 | Stop reason: HOSPADM

## 2024-11-11 RX ORDER — DILTIAZEM HYDROCHLORIDE 180 MG/1
180 CAPSULE, COATED, EXTENDED RELEASE ORAL DAILY
Status: DISCONTINUED | OUTPATIENT
Start: 2024-11-11 | End: 2024-11-12

## 2024-11-11 RX ADMIN — TAMSULOSIN HYDROCHLORIDE 0.4 MG: 0.4 CAPSULE ORAL at 08:10

## 2024-11-11 RX ADMIN — QUETIAPINE FUMARATE 25 MG: 25 TABLET ORAL at 20:38

## 2024-11-11 RX ADMIN — SODIUM CHLORIDE, PRESERVATIVE FREE 10 ML: 5 INJECTION INTRAVENOUS at 08:11

## 2024-11-11 RX ADMIN — SODIUM CHLORIDE, PRESERVATIVE FREE 10 ML: 5 INJECTION INTRAVENOUS at 20:40

## 2024-11-11 RX ADMIN — ALLOPURINOL 300 MG: 100 TABLET ORAL at 08:09

## 2024-11-11 RX ADMIN — DILTIAZEM HYDROCHLORIDE 180 MG: 180 CAPSULE, COATED, EXTENDED RELEASE ORAL at 08:09

## 2024-11-11 RX ADMIN — ASPIRIN 81 MG: 81 TABLET, COATED ORAL at 08:09

## 2024-11-11 RX ADMIN — LOSARTAN POTASSIUM 50 MG: 50 TABLET, FILM COATED ORAL at 08:10

## 2024-11-11 ASSESSMENT — PAIN SCALES - GENERAL
PAINLEVEL_OUTOF10: 0
PAINLEVEL_OUTOF10: 0

## 2024-11-11 NOTE — PROGRESS NOTES
Hospitalist Progress Note   Admit Date:  10/31/2024  5:15 PM   Name:  Seth Smith   Age:  79 y.o.  Sex:  male  :  1945   MRN:  [id number]   Room:      79 y.o. male with medical history of dementia, COPD, who presented with lower extremity weakness.  Also had AMS.  Mental status fluctuating.  Currently awaiting insurance authorization for discharge to short-term rehab.       Today, confused at baseline, joking, Valadez in place, no ac events      Assessment & Plan:       1- Acute metabolic encephalopathy -intermittently confused. Hx of Dementia  -Unclear etiology for AMS, but currently resolved and back to his baseline  -Ammonia, TSH, B12, folate, UDS, ethanol levels all unremarkable  -Agitation intermittently, does have Seroquel at night.  Discontinue Zyprexa.  -He lives by himself, but he probably needs nursing home due to his dementia.  Not safe to be discharged back to his place, as he does not move around at home  -Consulted palliative care for goals of care.  Nursing staff was able to obtain phone numbers of daughters who are deemed to be living out of state at this moment. [place] ([place]) [phone]. Susy [phone].  Patient has abandoned his daughters and they are not involved in his care.  -Patient's friend Ms Root is the POA.    2- Acute urinary retention, hx of BPH  -Valadez catheter in place.  -Continue Flomax.  -Voiding trial tomorrow  -Urology follow-up outpatient.    3- Weakness of bilateral lower extremities  Debility  B12, Mg, TSH within normal limits.    4- Hx of Constipation, COPD, gout. Stable.     5- Essential hypertension, not well controlled  Meds advanced        Dispo; [facility], [facility]  Case d/w pt, CM      Objective:   Patient Vitals for the past 24 hrs:   Temp Pulse Resp BP SpO2   24 1113 97.7 °F (36.5 °C) 60 20 (!) 140/63 97 %   24 0752 97.5 °F (36.4 °C) 55 20 (!) 160/69 95 %   24 0257 97.5 °F (36.4 °C) 62 18 (!) 156/75 94 %

## 2024-11-11 NOTE — PLAN OF CARE
Problem: Discharge Planning  Goal: Discharge to home or other facility with appropriate resources  Outcome: Progressing  Flowsheets (Taken 11/11/2024 0729)  Discharge to home or other facility with appropriate resources:   Arrange for needed discharge resources and transportation as appropriate   Identify barriers to discharge with patient and caregiver   Identify discharge learning needs (meds, wound care, etc)   Refer to discharge planning if patient needs post-hospital services based on physician order or complex needs related to functional status, cognitive ability or social support system     Problem: Pain  Goal: Verbalizes/displays adequate comfort level or baseline comfort level  Outcome: Progressing  Flowsheets (Taken 11/11/2024 0729)  Verbalizes/displays adequate comfort level or baseline comfort level: Encourage patient to monitor pain and request assistance     Problem: Safety - Adult  Goal: Free from fall injury  Outcome: Progressing

## 2024-11-11 NOTE — PROGRESS NOTES
ACUTE PHYSICAL THERAPY GOALS:   (Developed with and agreed upon by patient and/or caregiver.)  (1.)Mr. Smith will move from supine to sit and sit to supine , scoot up and down, and roll side to side in bed with STAND BY ASSIST within 7 treatment day(s).    (2.)Mr. Smith will transfer from bed to chair and chair to bed with MINIMAL ASSIST using the least restrictive device within 7 treatment day(s).    (3.)Mr. Smith will ambulate with MINIMAL ASSIST for 50+ feet with the least restrictive device within 7 treatment day(s).  (4.)Mr. Smith will tolerate 23+ minutes of therapeutic activity within 7 treatment days for increased activity tolerance and overall functional mobility.    PHYSICAL THERAPY: Daily Note AM   (Link to Caseload Tracking: PT Visit Days : 4  Time In/Out PT Charge Capture  Rehab Caseload Tracker  Orders    Seth Smith is a 79 y.o. male   PRIMARY DIAGNOSIS: Acute metabolic encephalopathy  Metabolic encephalopathy [G93.41]  Generalized abdominal pain [R10.84]  Weakness of both lower extremities [R29.898]  Constipation, unspecified constipation type [K59.00]  Pneumonia of right lower lobe due to infectious organism [J18.9]  Acute metabolic encephalopathy [G93.41]       Inpatient: Payor: [employer] / Plan: [employer] / Product Type: *No Product type* /     ASSESSMENT:     REHAB RECOMMENDATIONS:   Recommendation to date pending progress:  Setting:  Short-term Rehab    Equipment:    To Be Determined     ASSESSMENT:  Mr. Smith was supine upon contact and agreeable to PT with encouragement. Of note, patient is confused and borderline agitated at times especially if things are not done his way. Patient performed supine to sit with mod assist, additional time, and cues for technique. Encouraged him to attempt sit to stand but patient initially refused until he needed to use the BSC. Patient transferred to standing and to BSC with mod assist, rolling walker, and max cues  for sequencing/walker manipulation. Patient able to have a bowel movement. Patient transferred to standing again with mod assist and cues for hand placement/technique. Once standing patient demonstrated fair to fair- standing balance while therapist cleaned him up. Patient then attempted to \"climb\" forward into bed and became highly agitated when therapist attempted to cue him for safe transfer back into bed. Ultimately patient ended up on his stomach in the bed and was able to roll over onto his back with mod assist for cues. Slow ,steady progress towards PT goals. Will continue PT efforts.     SUBJECTIVE:   Mr. Smith states, \"Just get out of the way and I'll do it my way!!!\"     Social/Functional Lives With: Alone  Type of Home: Condo  Home Layout: One level  Home Access: Stairs to enter with rails  Entrance Stairs - Number of Steps: 12  Entrance Stairs - Rails: Right  Bathroom Shower/Tub: Tub/Shower unit  Bathroom Toilet: Standard  Bathroom Equipment: None  Bathroom Accessibility: Accessible  Home Equipment: Cane  Receives Help From: Friend(s)  ADL Assistance: Independent  Homemaking Assistance: Independent  Homemaking Responsibilities: Yes  Ambulation Assistance: Independent  Transfer Assistance: Independent  Active : No  Patient's  Info: friends  Occupation: Retired  OBJECTIVE:     PAIN: VITALS / O2: PRECAUTION / LINES / DRAINS:   Pre Treatment: Denies pain  Pain Assessment: None - Denies Pain      Post Treatment: 0/10 Vitals        Oxygen    None    RESTRICTIONS/PRECAUTIONS:        MOBILITY: I Mod I S SBA CGA Min Mod Max Total  NT x2 Comments:   Bed Mobility    Rolling [] [] [] [] [] [] [x] [] [] [] []    Supine to Sit [] [] [] [] [] [] [x] [] [] [] []    Scooting [] [] [] [] [] [] [] [] [] [] []    Sit to Supine [] [] [] [] [] [] [x] [] [] [] []    Transfers    Sit to Stand [] [] [] [] [] [] [x] [] [] [] []    Bed to Chair [] [] [] [] [] [] [x] [] [] [] []    Stand to Sit [] [] [] [] [] []

## 2024-11-11 NOTE — CARE COORDINATION
LAURA CM: [facility] for [facility] for re[facility] to L[facility].  Case Management will continue to follow.

## 2024-11-12 PROCEDURE — 97112 NEUROMUSCULAR REEDUCATION: CPT

## 2024-11-12 PROCEDURE — 6370000000 HC RX 637 (ALT 250 FOR IP)

## 2024-11-12 PROCEDURE — 1100000000 HC RM PRIVATE

## 2024-11-12 PROCEDURE — 6360000002 HC RX W HCPCS: Performed by: NURSE PRACTITIONER

## 2024-11-12 PROCEDURE — 6370000000 HC RX 637 (ALT 250 FOR IP): Performed by: STUDENT IN AN ORGANIZED HEALTH CARE EDUCATION/TRAINING PROGRAM

## 2024-11-12 PROCEDURE — 2580000003 HC RX 258

## 2024-11-12 PROCEDURE — 6370000000 HC RX 637 (ALT 250 FOR IP): Performed by: HOSPITALIST

## 2024-11-12 RX ORDER — LOSARTAN POTASSIUM 50 MG/1
100 TABLET ORAL DAILY
Status: DISCONTINUED | OUTPATIENT
Start: 2024-11-12 | End: 2024-11-13 | Stop reason: HOSPADM

## 2024-11-12 RX ORDER — DILTIAZEM HYDROCHLORIDE 180 MG/1
180 CAPSULE, COATED, EXTENDED RELEASE ORAL DAILY
Qty: 90 CAPSULE | Refills: 0
Start: 2024-11-13 | End: 2025-02-11

## 2024-11-12 RX ORDER — LOSARTAN POTASSIUM 100 MG/1
100 TABLET ORAL DAILY
Qty: 90 TABLET | Refills: 0
Start: 2024-11-13 | End: 2025-02-11

## 2024-11-12 RX ORDER — DILTIAZEM HYDROCHLORIDE 180 MG/1
180 CAPSULE, COATED, EXTENDED RELEASE ORAL DAILY
Status: DISCONTINUED | OUTPATIENT
Start: 2024-11-12 | End: 2024-11-13

## 2024-11-12 RX ORDER — DILTIAZEM HYDROCHLORIDE 240 MG/1
240 CAPSULE, COATED, EXTENDED RELEASE ORAL DAILY
Status: DISCONTINUED | OUTPATIENT
Start: 2024-11-12 | End: 2024-11-12

## 2024-11-12 RX ORDER — HYDRALAZINE HYDROCHLORIDE 20 MG/ML
10 INJECTION INTRAMUSCULAR; INTRAVENOUS EVERY 4 HOURS PRN
Status: DISCONTINUED | OUTPATIENT
Start: 2024-11-12 | End: 2024-11-13 | Stop reason: HOSPADM

## 2024-11-12 RX ADMIN — LOSARTAN POTASSIUM 100 MG: 50 TABLET, FILM COATED ORAL at 08:24

## 2024-11-12 RX ADMIN — HYDRALAZINE HYDROCHLORIDE 10 MG: 20 INJECTION INTRAMUSCULAR; INTRAVENOUS at 21:33

## 2024-11-12 RX ADMIN — SODIUM CHLORIDE, PRESERVATIVE FREE 10 ML: 5 INJECTION INTRAVENOUS at 08:26

## 2024-11-12 RX ADMIN — ALLOPURINOL 300 MG: 100 TABLET ORAL at 08:24

## 2024-11-12 RX ADMIN — DILTIAZEM HYDROCHLORIDE 180 MG: 180 CAPSULE, COATED, EXTENDED RELEASE ORAL at 08:24

## 2024-11-12 RX ADMIN — SODIUM CHLORIDE, PRESERVATIVE FREE 10 ML: 5 INJECTION INTRAVENOUS at 21:33

## 2024-11-12 RX ADMIN — ASPIRIN 81 MG: 81 TABLET, COATED ORAL at 08:24

## 2024-11-12 RX ADMIN — QUETIAPINE FUMARATE 25 MG: 25 TABLET ORAL at 21:33

## 2024-11-12 RX ADMIN — TAMSULOSIN HYDROCHLORIDE 0.4 MG: 0.4 CAPSULE ORAL at 08:24

## 2024-11-12 NOTE — PROGRESS NOTES
Hospitalist Progress Note   Admit Date:  10/31/2024  5:15 PM   Name:  Seth Smith   Age:  79 y.o.  Sex:  male  :  1945   MRN:  [id number]   Room:      79 y.o. male with medical history of dementia, COPD, who presented with lower extremity weakness.  Also had AMS.  Mental status fluctuating.  Currently awaiting insurance authorization for discharge to short-term rehab.       Today, confused at baseline, no change, Valadez in place, no ac events      Assessment & Plan:       1- Acute metabolic encephalopathy -intermittently confused. Hx of Dementia  -Unclear etiology for AMS, but currently resolved and back to his baseline  -Ammonia, TSH, B12, folate, UDS, ethanol levels all unremarkable  -Agitation intermittently, does have Seroquel at night.  Discontinue Zyprexa.  -He lives by himself, but he probably needs nursing home due to his dementia.  Not safe to be discharged back to his place, as he does not move around at home  -Consulted palliative care for goals of care.  Nursing staff was able to obtain phone numbers of daughters who are deemed to be living out of state at this moment. [place] ([place]) [phone]. Susy [phone].  Patient has abandoned his daughters and they are not involved in his care.  -Patient's friend Ms Root is the POA.    2- Acute urinary retention, hx of BPH  -Valadez catheter removal today, to follow UO  -Continue Flomax.  -Voiding trial tomorrow  -Urology follow-up outpatient.    3- Weakness of bilateral lower extremities  Debility  B12, Mg, TSH within normal limits.    4- Hx of Constipation, COPD, gout. Stable.     5- Essential hypertension, not well controlled  Meds advanced        Dispo; [facility], [facility]  Case d/w pt, CM      Objective:   Patient Vitals for the past 24 hrs:   Temp Pulse Resp BP SpO2   24 0741 97.7 °F (36.5 °C) 68 20 (!) 126/97 96 %   24 0323 97.3 °F (36.3 °C) 59 18 (!) 142/80 98 %   24 2359 97.7 °F (36.5 °C) 58 18  0.5 - 4.6 K/UL    Monocytes Absolute 0.8 0.1 - 1.3 K/UL    Eosinophils Absolute 0.2 0.0 - 0.8 K/UL    Basophils Absolute 0.1 0.0 - 0.2 K/UL    Immature Granulocytes Absolute 0.1 0.0 - 0.5 K/UL   Basic Metabolic Panel w/ Reflex to MG    Collection Time: 11/11/24  3:49 AM   Result Value Ref Range    Sodium 143 136 - 145 mmol/L    Potassium 3.9 3.5 - 5.1 mmol/L    Chloride 109 (H) 98 - 107 mmol/L    CO2 26 20 - 29 mmol/L    Anion Gap 8 7 - 16 mmol/L    Glucose 89 70 - 99 mg/dL    BUN 19 8 - 23 MG/DL    Creatinine 1.07 0.80 - 1.30 MG/DL    Est, Glom Filt Rate 71 >60 ml/min/1.73m2    Calcium 9.3 8.8 - 10.2 MG/DL       No results for input(s): \"COVID19\" in the last 72 hours.    Current Meds:  Current Facility-Administered Medications   Medication Dose Route Frequency    losartan (COZAAR) tablet 100 mg  100 mg Oral Daily    dilTIAZem (CARDIZEM CD) extended release capsule 180 mg  180 mg Oral Daily    polyethylene glycol (GLYCOLAX) packet 17 g  17 g Oral Daily PRN    nicotine (NICODERM CQ) 7 MG/24HR 1 patch  1 patch TransDERmal Daily    lidocaine (XYLOCAINE) 2 % uro-jet   Topical PRN    OLANZapine (ZyPREXA) 5 mg in sterile water 1 mL injection  5 mg IntraMUSCular Q8H PRN    QUEtiapine (SEROQUEL) tablet 25 mg  25 mg Oral Nightly    aspirin EC tablet 81 mg  81 mg Oral Daily    sodium chloride flush 0.9 % injection 5-40 mL  5-40 mL IntraVENous 2 times per day    sodium chloride flush 0.9 % injection 5-40 mL  5-40 mL IntraVENous PRN    0.9 % sodium chloride infusion   IntraVENous PRN    potassium chloride (KLOR-CON M) extended release tablet 40 mEq  40 mEq Oral PRN    Or    potassium bicarb-citric acid (EFFER-K) effervescent tablet 40 mEq  40 mEq Oral PRN    Or    potassium chloride 10 mEq/100 mL IVPB (Peripheral Line)  10 mEq IntraVENous PRN    potassium chloride 10 mEq/100 mL IVPB (Peripheral Line)  10 mEq IntraVENous PRN    magnesium sulfate 2000 mg in 50 mL IVPB premix  2,000 mg IntraVENous PRN    ondansetron (ZOFRAN-ODT)  disintegrating tablet 4 mg  4 mg Oral Q8H PRN    Or    ondansetron (ZOFRAN) injection 4 mg  4 mg IntraVENous Q6H PRN    bisacodyl (DULCOLAX) suppository 10 mg  10 mg Rectal Daily PRN    aluminum & magnesium hydroxide-simethicone (MAALOX) 200-200-20 MG/5ML suspension 30 mL  30 mL Oral Q6H PRN    acetaminophen (TYLENOL) tablet 650 mg  650 mg Oral Q6H PRN    Or    acetaminophen (TYLENOL) suppository 650 mg  650 mg Rectal Q6H PRN    allopurinol (ZYLOPRIM) tablet 300 mg  300 mg Oral Daily    tamsulosin (FLOMAX) capsule 0.4 mg  0.4 mg Oral Daily    albuterol (PROVENTIL) (2.5 MG/3ML) 0.083% nebulizer solution 2.5 mg  2.5 mg Nebulization Q6H PRN       Signed:  Primo Redman MD

## 2024-11-12 NOTE — PROGRESS NOTES
Pt resting in bed awake. Alert and oriented to person and place. On RA. No s/sx of distress noted. No complaints of pain. Valadez in place and draining. Call light within reach. Door open.

## 2024-11-12 NOTE — DISCHARGE SUMMARY
[unfilled]    Physician Discharge Summary     Patient ID:  Seth Smith  [id number]  79 y.o.  1945    Admit date: 10/31/2024    Discharge date: 11-    Diagnosis:    1- Acute metabolic encephalopathy -intermittently confused. Hx of Dementia  -Unclear etiology for AMS, but currently resolved and back to his baseline  -Ammonia, TSH, B12, folate, UDS, ethanol levels all unremarkable  -Agitation intermittently, does have Seroquel at night.  Discontinue Zyprexa.  -He lives by himself, but he probably needs nursing home due to his dementia.  Not safe to be discharged back to his place, as he does not move around at home  -Consulted palliative care for goals of care.  Nursing staff was able to obtain phone numbers of daughters who are deemed to be living out of state at this moment. Whitley Strong) [phone]. Susy [phone].  Patient has abandoned his daughters and they are not involved in his care.  -Patient's friend Ms Root is the POA.     2- Acute urinary retention, hx of BPH  -Valadez catheter removed, to follow UO  -Continue Flomax.  -Consider Urology follow-up outpatient if retention recur or persist.     3- Weakness of bilateral lower extremities. History of Debility.     4- History of constipation, COPD, gout. Stable.     5- Essential hypertension, not well controlled, improved after meds advanced.        [facility]    Hospital course:   Per HPI:  79 y.o. male with medical history of dementia, COPD, who presented with lower extremity weakness.  Also had AMS.  Mental status fluctuating.  Currently awaiting insurance authorization for discharge to short-term rehab.   Patient admitted and treated as above.  No symptoms or signs of active tuberculosis.    PCP: Unknown, Unknown (Inactive)    Patient Instructions:   Current Discharge Medication List        START taking these medications    Details   dilTIAZem (CARDIZEM CD) 180 MG extended release capsule Take 1

## 2024-11-12 NOTE — PROGRESS NOTES
continue OT efforts as indicated.        SUBJECTIVE:     Mr. Smith states, \"Good talking to you.\"    Social/Functional Lives With: Alone  Type of Home: Condo  Home Layout: One level  Home Access: Stairs to enter with rails  Entrance Stairs - Number of Steps: 12  Entrance Stairs - Rails: Right  Bathroom Shower/Tub: Tub/Shower unit  Bathroom Toilet: Standard  Bathroom Equipment: None  Bathroom Accessibility: Accessible  Home Equipment: Cane  Receives Help From: Friend(s)  ADL Assistance: Independent  Homemaking Assistance: Independent  Homemaking Responsibilities: Yes  Ambulation Assistance: Independent  Transfer Assistance: Independent  Active : No  Patient's  Info: friends  Occupation: Retired    OBJECTIVE:     LINES / DRAINS / AIRWAY: None    RESTRICTIONS/PRECAUTIONS:  Restrictions/Precautions  Restrictions/Precautions: Fall Risk        PAIN: VITALS / O2:   Pre Treatment:    0        Post Treatment: 0 Vitals          Oxygen        MOBILITY: I Mod I S SBA CGA Min Mod Max Total  NT x2 Comments:   Bed Mobility    Rolling [] [] [] [] [] [] [] [] [] [] []    Supine to Sit [] [] [] [] [] [] [x] [] [] [] []    Scooting [] [] [] [] [] [] [x] [] [] [] []    Sit to Supine [] [] [] [] [] [] [x] [] [] [] []    Transfers    Sit to Stand [] [] [] [] [] [] [] [] [] [] []    Bed to Chair [] [] [] [] [] [] [] [] [] [] []    Stand to Sit [] [] [] [] [] [] [] [] [] [] []    Tub/Shower [] [] [] [] [] [] [] [] [] [x] []     Toilet [] [] [] [] [] [] [] [] [] [x] []      [] [] [] [] [] [] [] [] [] [] []    I=Independent, Mod I=Modified Independent, S=Supervision/Setup, SBA=Standby Assistance, CGA=Contact Guard Assistance, Min=Minimal Assistance, Mod=Moderate Assistance, Max=Maximal Assistance, Total=Total Assistance, NT=Not Tested    ACTIVITIES OF DAILY LIVING: I Mod I S SBA CGA Min Mod Max Total NT Comments   BASIC ADLs:              Upper Body   Bathing [] [] [] [] [] [] [] [] [] [x]     Lower Body Bathing [] [] [] []  notified    INTERDISCIPLINARY COLLABORATION:  RN/ PCT and OT/ PEREZ    EDUCATION:       TOTAL TREATMENT DURATION AND TIME:  Time In: 1317  Time Out: 1332  Minutes: 15    Brigid Weiss, OT

## 2024-11-13 VITALS
TEMPERATURE: 97.6 F | HEART RATE: 71 BPM | OXYGEN SATURATION: 98 % | DIASTOLIC BLOOD PRESSURE: 79 MMHG | HEIGHT: 70 IN | BODY MASS INDEX: 27.21 KG/M2 | RESPIRATION RATE: 16 BRPM | SYSTOLIC BLOOD PRESSURE: 151 MMHG | WEIGHT: 190.04 LBS

## 2024-11-13 PROCEDURE — 51798 US URINE CAPACITY MEASURE: CPT

## 2024-11-13 PROCEDURE — 97530 THERAPEUTIC ACTIVITIES: CPT

## 2024-11-13 PROCEDURE — 2580000003 HC RX 258

## 2024-11-13 PROCEDURE — 6370000000 HC RX 637 (ALT 250 FOR IP)

## 2024-11-13 PROCEDURE — 6370000000 HC RX 637 (ALT 250 FOR IP): Performed by: HOSPITALIST

## 2024-11-13 RX ORDER — DILTIAZEM HYDROCHLORIDE 240 MG/1
240 CAPSULE, COATED, EXTENDED RELEASE ORAL DAILY
Status: DISCONTINUED | OUTPATIENT
Start: 2024-11-13 | End: 2024-11-13 | Stop reason: HOSPADM

## 2024-11-13 RX ADMIN — SODIUM CHLORIDE, PRESERVATIVE FREE 10 ML: 5 INJECTION INTRAVENOUS at 09:11

## 2024-11-13 RX ADMIN — DILTIAZEM HYDROCHLORIDE 240 MG: 240 CAPSULE, COATED, EXTENDED RELEASE ORAL at 09:10

## 2024-11-13 RX ADMIN — TAMSULOSIN HYDROCHLORIDE 0.4 MG: 0.4 CAPSULE ORAL at 09:11

## 2024-11-13 RX ADMIN — ALLOPURINOL 300 MG: 100 TABLET ORAL at 09:10

## 2024-11-13 RX ADMIN — ASPIRIN 81 MG: 81 TABLET, COATED ORAL at 09:10

## 2024-11-13 RX ADMIN — LOSARTAN POTASSIUM 100 MG: 50 TABLET, FILM COATED ORAL at 09:10

## 2024-11-13 NOTE — PLAN OF CARE
Problem: Discharge Planning  Goal: Discharge to home or other facility with appropriate resources  Outcome: Adequate for Discharge     Problem: Pain  Goal: Verbalizes/displays adequate comfort level or baseline comfort level  Outcome: Adequate for Discharge     Problem: Skin/Tissue Integrity  Goal: Absence of new skin breakdown  Description: 1.  Monitor for areas of redness and/or skin breakdown  2.  Assess vascular access sites hourly  3.  Every 4-6 hours minimum:  Change oxygen saturation probe site  4.  Every 4-6 hours:  If on nasal continuous positive airway pressure, respiratory therapy assess nares and determine need for appliance change or resting period.  Outcome: Adequate for Discharge     Problem: Safety - Adult  Goal: Free from fall injury  Outcome: Adequate for Discharge     Problem: Respiratory - Adult  Goal: Achieves optimal ventilation and oxygenation  Outcome: Adequate for Discharge     Problem: ABCDS Injury Assessment  Goal: Absence of physical injury  Outcome: Adequate for Discharge     Problem: Safety - Medical Restraint  Goal: Remains free of injury from restraints (Restraint for Interference with Medical Device)  Description: INTERVENTIONS:  1. Determine that other, less restrictive measures have been tried or would not be effective before applying the restraint  2. Evaluate the patient's condition at the time of restraint application  3. Inform patient/family regarding the reason for restraint  4. Q2H: Monitor safety, psychosocial status, comfort, nutrition and hydration  Outcome: Adequate for Discharge

## 2024-11-13 NOTE — PROGRESS NOTES
ACUTE PHYSICAL THERAPY GOALS:   (Developed with and agreed upon by patient and/or caregiver.)  (1.)Mr. Smith will move from supine to sit and sit to supine , scoot up and down, and roll side to side in bed with STAND BY ASSIST within 7 treatment day(s).    (2.)Mr. Smith will transfer from bed to chair and chair to bed with MINIMAL ASSIST using the least restrictive device within 7 treatment day(s).    (3.)Mr. Smith will ambulate with MINIMAL ASSIST for 50+ feet with the least restrictive device within 7 treatment day(s).  (4.)Mr. Smith will tolerate 23+ minutes of therapeutic activity within 7 treatment days for increased activity tolerance and overall functional mobility.    PHYSICAL THERAPY: Daily Note AM   (Link to Caseload Tracking: PT Visit Days : 5  Time In/Out PT Charge Capture  Rehab Caseload Tracker  Orders    Seth Smith is a 79 y.o. male   PRIMARY DIAGNOSIS: Acute metabolic encephalopathy  Metabolic encephalopathy [G93.41]  Generalized abdominal pain [R10.84]  Weakness of both lower extremities [R29.898]  Constipation, unspecified constipation type [K59.00]  Pneumonia of right lower lobe due to infectious organism [J18.9]  Acute metabolic encephalopathy [G93.41]       Inpatient: Payor: [employer] / Plan: [employer] / Product Type: *No Product type* /     ASSESSMENT:     REHAB RECOMMENDATIONS:   Recommendation to date pending progress:  Setting:  Short-term Rehab    Equipment:    To Be Determined     ASSESSMENT:  Mr. Smith was supine upon contact and agreeable to PT. Of note, patient is confused with periods of agitation although he was appropriate with me today. Patient performed supine to sit with mod assist, additional time, and cues for technique. Patient sat EOB for prolonged amount of time working on functional tasks to improve activity tolerance, strength, and sitting balance. Patient took rest breaks throughout all activity. Patient declined to attempt sit  to stand. Patient returned to supine with mod assist where he was repositioned for comfort. Slow ,steady progress towards PT goals. Will continue PT efforts.     SUBJECTIVE:   Mr. Smith states, \"What kind of car do you drive?\"     Social/Functional Lives With: Alone  Type of Home: Condo  Home Layout: One level  Home Access: Stairs to enter with rails  Entrance Stairs - Number of Steps: 12  Entrance Stairs - Rails: Right  Bathroom Shower/Tub: Tub/Shower unit  Bathroom Toilet: Standard  Bathroom Equipment: None  Bathroom Accessibility: Accessible  Home Equipment: Cane  Receives Help From: Friend(s)  ADL Assistance: Independent  Homemaking Assistance: Independent  Homemaking Responsibilities: Yes  Ambulation Assistance: Independent  Transfer Assistance: Independent  Active : No  Patient's  Info: friends  Occupation: Retired  OBJECTIVE:     PAIN: VITALS / O2: PRECAUTION / LINES / DRAINS:   Pre Treatment: Denies pain  Pain Assessment: None - Denies Pain      Post Treatment: 0/10 Vitals        Oxygen    None    RESTRICTIONS/PRECAUTIONS:        MOBILITY: I Mod I S SBA CGA Min Mod Max Total  NT x2 Comments:   Bed Mobility    Rolling [] [] [] [] [] [] [x] [] [] [] []    Supine to Sit [] [] [] [] [] [] [x] [] [] [] []    Scooting [] [] [] [] [] [] [] [] [] [] []    Sit to Supine [] [] [] [] [] [] [x] [] [] [] []    Transfers    Sit to Stand [] [] [] [] [] [] [] [] [] [] []    Bed to Chair [] [] [] [] [] [] [] [] [] [] []    Stand to Sit [] [] [] [] [] [] [] [] [] [] []     [] [] [] [] [] [] [] [] [] [] []    I=Independent, Mod I=Modified Independent, S=Supervision, SBA=Standby Assistance, CGA=Contact Guard Assistance,   Min=Minimal Assistance, Mod=Moderate Assistance, Max=Maximal Assistance, Total=Total Assistance, NT=Not Tested    BALANCE: Good Fair+ Fair Fair- Poor NT Comments   Sitting Static [x] [] [] [] [] []    Sitting Dynamic [] [x] [] [] [] []              Standing Static [] [] [x] [x] [] []

## 2024-11-13 NOTE — CARE COORDINATION
MSN, Martín:  patient to be discharged to [facility] today for rehab services.  Patient and friend agree with this discharge plan.  Patient has met all milestones for this admission.  [facility] to transport to facility.       11/13/24 0960   Service Assessment   Patient Orientation Alert and Oriented;Self   Cognition Alert   Services At/After Discharge   Transition of Care Consult (CM Consult) SNF  ([facility])   Partner SNF No   Reason Why Partner SNF Not Chosen Location   Services At/After Discharge Skilled Nursing Facility (SNF)   Mode of Transport at Discharge BLS  ([facility])   [facility] Transport Time of Discharge 1000   Confirm Follow Up Transport Friends   Condition of Participation: Discharge Planning   The Plan for Transition of Care is related to the following treatment goals: Short term rehab   The Patient and/or Patient Representative was provided with a Choice of Provider? Patient;Patient Representative   Name of the Patient Representative who was provided with the Choice of Provider and agrees with the Discharge Plan?  Ivett   The Patient and/Or Patient Representative agree with the Discharge Plan? Yes   Freedom of Choice list was provided with basic dialogue that supports the patient's individualized plan of care/goals, treatment preferences, and shares the quality data associated with the providers?  Yes

## 2024-11-13 NOTE — PROGRESS NOTES
Physician Progress Note      PATIENT:               LINDA VIZCAINO  [facility] #:                  [id number]  :                       1945  ADMIT DATE:       10/31/2024 5:15 PM  DISCH DATE:  RESPONDING  PROVIDER #:        Primo Redman MD          QUERY TEXT:    Patient admitted with weakness, & encephalopathy. Per HP: It is possible he   has an aspiration pneumonitis given his acute encephalopathy and underlying   dementia. Per  Note: This patient has aspiration pneumonia. If possible,   please document in the progress notes and discharge summary if aspiration   pneumonia was:    The medical record reflects the following:  Risk Factors: Acute metabolic encephalopathy, Dementia  Clinical Indicators: Per HP: Chest x-ray with possible right lower lobe   infiltrate.  As patient is afebrile and has no tachypnea, tachycardia or   leukocytosis, I think pneumonia is less likely.  It is possible he has an   aspiration pneumonitis given his acute encephalopathy and underlying dementia.   Per other progress notes: Pneumonia of right lower lobe due to infectious   organism. Per  Note: This patient has aspiration pneumonia.  Treatment: Rocephin and azithromycin  Options provided:  -- Aspiration pneumonia confirmed after study  -- Aspiration pneumonia treated and resolved  -- Aspiration pneumonia ruled out after study  -- Other - I will add my own diagnosis  -- Disagree - Not applicable / Not valid  -- Disagree - Clinically unable to determine / Unknown  -- Refer to Clinical Documentation Reviewer    PROVIDER RESPONSE TEXT:    Provider disagreed with this query.  na    Query created by: Crystal Son on 2024 7:09 AM      Electronically signed by:  Primo Redman MD 2024 7:32 AM

## 2024-11-30 NOTE — PROGRESS NOTES
Physician Progress Note      PATIENT:               LINDA VIZCAINO  [facility] #:                  [id number]  :                       1945  ADMIT DATE:       10/31/2024 5:15 PM  DISCH DATE:        2024 10:23 AM  RESPONDING  PROVIDER #:        Primo Redman MD          QUERY TEXT:    Dr Redman,    Patient admitted with lower extremity weakness, & metabolic encephalopathy   with known dementia. Per notes: He lives by himself, but he probably needs   nursing home. Not safe to be discharged back to his place, as he does not move   around at home. On admission noted:  He will urinate in the bed/recliner, but   he is not incontinent, he just does not want to get up.  If possible, please   document in the progress notes and discharge summary if you are evaluating and   / or treating any of the following:  The medical record reflects the following:  Risk Factors: Elderly, Patient has abandoned his daughters and they are not   involved in his care.  Clinical Indicators: Per notes: He lives by himself, but he probably needs   nursing home. Not safe to be discharged back to his place, as he does not move   around at home. On admission noted:  He will urinate in the bed/recliner, but   he is not incontinent, he just does not want to get up.  Treatment: PT/OT & discharge to short-term rehab  Options provided:  -- Lower extremity weakness due to Age Related Physical Debility  -- Lower extremity weakness due to Frailty  -- Lower extremity weakness due to other, Please document other cause.  -- Other - I will add my own diagnosis  -- Disagree - Not applicable / Not valid  -- Disagree - Clinically unable to determine / Unknown  -- Refer to Clinical Documentation Reviewer    PROVIDER RESPONSE TEXT:    This patient has Lower extremity weakness due to Age Related Physical Debility    Query created by: Crystal Son on 2024 5:43 AM      Electronically signed by:  Primo Redman MD 2024 8:32 AM

## 2025-03-06 ENCOUNTER — APPOINTMENT (OUTPATIENT)
Dept: GENERAL RADIOLOGY | Age: 80
End: 2025-03-06
Payer: OTHER GOVERNMENT

## 2025-03-06 ENCOUNTER — HOSPITAL ENCOUNTER (EMERGENCY)
Age: 80
Discharge: NURSING, REHAB OR LONG-TERM CARE | End: 2025-03-06
Attending: EMERGENCY MEDICINE | Admitting: INTERNAL MEDICINE
Payer: OTHER GOVERNMENT

## 2025-03-06 VITALS
WEIGHT: 184.5 LBS | HEIGHT: 70 IN | DIASTOLIC BLOOD PRESSURE: 61 MMHG | SYSTOLIC BLOOD PRESSURE: 130 MMHG | TEMPERATURE: 98 F | BODY MASS INDEX: 26.41 KG/M2 | OXYGEN SATURATION: 98 % | RESPIRATION RATE: 18 BRPM | HEART RATE: 90 BPM

## 2025-03-06 DIAGNOSIS — F03.90 DEMENTIA, UNSPECIFIED DEMENTIA SEVERITY, UNSPECIFIED DEMENTIA TYPE, UNSPECIFIED WHETHER BEHAVIORAL, PSYCHOTIC, OR MOOD DISTURBANCE OR ANXIETY (HCC): ICD-10-CM
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
## 2025-03-06 DIAGNOSIS — R55 SYNCOPE AND COLLAPSE: ICD-10-CM

## 2025-03-06 LAB
ALBUMIN SERPL-MCNC: 3.1 G/DL (ref 3.2–4.6)
ALBUMIN/GLOB SERPL: 1 (ref 1–1.9)
ALP SERPL-CCNC: 130 U/L (ref 40–129)
ALT SERPL-CCNC: 13 U/L (ref 8–55)
ANION GAP SERPL CALC-SCNC: 9 MMOL/L (ref 7–16)
AST SERPL-CCNC: 14 U/L (ref 15–37)
BASOPHILS # BLD: 0.05 K/UL (ref 0–0.2)
BASOPHILS NFR BLD: 0.6 % (ref 0–2)
BILIRUB SERPL-MCNC: 0.3 MG/DL (ref 0–1.2)
BUN SERPL-MCNC: 23 MG/DL (ref 8–23)
CALCIUM SERPL-MCNC: 9.6 MG/DL (ref 8.8–10.2)
CHLORIDE SERPL-SCNC: 107 MMOL/L (ref 98–107)
CO2 SERPL-SCNC: 25 MMOL/L (ref 20–29)
CREAT SERPL-MCNC: 1.28 MG/DL (ref 0.8–1.3)
DIFFERENTIAL METHOD BLD: ABNORMAL
EOSINOPHIL # BLD: 0.28 K/UL (ref 0–0.8)
EOSINOPHIL NFR BLD: 3.5 % (ref 0.5–7.8)
ERYTHROCYTE [DISTWIDTH] IN BLOOD BY AUTOMATED COUNT: 14.6 % (ref 11.9–14.6)
FLUAV RNA SPEC QL NAA+PROBE: NOT DETECTED
FLUBV RNA SPEC QL NAA+PROBE: NOT DETECTED
GLOBULIN SER CALC-MCNC: 3 G/DL (ref 2.3–3.5)
GLUCOSE SERPL-MCNC: 168 MG/DL (ref 70–99)
HCT VFR BLD AUTO: 39.5 % (ref 41.1–50.3)
HGB BLD-MCNC: 13.1 G/DL (ref 13.6–17.2)
IMM GRANULOCYTES # BLD AUTO: 0.04 K/UL (ref 0–0.5)
IMM GRANULOCYTES NFR BLD AUTO: 0.5 % (ref 0–5)
LYMPHOCYTES # BLD: 0.94 K/UL (ref 0.5–4.6)
LYMPHOCYTES NFR BLD: 11.6 % (ref 13–44)
MAGNESIUM SERPL-MCNC: 2.2 MG/DL (ref 1.8–2.4)
MCH RBC QN AUTO: 29.4 PG (ref 26.1–32.9)
MCHC RBC AUTO-ENTMCNC: 33.2 G/DL (ref 31.4–35)
MCV RBC AUTO: 88.8 FL (ref 82–102)
MONOCYTES # BLD: 0.6 K/UL (ref 0.1–1.3)
MONOCYTES NFR BLD: 7.4 % (ref 4–12)
NEUTS SEG # BLD: 6.16 K/UL (ref 1.7–8.2)
NEUTS SEG NFR BLD: 76.4 % (ref 43–78)
NRBC # BLD: 0 K/UL (ref 0–0.2)
PLATELET # BLD AUTO: 210 K/UL (ref 150–450)
PMV BLD AUTO: 9.8 FL (ref 9.4–12.3)
POTASSIUM SERPL-SCNC: 4 MMOL/L (ref 3.5–5.1)
PROCALCITONIN SERPL-MCNC: 0.07 NG/ML (ref 0–0.1)
PROT SERPL-MCNC: 6.1 G/DL (ref 6.3–8.2)
RBC # BLD AUTO: 4.45 M/UL (ref 4.23–5.6)
SARS-COV-2 RDRP RESP QL NAA+PROBE: NOT DETECTED
SODIUM SERPL-SCNC: 141 MMOL/L (ref 136–145)
SOURCE: NORMAL
WBC # BLD AUTO: 8.1 K/UL (ref 4.3–11.1)

## 2025-03-06 PROCEDURE — 83735 ASSAY OF MAGNESIUM: CPT

## 2025-03-06 PROCEDURE — 80053 COMPREHEN METABOLIC PANEL: CPT

## 2025-03-06 PROCEDURE — 84145 PROCALCITONIN (PCT): CPT

## 2025-03-06 PROCEDURE — 71045 X-RAY EXAM CHEST 1 VIEW: CPT

## 2025-03-06 PROCEDURE — 99284 EMERGENCY DEPT VISIT MOD MDM: CPT

## 2025-03-06 PROCEDURE — 85025 COMPLETE CBC W/AUTO DIFF WBC: CPT

## 2025-03-06 PROCEDURE — 87502 INFLUENZA DNA AMP PROBE: CPT

## 2025-03-06 PROCEDURE — 87635 SARS-COV-2 COVID-19 AMP PRB: CPT

## 2025-03-06 ASSESSMENT — ENCOUNTER SYMPTOMS
COUGH: 1
EYE REDNESS: 0
BACK PAIN: 0
CHOKING: 0
VOICE CHANGE: 0
ANAL BLEEDING: 1

## 2025-03-06 ASSESSMENT — LIFESTYLE VARIABLES
HOW OFTEN DO YOU HAVE A DRINK CONTAINING ALCOHOL: NEVER
HOW MANY STANDARD DRINKS CONTAINING ALCOHOL DO YOU HAVE ON A TYPICAL DAY: PATIENT DOES NOT DRINK

## 2025-03-06 ASSESSMENT — PAIN DESCRIPTION - LOCATION: LOCATION: NECK

## 2025-03-06 ASSESSMENT — PAIN SCALES - GENERAL: PAINLEVEL_OUTOF10: 3

## 2025-03-06 NOTE — ED TRIAGE NOTES
Patient arrives via [facility] from [place] [facility] with CO patient found unresponsive by staff. On EMS arrival patient alert but confused more than baseline, normally oriented x 2    /60 HR 70, 98% on RA,

## 2025-03-06 NOTE — ED NOTES
Attempted several time to give report to [facility] about patients condition. Several times there was no answer, one time they answered and hung up. [facility] to transport patient back to facility.      Hayley Rojas, TRAVIS  03/06/25 9892

## 2025-03-06 NOTE — ED PROVIDER NOTES
unwitnessed fall.  However staff reports she may have been less responsive.  EMS was called.  Upon arrival patient was awake and alert.  Had normal vital signs.  They did check a fingerstick blood sugar which was normal.  Patient does have known dementia.  Has no complaints at this time.  EMS is staff at the nursing facility did notice that he has had some cough.    The history is provided by the patient and the EMS personnel. The history is limited by the condition of the patient.       ROS     Review of Systems   Constitutional:  Negative for diaphoresis.   HENT:  Negative for voice change.    Eyes:  Negative for redness.   Respiratory:  Positive for cough. Negative for choking.    Cardiovascular:  Negative for chest pain and leg swelling.   Gastrointestinal:  Positive for anal bleeding.   Endocrine: Negative for polydipsia.   Musculoskeletal:  Negative for back pain and gait problem.   Skin:  Negative for rash.   Neurological:  Negative for syncope and speech difficulty.   Hematological:  Negative for adenopathy. Does not bruise/bleed easily.   All other systems reviewed and are negative.       Physical Exam     Vitals signs and nursing note reviewed:  Vitals:    03/06/25 1400 03/06/25 1415 03/06/25 1435 03/06/25 1440   BP: 107/68 99/69 120/73    Pulse: 67 67 74 66   Resp: 19 18 20 16   Temp:       TempSrc:       SpO2: 95% 98%  97%   Weight:       Height:          Physical Exam  Vitals and nursing note reviewed.   Constitutional:       Appearance: Normal appearance.   HENT:      Head: Normocephalic and atraumatic.      Right Ear: External ear normal.   Eyes:      General:         Right eye: No discharge.         Left eye: No discharge.      Extraocular Movements: Extraocular movements intact.      Pupils: Pupils are equal, round, and reactive to light.   Cardiovascular:      Rate and Rhythm: Normal rate and regular rhythm.      Pulses: Normal pulses.      Heart sounds: Normal heart sounds.   Pulmonary:

## 2025-03-06 NOTE — DISCHARGE INSTRUCTIONS
Follow-up with primary care provider  Return to the ER for any new, worsening or life-threatening symptom

## 2025-06-07 NOTE — PROGRESS NOTES
ACUTE OCCUPATIONAL THERAPY GOALS:   (Developed with and agreed upon by patient and/or caregiver.)  1. Patient will complete upper body bathing and dressing with min A, verbal cues, and adaptive equipment as needed.   2. Patient will complete toileting with max A, verbal cues, and adaptive equipment as needed.   3. Patient will complete functional transfers with min A, verbal cues, and adaptive equipment as needed.   4. Patient will tolerate at least 15 minutes of OT activity with less than 2 rest breaks while maintaining O2 sats >90%.   5. Patient will complete grooming in supported sitting at sink level with min A and verbal cues.  6. Patient will complete household distances with min A and adaptive equipment as needed.     Timeframe: 7 visits    OCCUPATIONAL THERAPY: Daily Note AM   OT Visit Days: 2   Time In/Out  OT Charge Capture  Rehab Caseload Tracker  OT Orders    Seth Smith is a 79 y.o. male   PRIMARY DIAGNOSIS: Acute metabolic encephalopathy  Metabolic encephalopathy [G93.41]  Generalized abdominal pain [R10.84]  Weakness of both lower extremities [R29.898]  Constipation, unspecified constipation type [K59.00]  Pneumonia of right lower lobe due to infectious organism [J18.9]  Acute metabolic encephalopathy [G93.41]       Inpatient: Payor: [employer] / Plan: [employer] / Product Type: *No Product type* /     ASSESSMENT:     REHAB RECOMMENDATIONS:   Recommendation to date pending progress:  Setting:  Short-term Rehab    Equipment:    To Be Determined     ASSESSMENT:  Mr. Smith is doing fair today. More cooperative for therapy. Pt was able to complete bed mobility with SBA/CGA. Fair sitting balance noted at edge of bed. Pt was able to stand with min A (x2 at times) and perform mobility in room. Placed at sink to complete self care tasks. Pt required min A to complete this session. Pt the performed functional mobility in hallway using rolling walker. Required min A x2 to perform safely. One  Intervention Initiated From:  [facility] / EICU    Peter intervened regarding:  Rounding (Video assessment)    Comments: Virtual ICU Quality Rounds    Admit Date: 6/2/2025  Hospital Day: 5    ICU Day: 16h    24H Vital Sign Range:  Temp:  [97.1 °F (36.2 °C)-97.7 °F (36.5 °C)]   Pulse:  []   Resp:  [16-46]   BP: ()/()   SpO2:  [84 %-100 %]     VICU Surveillance Screening    Daily review of  line necessity(optional): Central line(s) in place and Urinary catheter in place    Central line type (optional): Trialysis    Central line indications : Dialysis/CRRT    Hassan Indications : Critically ill in the intensive care unit requiring hourly urine output monitoring     LDA reconciliation : Yes                           Min Mod Max Total NT Comments   BASIC ADLs:              Upper Body   Bathing [] [] [] [x] [] [] [] [] [] []     Lower Body Bathing [] [] [] [] [] [x] [] [] [] []     Toileting [] [] [] [] [] [] [] [] [] [x]    Upper Body Dressing [] [] [x] [] [] [] [] [] [] []    Lower Body Dressing [] [] [] [] [] [] [] [x] [] []    Feeding [] [] [] [] [] [] [] [] [] [x]    Grooming [] [] [] [] [] [x] [] [] [] []    Personal Device Care [] [] [] [] [] [] [] [] [] [x]    Functional Mobility [] [] [] [] [] [x] [] [] [] [] x2   I=Independent, Mod I=Modified Independent, S=Supervision/Setup, SBA=Standby Assistance, CGA=Contact Guard Assistance, Min=Minimal Assistance, Mod=Moderate Assistance, Max=Maximal Assistance, Total=Total Assistance, NT=Not Tested    BALANCE: Good Fair+ Fair Fair- Poor NT Comments   Sitting Static [] [] [x] [] [] []    Sitting Dynamic [] [] [x] [] [] []              Standing Static [] [] [x] [] [] []    Standing Dynamic [] [] [] [x] [] []        PLAN:     FREQUENCY/DURATION   OT Plan of Care: 3 times/week for duration of hospital stay or until stated goals are met, whichever comes first.    TREATMENT:     TREATMENT:   Co-Treatment PT/OT necessary due to patient's decreased overall endurance/tolerance levels, as well as need for high level skilled assistance to complete functional transfers/mobility and functional tasks  Neuromuscular Re-education (15 Minutes): Patient participated in neuromuscular re-education including functional reaching, weight shifting, postural training, midline training, standing tolerance activity , and sitting balance activity   with minimal and moderate assistance, verbal cues, and tactile cues to improve sitting balance, standing balance, posture, coordination, static balance, and dynamic balance in order to prepare for functional task, prepare for seated ADLs, prepare for standing ADLs, prepare for functional transfer, and increase safety awareness.   Self Care (15 minutes): Patient

## (undated) DEVICE — FORCEPS BX L240CM JAW DIA2.8MM L CAP W/ NDL MIC MESH TOOTH

## (undated) DEVICE — CONNECTOR TBNG OD5-7MM O2 END DISP

## (undated) DEVICE — BLOCK BITE AD 60FR W/ VELC STRP ADDRESSES MOST PT AND

## (undated) DEVICE — CANNULA NSL ORAL AD FOR CAPNOFLEX CO2 O2 AIRLFE

## (undated) DEVICE — CONTAINER PREFIL FRMLN 40ML --

## (undated) DEVICE — KENDALL RADIOLUCENT FOAM MONITORING ELECTRODE RECTANGULAR SHAPE: Brand: KENDALL